# Patient Record
Sex: FEMALE | Race: WHITE | Employment: UNEMPLOYED | URBAN - METROPOLITAN AREA
[De-identification: names, ages, dates, MRNs, and addresses within clinical notes are randomized per-mention and may not be internally consistent; named-entity substitution may affect disease eponyms.]

---

## 2017-03-23 RX ORDER — ESCITALOPRAM OXALATE 20 MG/1
TABLET ORAL
Qty: 30 TABLET | Refills: 0 | Status: SHIPPED | OUTPATIENT
Start: 2017-03-23 | End: 2017-03-28 | Stop reason: SDUPTHER

## 2017-03-28 RX ORDER — ESCITALOPRAM OXALATE 20 MG/1
20 TABLET ORAL DAILY
Qty: 30 TABLET | Refills: 0 | Status: SHIPPED | OUTPATIENT
Start: 2017-03-28 | End: 2017-03-30 | Stop reason: SDUPTHER

## 2017-03-30 ENCOUNTER — OFFICE VISIT (OUTPATIENT)
Dept: FAMILY MEDICINE CLINIC | Age: 37
End: 2017-03-30

## 2017-03-30 VITALS
RESPIRATION RATE: 16 BRPM | HEART RATE: 86 BPM | WEIGHT: 166 LBS | DIASTOLIC BLOOD PRESSURE: 70 MMHG | HEIGHT: 59 IN | SYSTOLIC BLOOD PRESSURE: 124 MMHG | TEMPERATURE: 98 F | BODY MASS INDEX: 33.47 KG/M2

## 2017-03-30 DIAGNOSIS — F32.A ANXIETY AND DEPRESSION: ICD-10-CM

## 2017-03-30 DIAGNOSIS — G35 MS (MULTIPLE SCLEROSIS) (HCC): Primary | ICD-10-CM

## 2017-03-30 DIAGNOSIS — F41.9 ANXIETY AND DEPRESSION: ICD-10-CM

## 2017-03-30 DIAGNOSIS — H66.91 RIGHT OTITIS MEDIA, UNSPECIFIED CHRONICITY, UNSPECIFIED OTITIS MEDIA TYPE: ICD-10-CM

## 2017-03-30 PROCEDURE — 99213 OFFICE O/P EST LOW 20 MIN: CPT | Performed by: FAMILY MEDICINE

## 2017-03-30 RX ORDER — CEFDINIR 300 MG/1
300 CAPSULE ORAL 2 TIMES DAILY
Qty: 20 CAPSULE | Refills: 0 | Status: SHIPPED | OUTPATIENT
Start: 2017-03-30 | End: 2017-07-26 | Stop reason: SDUPTHER

## 2017-03-30 RX ORDER — LORAZEPAM 0.5 MG/1
TABLET ORAL
Qty: 60 TABLET | Refills: 1 | Status: SHIPPED | OUTPATIENT
Start: 2017-03-30 | End: 2017-05-31 | Stop reason: SDUPTHER

## 2017-03-30 RX ORDER — ESCITALOPRAM OXALATE 20 MG/1
20 TABLET ORAL DAILY
Qty: 30 TABLET | Refills: 5 | Status: SHIPPED | OUTPATIENT
Start: 2017-03-30 | End: 2017-10-29 | Stop reason: SDUPTHER

## 2017-03-30 ASSESSMENT — ENCOUNTER SYMPTOMS
GASTROINTESTINAL NEGATIVE: 1
RHINORRHEA: 0
CHEST TIGHTNESS: 0
COUGH: 0
EYES NEGATIVE: 1
RESPIRATORY NEGATIVE: 1

## 2017-05-31 DIAGNOSIS — F41.9 ANXIETY AND DEPRESSION: ICD-10-CM

## 2017-05-31 DIAGNOSIS — F32.A ANXIETY AND DEPRESSION: ICD-10-CM

## 2017-06-01 RX ORDER — LORAZEPAM 0.5 MG/1
TABLET ORAL
Qty: 60 TABLET | Refills: 0 | Status: SHIPPED | OUTPATIENT
Start: 2017-06-01 | End: 2017-06-29 | Stop reason: SDUPTHER

## 2017-06-29 ENCOUNTER — OFFICE VISIT (OUTPATIENT)
Dept: FAMILY MEDICINE CLINIC | Age: 37
End: 2017-06-29

## 2017-06-29 VITALS
DIASTOLIC BLOOD PRESSURE: 82 MMHG | HEART RATE: 74 BPM | RESPIRATION RATE: 18 BRPM | TEMPERATURE: 98.2 F | BODY MASS INDEX: 34.07 KG/M2 | SYSTOLIC BLOOD PRESSURE: 124 MMHG | WEIGHT: 169 LBS | OXYGEN SATURATION: 98 % | HEIGHT: 59 IN

## 2017-06-29 DIAGNOSIS — F32.A ANXIETY AND DEPRESSION: ICD-10-CM

## 2017-06-29 DIAGNOSIS — F41.9 ANXIETY AND DEPRESSION: ICD-10-CM

## 2017-06-29 DIAGNOSIS — G35 MS (MULTIPLE SCLEROSIS) (HCC): Primary | ICD-10-CM

## 2017-06-29 DIAGNOSIS — G62.9 NEUROPATHY: ICD-10-CM

## 2017-06-29 PROCEDURE — 99213 OFFICE O/P EST LOW 20 MIN: CPT | Performed by: FAMILY MEDICINE

## 2017-06-29 RX ORDER — LORAZEPAM 0.5 MG/1
TABLET ORAL
Qty: 60 TABLET | Refills: 1 | Status: SHIPPED | OUTPATIENT
Start: 2017-06-29 | End: 2017-12-04 | Stop reason: SDUPTHER

## 2017-06-29 RX ORDER — VARENICLINE TARTRATE 1 MG/1
1 TABLET, FILM COATED ORAL 2 TIMES DAILY
Qty: 60 TABLET | Refills: 3 | Status: SHIPPED | OUTPATIENT
Start: 2017-06-29 | End: 2018-04-10

## 2017-06-29 ASSESSMENT — ENCOUNTER SYMPTOMS
GASTROINTESTINAL NEGATIVE: 1
EYES NEGATIVE: 1
CHEST TIGHTNESS: 0
RHINORRHEA: 0
COUGH: 0
RESPIRATORY NEGATIVE: 1

## 2017-07-05 LAB
ALBUMIN SERPL-MCNC: 4 G/DL (ref 3.9–4.9)
ALP BLD-CCNC: 66 U/L (ref 40–130)
ALT SERPL-CCNC: 8 U/L (ref 0–33)
AST SERPL-CCNC: 13 U/L (ref 0–35)
BASOPHILS ABSOLUTE: 0.1 K/UL (ref 0–0.2)
BASOPHILS RELATIVE PERCENT: 1.4 %
BILIRUB SERPL-MCNC: 0.3 MG/DL (ref 0–1.2)
BILIRUBIN DIRECT: 0 MG/DL (ref 0–0.3)
BILIRUBIN, INDIRECT: 0.3 MG/DL (ref 0–0.6)
EOSINOPHILS ABSOLUTE: 0.6 K/UL (ref 0–0.7)
EOSINOPHILS RELATIVE PERCENT: 6 %
HCT VFR BLD CALC: 44.9 % (ref 37–47)
HEMOGLOBIN: 14.7 G/DL (ref 12–16)
LYMPHOCYTES ABSOLUTE: 2.6 K/UL (ref 1–4.8)
LYMPHOCYTES RELATIVE PERCENT: 27 %
MCH RBC QN AUTO: 31.9 PG (ref 27–31.3)
MCHC RBC AUTO-ENTMCNC: 32.8 % (ref 33–37)
MCV RBC AUTO: 97.2 FL (ref 82–100)
MONOCYTES ABSOLUTE: 0.7 K/UL (ref 0.2–0.8)
MONOCYTES RELATIVE PERCENT: 7.3 %
NEUTROPHILS ABSOLUTE: 5.7 K/UL (ref 1.4–6.5)
NEUTROPHILS RELATIVE PERCENT: 58.3 %
PDW BLD-RTO: 13.3 % (ref 11.5–14.5)
PLATELET # BLD: 292 K/UL (ref 130–400)
RBC # BLD: 4.62 M/UL (ref 4.2–5.4)
TOTAL PROTEIN: 6 G/DL (ref 6.4–8.1)
WBC # BLD: 9.8 K/UL (ref 4.8–10.8)

## 2017-07-26 ENCOUNTER — OFFICE VISIT (OUTPATIENT)
Dept: FAMILY MEDICINE CLINIC | Age: 37
End: 2017-07-26

## 2017-07-26 VITALS
TEMPERATURE: 98.5 F | BODY MASS INDEX: 32.2 KG/M2 | DIASTOLIC BLOOD PRESSURE: 82 MMHG | HEIGHT: 62 IN | RESPIRATION RATE: 18 BRPM | WEIGHT: 175 LBS | SYSTOLIC BLOOD PRESSURE: 134 MMHG | OXYGEN SATURATION: 97 % | HEART RATE: 75 BPM

## 2017-07-26 DIAGNOSIS — J01.90 ACUTE BACTERIAL SINUSITIS: ICD-10-CM

## 2017-07-26 DIAGNOSIS — H66.92 LEFT OTITIS MEDIA, UNSPECIFIED CHRONICITY, UNSPECIFIED OTITIS MEDIA TYPE: Primary | ICD-10-CM

## 2017-07-26 DIAGNOSIS — B96.89 ACUTE BACTERIAL SINUSITIS: ICD-10-CM

## 2017-07-26 PROCEDURE — 99213 OFFICE O/P EST LOW 20 MIN: CPT | Performed by: FAMILY MEDICINE

## 2017-07-26 RX ORDER — CEFDINIR 300 MG/1
300 CAPSULE ORAL 2 TIMES DAILY
Qty: 40 CAPSULE | Refills: 0 | Status: SHIPPED | OUTPATIENT
Start: 2017-07-26 | End: 2017-08-15

## 2017-07-26 ASSESSMENT — ENCOUNTER SYMPTOMS
COUGH: 0
RESPIRATORY NEGATIVE: 1
SORE THROAT: 1
CHEST TIGHTNESS: 0
GASTROINTESTINAL NEGATIVE: 1
SINUS PRESSURE: 1
RHINORRHEA: 0
EYES NEGATIVE: 1

## 2017-07-26 ASSESSMENT — PATIENT HEALTH QUESTIONNAIRE - PHQ9
SUM OF ALL RESPONSES TO PHQ9 QUESTIONS 1 & 2: 0
2. FEELING DOWN, DEPRESSED OR HOPELESS: 0
SUM OF ALL RESPONSES TO PHQ QUESTIONS 1-9: 0
1. LITTLE INTEREST OR PLEASURE IN DOING THINGS: 0

## 2017-08-21 ENCOUNTER — OFFICE VISIT (OUTPATIENT)
Dept: FAMILY MEDICINE CLINIC | Age: 37
End: 2017-08-21

## 2017-08-21 VITALS
WEIGHT: 176.7 LBS | HEIGHT: 62 IN | DIASTOLIC BLOOD PRESSURE: 58 MMHG | OXYGEN SATURATION: 98 % | BODY MASS INDEX: 32.52 KG/M2 | TEMPERATURE: 97.3 F | SYSTOLIC BLOOD PRESSURE: 94 MMHG | RESPIRATION RATE: 15 BRPM | HEART RATE: 73 BPM

## 2017-08-21 DIAGNOSIS — H69.93 EUSTACHIAN TUBE DISORDER, BILATERAL: Primary | ICD-10-CM

## 2017-08-21 PROCEDURE — 99212 OFFICE O/P EST SF 10 MIN: CPT | Performed by: FAMILY MEDICINE

## 2017-08-21 RX ORDER — AZITHROMYCIN 250 MG/1
TABLET, FILM COATED ORAL
Qty: 1 PACKET | Refills: 0 | Status: SHIPPED | OUTPATIENT
Start: 2017-08-21 | End: 2017-08-31

## 2017-08-21 RX ORDER — FLUTICASONE PROPIONATE 50 MCG
2 SPRAY, SUSPENSION (ML) NASAL DAILY
Qty: 1 BOTTLE | Refills: 0 | Status: SHIPPED | OUTPATIENT
Start: 2017-08-21 | End: 2017-09-18 | Stop reason: SDUPTHER

## 2017-08-21 ASSESSMENT — ENCOUNTER SYMPTOMS
VOICE CHANGE: 0
FACIAL SWELLING: 0

## 2017-09-16 RX ORDER — GABAPENTIN 300 MG/1
CAPSULE ORAL
Qty: 120 CAPSULE | Refills: 0 | Status: SHIPPED | OUTPATIENT
Start: 2017-09-16 | End: 2018-10-12 | Stop reason: DRUGHIGH

## 2017-09-18 RX ORDER — FLUTICASONE PROPIONATE 50 MCG
SPRAY, SUSPENSION (ML) NASAL
Qty: 3 BOTTLE | Refills: 3 | Status: SHIPPED | OUTPATIENT
Start: 2017-09-18 | End: 2018-04-10

## 2017-10-02 ENCOUNTER — OFFICE VISIT (OUTPATIENT)
Dept: FAMILY MEDICINE CLINIC | Age: 37
End: 2017-10-02

## 2017-10-02 VITALS
HEIGHT: 62 IN | TEMPERATURE: 98.1 F | BODY MASS INDEX: 32.94 KG/M2 | SYSTOLIC BLOOD PRESSURE: 124 MMHG | DIASTOLIC BLOOD PRESSURE: 72 MMHG | HEART RATE: 78 BPM | WEIGHT: 179 LBS | RESPIRATION RATE: 14 BRPM

## 2017-10-02 DIAGNOSIS — F41.9 ANXIETY AND DEPRESSION: ICD-10-CM

## 2017-10-02 DIAGNOSIS — F32.A ANXIETY AND DEPRESSION: ICD-10-CM

## 2017-10-02 DIAGNOSIS — G62.9 NEUROPATHY: ICD-10-CM

## 2017-10-02 DIAGNOSIS — G35 MS (MULTIPLE SCLEROSIS) (HCC): Primary | ICD-10-CM

## 2017-10-02 PROCEDURE — 99213 OFFICE O/P EST LOW 20 MIN: CPT | Performed by: FAMILY MEDICINE

## 2017-10-02 RX ORDER — CYCLOBENZAPRINE HCL 5 MG
TABLET ORAL
Refills: 0 | Status: ON HOLD | COMMUNITY
Start: 2017-09-08 | End: 2022-06-19

## 2017-10-02 RX ORDER — MELOXICAM 7.5 MG/1
TABLET ORAL
Refills: 0 | COMMUNITY
Start: 2017-09-08 | End: 2018-10-12

## 2017-10-02 ASSESSMENT — ENCOUNTER SYMPTOMS
GASTROINTESTINAL NEGATIVE: 1
CHEST TIGHTNESS: 0
COUGH: 0
EYES NEGATIVE: 1
RESPIRATORY NEGATIVE: 1
RHINORRHEA: 0

## 2017-10-02 NOTE — MR AVS SNAPSHOT
After Visit Summary             Sue Knee   10/2/2017 1:15 PM   Office Visit    Description:  Female : 1980   Provider:  Angella Reveles MD   Department:  [address] [place] [address] PCP              Your Follow-Up and Future Appointments         Below is a list of your follow-up and future appointments. This may not be a complete list as you may have made appointments directly with providers that we are not aware of or your providers may have made some for you. Please call your providers to confirm appointments. It is important to keep your appointments. Please bring your current insurance card, photo ID, co-pay, and all medication bottles to your appointment. If self-pay, payment is expected at the time of service. Your To-Do List     Future Appointments Provider Department Dept Phone    2018 1:15 PM Angella Reveles MD [address] [place] [address] [phone]    Please arrive 15 minutes prior to appointment, bring photo ID and insurance card. Information from Your Visit        Department     Name Address Phone Fax    [address] [place] [address] PCP [address] [facility] [id number] [phone]      You Were Seen for:         Comments    MS (multiple sclerosis) ([facility]   [[id number]]         Vital Signs     Blood Pressure Pulse Temperature Respirations Height Weight    124/72 78 98.1 °F (36.7 °C) (Tympanic) 14 5' 2\" (1.575 m) 179 lb (81.2 kg)    Body Mass Index Smoking Status                32.74 kg/m2 Former Smoker          Additional Information about your Body Mass Index (BMI)           Your BMI as listed above is considered obese (30 or more). BMI is an estimate of body fat, calculated from your height and weight. The higher your BMI, the greater your risk of heart disease, high blood pressure, type 2 diabetes, stroke, gallstones, arthritis, sleep apnea, and certain cancers. BMI is not perfect.   It may overestimate body fat in athletes and aged 15-65 years at least once (lifetime) who have never been HIV tested. 8/15/2026 (Originally 4/7/1995)    Pap Smear 2/10/2018    Tetanus Combination Vaccine (2 - Td) 2/1/2021            MyChart Signup           Related Content Database (RCDb) allows you to send messages to your doctor, view your test results, renew your prescriptions, schedule appointments, view visit notes, and more. How Do I Sign Up? 1. In your Internet browser, go to [email or web address]  2. Click on the Sign Up Now link in the Sign In box. You will see the New Member Sign Up page. 3. Enter your Related Content Database (RCDb) Access Code exactly as it appears below. You will not need to use this code after youve completed the sign-up process. If you do not sign up before the expiration date, you must request a new code. Related Content Database (RCDb) Access Code: [email or web address]  Expires: 12/1/2017  1:32 PM    4. Enter your Social Security Number ([id number]) and Date of Birth (mm/dd/yyyy) as indicated and click Submit. You will be taken to the next sign-up page. 5. Create a Related Content Database (RCDb) ID. This will be your Related Content Database (RCDb) login ID and cannot be changed, so think of one that is secure and easy to remember. 6. Create a Related Content Database (RCDb) password. You can change your password at any time. 7. Enter your Password Reset Question and Answer. This can be used at a later time if you forget your password. 8. Enter your e-mail address. You will receive e-mail notification when new information is available in [address]. 9. Click Sign Up. You can now view your medical record. Additional Information  If you have questions, please contact the physician practice where you receive care. Remember, Related Content Database (RCDb) is NOT to be used for urgent needs. For medical emergencies, dial 911. For questions regarding your Related Content Database (RCDb) account call [phone]. If you have a clinical question, please call your doctor's office.

## 2017-10-02 NOTE — PROGRESS NOTES
Patient is seen in follow up for   Chief Complaint   Patient presents with   Saige [address] for follow up on tmj from ent . Will discuss with her dentist.    No past medical history on file.   Patient Active Problem List    Diagnosis Date Noted    Neuropathy ([facility] 2016    Anxiety and depression 2016    MS (multiple sclerosis) ([facility] 2015     Past Surgical History:   Procedure Laterality Date    APPENDECTOMY      BREAST SURGERY      lump removal bengin     SECTION      x 3     Family History   Problem Relation Age of Onset    Diabetes Mother     Heart Disease Father      Social History     Social History    Marital status:      Spouse name: N/A    Number of children: N/A    Years of education: N/A     Social History Main Topics    Smoking status: Former Smoker     Quit date: 2017    Smokeless tobacco: Never Used    Alcohol use No    Drug use: No    Sexual activity: Not Asked     Other Topics Concern    None     Social History Narrative     Current Outpatient Prescriptions   Medication Sig Dispense Refill    meloxicam (MOBIC) 7.5 MG tablet TK 1 T PO QD  0    cyclobenzaprine (FLEXERIL) 5 MG tablet TK 1 T PO BID  0    fluticasone (FLONASE) 50 MCG/ACT nasal spray SHAKE LIQUID AND USE 2 SPRAYS IN EACH NOSTRIL DAILY 3 Bottle 3    gabapentin (NEURONTIN) 300 MG capsule TAKE 1 CAPSULE BY MOUTH FOUR TIMES DAILY 120 capsule 0    LORazepam (ATIVAN) 0.5 MG tablet TAKE 1 TABLET BY MOUTH EVERY 8 HOURS AS NEEDED FOR ANXIETY 60 tablet 1    varenicline (CHANTIX CONTINUING MONTH HEVER) 1 MG tablet Take 1 tablet by mouth 2 times daily 60 tablet 3    escitalopram (LEXAPRO) 20 MG tablet Take 1 tablet by mouth daily 30 tablet 5    AUBAGIO 14 MG TABS       oxyCODONE-acetaminophen (PERCOCET) 5-325 MG per tablet Take 1 tablet by mouth every 6 hours as needed for Pain 60 tablet 0    zolpidem (AMBIEN) 10 MG tablet Take 1 tablet by mouth nightly as needed for Sleep 30 Musculoskeletal: Negative. Skin: Negative. Neurological: Negative for dizziness, light-headedness and numbness. Hematological: Negative. Psychiatric/Behavioral: Negative. Physical Exam  Vitals:    10/02/17 1315   BP: 124/72   Pulse: 78   Resp: 14   Temp: 98.1 °F (36.7 °C)   TempSrc: Tympanic   Weight: 179 lb (81.2 kg)   Height: 5' 2\" (1.575 m)       Physical Exam   Constitutional: She appears well-developed and well-nourished. HENT:   Right Ear: External ear normal.   Left Ear: External ear normal.   Eyes: Conjunctivae are normal. Pupils are equal, round, and reactive to light. Neck: Normal range of motion. Neck supple. No thyromegaly present. Cardiovascular: Normal rate, regular rhythm and normal heart sounds. No murmur heard. Pulmonary/Chest: Effort normal and breath sounds normal.   Abdominal: Soft. Bowel sounds are normal. She exhibits no distension. There is no tenderness. Musculoskeletal: Normal range of motion. She exhibits no edema or tenderness. Lymphadenopathy:     She has no cervical adenopathy. Neurological: She is alert. No cranial nerve deficit. Coordination normal.       Assessment  1. MS (multiple sclerosis) ([address]     2. Anxiety and depression     3. Neuropathy ([address]       Problem List Items Addressed This Visit     MS (multiple sclerosis) ([address] - Primary    Relevant Medications    AUBAGIO 14 MG TABS    Neuropathy (HCC)    Anxiety and depression    Relevant Medications    LORazepam (ATIVAN) 0.5 MG tablet          Plan  Will follow uop with  for MS and Dentist for the tmj  No orders of the defined types were placed in this encounter. No Follow-up on file.   Xavi Willson MD

## 2017-10-18 RX ORDER — GABAPENTIN 300 MG/1
CAPSULE ORAL
Qty: 120 CAPSULE | Refills: 0 | Status: SHIPPED | OUTPATIENT
Start: 2017-10-18 | End: 2018-04-10 | Stop reason: SDUPTHER

## 2017-10-30 RX ORDER — ESCITALOPRAM OXALATE 20 MG/1
20 TABLET ORAL DAILY
Qty: 30 TABLET | Refills: 0 | Status: SHIPPED | OUTPATIENT
Start: 2017-10-30 | End: 2017-12-19 | Stop reason: SDUPTHER

## 2017-11-01 LAB
ALBUMIN SERPL-MCNC: 4.3 G/DL (ref 3.9–4.9)
ALP BLD-CCNC: 68 U/L (ref 40–130)
ALT SERPL-CCNC: 13 U/L (ref 0–33)
AST SERPL-CCNC: 18 U/L (ref 0–35)
BASOPHILS ABSOLUTE: 0.1 K/UL (ref 0–0.2)
BASOPHILS RELATIVE PERCENT: 1.4 %
BILIRUB SERPL-MCNC: 0.3 MG/DL (ref 0–1.2)
BILIRUBIN DIRECT: 0 MG/DL (ref 0–0.3)
BILIRUBIN, INDIRECT: 0.3 MG/DL (ref 0–0.6)
EOSINOPHILS ABSOLUTE: 0.6 K/UL (ref 0–0.7)
EOSINOPHILS RELATIVE PERCENT: 7.5 %
HCT VFR BLD CALC: 42.3 % (ref 37–47)
HEMOGLOBIN: 13.8 G/DL (ref 12–16)
LYMPHOCYTES ABSOLUTE: 2.7 K/UL (ref 1–4.8)
LYMPHOCYTES RELATIVE PERCENT: 31.7 %
MCH RBC QN AUTO: 31.4 PG (ref 27–31.3)
MCHC RBC AUTO-ENTMCNC: 32.6 % (ref 33–37)
MCV RBC AUTO: 96.2 FL (ref 82–100)
MONOCYTES ABSOLUTE: 0.7 K/UL (ref 0.2–0.8)
MONOCYTES RELATIVE PERCENT: 8.5 %
NEUTROPHILS ABSOLUTE: 4.3 K/UL (ref 1.4–6.5)
NEUTROPHILS RELATIVE PERCENT: 50.9 %
PDW BLD-RTO: 13.9 % (ref 11.5–14.5)
PLATELET # BLD: 316 K/UL (ref 130–400)
RBC # BLD: 4.39 M/UL (ref 4.2–5.4)
TOTAL PROTEIN: 6.6 G/DL (ref 6.4–8.1)
WBC # BLD: 8.5 K/UL (ref 4.8–10.8)

## 2017-11-14 ENCOUNTER — HOSPITAL ENCOUNTER (OUTPATIENT)
Dept: MRI IMAGING | Age: 37
Discharge: HOME | End: 2017-11-14
Payer: COMMERCIAL

## 2017-11-14 ENCOUNTER — HOSPITAL ENCOUNTER (OUTPATIENT)
Dept: PHYSICAL THERAPY | Age: 37
Setting detail: THERAPIES SERIES
Discharge: HOME | End: 2017-11-14
Payer: COMMERCIAL

## 2017-11-14 DIAGNOSIS — G35 MS (MULTIPLE SCLEROSIS) (HCC): ICD-10-CM

## 2017-11-14 PROCEDURE — 6360000004 HC RX CONTRAST MEDICATION: Performed by: PSYCHIATRY & NEUROLOGY

## 2017-11-14 PROCEDURE — 70553 MRI BRAIN STEM W/O & W/DYE: CPT

## 2017-11-14 PROCEDURE — A9579 GAD-BASE MR CONTRAST NOS,1ML: HCPCS | Performed by: PSYCHIATRY & NEUROLOGY

## 2017-11-14 PROCEDURE — 97162 PT EVAL MOD COMPLEX 30 MIN: CPT

## 2017-11-14 RX ORDER — SODIUM CHLORIDE 0.9 % (FLUSH) 0.9 %
10 SYRINGE (ML) INJECTION PRN
Status: DISCONTINUED | OUTPATIENT
Start: 2017-11-14 | End: 2017-11-17 | Stop reason: HOSPADM

## 2017-11-14 RX ADMIN — GADOTERIDOL 15 ML: 279.3 INJECTION, SOLUTION INTRAVENOUS at 18:34

## 2017-11-14 NOTE — PROGRESS NOTES
Curt willis, [address]     Ph: [phone]  Fax: [phone]    [] Certification  [] Recertification [x]  Plan of Care  [] Progress Note [] Discharge      To:  Referring Practitioner: Winda Galeazzi      From:  Randi Live PT  Patient: Kirit Saavedra     : 1980  Diagnosis: Ataxia, Multiple Sclerosis     Date: 2017  Treatment Diagnosis: MS, gait instability, balance impairment, frequent falls       OBJECTIVE:   Short Term Goals - Time Frame for Short term goals: 2 weeks     Goals Current/Discharge status  Met   Short term goal 1: Pt will be indep with HEP  Pt needs further instruction and education    [] yes  [x] no   Short term goal 2: Pt will report no falls throughout course of PT  Reports 6-10 falls per month [] yes  [x] no     Long Term Goals - Time Frame for Long term goals : 4 weeks   Goals Current/ Discharge status Met   Long term goal 1: DGI will increase to >/=20/24 to demonstrate decreased risk of falls   [] yes  [x] no   Long term goal 2: Pt will demonstrate 4+/5 strength in B hips for improved activity tolerance . Strength RLE  Comment: hip flex, ABD, ext, HS, ankle DF 4-/5  Strength LLE  Comment: hip flex, ABD, ext, HS, ankle DF 4-/5        Strength Other  Other: muscle fatigue quickly with MMT   [] yes  [x] no   Long term goal 3: Pt will complete 15' activity on land and pool without a rest break  ~5' activity tolerance  [] yes  [x] no       Body structures, Functions, Activity limitations: Decreased functional mobility , Decreased strength, Decreased balance, Decreased endurance, Decreased high-level IADLs  Assessment: Pt presents with history of MS and frequent with reports of functional decline and impaired balance and activity tolerance. Pt demonstrates impaired endurance, decreased strength, impaired dynamic stability,a nd increased risk for falls.  Pt would benefit from skilled PT to improve safety with mobility and indep function. Will trial aquatics per physician order and assess pt tolerance to warm water pool. New Education Provided: POC, aquatic guidelines    PLAN: [] Evaluate and Treat  Frequency/Duration:  Plan  Times per week: 2-3 pool and land combination. Based on tolerance may try cooler public pool. Plan weeks: 4  Current Treatment Recommendations: Strengthening, ROM, Balance Training, Transfer Training, Functional Mobility Training, Gait Training, Stair training, Neuromuscular Re-education, Manual Therapy - Soft Tissue Mobilization, Home Exercise Program, Safety Education & Training, Equipment Evaluation, Education, & procurement, Modalities, Patient/Caregiver Education & Training, Endurance Training, IADL Training  Plan Comment: Transfer POC to [employer], PT             ? Patient Status:[x] Continue/ Initiate plan of Care    [] Discharge PT. Recommend pt continue with HEP. [] Additional visits requested, Please re-certify for additional visits:          Signature: Electronically signed by Flaco Childers PT on 11/14/17 at 2:02 PM      If you have any questions or concerns, please don't hesitate to call. Thank you for your referral.    I have reviewed this plan of care and certify a need for medically necessary rehabilitation services.     Physician Signature:__________________________________________________________  Date:  Please sign and return

## 2017-11-14 NOTE — PROGRESS NOTES
instablity  Distance: DGI, short distances around clinic  Comments: DGI: 14/24, 1 rest break, c/o dizziness with horizontal head turns             Strength RLE  Comment: hip flex, ABD, ext, HS, ankle DF 4-/5  Strength LLE  Comment: hip flex, ABD, ext, HS, ankle DF 4-/5        Strength Other  Other: muscle fatigue quickly with MMT    PROM RLE (degrees)  RLE PROM: WFL     PROM LLE (degrees)  LLE PROM: WFL        Spine  Lumbar: WFL       Exercises:   Exercises  Exercise 1: see paper PRE for aquatic ex's  Exercise 3: ramirez tasks*  Exercise 4: dgi tasks*  Exercise 5: LE and core strengthening*  Exercise 6: gait training*  Exercise 7: activity and amb for endurance*       Assessment: Body structures, Functions, Activity limitations: Decreased functional mobility , Decreased strength, Decreased balance, Decreased endurance, Decreased high-level IADLs  Assessment: Pt presents with history of MS and frequent with reports of functional decline and impaired balance and activity tolerance. Pt demonstrates impaired endurance, decreased strength, impaired dynamic stability,a nd increased risk for falls. Pt would benefit from skilled PT to improve safety with mobility and indep function. Will trial aquatics per physician order and assess pt tolerance to warm water pool. Activity Tolerance: Patient limited by endurance  Activity Tolerance: limited tolerance for activity     Decision Making: Medium Complexity  History: MS, frequent falls, personal factors   Exam: Pain and sensory, musculoskeletal systems impacting ROM, strength, ADL/IADL completion, DGO 14/24  Clinical Presentation: evolving         Plan  Frequency/Duration:  Plan  Times per week: 2-3 pool and land combination. Based on tolerance may try cooler public pool.    Plan weeks: 4  Current Treatment Recommendations: Strengthening, ROM, Balance Training, Transfer Training, Functional Mobility Training, Gait Training, Stair training, Neuromuscular Re-education, Manual Therapy - Soft Tissue Mobilization, Home Exercise Program, Safety Education & Training, Equipment Evaluation, Education, & procurement, Modalities, Patient/Caregiver Education & Training, Endurance Training, IADL Training  Plan Comment: Transfer POC to Amy Moncada PT       Patient Education  New Education Provided: POC, aquatic guidelines    POST-PAIN     Pain Rating (0-10 pain scale):   0/10  Location and pain description same as pre-treatment unless indicated. Action: [x] NA  [] Call Physician  [] Perform HEP  [] Meds as prescribed    Evaluation and patient rights have been reviewed and patient agrees with plan of care. Yes  [x]  No  []   Explain:       Guzman Fall Risk Assessment  Risk Factor Scale  Score   History of Falls [x] Yes  [] No 25  0 25   Secondary Diagnosis [] Yes  [x] No 15  0 0   Ambulatory Aid [] Furniture  [x] Crutches/cane/walker  [] None/bedrest/wheelchair/nurse 30  15  0 15   IV/Heparin Lock [] Yes  [x] No 20  0 0   Gait/Transferring [x] Impaired  [] Weak  [] Normal/bedrest/immobile 20  10  0 20   Mental Status [] Forgets limitations  [x] Oriented to own ability 15  0 0      Total:50     Based on the Assessment score: check the appropriate box.   []  No intervention needed   Low =   Score of 0-24  []  Use standard prevention interventions Moderate =  Score of 24-44   [] Discuss fall prevention strategies   [] Indicate moderate falls risk on eval  [x]  Use high risk prevention interventions High = Score of 45 and higher   [x] Discuss fall prevention strategies   [x] Provide supervision during treatment time    Goals  Short term goals  Time Frame for Short term goals: 2 weeks   Short term goal 1: Pt will be indep with HEP  Short term goal 2: Pt will report no falls throughout course of PT  Long term goals  Time Frame for Long term goals : 4 weeks   Long term goal 1: DGI will increase to >/=20/24 to demonstrate decreased risk of falls   Long term goal 2: Pt will demonstrate 4+/5 strength in B hips for improved activity tolerance  Long term goal 3: Pt will complete 15' activity on land and pool without a rest break          PT Individual Minutes  Time In: 1304  Time Out: 1342  Minutes: 38  Timed Code Treatment Minutes: 0 Minutes  Procedure Minutes: 45  Electronically signed by Ethan Rivera PT on 11/14/17 at 1:13 PM

## 2017-11-21 ENCOUNTER — HOSPITAL ENCOUNTER (OUTPATIENT)
Dept: PHYSICAL THERAPY | Age: 37
Setting detail: THERAPIES SERIES
Discharge: HOME | End: 2017-11-21
Payer: COMMERCIAL

## 2017-11-21 PROCEDURE — 97110 THERAPEUTIC EXERCISES: CPT

## 2017-11-21 PROCEDURE — 97112 NEUROMUSCULAR REEDUCATION: CPT

## 2017-11-21 RX ORDER — GABAPENTIN 300 MG/1
CAPSULE ORAL
Qty: 120 CAPSULE | Refills: 0 | Status: SHIPPED | OUTPATIENT
Start: 2017-11-21 | End: 2018-04-10 | Stop reason: SDUPTHER

## 2017-11-21 ASSESSMENT — PAIN DESCRIPTION - DESCRIPTORS: DESCRIPTORS: ACHING

## 2017-11-21 ASSESSMENT — PAIN SCALES - GENERAL: PAINLEVEL_OUTOF10: 6

## 2017-11-21 NOTE — PROGRESS NOTES
[address] [address]  Outpatient Physical Therapy    Treatment Note        Date: 2017  Patient: Eugenia Hanson  : 1980  ACCT #: [de-identified]  Referring Practitioner: Tyler Leigh  Diagnosis: Ataxia, Multiple Sclerosis    Visit Information:  PT Visit Information  PT Insurance Information: [employer]  Total # of Visits Approved: 30  Total # of Visits to Date: 2  No Show: 0  Canceled Appointment: 0  Progress Note Counter:     Subjective: Pt reports feeling fatigued today after going to [facility] [employer] for hearing for disability. Vital Signs  Patient Currently in Pain: Yes   Pain Screening  Patient Currently in Pain: Yes  Pain Assessment  Pain Assessment: 0-10  Pain Level: 6  Pain Location:  (all over)  Pain Descriptors: Aching    OBJECTIVE:   Exercises  Exercise 2: Foam: FA, semitandem  Exercise 5: SLR (flex, abd and ext) x10 louis  Exercise 7: Sitting drawing alphabet with ball x1 louis  Exercise 8: NuStep L1 8'  Exercise 9: Sit to stand from chair without UEs x5  Exercise 20: HEP: 3 way SLR    Balance  Comments: Quan = 42/56    Assessment:   Activity Tolerance  Activity Tolerance: Patient limited by fatigue    Body structures, Functions, Activity limitations: Decreased functional mobility , Decreased strength, Decreased balance, Decreased endurance, Decreased high-level IADLs  Assessment: Pt at an increased risk for falls per Quan score. Pt with increased shaking with static standing activities that improves when distracted. Pt required multiple short RBs during session due to fatigue. Demos decreased LE control with 3 way SLR likely due to decreased strength.   Treatment Diagnosis: MS, gait instability, balance impairment, frequent falls   Prognosis: Good       Goals:  Short term goals  Time Frame for Short term goals: 2 weeks   Short term goal 1: Pt will be indep with HEP  Short term goal 2: Pt will report no falls throughout course of PT    Long term goals  Time Frame for Long term goals : 4

## 2017-11-24 ENCOUNTER — HOSPITAL ENCOUNTER (OUTPATIENT)
Dept: PHYSICAL THERAPY | Age: 37
Setting detail: THERAPIES SERIES
Discharge: HOME | End: 2017-11-24
Payer: COMMERCIAL

## 2017-11-24 PROCEDURE — 97113 AQUATIC THERAPY/EXERCISES: CPT

## 2017-11-24 ASSESSMENT — PAIN DESCRIPTION - PAIN TYPE: TYPE: CHRONIC PAIN

## 2017-11-24 ASSESSMENT — PAIN DESCRIPTION - DESCRIPTORS: DESCRIPTORS: ACHING;SORE

## 2017-11-24 ASSESSMENT — PAIN SCALES - GENERAL: PAINLEVEL_OUTOF10: 8

## 2017-11-28 ENCOUNTER — HOSPITAL ENCOUNTER (OUTPATIENT)
Dept: PHYSICAL THERAPY | Age: 37
Setting detail: THERAPIES SERIES
Discharge: HOME | End: 2017-11-28
Payer: COMMERCIAL

## 2017-11-28 PROCEDURE — 97110 THERAPEUTIC EXERCISES: CPT

## 2017-11-28 PROCEDURE — 97112 NEUROMUSCULAR REEDUCATION: CPT

## 2017-11-28 ASSESSMENT — PAIN SCALES - GENERAL: PAINLEVEL_OUTOF10: 7

## 2017-11-28 ASSESSMENT — PAIN DESCRIPTION - ORIENTATION: ORIENTATION: UPPER

## 2017-11-28 ASSESSMENT — PAIN DESCRIPTION - LOCATION: LOCATION: BACK;ARM

## 2017-11-28 NOTE — PROGRESS NOTES
term goal 1: Pt will be indep with HEP  Short term goal 2: Pt will report no falls throughout course of PT    Long term goals  Time Frame for Long term goals : 4 weeks   Long term goal 1: DGI will increase to >/=20/24 to demonstrate decreased risk of falls   Long term goal 2: Pt will demonstrate 4+/5 strength in B hips for improved activity tolerance  Long term goal 3: Pt will complete 15' activity on land and pool without a rest break   Long term goal 4: Quan >/= 48/56  Progress toward goals: strength, balance    POST-PAIN       Pain Rating (0-10 pain scale): 0 /10   Location and pain description same as pre-treatment unless indicated. Action: [x] NA   [] Perform HEP  [] Meds as prescribed  [] Modalities as prescribed   [] Call Physician     Frequency/Duration:  Plan  Times per week: 2-3 pool and land combination. Based on tolerance may try cooler public pool. Plan weeks: 4  Current Treatment Recommendations: Strengthening, ROM, Balance Training, Transfer Training, Functional Mobility Training, Gait Training, Stair training, Neuromuscular Re-education, Manual Therapy - Soft Tissue Mobilization, Home Exercise Program, Safety Education & Training, Equipment Evaluation, Education, & procurement, Modalities, Patient/Caregiver Education & Training, Endurance Training, IADL Training  Plan Comment: Transfer POC to [employer], PT     Pt to continue current HEP. See objective section for any therapeutic exercise changes, additions or modifications this date.     PT Individual Minutes  Time In: 4737  Time Out: 8441  Minutes: 56  Timed Code Treatment Minutes: 55 Minutes  Procedure Minutes:0  There ex: 28'  Neuro alan: 20'    Signature:  Electronically signed by Sudeep Saldaña PT on 11/28/17 at 1:08 PM

## 2017-11-30 NOTE — PROGRESS NOTES
[address]       Physical Therapy  Cancellation/No-show Note  Patient Name:  Bong Samuel  :  1980   Date:  2017  Referring Practitioner: Keegan Leon  Diagnosis: Ataxia, Multiple Sclerosis    Visit Information:  PT Visit Information  PT Insurance Information: [employer]  Total # of Visits Approved: 30  Total # of Visits to Date: 4  No Show: 0  Canceled Appointment: 1  Progress Note Counter: ( cx 12-)24 hr notice    For today's appointment patient:  [x]  Cancelled  []  Rescheduled appointment  []  No-show   []  Called pt to remind of next appointment     Reason given by patient:  []  Patient ill  [x]  Conflicting appointment  []  No transportation    []  Conflict with work  []  No reason given  []  Other:       Comments:       Signature: Electronically signed by Jerrica Landry PTA on 17 at 6:55 PM

## 2017-12-01 ENCOUNTER — HOSPITAL ENCOUNTER (OUTPATIENT)
Dept: PHYSICAL THERAPY | Age: 37
Setting detail: THERAPIES SERIES
Discharge: HOME | End: 2017-12-01
Payer: COMMERCIAL

## 2017-12-04 DIAGNOSIS — F32.A ANXIETY AND DEPRESSION: ICD-10-CM

## 2017-12-04 DIAGNOSIS — F41.9 ANXIETY AND DEPRESSION: ICD-10-CM

## 2017-12-04 RX ORDER — LORAZEPAM 0.5 MG/1
TABLET ORAL
Qty: 60 TABLET | Refills: 0 | Status: SHIPPED | OUTPATIENT
Start: 2017-12-04 | End: 2018-04-10

## 2017-12-05 ENCOUNTER — HOSPITAL ENCOUNTER (OUTPATIENT)
Dept: PHYSICAL THERAPY | Age: 37
Setting detail: THERAPIES SERIES
Discharge: HOME | End: 2017-12-05
Payer: COMMERCIAL

## 2017-12-05 PROCEDURE — 97110 THERAPEUTIC EXERCISES: CPT

## 2017-12-05 PROCEDURE — 97112 NEUROMUSCULAR REEDUCATION: CPT

## 2017-12-05 ASSESSMENT — PAIN SCALES - GENERAL: PAINLEVEL_OUTOF10: 8

## 2017-12-05 ASSESSMENT — PAIN DESCRIPTION - PAIN TYPE: TYPE: CHRONIC PAIN

## 2017-12-05 ASSESSMENT — PAIN DESCRIPTION - LOCATION: LOCATION: FOOT

## 2017-12-05 NOTE — PROGRESS NOTES
[address] [address]  Outpatient Physical Therapy    Treatment Note        Date: 2017  Patient: Sherryle Guess  : 1980  ACCT #: [de-identified]  Referring Practitioner: Adalgisa Haley  Diagnosis: Ataxia, Multiple Sclerosis    Visit Information:  PT Visit Information  PT Insurance Information: [employer]  Total # of Visits Approved: 30  Total # of Visits to Date: 5  No Show: 0  Canceled Appointment: 1  Progress Note Counter:     Subjective: Pt reports increased soreness and N&Ting in b/l feet today. Pt states this is not uncommon and occcurs occassionally. Pt reports having to elevate b/l LE's often at home when this happens. Pt reports no falls since beginning PT, however reports \"furniture surfing\" at home. HEP Compliance:  [x] Good [] Fair [] Poor [] Reports not doing due to:    Vital Signs  Patient Currently in Pain: Yes   Pain Screening  Patient Currently in Pain: Yes  Pain Assessment  Pain Assessment: 0-10  Pain Level: 8  Pain Type: Chronic pain  Pain Location: Foot  Pain Descriptors: Burning;Sore;Tingling;Numbness    OBJECTIVE:   Exercises  Exercise 5: bridges 5''x10  Exercise 8: NuStep L1 10'  Exercise 11: Single stepping over s/c x10 louis  Exercise 12: 4-way hip w/o resistance w/ 0-2 UE support x10 b/l  Exercise 13: TA isos 5''x10  Exercise 14: DLS 3-way Y71 b/l  Exercise 15: hip series 4-way x10 ea b/l  Exercise 16: hip circles x10 b/l (F/R)    Strength: [x] NT  [] MMT completed:    ROM: [x] NT  [] ROM measurements:    *Indicates exercise, modality, or manual techniques to be initiated when appropriate    Assessment: Body structures, Functions, Activity limitations: Decreased functional mobility , Decreased strength, Decreased balance, Decreased endurance, Decreased high-level IADLs  Assessment: Tx focus on open chain LE and core strengthening this date d/t decreased lida standing d/t soreness and N&Ting in b/l feet. Pt challenged by 4-way hip w/o UE support or fingertip support.   Pt

## 2017-12-08 ENCOUNTER — HOSPITAL ENCOUNTER (OUTPATIENT)
Dept: PHYSICAL THERAPY | Age: 37
Setting detail: THERAPIES SERIES
Discharge: HOME | End: 2017-12-08
Payer: COMMERCIAL

## 2017-12-08 PROCEDURE — 97113 AQUATIC THERAPY/EXERCISES: CPT

## 2017-12-08 NOTE — PROGRESS NOTES
[address] [address]  Outpatient Physical Therapy    Treatment Note        Date: 2017  Patient: Neema Tadeo  : 1980  ACCT #: [de-identified]  Referring Practitioner: Frank Richards  Diagnosis: Ataxia, Multiple Sclerosis    Visit Information:  PT Visit Information  PT Insurance Information: [employer]  Total # of Visits Approved: 30  Total # of Visits to Date: 6  No Show: 0  Canceled Appointment: 1  Progress Note Counter: -    Subjective: Pt states she has been sore but doing ok. Pt reports she is doing better with no falls just LOB and using furniture and walls. HEP Compliance:  [x] Good [] Fair [] Poor [] Reports not doing due to:    Vital Signs  Patient Currently in Pain: Yes   Pain Screening  Patient Currently in Pain: Yes    OBJECTIVE:   Exercises  Exercise 1: see paper PRE for aquatic ex's      *Indicates exercise, modality, or manual techniques to be initiated when appropriate    Assessment:   Activity Tolerance  Activity Tolerance: Patient Tolerated treatment well    Body structures, Functions, Activity limitations: Decreased functional mobility , Decreased strength, Decreased balance, Decreased endurance, Decreased high-level IADLs  Assessment: Pt tolerated pool exercises. Added balance activities- feet together EO/EC, tandem EO,EC Pt was able to tolerate the new activities. Pt reported no pain just soreness throughout session.    Treatment Diagnosis: MS, gait instability, balance impairment, frequent falls      Patient Education: POC, aquatic guidelines    Goals:  Short term goals  Time Frame for Short term goals: 2 weeks   Short term goal 1: Pt will be indep with HEP  Short term goal 2: Pt will report no falls throughout course of PT    Long term goals  Time Frame for Long term goals : 4 weeks   Long term goal 1: DGI will increase to >/=20/24 to demonstrate decreased risk of falls   Long term goal 2: Pt will demonstrate 4+/5 strength in B hips for improved activity tolerance  Long term

## 2017-12-12 ENCOUNTER — HOSPITAL ENCOUNTER (OUTPATIENT)
Dept: PHYSICAL THERAPY | Age: 37
Setting detail: THERAPIES SERIES
Discharge: HOME | End: 2017-12-12
Payer: COMMERCIAL

## 2017-12-12 NOTE — PROGRESS NOTES
[address]       Physical Therapy  Cancellation/No-show Note  Patient Name:  Marla Mendiola  :  1980   Date:  2017  Referring Practitioner: Kinza Escalera  Diagnosis: Ataxia, Multiple Sclerosis    Visit Information:  PT Visit Information  [facility]  Total # of Visits Approved: 30  Total # of Visits to Date: 6  No Show: 0  Canceled Appointment: 2  Progress Note Counter: ( cx )    For today's appointment patient:  [x]  Cancelled  []  Rescheduled appointment  []  No-show   []  Called pt to remind of next appointment     Reason given by patient:  []  Patient ill  []  Conflicting appointment  []  No transportation    []  Conflict with work  []  No reason given  [x]  Other:  Death of a friend     Comments:       Signature: Electronically signed by Dinh Anne PTA on 17 at 11:05 AM

## 2017-12-15 ENCOUNTER — HOSPITAL ENCOUNTER (OUTPATIENT)
Dept: PHYSICAL THERAPY | Age: 37
Setting detail: THERAPIES SERIES
Discharge: HOME | End: 2017-12-15
Payer: COMMERCIAL

## 2017-12-15 NOTE — PROGRESS NOTES
[address]       Physical Therapy  Cancellation/No-show Note  Patient Name:  Ned Street  :  1980   Date:  12/15/2017  Referring Practitioner: Jt Barros  Diagnosis: Ataxia, Multiple Sclerosis    Visit Information:  PT Visit Information  PT Insurance Information: [facility]  Total # of Visits Approved: 30  Total # of Visits to Date: 6  No Show: 0  Canceled Appointment: 3  Progress Note Counter: -( cx -15)    For today's appointment patient:  [x]  Cancelled  []  Rescheduled appointment  []  No-show   []  Called pt to remind of next appointment     Reason given by patient:  []  Patient ill  []  Conflicting appointment  []  No transportation    []  Conflict with work  [x]  No reason given  []  Other:       Comments:    Pt reports calling from in patient therapy.     Signature: Electronically signed by Carmine Schrader PTA on 12/15/17 at 8:26 AM

## 2017-12-19 ENCOUNTER — HOSPITAL ENCOUNTER (OUTPATIENT)
Dept: PHYSICAL THERAPY | Age: 37
Setting detail: THERAPIES SERIES
Discharge: HOME | End: 2017-12-19
Payer: COMMERCIAL

## 2017-12-19 PROCEDURE — 97116 GAIT TRAINING THERAPY: CPT

## 2017-12-19 PROCEDURE — 97110 THERAPEUTIC EXERCISES: CPT

## 2017-12-19 PROCEDURE — 97112 NEUROMUSCULAR REEDUCATION: CPT

## 2017-12-19 RX ORDER — GABAPENTIN 300 MG/1
CAPSULE ORAL
Qty: 120 CAPSULE | Refills: 0 | Status: SHIPPED | OUTPATIENT
Start: 2017-12-19 | End: 2018-02-28 | Stop reason: SDUPTHER

## 2017-12-19 RX ORDER — GABAPENTIN 300 MG/1
CAPSULE ORAL
Qty: 120 CAPSULE | Refills: 0 | Status: SHIPPED | OUTPATIENT
Start: 2017-12-19 | End: 2018-04-10 | Stop reason: SDUPTHER

## 2017-12-19 RX ORDER — ESCITALOPRAM OXALATE 20 MG/1
20 TABLET ORAL DAILY
Qty: 30 TABLET | Refills: 0 | Status: SHIPPED | OUTPATIENT
Start: 2017-12-19 | End: 2018-01-17 | Stop reason: SDUPTHER

## 2017-12-19 ASSESSMENT — PAIN SCALES - GENERAL: PAINLEVEL_OUTOF10: 6

## 2017-12-19 ASSESSMENT — PAIN DESCRIPTION - ORIENTATION: ORIENTATION: RIGHT;LEFT

## 2017-12-19 ASSESSMENT — PAIN DESCRIPTION - PAIN TYPE: TYPE: CHRONIC PAIN

## 2017-12-19 ASSESSMENT — PAIN DESCRIPTION - DESCRIPTORS: DESCRIPTORS: SORE

## 2017-12-19 ASSESSMENT — PAIN DESCRIPTION - LOCATION: LOCATION: LEG

## 2017-12-26 ENCOUNTER — HOSPITAL ENCOUNTER (OUTPATIENT)
Dept: PHYSICAL THERAPY | Age: 37
Setting detail: THERAPIES SERIES
Discharge: HOME | End: 2017-12-26
Payer: COMMERCIAL

## 2017-12-26 PROCEDURE — 97112 NEUROMUSCULAR REEDUCATION: CPT

## 2017-12-26 PROCEDURE — 97110 THERAPEUTIC EXERCISES: CPT

## 2017-12-26 ASSESSMENT — PAIN DESCRIPTION - DESCRIPTORS: DESCRIPTORS: SHARP

## 2017-12-26 ASSESSMENT — PAIN SCALES - GENERAL: PAINLEVEL_OUTOF10: 8

## 2017-12-26 ASSESSMENT — PAIN DESCRIPTION - ORIENTATION: ORIENTATION: LOWER;RIGHT

## 2017-12-26 ASSESSMENT — PAIN DESCRIPTION - PAIN TYPE: TYPE: CHRONIC PAIN

## 2017-12-26 ASSESSMENT — PAIN DESCRIPTION - LOCATION: LOCATION: BACK;HIP

## 2017-12-26 NOTE — PROGRESS NOTES
Goals:  Short term goals  Time Frame for Short term goals: 2 weeks   Short term goal 1: Pt will be indep with HEP  Short term goal 2: Pt will report no falls throughout course of PT    Long term goals  Time Frame for Long term goals : 4 weeks   Long term goal 1: DGI will increase to >/=20/24 to demonstrate decreased risk of falls   Long term goal 2: Pt will demonstrate 4+/5 strength in B hips for improved activity tolerance  Long term goal 3: Pt will complete 15' activity on land and pool without a rest break   Long term goal 4: Quan >/= 48/56  Progress toward goals: DGI, balance, standing tolerance    POST-PAIN       Pain Rating (0-10 pain scale):  8 /10   Location and pain description same as pre-treatment unless indicated. Action: [] NA   [] Perform HEP  [x] Meds as prescribed  [x] Modalities as prescribed   [] Call Physician     Frequency/Duration:  Plan  Times per week: 2-3 pool and land combination. Based on tolerance may try cooler public pool. Plan weeks: 4  Current Treatment Recommendations: Strengthening, ROM, Balance Training, Transfer Training, Functional Mobility Training, Gait Training, Stair training, Neuromuscular Re-education, Manual Therapy - Soft Tissue Mobilization, Home Exercise Program, Safety Education & Training, Equipment Evaluation, Education, & procurement, Modalities, Patient/Caregiver Education & Training, Endurance Training, IADL Training  Plan Comment: Transfer POC to [employer], PT     Pt to continue current HEP. See objective section for any therapeutic exercise changes, additions or modifications this date.     PT Individual Minutes  Time In: 2963  Time Out: 6953  Minutes: 54  Timed Code Treatment Minutes: 53 Minutes  Procedure Minutes:0  Neuro alan:38'  There ex: 15'    Signature:  Electronically signed by Petra Mcadams, PT on 12/26/17 at 11:12 AM

## 2018-01-02 NOTE — PROGRESS NOTES
[address]       Physical Therapy  Cancellation/No-show Note  Patient Name:  Cristina Orellana  :  1980   Date:  2018  Referring Practitioner: Jaron Noe  Diagnosis: Ataxia, Multiple Sclerosis     Visit Information:  PT Visit Information  [facility]  Total # of Visits Approved: 30  Total # of Visits to Date: 8  No Show: 2  Canceled Appointment: 4  Progress Note Counter: - (cx)    For today's appointment patient:  [x]  Cancelled  []  Rescheduled appointment  []  No-show   []  Called pt to remind of next appointment     Reason given by patient:  []  Patient ill  []  Conflicting appointment  []  No transportation    []  Conflict with work  []  No reason given  [x]  Other:   Wants to put therapy on hold and be discharged for now. Pt reports has a lot of personal issues going on. Instructed pt to get new order to resume PT.      Comments:       Signature: Electronically signed by Toi Garay PT on 18 at 2:03 PM

## 2018-01-02 NOTE — PROGRESS NOTES
[facility]    [x]  [address]  []  [address] Jodie Barrientos [address] [place] [address]  Ph: [phone]     Ph: [phone]  Fax: [phone]     Fax: [phone]    [] Certification  [] Recertification []  Plan of Care  [] Progress Note [x] Discharge    Date: 2018  Patient Name: Jerrell Weinberg  : 1980  MRN: [id number]    To:  Dr. Rhona Mireles    From: Inocencio Metcalf PT     [x]    FYI: Pt requests to be discharged at this time due to a lot of personal issues going on. Informed pt she would need a new Rx to resume PT. Please see last POC/ Progress Report for last measured functional status. D/C at this time      Thank you for your referral and the opportunity to treat this patient. Please contact us with any questions or concerns.     Electronically signed by Inocencio Metcalf PT on 2018 at 2:07 PM

## 2018-01-05 ENCOUNTER — HOSPITAL ENCOUNTER (OUTPATIENT)
Dept: PHYSICAL THERAPY | Age: 38
Setting detail: THERAPIES SERIES
Discharge: HOME | End: 2018-01-05
Payer: COMMERCIAL

## 2018-01-17 RX ORDER — ESCITALOPRAM OXALATE 20 MG/1
20 TABLET ORAL DAILY
Qty: 30 TABLET | Refills: 0 | Status: SHIPPED | OUTPATIENT
Start: 2018-01-17 | End: 2018-02-15 | Stop reason: SDUPTHER

## 2018-02-15 RX ORDER — ESCITALOPRAM OXALATE 20 MG/1
20 TABLET ORAL DAILY
Qty: 30 TABLET | Refills: 0 | Status: SHIPPED | OUTPATIENT
Start: 2018-02-15 | End: 2018-03-22 | Stop reason: SDUPTHER

## 2018-02-28 RX ORDER — GABAPENTIN 300 MG/1
CAPSULE ORAL
Qty: 120 CAPSULE | Refills: 2 | Status: SHIPPED | OUTPATIENT
Start: 2018-02-28 | End: 2018-04-10 | Stop reason: SDUPTHER

## 2018-03-22 RX ORDER — ESCITALOPRAM OXALATE 20 MG/1
20 TABLET ORAL DAILY
Qty: 30 TABLET | Refills: 0 | Status: SHIPPED | OUTPATIENT
Start: 2018-03-22 | End: 2018-04-29 | Stop reason: SDUPTHER

## 2018-04-10 ENCOUNTER — OFFICE VISIT (OUTPATIENT)
Dept: INTERNAL MEDICINE CLINIC | Age: 38
End: 2018-04-10
Payer: MEDICARE

## 2018-04-10 VITALS
RESPIRATION RATE: 16 BRPM | DIASTOLIC BLOOD PRESSURE: 70 MMHG | SYSTOLIC BLOOD PRESSURE: 128 MMHG | TEMPERATURE: 97.8 F | HEART RATE: 98 BPM | HEIGHT: 60 IN | OXYGEN SATURATION: 95 % | BODY MASS INDEX: 38.48 KG/M2 | WEIGHT: 196 LBS

## 2018-04-10 DIAGNOSIS — G35 MS (MULTIPLE SCLEROSIS) (HCC): Primary | ICD-10-CM

## 2018-04-10 DIAGNOSIS — G62.9 NEUROPATHY: ICD-10-CM

## 2018-04-10 PROCEDURE — 1036F TOBACCO NON-USER: CPT | Performed by: FAMILY MEDICINE

## 2018-04-10 PROCEDURE — G8427 DOCREV CUR MEDS BY ELIG CLIN: HCPCS | Performed by: FAMILY MEDICINE

## 2018-04-10 PROCEDURE — G8417 CALC BMI ABV UP PARAM F/U: HCPCS | Performed by: FAMILY MEDICINE

## 2018-04-10 PROCEDURE — 99213 OFFICE O/P EST LOW 20 MIN: CPT | Performed by: FAMILY MEDICINE

## 2018-04-10 RX ORDER — TRIAMTERENE AND HYDROCHLOROTHIAZIDE 37.5; 25 MG/1; MG/1
1 TABLET ORAL DAILY
Qty: 30 TABLET | Refills: 3 | Status: SHIPPED | OUTPATIENT
Start: 2018-04-10 | End: 2018-04-10 | Stop reason: SDUPTHER

## 2018-04-10 RX ORDER — ACETAMINOPHEN 160 MG
TABLET,DISINTEGRATING ORAL
Refills: 3 | COMMUNITY
Start: 2018-03-28 | End: 2020-06-03 | Stop reason: SDUPTHER

## 2018-04-10 RX ORDER — SOLIFENACIN SUCCINATE 10 MG/1
TABLET, FILM COATED ORAL
Refills: 3 | COMMUNITY
Start: 2018-03-28 | End: 2018-10-12 | Stop reason: ALTCHOICE

## 2018-04-10 RX ORDER — ZOLPIDEM TARTRATE 10 MG/1
10 TABLET ORAL NIGHTLY PRN
Qty: 30 TABLET | Refills: 5 | Status: SHIPPED | OUTPATIENT
Start: 2018-04-10 | End: 2018-05-10

## 2018-04-10 ASSESSMENT — ENCOUNTER SYMPTOMS
EYES NEGATIVE: 1
RESPIRATORY NEGATIVE: 1
CHEST TIGHTNESS: 0
RHINORRHEA: 0
GASTROINTESTINAL NEGATIVE: 1
COUGH: 0

## 2018-04-11 RX ORDER — TRIAMTERENE AND HYDROCHLOROTHIAZIDE 37.5; 25 MG/1; MG/1
1 TABLET ORAL DAILY
Qty: 90 TABLET | Refills: 3 | Status: SHIPPED | OUTPATIENT
Start: 2018-04-11 | End: 2019-05-21 | Stop reason: SDUPTHER

## 2018-04-30 RX ORDER — GABAPENTIN 300 MG/1
CAPSULE ORAL
Qty: 120 CAPSULE | Refills: 2 | Status: SHIPPED | OUTPATIENT
Start: 2018-04-30 | End: 2018-10-12 | Stop reason: DRUGHIGH

## 2018-04-30 RX ORDER — ESCITALOPRAM OXALATE 20 MG/1
20 TABLET ORAL DAILY
Qty: 30 TABLET | Refills: 0 | Status: SHIPPED | OUTPATIENT
Start: 2018-04-30 | End: 2018-07-15 | Stop reason: SDUPTHER

## 2018-04-30 RX ORDER — ESCITALOPRAM OXALATE 20 MG/1
20 TABLET ORAL DAILY
Qty: 30 TABLET | Refills: 0 | Status: SHIPPED | OUTPATIENT
Start: 2018-04-30 | End: 2018-07-19 | Stop reason: SDUPTHER

## 2018-07-09 RX ORDER — GABAPENTIN 300 MG/1
CAPSULE ORAL
Qty: 120 CAPSULE | Refills: 0 | Status: SHIPPED | OUTPATIENT
Start: 2018-07-09 | End: 2018-08-08 | Stop reason: SDUPTHER

## 2018-07-12 LAB
ALBUMIN SERPL-MCNC: 3.9 G/DL (ref 3.9–4.9)
ALP BLD-CCNC: 82 U/L (ref 40–130)
ALT SERPL-CCNC: 8 U/L (ref 0–33)
AST SERPL-CCNC: 11 U/L (ref 0–35)
BASOPHILS ABSOLUTE: 0 K/UL (ref 0–0.2)
BASOPHILS RELATIVE PERCENT: 0.2 %
BILIRUB SERPL-MCNC: 0.3 MG/DL (ref 0–1.2)
BILIRUBIN DIRECT: <0.2 MG/DL (ref 0–0.3)
BILIRUBIN, INDIRECT: NORMAL MG/DL (ref 0–0.6)
EOSINOPHILS ABSOLUTE: 0.4 K/UL (ref 0–0.7)
EOSINOPHILS RELATIVE PERCENT: 3.8 %
HCT VFR BLD CALC: 44.3 % (ref 37–47)
HEMOGLOBIN: 14.8 G/DL (ref 12–16)
LYMPHOCYTES ABSOLUTE: 2.3 K/UL (ref 1–4.8)
LYMPHOCYTES RELATIVE PERCENT: 21 %
MCH RBC QN AUTO: 32 PG (ref 27–31.3)
MCHC RBC AUTO-ENTMCNC: 33.5 % (ref 33–37)
MCV RBC AUTO: 95.5 FL (ref 82–100)
MONOCYTES ABSOLUTE: 0.5 K/UL (ref 0.2–0.8)
MONOCYTES RELATIVE PERCENT: 4.7 %
NEUTROPHILS ABSOLUTE: 7.8 K/UL (ref 1.4–6.5)
NEUTROPHILS RELATIVE PERCENT: 70.3 %
PDW BLD-RTO: 14.3 % (ref 11.5–14.5)
PLATELET # BLD: 320 K/UL (ref 130–400)
RBC # BLD: 4.64 M/UL (ref 4.2–5.4)
TOTAL PROTEIN: 6.7 G/DL (ref 6.4–8.1)
WBC # BLD: 11.1 K/UL (ref 4.8–10.8)

## 2018-07-16 RX ORDER — ESCITALOPRAM OXALATE 20 MG/1
20 TABLET ORAL DAILY
Qty: 30 TABLET | Refills: 0 | Status: SHIPPED | OUTPATIENT
Start: 2018-07-16 | End: 2019-06-12 | Stop reason: SDUPTHER

## 2018-07-19 RX ORDER — ESCITALOPRAM OXALATE 20 MG/1
20 TABLET ORAL DAILY
Qty: 30 TABLET | Refills: 0 | Status: SHIPPED | OUTPATIENT
Start: 2018-07-19 | End: 2018-08-07 | Stop reason: SDUPTHER

## 2018-08-07 ENCOUNTER — OFFICE VISIT (OUTPATIENT)
Dept: INTERNAL MEDICINE CLINIC | Age: 38
End: 2018-08-07
Payer: COMMERCIAL

## 2018-08-07 VITALS
HEIGHT: 60 IN | SYSTOLIC BLOOD PRESSURE: 98 MMHG | DIASTOLIC BLOOD PRESSURE: 60 MMHG | WEIGHT: 183 LBS | TEMPERATURE: 99 F | HEART RATE: 63 BPM | RESPIRATION RATE: 16 BRPM | OXYGEN SATURATION: 98 % | BODY MASS INDEX: 35.93 KG/M2

## 2018-08-07 DIAGNOSIS — H66.92 LEFT OTITIS MEDIA, UNSPECIFIED OTITIS MEDIA TYPE: ICD-10-CM

## 2018-08-07 DIAGNOSIS — G62.9 NEUROPATHY: Primary | ICD-10-CM

## 2018-08-07 PROCEDURE — G8417 CALC BMI ABV UP PARAM F/U: HCPCS | Performed by: FAMILY MEDICINE

## 2018-08-07 PROCEDURE — G8427 DOCREV CUR MEDS BY ELIG CLIN: HCPCS | Performed by: FAMILY MEDICINE

## 2018-08-07 PROCEDURE — 99213 OFFICE O/P EST LOW 20 MIN: CPT | Performed by: FAMILY MEDICINE

## 2018-08-07 PROCEDURE — 1036F TOBACCO NON-USER: CPT | Performed by: FAMILY MEDICINE

## 2018-08-07 RX ORDER — CEFDINIR 300 MG/1
300 CAPSULE ORAL 2 TIMES DAILY
Qty: 20 CAPSULE | Refills: 0 | Status: SHIPPED | OUTPATIENT
Start: 2018-08-07 | End: 2018-08-17

## 2018-08-07 RX ORDER — GABAPENTIN 600 MG/1
600 TABLET ORAL 3 TIMES DAILY
Qty: 90 TABLET | Refills: 5 | Status: SHIPPED | OUTPATIENT
Start: 2018-08-07 | End: 2019-07-01 | Stop reason: SDUPTHER

## 2018-08-07 RX ORDER — ESCITALOPRAM OXALATE 20 MG/1
20 TABLET ORAL DAILY
Qty: 30 TABLET | Refills: 5 | Status: SHIPPED | OUTPATIENT
Start: 2018-08-07 | End: 2018-10-12 | Stop reason: SDUPTHER

## 2018-08-07 RX ORDER — GABAPENTIN 300 MG/1
CAPSULE ORAL
Qty: 120 CAPSULE | Refills: 0 | Status: CANCELLED | OUTPATIENT
Start: 2018-08-07

## 2018-08-07 ASSESSMENT — ENCOUNTER SYMPTOMS
EYES NEGATIVE: 1
CHEST TIGHTNESS: 0
RESPIRATORY NEGATIVE: 1
RHINORRHEA: 0
COUGH: 0
GASTROINTESTINAL NEGATIVE: 1

## 2018-08-07 ASSESSMENT — PATIENT HEALTH QUESTIONNAIRE - PHQ9
SUM OF ALL RESPONSES TO PHQ QUESTIONS 1-9: 2
SUM OF ALL RESPONSES TO PHQ9 QUESTIONS 1 & 2: 2
1. LITTLE INTEREST OR PLEASURE IN DOING THINGS: 1
2. FEELING DOWN, DEPRESSED OR HOPELESS: 1
SUM OF ALL RESPONSES TO PHQ QUESTIONS 1-9: 2

## 2018-08-07 NOTE — PROGRESS NOTES
Medications    escitalopram (LEXAPRO) 20 MG tablet     Sig: Take 1 tablet by mouth daily     Dispense:  30 tablet     Refill:  5    gabapentin (NEURONTIN) 600 MG tablet     Sig: Take 1 tablet by mouth 3 times daily for 30 days. .     Dispense:  90 tablet     Refill:  5    cefdinir (OMNICEF) 300 MG capsule     Sig: Take 1 capsule by mouth 2 times daily for 10 days     Dispense:  20 capsule     Refill:  0     No Follow-up on file.   Junior Ayana MD

## 2018-08-08 RX ORDER — GABAPENTIN 300 MG/1
CAPSULE ORAL
Qty: 120 CAPSULE | Refills: 0 | Status: SHIPPED | OUTPATIENT
Start: 2018-08-08 | End: 2018-10-12 | Stop reason: DRUGHIGH

## 2018-09-21 LAB
ANION GAP SERPL CALCULATED.3IONS-SCNC: 13 MMOL/L (ref 10–20)
BICARBONATE: 27 MMOL/L (ref 21–32)
BUN / CREAT RATIO: 17 (ref 5–25)
CALCIUM SERPL-MCNC: 9.2 MG/DL (ref 8.6–10.3)
CHLORIDE BLD-SCNC: 104 MMOL/L (ref 98–107)
CREAT SERPL-MCNC: 0.84 MG/DL (ref 0.5–1.05)
ERYTHROCYTE [DISTWIDTH] IN BLOOD BY AUTOMATED COUNT: 13.9 % (ref 12–15.4)
ERYTHROCYTE [DISTWIDTH] IN BLOOD BY AUTOMATED COUNT: 46.8 FL (ref 39.3–48.6)
FOLATE: 5 NG/ML
GFR CALCULATED: >60
GLUCOSE: 172 MG/DL (ref 70–100)
HCT VFR BLD CALC: 43 % (ref 36.5–46.6)
HEMOGLOBIN: 14.3 G/DL (ref 11.8–15.3)
MCH RBC QN AUTO: 30.7 PG (ref 27.5–33)
MCHC RBC AUTO-ENTMCNC: 33.3 G/DL (ref 30.1–35)
MCV RBC AUTO: 92.3 FL (ref 85.4–100)
NUCLEATED RBCS: 0 /100{WBCS}
PLATELET # BLD: 349 10*3/UL (ref 155–404)
PMV BLD AUTO: 10.5 FL (ref 9.9–12.1)
POTASSIUM SERPL-SCNC: 3.7 MMOL/L (ref 3.5–5.1)
RBC: 4.66 10*6/UL (ref 3.85–5.1)
RBCS COUNTED: 0 10*3/UL
SODIUM BLD-SCNC: 140 MMOL/L (ref 136–145)
TSH SERPL DL<=0.05 MIU/L-ACNC: 0.48 MU/L (ref 0.44–3.98)
UREA NITROGEN: 14 MG/DL (ref 6–23)
VITAMIN B-12: 247 PG/ML (ref 211–911)
VITAMIN D 25-HYDROXY: 25 NG/ML
WBC: 12.8 10*3/UL (ref 4.4–9.9)

## 2018-10-01 ENCOUNTER — TELEPHONE (OUTPATIENT)
Dept: INTERNAL MEDICINE CLINIC | Age: 38
End: 2018-10-01

## 2018-10-01 NOTE — TELEPHONE ENCOUNTER
[facility] Transitions Initial Follow Up Call    Outreach made within 2 business days of discharge: Yes    Patient: Trisha Bustamante Patient : 1980   MRN: [id number]  Reason for Admission: There are no discharge diagnoses documented for the most recent discharge. Discharge Date: 2018       Spoke with: Joy Doe    Discharge department/facility: [facility] Interactive Patient Contact:  Was patient able to fill all prescriptions: Yes  Was patient instructed to bring all medications to the follow-up visit: Yes  Is patient taking all medications as directed in the discharge summary?  Yes  Does patient understand their discharge instructions: Yes  Does patient have questions or concerns that need addressed prior to 7-14 day follow up office visit: no    Scheduled appointment with PCP within 7-14 days    Follow Up  Future Appointments  Date Time Provider Rema Smart   10/3/2018 3:15 PM Remedios Cole MD [address] [place] [address]

## 2018-10-12 ENCOUNTER — OFFICE VISIT (OUTPATIENT)
Dept: INTERNAL MEDICINE CLINIC | Age: 38
End: 2018-10-12
Payer: COMMERCIAL

## 2018-10-12 VITALS
RESPIRATION RATE: 16 BRPM | HEIGHT: 59 IN | HEART RATE: 108 BPM | SYSTOLIC BLOOD PRESSURE: 120 MMHG | DIASTOLIC BLOOD PRESSURE: 74 MMHG | WEIGHT: 184.8 LBS | BODY MASS INDEX: 37.25 KG/M2 | TEMPERATURE: 98.9 F | OXYGEN SATURATION: 97 %

## 2018-10-12 DIAGNOSIS — G35 MS (MULTIPLE SCLEROSIS) (HCC): Primary | ICD-10-CM

## 2018-10-12 DIAGNOSIS — M79.601 RIGHT ARM PAIN: ICD-10-CM

## 2018-10-12 PROCEDURE — G8427 DOCREV CUR MEDS BY ELIG CLIN: HCPCS | Performed by: FAMILY MEDICINE

## 2018-10-12 PROCEDURE — 1036F TOBACCO NON-USER: CPT | Performed by: FAMILY MEDICINE

## 2018-10-12 PROCEDURE — G8484 FLU IMMUNIZE NO ADMIN: HCPCS | Performed by: FAMILY MEDICINE

## 2018-10-12 PROCEDURE — G8417 CALC BMI ABV UP PARAM F/U: HCPCS | Performed by: FAMILY MEDICINE

## 2018-10-12 PROCEDURE — 99213 OFFICE O/P EST LOW 20 MIN: CPT | Performed by: FAMILY MEDICINE

## 2018-10-12 RX ORDER — NAPROXEN 500 MG/1
TABLET ORAL
COMMUNITY
End: 2020-03-12

## 2018-10-12 RX ORDER — HYDROCODONE BITARTRATE AND ACETAMINOPHEN 5; 325 MG/1; MG/1
1 TABLET ORAL EVERY 6 HOURS PRN
Qty: 28 TABLET | Refills: 0 | Status: SHIPPED | OUTPATIENT
Start: 2018-10-12 | End: 2018-10-19

## 2018-10-12 RX ORDER — VARENICLINE TARTRATE 1 MG/1
1 TABLET, FILM COATED ORAL 2 TIMES DAILY
Qty: 60 TABLET | Refills: 5 | Status: SHIPPED | OUTPATIENT
Start: 2018-10-12 | End: 2019-06-12

## 2018-10-12 RX ORDER — FESOTERODINE FUMARATE 8 MG/1
1 TABLET, EXTENDED RELEASE ORAL DAILY
COMMUNITY
End: 2020-03-12

## 2018-10-12 ASSESSMENT — ENCOUNTER SYMPTOMS
COUGH: 0
GASTROINTESTINAL NEGATIVE: 1
RESPIRATORY NEGATIVE: 1
RHINORRHEA: 0
EYES NEGATIVE: 1
CHEST TIGHTNESS: 0

## 2018-10-15 ENCOUNTER — TELEPHONE (OUTPATIENT)
Dept: FAMILY MEDICINE CLINIC | Age: 38
End: 2018-10-15

## 2018-10-22 ENCOUNTER — TELEPHONE (OUTPATIENT)
Dept: FAMILY MEDICINE CLINIC | Age: 38
End: 2018-10-22

## 2018-10-23 ENCOUNTER — HOSPITAL ENCOUNTER (OUTPATIENT)
Dept: NEUROLOGY | Age: 38
Discharge: HOME | End: 2018-10-23
Payer: COMMERCIAL

## 2018-10-23 PROCEDURE — 95911 NRV CNDJ TEST 9-10 STUDIES: CPT

## 2018-10-23 PROCEDURE — 95886 MUSC TEST DONE W/N TEST COMP: CPT

## 2018-10-23 NOTE — PROCEDURES
[address]                     [address], [address] [facility]                            ELECTROMYOGRAM REPORT    PATIENT NAME: ALEXANDRIA PRICE                        :         1980  MED REC NO:   [id number]                            ROOM:  ACCOUNT NO:   [de-identified]                           ADMIT DATE:  10/23/2018  PROVIDER:     Suzy Horn MD    DATE OF EMG:  10/23/2018    Neurophysiologic studies are requested for the patient's significant  pain and numbness in the right arm. Motor nerve conduction studies of the right median nerve shows normal  amplitudes, latencies, and conduction velocity. The right ulnar nerve  shows normal amplitudes, latencies, and conduction velocity. The left  ulnar nerve shows normal amplitudes, latencies, and conduction  velocity. Sensory nerve conduction studies of the right median nerve recorded in  digit two shows normal amplitudes, latencies, and conduction velocity. Further mid palm stimulation was obtained to confirm findings shows  mildly prolonged latencies, low amplitudes, and decreased velocity. The left median digital examination shows normal amplitudes,  latencies, and conduction velocity. The left median mid palm  stimulation shows normal amplitudes, latencies, and conduction  velocity. The right ulnar digital examination shows normal peak  latencies, low amplitudes, and normal conduction velocity. The left  ulnar digital examination shows normal peak latencies, low amplitudes,  and normal conduction velocity. The right ulnar mixed orthodromic  study shows normal amplitudes, latencies, and conduction velocity. The left ulnar mixed orthodromic study shows normal amplitudes,  latencies, and conduction velocity. Radial and sensory nerve  conduction studies are not recordable bilaterally.   Needle electrode  examination of the above sampled muscles shows decreased units at the  right abductor pollicis brevis

## 2019-05-21 RX ORDER — TRIAMTERENE AND HYDROCHLOROTHIAZIDE 37.5; 25 MG/1; MG/1
1 TABLET ORAL DAILY
Qty: 90 TABLET | Refills: 1 | Status: SHIPPED | OUTPATIENT
Start: 2019-05-21 | End: 2020-03-12

## 2019-06-12 ENCOUNTER — OFFICE VISIT (OUTPATIENT)
Dept: FAMILY MEDICINE CLINIC | Age: 39
End: 2019-06-12
Payer: COMMERCIAL

## 2019-06-12 VITALS
RESPIRATION RATE: 18 BRPM | HEIGHT: 60 IN | DIASTOLIC BLOOD PRESSURE: 76 MMHG | SYSTOLIC BLOOD PRESSURE: 110 MMHG | TEMPERATURE: 98.4 F | BODY MASS INDEX: 33.96 KG/M2 | OXYGEN SATURATION: 98 % | WEIGHT: 173 LBS | HEART RATE: 72 BPM

## 2019-06-12 DIAGNOSIS — B96.89 ACUTE BACTERIAL SINUSITIS: Primary | ICD-10-CM

## 2019-06-12 DIAGNOSIS — J01.90 ACUTE BACTERIAL SINUSITIS: Primary | ICD-10-CM

## 2019-06-12 PROCEDURE — G8417 CALC BMI ABV UP PARAM F/U: HCPCS | Performed by: FAMILY MEDICINE

## 2019-06-12 PROCEDURE — 1036F TOBACCO NON-USER: CPT | Performed by: FAMILY MEDICINE

## 2019-06-12 PROCEDURE — G8427 DOCREV CUR MEDS BY ELIG CLIN: HCPCS | Performed by: FAMILY MEDICINE

## 2019-06-12 PROCEDURE — 99213 OFFICE O/P EST LOW 20 MIN: CPT | Performed by: FAMILY MEDICINE

## 2019-06-12 RX ORDER — VARENICLINE TARTRATE 1 MG/1
1 TABLET, FILM COATED ORAL 2 TIMES DAILY
Qty: 60 TABLET | Refills: 5 | Status: SHIPPED | OUTPATIENT
Start: 2019-06-12 | End: 2020-03-12 | Stop reason: ALTCHOICE

## 2019-06-12 RX ORDER — ESCITALOPRAM OXALATE 20 MG/1
20 TABLET ORAL DAILY
Qty: 90 TABLET | Refills: 3 | Status: SHIPPED | OUTPATIENT
Start: 2019-06-12 | End: 2020-06-05 | Stop reason: SDUPTHER

## 2019-06-12 RX ORDER — CEFDINIR 300 MG/1
300 CAPSULE ORAL 2 TIMES DAILY
Qty: 20 CAPSULE | Refills: 0 | Status: SHIPPED | OUTPATIENT
Start: 2019-06-12 | End: 2019-06-22

## 2019-06-12 ASSESSMENT — PATIENT HEALTH QUESTIONNAIRE - PHQ9
2. FEELING DOWN, DEPRESSED OR HOPELESS: 1
SUM OF ALL RESPONSES TO PHQ QUESTIONS 1-9: 2
SUM OF ALL RESPONSES TO PHQ QUESTIONS 1-9: 2
SUM OF ALL RESPONSES TO PHQ9 QUESTIONS 1 & 2: 2
1. LITTLE INTEREST OR PLEASURE IN DOING THINGS: 1

## 2019-06-12 ASSESSMENT — ENCOUNTER SYMPTOMS
RESPIRATORY NEGATIVE: 1
COUGH: 0
SINUS COMPLAINT: 1
GASTROINTESTINAL NEGATIVE: 1
RHINORRHEA: 0
CHEST TIGHTNESS: 0
EYES NEGATIVE: 1
SINUS PRESSURE: 1

## 2019-06-12 NOTE — PROGRESS NOTES
Patient is seen in follow up for   Chief Complaint   Patient presents with    Multiple Sclerosis     follow up on MS    Nicotine Dependence     Would like to try chantix again      Sinus Problem   This is a new problem. The current episode started in the past 7 days. The problem is unchanged. She is experiencing no pain. Associated symptoms include congestion, headaches and sinus pressure. Pertinent negatives include no coughing. Past treatments include nothing. The treatment provided no relief. No past medical history on file.   Patient Active Problem List    Diagnosis Date Noted    Neuropathy 2016    Anxiety and depression 2016    [facility]     Past Surgical History:   Procedure Laterality Date    APPENDECTOMY      BREAST SURGERY      lump removal bengin     SECTION      x 3     Family History   Problem Relation Age of Onset    Diabetes Mother     Heart Disease Father      Social History     Socioeconomic History    Marital status:      Spouse name: None    Number of children: None    Years of education: None    Highest education level: None   Occupational History    None   Social Needs    Financial resource strain: None    Food insecurity:     Worry: None     Inability: None    Transportation needs:     Medical: None     Non-medical: None   Tobacco Use    Smoking status: Former Smoker     Last attempt to quit: 2017     Years since quittin.9    Smokeless tobacco: Never Used   Substance and Sexual Activity    Alcohol use: No     Alcohol/week: 0.0 oz    Drug use: No    Sexual activity: None   Lifestyle    Physical activity:     Days per week: None     Minutes per session: None    Stress: None   Relationships    Social connections:     Talks on phone: None     Gets together: None     Attends Bahai service: None     Active member of club or organization: None     Attends meetings of clubs or organizations: None Relationship status: None    Intimate partner violence:     Fear of current or ex partner: None     Emotionally abused: None     Physically abused: None     Forced sexual activity: None   Other Topics Concern    None   Social History Narrative    None     Current Outpatient Medications   Medication Sig Dispense Refill    escitalopram (LEXAPRO) 20 MG tablet Take 1 tablet by mouth daily 90 tablet 3    cefdinir (OMNICEF) 300 MG capsule Take 1 capsule by mouth 2 times daily for 10 days 20 capsule 0    varenicline (CHANTIX) 1 MG tablet Take 1 tablet by mouth 2 times daily 60 tablet 5    triamterene-hydrochlorothiazide (MAXZIDE-25) 37.5-25 MG per tablet Take 1 tablet by mouth daily 90 tablet 1    naproxen (NAPROSYN) 500 MG tablet       Fesoterodine Fumarate ER (TOVIAZ) 8 MG TB24 Take 1 tablet by mouth daily      Handicap Placard MISC by Does not apply route 1 each 0    Cholecalciferol (VITAMIN D3) 2000 units CAPS TK 1 C PO D  3    cyclobenzaprine (FLEXERIL) 5 MG tablet TK 1 T PO BID  0    AUBAGIO 14 MG TABS Take 14 mg by mouth daily       baclofen (LIORESAL) 10 MG tablet Take 10 mg by mouth 4 times daily   0    levonorgestrel (MIRENA) 20 MCG/24HR IUD 1 each by Intrauterine route      gabapentin (NEURONTIN) 600 MG tablet Take 1 tablet by mouth 3 times daily for 30 days. . 90 tablet 5     No current facility-administered medications for this visit.       Current Outpatient Medications on File Prior to Visit   Medication Sig Dispense Refill    triamterene-hydrochlorothiazide (MAXZIDE-25) 37.5-25 MG per tablet Take 1 tablet by mouth daily 90 tablet 1    naproxen (NAPROSYN) 500 MG tablet       Fesoterodine Fumarate ER (TOVIAZ) 8 MG TB24 Take 1 tablet by mouth daily      Handicap Placard MISC by Does not apply route 1 each 0    Cholecalciferol (VITAMIN D3) 2000 units CAPS TK 1 C PO D  3    cyclobenzaprine (FLEXERIL) 5 MG tablet TK 1 T PO BID  0    AUBAGIO 14 MG TABS Take 14 mg by mouth daily       baclofen (LIORESAL) 10 MG tablet Take 10 mg by mouth 4 times daily   0    levonorgestrel (MIRENA) 20 MCG/24HR IUD 1 each by Intrauterine route      gabapentin (NEURONTIN) 600 MG tablet Take 1 tablet by mouth 3 times daily for 30 days. . 90 tablet 5     No current facility-administered medications on file prior to visit. No Known Allergies  Health Maintenance   Topic Date Due    Varicella Vaccine (1 of 2 - 13+ 2-dose series) 04/07/1993    HIV screen  08/15/2026 (Originally 4/7/1995)    Potassium monitoring  10/03/2019    Creatinine monitoring  10/03/2019    Cervical cancer screen  10/25/2019    DTaP/Tdap/Td vaccine (2 - Td) 02/01/2021    Flu vaccine  Completed    Pneumococcal 0-64 years Vaccine  Aged Out       Review of Systems     Review of Systems   Constitutional: Negative for activity change, appetite change, fatigue and fever. HENT: Positive for congestion and sinus pressure. Negative for rhinorrhea. Eyes: Negative. Respiratory: Negative. Negative for cough and chest tightness. Cardiovascular: Negative. Gastrointestinal: Negative. Endocrine: Negative. Genitourinary: Negative. Musculoskeletal: Negative. Skin: Negative. Neurological: Positive for headaches. Negative for dizziness, light-headedness and numbness. Hematological: Negative. Psychiatric/Behavioral: Negative. Physical Exam  Vitals:    06/12/19 1110   BP: 110/76   Site: Left Upper Arm   Position: Sitting   Cuff Size: Large Adult   Pulse: 72   Resp: 18   Temp: 98.4 °F (36.9 °C)   TempSrc: Oral   SpO2: 98%   Weight: 173 lb (78.5 kg)   Height: 4' 11.5\" (1.511 m)       Physical Exam   Constitutional: She is oriented to person, place, and time. She appears well-developed and well-nourished. HENT:   Right Ear: External ear normal.   Left Ear: External ear normal.   Mouth/Throat: Oropharynx is clear and moist.   Eyes: Pupils are equal, round, and reactive to light. EOM are normal.   Neck: Normal range of motion. Neck supple. No thyromegaly present. Cardiovascular: Normal rate, regular rhythm and normal heart sounds. Exam reveals no gallop and no friction rub. No murmur heard. Pulmonary/Chest: Effort normal. No respiratory distress. She has no wheezes. She has no rales. She exhibits no tenderness. Abdominal: Soft. Bowel sounds are normal. She exhibits no distension and no mass. There is no tenderness. There is no rebound and no guarding. Musculoskeletal: Normal range of motion. Lymphadenopathy:     She has no cervical adenopathy. Neurological: She is alert and oriented to person, place, and time. No cranial nerve deficit. Coordination normal.   Skin: Skin is warm and dry. Psychiatric: She has a normal mood and affect. Assessment   Diagnosis Orders   1. Acute bacterial sinusitis       Problem List     None          Plan  No orders of the defined types were placed in this encounter. Orders Placed This Encounter   Medications    escitalopram (LEXAPRO) 20 MG tablet     Sig: Take 1 tablet by mouth daily     Dispense:  90 tablet     Refill:  3    cefdinir (OMNICEF) 300 MG capsule     Sig: Take 1 capsule by mouth 2 times daily for 10 days     Dispense:  20 capsule     Refill:  0    varenicline (CHANTIX) 1 MG tablet     Sig: Take 1 tablet by mouth 2 times daily     Dispense:  60 tablet     Refill:  5   Call if getting worse.   Sonia Art MD

## 2019-06-20 ENCOUNTER — TELEPHONE (OUTPATIENT)
Dept: FAMILY MEDICINE CLINIC | Age: 39
End: 2019-06-20

## 2019-06-21 ENCOUNTER — TELEPHONE (OUTPATIENT)
Dept: FAMILY MEDICINE CLINIC | Age: 39
End: 2019-06-21

## 2019-07-01 RX ORDER — GABAPENTIN 600 MG/1
TABLET ORAL
Qty: 90 TABLET | Refills: 0 | Status: SHIPPED | OUTPATIENT
Start: 2019-07-01 | End: 2019-07-31 | Stop reason: SDUPTHER

## 2019-07-31 LAB
ALBUMIN SERPL-MCNC: 4.3 G/DL (ref 3.5–4.6)
ALP BLD-CCNC: 63 U/L (ref 40–130)
ALT SERPL-CCNC: 9 U/L (ref 0–33)
AST SERPL-CCNC: 14 U/L (ref 0–35)
BASOPHILS ABSOLUTE: 0.1 K/UL (ref 0–0.2)
BASOPHILS RELATIVE PERCENT: 1.2 %
BILIRUB SERPL-MCNC: 0.4 MG/DL (ref 0.2–0.7)
BILIRUBIN DIRECT: <0.2 MG/DL (ref 0–0.4)
BILIRUBIN, INDIRECT: NORMAL MG/DL (ref 0–0.6)
EOSINOPHILS ABSOLUTE: 0.2 K/UL (ref 0–0.7)
EOSINOPHILS RELATIVE PERCENT: 3.5 %
HCT VFR BLD CALC: 44.3 % (ref 37–47)
HEMOGLOBIN: 15 G/DL (ref 12–16)
LYMPHOCYTES ABSOLUTE: 2.5 K/UL (ref 1–4.8)
LYMPHOCYTES RELATIVE PERCENT: 35.2 %
MCH RBC QN AUTO: 32.5 PG (ref 27–31.3)
MCHC RBC AUTO-ENTMCNC: 33.9 % (ref 33–37)
MCV RBC AUTO: 96 FL (ref 82–100)
MONOCYTES ABSOLUTE: 0.6 K/UL (ref 0.2–0.8)
MONOCYTES RELATIVE PERCENT: 8.5 %
NEUTROPHILS ABSOLUTE: 3.7 K/UL (ref 1.4–6.5)
NEUTROPHILS RELATIVE PERCENT: 51.6 %
PDW BLD-RTO: 13.7 % (ref 11.5–14.5)
PLATELET # BLD: 311 K/UL (ref 130–400)
RBC # BLD: 4.61 M/UL (ref 4.2–5.4)
TOTAL PROTEIN: 7.2 G/DL (ref 6.3–8)
WBC # BLD: 7.2 K/UL (ref 4.8–10.8)

## 2019-07-31 RX ORDER — GABAPENTIN 600 MG/1
TABLET ORAL
Qty: 90 TABLET | Refills: 0 | Status: SHIPPED | OUTPATIENT
Start: 2019-07-31 | End: 2019-09-03 | Stop reason: SDUPTHER

## 2019-09-03 ENCOUNTER — OFFICE VISIT (OUTPATIENT)
Dept: OBGYN CLINIC | Age: 39
End: 2019-09-03
Payer: COMMERCIAL

## 2019-09-03 VITALS
WEIGHT: 176 LBS | BODY MASS INDEX: 35.48 KG/M2 | DIASTOLIC BLOOD PRESSURE: 80 MMHG | HEIGHT: 59 IN | SYSTOLIC BLOOD PRESSURE: 120 MMHG

## 2019-09-03 DIAGNOSIS — Z12.31 ENCOUNTER FOR SCREENING MAMMOGRAM FOR BREAST CANCER: ICD-10-CM

## 2019-09-03 DIAGNOSIS — N93.0 PCB (POST COITAL BLEEDING): ICD-10-CM

## 2019-09-03 DIAGNOSIS — Z01.419 WOMEN'S ANNUAL ROUTINE GYNECOLOGICAL EXAMINATION: Primary | ICD-10-CM

## 2019-09-03 DIAGNOSIS — Z97.5 PRESENCE OF INTRAUTERINE CONTRACEPTIVE DEVICE (IUD): ICD-10-CM

## 2019-09-03 DIAGNOSIS — Z01.419 WOMEN'S ANNUAL ROUTINE GYNECOLOGICAL EXAMINATION: ICD-10-CM

## 2019-09-03 DIAGNOSIS — Z11.51 SCREENING FOR HPV (HUMAN PAPILLOMAVIRUS): ICD-10-CM

## 2019-09-03 PROCEDURE — G8417 CALC BMI ABV UP PARAM F/U: HCPCS | Performed by: OBSTETRICS & GYNECOLOGY

## 2019-09-03 PROCEDURE — 1036F TOBACCO NON-USER: CPT | Performed by: OBSTETRICS & GYNECOLOGY

## 2019-09-03 PROCEDURE — 99385 PREV VISIT NEW AGE 18-39: CPT | Performed by: OBSTETRICS & GYNECOLOGY

## 2019-09-03 PROCEDURE — G8427 DOCREV CUR MEDS BY ELIG CLIN: HCPCS | Performed by: OBSTETRICS & GYNECOLOGY

## 2019-09-03 RX ORDER — GABAPENTIN 600 MG/1
TABLET ORAL
Qty: 90 TABLET | Refills: 0 | Status: SHIPPED | OUTPATIENT
Start: 2019-09-03 | End: 2019-10-20 | Stop reason: SDUPTHER

## 2019-09-03 ASSESSMENT — ENCOUNTER SYMPTOMS
CONSTIPATION: 0
WHEEZING: 0
NAUSEA: 0
VOMITING: 0
DIARRHEA: 0
ABDOMINAL DISTENTION: 0
ABDOMINAL PAIN: 0
SORE THROAT: 0
COUGH: 0
SHORTNESS OF BREATH: 0
BLOOD IN STOOL: 0

## 2019-09-03 ASSESSMENT — PATIENT HEALTH QUESTIONNAIRE - PHQ9
2. FEELING DOWN, DEPRESSED OR HOPELESS: 0
SUM OF ALL RESPONSES TO PHQ QUESTIONS 1-9: 0
1. LITTLE INTEREST OR PLEASURE IN DOING THINGS: 0
SUM OF ALL RESPONSES TO PHQ9 QUESTIONS 1 & 2: 0
SUM OF ALL RESPONSES TO PHQ QUESTIONS 1-9: 0

## 2019-09-03 NOTE — PROGRESS NOTES
Not on file     Active member of club or organization: Not on file     Attends meetings of clubs or organizations: Not on file     Relationship status: Not on file    Intimate partner violence:     Fear of current or ex partner: Not on file     Emotionally abused: Not on file     Physically abused: Not on file     Forced sexual activity: Not on file   Other Topics Concern    Not on file   Social History Narrative    Not on file       GynecologicHistory  No LMP recorded. Patient has had an implant. Last Pap:  Results: normal  Last Mammogram: Not Indicated Results: N/A  no fmhx cancer  OB History        7    Para   4    Term   4            AB   3    Living   4       SAB        TAB        Ectopic        Molar        Multiple        Live Births   4              Patient's medications, allergies, past medical, surgical, social and family histories were reviewed and updated as appropriate. Review of Systems  Review of Systems   Constitutional: Negative for activity change, appetite change, fatigue and unexpected weight change. HENT: Negative for nosebleeds and sore throat. Eyes: Negative for visual disturbance. Respiratory: Negative for cough, shortness of breath and wheezing. Cardiovascular: Negative for chest pain, palpitations and leg swelling. Gastrointestinal: Negative for abdominal distention, abdominal pain, blood in stool, constipation, diarrhea, nausea and vomiting. Endocrine: Negative for cold intolerance, heat intolerance, polydipsia and polyuria. Genitourinary: Positive for menstrual problem and vaginal discharge. Negative for difficulty urinating, dyspareunia, dysuria, frequency, genital sores, hematuria, pelvic pain, urgency, vaginal bleeding and vaginal pain. Musculoskeletal: Negative for arthralgias. Skin: Negative for rash. Neurological: Negative for dizziness, weakness, light-headedness and headaches. Hematological: Negative for adenopathy.  Does not prolapsed and there is not a cystocele or rectocele noted   Musculoskeletal: She exhibits no edema. Lymphadenopathy:        Right: No inguinal adenopathy present. Left: No inguinal adenopathy present. Neurological: She is alert and oriented to person, place, and time. She displays normal reflexes. No cranial nerve deficit. Skin: Skin is warm and dry. She is not diaphoretic. Psychiatric: She has a normal mood and affect. Her behavior is normal. Judgment normal.       Assessment:      Diagnosis Orders   1. Women's annual routine gynecological examination  PAP SMEAR   2. Screening for HPV (human papillomavirus)  PAP SMEAR   3. Encounter for screening mammogram for breast cancer  LORRAINE DIGITAL SCREEN W CAD BILATERAL   4. PCB (post coital bleeding)  C.trachomatis N.gonorrhoeae DNA    Wet Prep, Genital   5. Presence of intrauterine contraceptive device (IUD)         Body mass index is 35.55 kg/m². Obesity:  Overweight        Plan:   Pap smear : indicated:  performed. Breast exam :Normal  STD work up : As appropriate      Obesity Counseling:  given  Smoking Counseling:  Given  STD counseling: Will call pt with results    Orders Placed This Encounter   Procedures    C.trachomatis N.gonorrhoeae DNA     Standing Status:   Future     Number of Occurrences:   1     Standing Expiration Date:   9/3/2020    Wet Prep, Genital     Standing Status:   Future     Number of Occurrences:   1     Standing Expiration Date:   9/3/2020    LORRAINE DIGITAL SCREEN W CAD BILATERAL     Standing Status:   Future     Standing Expiration Date:   9/2/2020    PAP SMEAR     Standing Status:   Future     Number of Occurrences:   1     Standing Expiration Date:   8/27/2020     Order Specific Question:   Collection Type     Answer: Thin Prep     Order Specific Question:   Prior Abnormal Pap Test     Answer:   No     Order Specific Question:   Screening or Diagnostic     Answer:   Screening     Order Specific Question:   HPV Requested? Answer:   Yes     Comments:   16/18     Order Specific Question:   High Risk Patient     Answer:   N/A     No orders of the defined types were placed in this encounter. Clinic history reviewed and IUD was placed in November 2016 patient has another 2 years for her device. We will obtain cultures to rule out the presence of infection as a cause for postcoital bleeding    Follow up:  Return in about 1 year (around 9/3/2020).       Elli Johnston, DO

## 2019-09-04 LAB
CLUE CELLS: NORMAL
TRICHOMONAS PREP: NORMAL
TRICHOMONAS VAGINALIS SCREEN: NEGATIVE
YEAST WET PREP: NORMAL

## 2019-09-06 LAB
C TRACH DNA GENITAL QL NAA+PROBE: NEGATIVE
N. GONORRHOEAE DNA: NEGATIVE

## 2019-09-09 LAB
HPV COMMENT: NORMAL
HPV TYPE 16: NOT DETECTED
HPV TYPE 18: NOT DETECTED
HPVOH (OTHER TYPES): NOT DETECTED

## 2019-09-11 ENCOUNTER — HOSPITAL ENCOUNTER (OUTPATIENT)
Dept: WOMENS IMAGING | Age: 39
Discharge: HOME | End: 2019-09-13
Payer: COMMERCIAL

## 2019-09-11 DIAGNOSIS — Z12.31 ENCOUNTER FOR SCREENING MAMMOGRAM FOR BREAST CANCER: ICD-10-CM

## 2019-09-11 PROCEDURE — 77067 SCR MAMMO BI INCL CAD: CPT

## 2019-09-11 RX ORDER — METRONIDAZOLE 7.5 MG/G
GEL VAGINAL
Qty: 1 TUBE | Refills: 1 | Status: SHIPPED | OUTPATIENT
Start: 2019-09-11 | End: 2020-03-12

## 2019-09-12 ENCOUNTER — TELEPHONE (OUTPATIENT)
Dept: OBGYN CLINIC | Age: 39
End: 2019-09-12

## 2019-10-21 RX ORDER — GABAPENTIN 600 MG/1
TABLET ORAL
Qty: 90 TABLET | Refills: 3 | Status: SHIPPED | OUTPATIENT
Start: 2019-10-21 | End: 2020-06-03 | Stop reason: SDUPTHER

## 2019-12-04 ENCOUNTER — OFFICE VISIT (OUTPATIENT)
Dept: NEUROLOGY | Age: 39
End: 2019-12-04
Payer: COMMERCIAL

## 2019-12-04 VITALS — SYSTOLIC BLOOD PRESSURE: 109 MMHG | HEART RATE: 77 BPM | DIASTOLIC BLOOD PRESSURE: 68 MMHG

## 2019-12-04 DIAGNOSIS — G35 MS (MULTIPLE SCLEROSIS) (HCC): Primary | ICD-10-CM

## 2019-12-04 DIAGNOSIS — G35 MS (MULTIPLE SCLEROSIS) (HCC): ICD-10-CM

## 2019-12-04 LAB
ALBUMIN SERPL-MCNC: 4.3 G/DL (ref 3.5–4.6)
ALP BLD-CCNC: 67 U/L (ref 40–130)
ALT SERPL-CCNC: 8 U/L (ref 0–33)
ANION GAP SERPL CALCULATED.3IONS-SCNC: 14 MEQ/L (ref 9–15)
AST SERPL-CCNC: 12 U/L (ref 0–35)
BASOPHILS ABSOLUTE: 0.1 K/UL (ref 0–0.2)
BASOPHILS RELATIVE PERCENT: 0.9 %
BILIRUB SERPL-MCNC: 0.5 MG/DL (ref 0.2–0.7)
BILIRUBIN DIRECT: <0.2 MG/DL (ref 0–0.4)
BILIRUBIN, INDIRECT: NORMAL MG/DL (ref 0–0.6)
BUN BLDV-MCNC: 12 MG/DL (ref 6–20)
CALCIUM SERPL-MCNC: 9.5 MG/DL (ref 8.5–9.9)
CHLORIDE BLD-SCNC: 104 MEQ/L (ref 95–107)
CO2: 22 MEQ/L (ref 20–31)
CREAT SERPL-MCNC: 0.67 MG/DL (ref 0.5–0.9)
EOSINOPHILS ABSOLUTE: 0.3 K/UL (ref 0–0.7)
EOSINOPHILS RELATIVE PERCENT: 3.2 %
GFR AFRICAN AMERICAN: >60
GFR NON-AFRICAN AMERICAN: >60
GLUCOSE BLD-MCNC: 78 MG/DL (ref 70–99)
HCT VFR BLD CALC: 45.1 % (ref 37–47)
HEMOGLOBIN: 15.1 G/DL (ref 12–16)
LYMPHOCYTES ABSOLUTE: 3.2 K/UL (ref 1–4.8)
LYMPHOCYTES RELATIVE PERCENT: 32 %
MCH RBC QN AUTO: 32 PG (ref 27–31.3)
MCHC RBC AUTO-ENTMCNC: 33.5 % (ref 33–37)
MCV RBC AUTO: 95.3 FL (ref 82–100)
MONOCYTES ABSOLUTE: 0.6 K/UL (ref 0.2–0.8)
MONOCYTES RELATIVE PERCENT: 5.9 %
NEUTROPHILS ABSOLUTE: 5.8 K/UL (ref 1.4–6.5)
NEUTROPHILS RELATIVE PERCENT: 58 %
PDW BLD-RTO: 13.7 % (ref 11.5–14.5)
PLATELET # BLD: 352 K/UL (ref 130–400)
POTASSIUM SERPL-SCNC: 4.2 MEQ/L (ref 3.4–4.9)
RBC # BLD: 4.73 M/UL (ref 4.2–5.4)
SODIUM BLD-SCNC: 140 MEQ/L (ref 135–144)
TOTAL PROTEIN: 7.3 G/DL (ref 6.3–8)
WBC # BLD: 10 K/UL (ref 4.8–10.8)

## 2019-12-04 PROCEDURE — 99214 OFFICE O/P EST MOD 30 MIN: CPT | Performed by: PSYCHIATRY & NEUROLOGY

## 2019-12-04 ASSESSMENT — ENCOUNTER SYMPTOMS
PHOTOPHOBIA: 0
VOMITING: 0
TROUBLE SWALLOWING: 0
NAUSEA: 0
BACK PAIN: 0
SHORTNESS OF BREATH: 0
CHOKING: 0

## 2019-12-12 ENCOUNTER — OFFICE VISIT (OUTPATIENT)
Dept: FAMILY MEDICINE CLINIC | Age: 39
End: 2019-12-12
Payer: COMMERCIAL

## 2019-12-12 VITALS
DIASTOLIC BLOOD PRESSURE: 80 MMHG | TEMPERATURE: 97.7 F | BODY MASS INDEX: 37.9 KG/M2 | SYSTOLIC BLOOD PRESSURE: 110 MMHG | OXYGEN SATURATION: 96 % | RESPIRATION RATE: 16 BRPM | WEIGHT: 188 LBS | HEART RATE: 66 BPM | HEIGHT: 59 IN

## 2019-12-12 DIAGNOSIS — F32.A ANXIETY AND DEPRESSION: Primary | ICD-10-CM

## 2019-12-12 DIAGNOSIS — F41.9 ANXIETY AND DEPRESSION: Primary | ICD-10-CM

## 2019-12-12 PROCEDURE — 99213 OFFICE O/P EST LOW 20 MIN: CPT | Performed by: FAMILY MEDICINE

## 2019-12-12 PROCEDURE — G8427 DOCREV CUR MEDS BY ELIG CLIN: HCPCS | Performed by: FAMILY MEDICINE

## 2019-12-12 PROCEDURE — G8484 FLU IMMUNIZE NO ADMIN: HCPCS | Performed by: FAMILY MEDICINE

## 2019-12-12 PROCEDURE — 1036F TOBACCO NON-USER: CPT | Performed by: FAMILY MEDICINE

## 2019-12-12 PROCEDURE — G8417 CALC BMI ABV UP PARAM F/U: HCPCS | Performed by: FAMILY MEDICINE

## 2019-12-12 RX ORDER — ETODOLAC 400 MG/1
400 TABLET, FILM COATED ORAL 2 TIMES DAILY
Qty: 60 TABLET | Refills: 3 | Status: SHIPPED | OUTPATIENT
Start: 2019-12-12 | End: 2020-03-12

## 2019-12-12 RX ORDER — LORAZEPAM 0.5 MG/1
TABLET ORAL
Qty: 60 TABLET | Refills: 1 | Status: SHIPPED | OUTPATIENT
Start: 2019-12-12 | End: 2020-06-05 | Stop reason: SDUPTHER

## 2019-12-12 ASSESSMENT — ENCOUNTER SYMPTOMS
RHINORRHEA: 0
EYES NEGATIVE: 1
GASTROINTESTINAL NEGATIVE: 1
CHEST TIGHTNESS: 0
COUGH: 0
RESPIRATORY NEGATIVE: 1

## 2020-01-02 ENCOUNTER — TELEPHONE (OUTPATIENT)
Dept: FAMILY MEDICINE CLINIC | Age: 40
End: 2020-01-02

## 2020-01-02 ENCOUNTER — TELEPHONE (OUTPATIENT)
Dept: NEUROLOGY | Age: 40
End: 2020-01-02

## 2020-01-02 NOTE — TELEPHONE ENCOUNTER
Patient called states she went to the [facility] ER last Saturday for bilateral leg swelling and \"heavy\" feeling. They advised her to get compression stockings and also gave her a steroid injection. She states she is feeling better since then but worried it could have been a relapse. She just wanted to let you know they gave the injection.  Thanks

## 2020-03-12 ENCOUNTER — OFFICE VISIT (OUTPATIENT)
Dept: FAMILY MEDICINE CLINIC | Age: 40
End: 2020-03-12
Payer: COMMERCIAL

## 2020-03-12 VITALS
OXYGEN SATURATION: 96 % | HEIGHT: 60 IN | HEART RATE: 75 BPM | DIASTOLIC BLOOD PRESSURE: 68 MMHG | SYSTOLIC BLOOD PRESSURE: 92 MMHG | TEMPERATURE: 98.3 F | WEIGHT: 197.8 LBS | BODY MASS INDEX: 38.83 KG/M2

## 2020-03-12 PROCEDURE — 99213 OFFICE O/P EST LOW 20 MIN: CPT | Performed by: FAMILY MEDICINE

## 2020-03-12 PROCEDURE — G8427 DOCREV CUR MEDS BY ELIG CLIN: HCPCS | Performed by: FAMILY MEDICINE

## 2020-03-12 PROCEDURE — G8484 FLU IMMUNIZE NO ADMIN: HCPCS | Performed by: FAMILY MEDICINE

## 2020-03-12 PROCEDURE — 1036F TOBACCO NON-USER: CPT | Performed by: FAMILY MEDICINE

## 2020-03-12 PROCEDURE — G8417 CALC BMI ABV UP PARAM F/U: HCPCS | Performed by: FAMILY MEDICINE

## 2020-03-12 RX ORDER — LORAZEPAM 0.5 MG/1
TABLET ORAL
COMMUNITY
End: 2020-10-08

## 2020-03-12 ASSESSMENT — ENCOUNTER SYMPTOMS
RESPIRATORY NEGATIVE: 1
GASTROINTESTINAL NEGATIVE: 1
RHINORRHEA: 0
CHEST TIGHTNESS: 0
EYES NEGATIVE: 1
COUGH: 0

## 2020-03-12 NOTE — PROGRESS NOTES
Patient is seen in follow up for   Chief Complaint   Patient presents with    3 Month Follow-Up     pt states she has some cramping and pain on hips down to legs. started about a week ago. was in the emergency room 2019 for lymphedema and was refered compression socks. HPIhere for follow up having some cramps. Feeling better now. No past medical history on file.   Patient Active Problem List    Diagnosis Date Noted    Neuropathy 2016    Anxiety and depression 2016    MS (multiple sclerosis) ([facility] 2015     Past Surgical History:   Procedure Laterality Date    APPENDECTOMY      BREAST SURGERY      lump removal bengin     SECTION      x 3     Family History   Problem Relation Age of Onset    Diabetes Mother     Heart Disease Father      Social History     Socioeconomic History    Marital status:      Spouse name: None    Number of children: None    Years of education: None    Highest education level: None   Occupational History    None   Social Needs    Financial resource strain: None    Food insecurity     Worry: None     Inability: None    Transportation needs     Medical: None     Non-medical: None   Tobacco Use    Smoking status: Former Smoker     Last attempt to quit: 2017     Years since quittin.7    Smokeless tobacco: Never Used   Substance and Sexual Activity    Alcohol use: No     Alcohol/week: 0.0 standard drinks    Drug use: No    Sexual activity: None   Lifestyle    Physical activity     Days per week: None     Minutes per session: None    Stress: None   Relationships    Social connections     Talks on phone: None     Gets together: None     Attends Advent service: None     Active member of club or organization: None     Attends meetings of clubs or organizations: None     Relationship status: None    Intimate partner violence     Fear of current or ex partner: None     Emotionally abused: None     Physically abused: None Forced sexual activity: None   Other Topics Concern    None   Social History Narrative    None     Current Outpatient Medications   Medication Sig Dispense Refill    Compression Stockings MISC by Does not apply route Knee high medium compression 20-30 cc disp three pairs. 1 each 0    gabapentin (NEURONTIN) 600 MG tablet TAKE 1 TABLET BY MOUTH THREE TIMES DAILY 90 tablet 3    escitalopram (LEXAPRO) 20 MG tablet Take 1 tablet by mouth daily 90 tablet 3    Handicap Placard MISC by Does not apply route 1 each 0    Cholecalciferol (VITAMIN D3) 2000 units CAPS TK 1 C PO D  3    cyclobenzaprine (FLEXERIL) 5 MG tablet TK 1 T PO BID  0    AUBAGIO 14 MG TABS Take 14 mg by mouth daily       baclofen (LIORESAL) 10 MG tablet Take 10 mg by mouth 4 times daily   0    levonorgestrel (MIRENA) 20 MCG/24HR IUD 1 each by Intrauterine route      LORazepam (ATIVAN) 0.5 MG tablet Take by mouth. No current facility-administered medications for this visit. Current Outpatient Medications on File Prior to Visit   Medication Sig Dispense Refill    gabapentin (NEURONTIN) 600 MG tablet TAKE 1 TABLET BY MOUTH THREE TIMES DAILY 90 tablet 3    escitalopram (LEXAPRO) 20 MG tablet Take 1 tablet by mouth daily 90 tablet 3    Handicap Placard MISC by Does not apply route 1 each 0    Cholecalciferol (VITAMIN D3) 2000 units CAPS TK 1 C PO D  3    cyclobenzaprine (FLEXERIL) 5 MG tablet TK 1 T PO BID  0    AUBAGIO 14 MG TABS Take 14 mg by mouth daily       baclofen (LIORESAL) 10 MG tablet Take 10 mg by mouth 4 times daily   0    levonorgestrel (MIRENA) 20 MCG/24HR IUD 1 each by Intrauterine route      LORazepam (ATIVAN) 0.5 MG tablet Take by mouth. No current facility-administered medications on file prior to visit.       No Known Allergies  Health Maintenance   Topic Date Due    Varicella vaccine (1 of 2 - 2-dose childhood series) 04/07/1981    Annual Wellness Visit (AWV)  06/12/2019    Flu vaccine (1) no distension. Palpations: Abdomen is soft. There is no mass. Tenderness: There is no abdominal tenderness. There is no guarding or rebound. Musculoskeletal: Normal range of motion. Lymphadenopathy:      Cervical: No cervical adenopathy. Skin:     General: Skin is warm and dry. Neurological:      Mental Status: She is alert and oriented to person, place, and time. Cranial Nerves: No cranial nerve deficit. Coordination: Coordination normal.         Assessment   Diagnosis Orders   1. MS [facility] ([facility]     2. Neuropathy     3. Edema, unspecified type       Problem List     MS (multiple sclerosis) ([facility] - Primary    Relevant Medications    AUBAGIO 14 MG TABS    Neuropathy          Plan  Call if getting worse. Orders Placed This Encounter   Medications    Compression Stockings MISC     Sig: by Does not apply route Knee high medium compression 20-30 cc disp three pairs. Dispense:  1 each     Refill:  0     No follow-ups on file.   Obed Fernandez MD

## 2020-03-16 ENCOUNTER — TELEPHONE (OUTPATIENT)
Dept: FAMILY MEDICINE CLINIC | Age: 40
End: 2020-03-16

## 2020-03-18 NOTE — TELEPHONE ENCOUNTER
I called [facility] they do not have a Theresa Kidney that works there and the woman who [employer] is Cassandra Guzman I left a message on her machine Dx code is G62.9

## 2020-06-03 ENCOUNTER — TELEPHONE (OUTPATIENT)
Dept: FAMILY MEDICINE CLINIC | Age: 40
End: 2020-06-03

## 2020-06-03 ENCOUNTER — OFFICE VISIT (OUTPATIENT)
Dept: NEUROLOGY | Age: 40
End: 2020-06-03
Payer: COMMERCIAL

## 2020-06-03 VITALS
HEART RATE: 75 BPM | SYSTOLIC BLOOD PRESSURE: 123 MMHG | WEIGHT: 191.8 LBS | DIASTOLIC BLOOD PRESSURE: 86 MMHG | BODY MASS INDEX: 38.09 KG/M2

## 2020-06-03 DIAGNOSIS — G35 MULTIPLE SCLEROSIS (HCC): ICD-10-CM

## 2020-06-03 PROBLEM — E66.9 OBESITY, UNSPECIFIED: Status: ACTIVE | Noted: 2018-09-21

## 2020-06-03 PROBLEM — M79.672 LEFT FOOT PAIN: Status: ACTIVE | Noted: 2020-06-03

## 2020-06-03 PROBLEM — E66.3 OVERWEIGHT: Status: ACTIVE | Noted: 2018-10-02

## 2020-06-03 PROBLEM — N31.9 NEUROMUSCULAR DYSFUNCTION OF BLADDER, UNSPECIFIED: Status: ACTIVE | Noted: 2018-09-21

## 2020-06-03 PROBLEM — R26.0 ATAXIC GAIT: Status: ACTIVE | Noted: 2018-09-21

## 2020-06-03 PROBLEM — R20.2 PARESTHESIA OF SKIN: Status: ACTIVE | Noted: 2018-09-21

## 2020-06-03 PROBLEM — S61.559A: Status: ACTIVE | Noted: 2020-06-03

## 2020-06-03 PROBLEM — Z74.09 IMPAIRED MOBILITY: Status: ACTIVE | Noted: 2020-06-03

## 2020-06-03 PROBLEM — M62.81 MUSCLE WEAKNESS (GENERALIZED): Status: ACTIVE | Noted: 2018-09-21

## 2020-06-03 PROBLEM — R52 PAIN: Status: ACTIVE | Noted: 2020-06-03

## 2020-06-03 PROBLEM — E87.6 HYPOKALEMIA: Status: ACTIVE | Noted: 2018-10-02

## 2020-06-03 PROBLEM — M79.10 MYALGIA: Status: ACTIVE | Noted: 2020-06-03

## 2020-06-03 PROBLEM — T14.8XXA BITE, ANIMAL: Status: ACTIVE | Noted: 2020-06-03
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
## 2020-06-03 PROBLEM — E55.9 VITAMIN D DEFICIENCY: Status: ACTIVE | Noted: 2018-09-21

## 2020-06-03 PROBLEM — F17.200 NICOTINE DEPENDENCE, UNSPECIFIED, UNCOMPLICATED: Status: ACTIVE | Noted: 2018-10-02

## 2020-06-03 LAB
ALBUMIN SERPL-MCNC: 4.4 G/DL (ref 3.5–4.6)
ALP BLD-CCNC: 74 U/L (ref 40–130)
ALT SERPL-CCNC: 11 U/L (ref 0–33)
ANION GAP SERPL CALCULATED.3IONS-SCNC: 15 MEQ/L (ref 9–15)
AST SERPL-CCNC: 16 U/L (ref 0–35)
BASOPHILS ABSOLUTE: 0.1 K/UL (ref 0–0.2)
BASOPHILS RELATIVE PERCENT: 0.9 %
BILIRUB SERPL-MCNC: <0.2 MG/DL (ref 0.2–0.7)
BILIRUBIN DIRECT: <0.2 MG/DL (ref 0–0.4)
BILIRUBIN, INDIRECT: NORMAL MG/DL (ref 0–0.6)
BUN BLDV-MCNC: 9 MG/DL (ref 6–20)
CALCIUM SERPL-MCNC: 10.1 MG/DL (ref 8.5–9.9)
CHLORIDE BLD-SCNC: 101 MEQ/L (ref 95–107)
CO2: 21 MEQ/L (ref 20–31)
CREAT SERPL-MCNC: 0.71 MG/DL (ref 0.5–0.9)
EOSINOPHILS ABSOLUTE: 0.5 K/UL (ref 0–0.7)
EOSINOPHILS RELATIVE PERCENT: 4.6 %
GFR AFRICAN AMERICAN: >60
GFR NON-AFRICAN AMERICAN: >60
GLUCOSE BLD-MCNC: 70 MG/DL (ref 70–99)
HCT VFR BLD CALC: 46.8 % (ref 37–47)
HEMOGLOBIN: 15.4 G/DL (ref 12–16)
LYMPHOCYTES ABSOLUTE: 2.9 K/UL (ref 1–4.8)
LYMPHOCYTES RELATIVE PERCENT: 26.1 %
MCH RBC QN AUTO: 31.7 PG (ref 27–31.3)
MCHC RBC AUTO-ENTMCNC: 32.9 % (ref 33–37)
MCV RBC AUTO: 96.4 FL (ref 82–100)
MONOCYTES ABSOLUTE: 0.9 K/UL (ref 0.2–0.8)
MONOCYTES RELATIVE PERCENT: 7.9 %
NEUTROPHILS ABSOLUTE: 6.6 K/UL (ref 1.4–6.5)
NEUTROPHILS RELATIVE PERCENT: 60.5 %
PDW BLD-RTO: 14 % (ref 11.5–14.5)
PLATELET # BLD: 337 K/UL (ref 130–400)
POTASSIUM SERPL-SCNC: 4.1 MEQ/L (ref 3.4–4.9)
RBC # BLD: 4.86 M/UL (ref 4.2–5.4)
SODIUM BLD-SCNC: 137 MEQ/L (ref 135–144)
TOTAL PROTEIN: 7 G/DL (ref 6.3–8)
WBC # BLD: 10.9 K/UL (ref 4.8–10.8)

## 2020-06-03 PROCEDURE — G8427 DOCREV CUR MEDS BY ELIG CLIN: HCPCS | Performed by: PSYCHIATRY & NEUROLOGY

## 2020-06-03 PROCEDURE — G8417 CALC BMI ABV UP PARAM F/U: HCPCS | Performed by: PSYCHIATRY & NEUROLOGY

## 2020-06-03 PROCEDURE — 1036F TOBACCO NON-USER: CPT | Performed by: PSYCHIATRY & NEUROLOGY

## 2020-06-03 PROCEDURE — 99214 OFFICE O/P EST MOD 30 MIN: CPT | Performed by: PSYCHIATRY & NEUROLOGY

## 2020-06-03 RX ORDER — GABAPENTIN 600 MG/1
TABLET ORAL
Qty: 90 TABLET | Refills: 3 | Status: SHIPPED | OUTPATIENT
Start: 2020-06-03 | End: 2020-08-13

## 2020-06-03 RX ORDER — AMANTADINE HYDROCHLORIDE 100 MG/1
CAPSULE, GELATIN COATED ORAL
Qty: 60 CAPSULE | Refills: 0 | Status: SHIPPED | OUTPATIENT
Start: 2020-06-03 | End: 2021-07-09

## 2020-06-03 RX ORDER — ACETAMINOPHEN 160 MG
TABLET,DISINTEGRATING ORAL
Qty: 30 CAPSULE | Refills: 3 | Status: SHIPPED | OUTPATIENT
Start: 2020-06-03 | End: 2020-06-05 | Stop reason: SDUPTHER

## 2020-06-03 ASSESSMENT — ENCOUNTER SYMPTOMS
BACK PAIN: 0
CHOKING: 0
NAUSEA: 0
PHOTOPHOBIA: 0
TROUBLE SWALLOWING: 0
SHORTNESS OF BREATH: 0
VOMITING: 0
COLOR CHANGE: 0

## 2020-06-03 NOTE — TELEPHONE ENCOUNTER
Lea Beltran from [facility] called to advise that pt will now be using their pharmacy for her medications. She will fax you a request but asked that I also inform the office of this so her chart can be noted.     [facility]  [phone]

## 2020-06-03 NOTE — PROGRESS NOTES
Never Used   Substance and Sexual Activity    Alcohol use: No     Alcohol/week: 0.0 standard drinks    Drug use: No    Sexual activity: Not on file   Lifestyle    Physical activity     Days per week: Not on file     Minutes per session: Not on file    Stress: Not on file   Relationships    Social connections     Talks on phone: Not on file     Gets together: Not on file     Attends Hindu service: Not on file     Active member of club or organization: Not on file     Attends meetings of clubs or organizations: Not on file     Relationship status: Not on file    Intimate partner violence     Fear of current or ex partner: Not on file     Emotionally abused: Not on file     Physically abused: Not on file     Forced sexual activity: Not on file   Other Topics Concern    Not on file   Social History Narrative    Not on file     Family History   Problem Relation Age of Onset    Diabetes Mother     Heart Disease Father      No Known Allergies    Current Outpatient Medications   Medication Sig Dispense Refill    gabapentin (NEURONTIN) 600 MG tablet TAKE 1 TABLET BY MOUTH THREE TIMES DAILY 90 tablet 3    Cholecalciferol (VITAMIN D3) 50 MCG (2000 UT) CAPS TK 1 C PO D 30 capsule 3    amantadine (SYMMETREL) 100 MG capsule One daily for 1 week, then one bid 60 capsule 0    LORazepam (ATIVAN) 0.5 MG tablet Take by mouth.  Compression Stockings MISC by Does not apply route Knee high medium compression 20-30 cc disp three pairs. 1 each 0    escitalopram (LEXAPRO) 20 MG tablet Take 1 tablet by mouth daily 90 tablet 3    Handicap Placard MISC by Does not apply route 1 each 0    AUBAGIO 14 MG TABS Take 14 mg by mouth daily       baclofen (LIORESAL) 10 MG tablet Take 10 mg by mouth 4 times daily   0    levonorgestrel (MIRENA) 20 MCG/24HR IUD 1 each by Intrauterine route      cyclobenzaprine (FLEXERIL) 5 MG tablet TK 1 T PO BID  0     No current facility-administered medications for this visit.

## 2020-06-05 RX ORDER — ESCITALOPRAM OXALATE 20 MG/1
20 TABLET ORAL DAILY
Qty: 90 TABLET | Refills: 3 | Status: SHIPPED | OUTPATIENT
Start: 2020-06-05 | End: 2021-05-04

## 2020-06-05 RX ORDER — BACLOFEN 10 MG/1
10 TABLET ORAL 4 TIMES DAILY
Qty: 120 TABLET | Refills: 3 | Status: SHIPPED | OUTPATIENT
Start: 2020-06-05 | End: 2020-09-09

## 2020-06-05 RX ORDER — ETODOLAC 400 MG/1
400 TABLET, FILM COATED ORAL 2 TIMES DAILY
Qty: 180 TABLET | Refills: 3 | Status: SHIPPED | OUTPATIENT
Start: 2020-06-05 | End: 2021-05-04

## 2020-06-05 RX ORDER — LORAZEPAM 0.5 MG/1
TABLET ORAL
Qty: 60 TABLET | Refills: 1 | Status: SHIPPED | OUTPATIENT
Start: 2020-06-05 | End: 2020-06-05

## 2020-06-05 RX ORDER — LORAZEPAM 0.5 MG/1
0.5 TABLET ORAL
Status: CANCELLED | OUTPATIENT
Start: 2020-06-05

## 2020-06-05 RX ORDER — ACETAMINOPHEN 160 MG
TABLET,DISINTEGRATING ORAL
Qty: 30 CAPSULE | Refills: 3 | Status: SHIPPED | OUTPATIENT
Start: 2020-06-05 | End: 2020-12-15

## 2020-06-08 ENCOUNTER — VIRTUAL VISIT (OUTPATIENT)
Dept: FAMILY MEDICINE CLINIC | Age: 40
End: 2020-06-08
Payer: COMMERCIAL

## 2020-06-08 PROCEDURE — G0438 PPPS, INITIAL VISIT: HCPCS | Performed by: NURSE PRACTITIONER

## 2020-06-08 ASSESSMENT — LIFESTYLE VARIABLES
HAVE YOU OR SOMEONE ELSE BEEN INJURED AS A RESULT OF YOUR DRINKING: 0
HOW OFTEN DURING THE LAST YEAR HAVE YOU FOUND THAT YOU WERE NOT ABLE TO STOP DRINKING ONCE YOU HAD STARTED: 0
HOW OFTEN DURING THE LAST YEAR HAVE YOU BEEN UNABLE TO REMEMBER WHAT HAPPENED THE NIGHT BEFORE BECAUSE YOU HAD BEEN DRINKING: 0
HOW OFTEN DURING THE LAST YEAR HAVE YOU HAD A FEELING OF GUILT OR REMORSE AFTER DRINKING: 0
AUDIT TOTAL SCORE: 1
HOW OFTEN DO YOU HAVE SIX OR MORE DRINKS ON ONE OCCASION: 0
HOW OFTEN DURING THE LAST YEAR HAVE YOU FAILED TO DO WHAT WAS NORMALLY EXPECTED FROM YOU BECAUSE OF DRINKING: 0
HAS A RELATIVE, FRIEND, DOCTOR, OR ANOTHER HEALTH PROFESSIONAL EXPRESSED CONCERN ABOUT YOUR DRINKING OR SUGGESTED YOU CUT DOWN: 0
AUDIT-C TOTAL SCORE: 1
HOW OFTEN DO YOU HAVE A DRINK CONTAINING ALCOHOL: 1
HOW MANY STANDARD DRINKS CONTAINING ALCOHOL DO YOU HAVE ON A TYPICAL DAY: 0
HOW OFTEN DURING THE LAST YEAR HAVE YOU NEEDED AN ALCOHOLIC DRINK FIRST THING IN THE MORNING TO GET YOURSELF GOING AFTER A NIGHT OF HEAVY DRINKING: 0

## 2020-06-08 ASSESSMENT — PATIENT HEALTH QUESTIONNAIRE - PHQ9
SUM OF ALL RESPONSES TO PHQ QUESTIONS 1-9: 0
SUM OF ALL RESPONSES TO PHQ QUESTIONS 1-9: 0

## 2020-06-08 NOTE — PROGRESS NOTES
or your family notice any trouble with your hearing?: No  Do you have difficulty driving, watching TV, or doing any of your daily activities because of your eyesight?: No  Have you had an eye exam within the past year?: (!) No  Hearing/Vision Interventions:  · Vision concerns:  patient declines any further evaluation/treatment for this issue    Personalized Preventive Plan   Current Health Maintenance Status  Immunization History   Administered Date(s) Administered    Influenza Vaccine, unspecified formulation 09/24/2018    Influenza, Quadv, IM, PF (6 mo and older Fluzone, Flulaval, Fluarix, and 3 yrs and older Afluria) 09/24/2018    Tdap (Boostrix, Adacel) 02/01/2011, 04/03/2019, 12/12/2019        Health Maintenance   Topic Date Due    Varicella vaccine (1 of 2 - 2-dose childhood series) 04/07/1981    Annual Wellness Visit (AWV)  06/12/2019    Lipid screen  06/12/2020    HIV screen  08/15/2026 (Originally 4/7/1995)    Flu vaccine (Season Ended) 09/01/2020    Cervical cancer screen  09/05/2024    DTaP/Tdap/Td vaccine (4 - Td) 12/12/2029    Hepatitis A vaccine  Aged Out    Hepatitis B vaccine  Aged Out    Hib vaccine  Aged Out    Meningococcal (ACWY) vaccine  Aged Out    Pneumococcal 0-64 years Vaccine  Aged Out     Recommendations for Mobile [employer] Due: see orders and patient instructions/AVS.  . Recommended screening schedule for the next 5-10 years is provided to the patient in written form: see Patient Instructions/AVS.    [facility] MAR[facility]OY Kerens was seen today for medicare awv. Diagnoses and all orders for this visit:    Routine general medical examination at a health care facility              Kelsey Astudillo is a 36 y.o. female being evaluated by a Virtual Visit (phone) encounter to address concerns as mentioned above. A caregiver was present when appropriate.  Due to this being a TeleHealth encounter (During [facility]- public health emergency), evaluation of the following organ systems was limited: Vitals/Constitutional/EENT/Resp/CV/GI//MS/Neuro/Skin/Heme-Lymph-Imm. Pursuant to the emergency declaration under the [address] [place] [address], [address] [place] [address] and the [employer] Act, this Virtual Visit was conducted with patient's (and/or legal guardian's) consent, to reduce the patient's risk of exposure to COVID-19 and provide necessary medical care. The patient (and/or legal guardian) has also been advised to contact this office for worsening conditions or problems, and seek emergency medical treatment and/or call 911 if deemed necessary. Patient identification was verified at the start of the visit: Yes    Services were provided through phone to substitute for in-person clinic visit. Patient and provider were located at their individual homes. --Loan Joseph, CARLTON - CNP on 6/8/2020 at 1:29 PM    An electronic signature was used to authenticate this note. Advance Care Planning   Advanced Care Planning: Discussed the patients choices for care and treatment in case of a health event that adversely affects decision-making abilities. Also discussed the patients long-term treatment options. Reviewed with the patient the [address] [place] [address] & [facility] Declaration forms  Reviewed the process of designating a competent adult as an Agent (or -in-fact) that could take make health care decisions for the patient if incompetent. Patient was asked to complete the declaration forms, either acknowledge the forms by a public notary or an eligible witness and provide a signed copy to the practice office.   Time spent (minutes): 5

## 2020-06-08 NOTE — PATIENT INSTRUCTIONS
you learn more? Go to [email or web address] and sign in to your [employer] account. Enter [id number] in the [employer] [facility] box to learn more about \"Learning About [address]. \"     If you do not have an account, please click on the \"Sign Up Now\" link. Current as of: December 9, 2019               Content Version: 12.5  © 5305-7879 [employer]. Care instructions adapted under license by [facility] ([facility]). If you have questions about a medical condition or this instruction, always ask your healthcare professional. [address] any warranty or liability for your use of this information. Learning About Living [place]  What is a living will? A living will, also called a declaration, is a legal form. It tells your family and your doctor your wishes when you can't speak for yourself. It's used by the health professionals who will treat you as you near the end of your life or if you get seriously hurt or ill. If you put your wishes in writing, your loved ones and others will know what kind of care you want. They won't need to guess. This can ease your mind and be helpful to others. And you can change or cancel your living will at any time. A living will is not the same as an estate or property will. An estate will explains what you want to happen with your money and property after you die. How do you use it? A living will is used to describe the kinds of treatment or life support you want as you near the end of your life or if you get seriously hurt or ill. Keep these facts in mind about living lutz. · Your living will is used only if you can't speak or make decisions for yourself. Most often, one or more doctors must certify that you can't speak or decide for yourself before your living will takes effect. · If you get better and can speak for yourself again, you can accept or refuse any treatment.  It doesn't matter what you said in your

## 2020-06-10 ENCOUNTER — VIRTUAL VISIT (OUTPATIENT)
Dept: FAMILY MEDICINE CLINIC | Age: 40
End: 2020-06-10
Payer: COMMERCIAL

## 2020-06-10 PROCEDURE — 99213 OFFICE O/P EST LOW 20 MIN: CPT | Performed by: FAMILY MEDICINE

## 2020-06-10 ASSESSMENT — ENCOUNTER SYMPTOMS
EYES NEGATIVE: 1
CHEST TIGHTNESS: 0
RESPIRATORY NEGATIVE: 1
RHINORRHEA: 0
GASTROINTESTINAL NEGATIVE: 1
COUGH: 0

## 2020-06-10 NOTE — PROGRESS NOTES
Laterality Date    APPENDECTOMY      BREAST SURGERY      lump removal bengin     SECTION      x 3     Family History   Problem Relation Age of Onset    Diabetes Mother     Heart Disease Father      Social History     Socioeconomic History    Marital status:      Spouse name: Not on file    Number of children: Not on file    Years of education: Not on file    Highest education level: Not on file   Occupational History    Not on file   Social Needs    Financial resource strain: Not on file    Food insecurity     Worry: Not on file     Inability: Not on file    Transportation needs     Medical: Not on file     Non-medical: Not on file   Tobacco Use    Smoking status: Former Smoker     Last attempt to quit: 2017     Years since quittin.9    Smokeless tobacco: Never Used   Substance and Sexual Activity    Alcohol use: No     Alcohol/week: 0.0 standard drinks    Drug use: No    Sexual activity: Not on file   Lifestyle    Physical activity     Days per week: Not on file     Minutes per session: Not on file    Stress: Not on file   Relationships    Social connections     Talks on phone: Not on file     Gets together: Not on file     Attends Advent service: Not on file     Active member of club or organization: Not on file     Attends meetings of clubs or organizations: Not on file     Relationship status: Not on file    Intimate partner violence     Fear of current or ex partner: Not on file     Emotionally abused: Not on file     Physically abused: Not on file     Forced sexual activity: Not on file   Other Topics Concern    Not on file   Social History Narrative    Not on file     Current Outpatient Medications   Medication Sig Dispense Refill    escitalopram (LEXAPRO) 20 MG tablet Take 1 tablet by mouth daily 90 tablet 3    etodolac (LODINE) 400 MG tablet Take 1 tablet by mouth 2 times daily 180 tablet 3    Cholecalciferol (VITAMIN D3) 50 MCG ( UT) CAPS TK 1 C PO D 30 capsule 3    baclofen (LIORESAL) 10 MG tablet Take 1 tablet by mouth 4 times daily 120 tablet 3    gabapentin (NEURONTIN) 600 MG tablet TAKE 1 TABLET BY MOUTH THREE TIMES DAILY 90 tablet 3    amantadine (SYMMETREL) 100 MG capsule One daily for 1 week, then one bid 60 capsule 0    LORazepam (ATIVAN) 0.5 MG tablet Take by mouth.  Compression Stockings MISC by Does not apply route Knee high medium compression 20-30 cc disp three pairs. 1 each 0    Handicap Placard MISC by Does not apply route 1 each 0    cyclobenzaprine (FLEXERIL) 5 MG tablet TK 1 T PO BID  0    AUBAGIO 14 MG TABS Take 14 mg by mouth daily       levonorgestrel (MIRENA) 20 MCG/24HR IUD 1 each by Intrauterine route       No current facility-administered medications for this visit. Current Outpatient Medications on File Prior to Visit   Medication Sig Dispense Refill    escitalopram (LEXAPRO) 20 MG tablet Take 1 tablet by mouth daily 90 tablet 3    etodolac (LODINE) 400 MG tablet Take 1 tablet by mouth 2 times daily 180 tablet 3    Cholecalciferol (VITAMIN D3) 50 MCG (2000 UT) CAPS TK 1 C PO D 30 capsule 3    baclofen (LIORESAL) 10 MG tablet Take 1 tablet by mouth 4 times daily 120 tablet 3    gabapentin (NEURONTIN) 600 MG tablet TAKE 1 TABLET BY MOUTH THREE TIMES DAILY 90 tablet 3    amantadine (SYMMETREL) 100 MG capsule One daily for 1 week, then one bid 60 capsule 0    LORazepam (ATIVAN) 0.5 MG tablet Take by mouth.  Compression Stockings MISC by Does not apply route Knee high medium compression 20-30 cc disp three pairs. 1 each 0    Handicap Placard MISC by Does not apply route 1 each 0    cyclobenzaprine (FLEXERIL) 5 MG tablet TK 1 T PO BID  0    AUBAGIO 14 MG TABS Take 14 mg by mouth daily       levonorgestrel (MIRENA) 20 MCG/24HR IUD 1 each by Intrauterine route       No current facility-administered medications on file prior to visit.       No Known Allergies  Health Maintenance   Topic Date Due    Varicella vaccine (1 of 2 - 2-dose childhood series) 04/07/1981    Lipid screen  06/12/2020    HIV screen  08/15/2026 (Originally 4/7/1995)    Flu vaccine (Season Ended) 09/01/2020    Annual Wellness Visit (AWV)  06/09/2021    Cervical cancer screen  09/05/2024    DTaP/Tdap/Td vaccine (4 - Td) 12/12/2029    Hepatitis A vaccine  Aged Out    Hepatitis B vaccine  Aged Out    Hib vaccine  Aged Out    Meningococcal (ACWY) vaccine  Aged Out    Pneumococcal 0-64 years Vaccine  Aged Out       Review of Systems     Review of Systems   Constitutional: Negative for activity change, appetite change, fatigue and fever. HENT: Negative for congestion and rhinorrhea. Eyes: Negative. Respiratory: Negative. Negative for cough and chest tightness. Cardiovascular: Negative. Gastrointestinal: Negative. Endocrine: Negative. Genitourinary: Negative. Musculoskeletal: Negative. Skin: Negative. Neurological: Negative for dizziness, light-headedness and numbness. Hematological: Negative. Psychiatric/Behavioral: Negative. Physical Exam  There were no vitals filed for this visit. Physical Exam    Assessment   Diagnosis Orders   1. MS (multiple sclerosis) ([facility]     2. Anxiety and depression     3. Neuropathy       Problem List     Neuropathy    Anxiety and depression    Relevant Medications    LORazepam (ATIVAN) 0.5 MG tablet    escitalopram (LEXAPRO) 20 MG tablet          Plan  Doing well continue current treatments  No orders of the defined types were placed in this encounter. No follow-ups on file.   Susan Quinn MD

## 2020-06-15 ENCOUNTER — TELEPHONE (OUTPATIENT)
Dept: FAMILY MEDICINE CLINIC | Age: 40
End: 2020-06-15

## 2020-06-15 NOTE — TELEPHONE ENCOUNTER
No answer - v-mail full - unable to leave message to call office to schedule 3 month follow up from 6/10 appt w/Dr. Alexandra Pickard

## 2020-07-03 PROBLEM — R52 PAIN: Status: RESOLVED | Noted: 2020-06-03 | Resolved: 2020-07-03

## 2020-07-13 RX ORDER — LORAZEPAM 0.5 MG/1
TABLET ORAL
Qty: 60 TABLET | Refills: 10 | OUTPATIENT
Start: 2020-07-13

## 2020-07-13 RX ORDER — LORAZEPAM 0.5 MG/1
TABLET ORAL
Qty: 60 TABLET | Refills: 2 | Status: SHIPPED | OUTPATIENT
Start: 2020-07-13 | End: 2020-07-13

## 2020-07-13 NOTE — TELEPHONE ENCOUNTER
Requesting medication refill.  Please approve or deny this request.    Rx requested:  Requested Prescriptions     Pending Prescriptions Disp Refills    LORazepam (ATIVAN) 0.5 MG tablet [Pharmacy Med Name: LORAZEPAM 0.5 MG TABS 0.5 TAB] 60 tablet 10     Sig: TAKE 1 TABLET BY MOUTH EVERY 8 HOURS AS NEEDED FOR ANXIETY       Last Office Visit:   06/10/20    Last Filled:      Last Labs:      Next Visit Date:  Future Appointments   Date Time Provider Rema Smart   9/8/2020  1:00 PM Isabella Lira[address]   12/2/2020  2:30 PM Sameer Skinner MD [facility]

## 2020-08-13 RX ORDER — GABAPENTIN 600 MG/1
TABLET ORAL
Qty: 90 TABLET | Refills: 2 | Status: SHIPPED | OUTPATIENT
Start: 2020-08-13 | End: 2020-11-10

## 2020-09-08 ENCOUNTER — OFFICE VISIT (OUTPATIENT)
Dept: OBGYN CLINIC | Age: 40
End: 2020-09-08
Payer: COMMERCIAL

## 2020-09-08 VITALS
HEIGHT: 60 IN | WEIGHT: 193 LBS | BODY MASS INDEX: 37.89 KG/M2 | SYSTOLIC BLOOD PRESSURE: 112 MMHG | DIASTOLIC BLOOD PRESSURE: 70 MMHG

## 2020-09-08 DIAGNOSIS — Z11.51 SCREENING FOR HPV (HUMAN PAPILLOMAVIRUS): ICD-10-CM

## 2020-09-08 DIAGNOSIS — N93.8 DUB (DYSFUNCTIONAL UTERINE BLEEDING): ICD-10-CM

## 2020-09-08 DIAGNOSIS — Z01.419 WOMEN'S ANNUAL ROUTINE GYNECOLOGICAL EXAMINATION: ICD-10-CM

## 2020-09-08 PROCEDURE — 1036F TOBACCO NON-USER: CPT | Performed by: OBSTETRICS & GYNECOLOGY

## 2020-09-08 PROCEDURE — G8417 CALC BMI ABV UP PARAM F/U: HCPCS | Performed by: OBSTETRICS & GYNECOLOGY

## 2020-09-08 PROCEDURE — 99396 PREV VISIT EST AGE 40-64: CPT | Performed by: OBSTETRICS & GYNECOLOGY

## 2020-09-08 PROCEDURE — G8427 DOCREV CUR MEDS BY ELIG CLIN: HCPCS | Performed by: OBSTETRICS & GYNECOLOGY

## 2020-09-08 ASSESSMENT — ENCOUNTER SYMPTOMS
COUGH: 0
WHEEZING: 0
BLOOD IN STOOL: 0
DIARRHEA: 0
ABDOMINAL PAIN: 0
CONSTIPATION: 0
SHORTNESS OF BREATH: 0
VOMITING: 0
ABDOMINAL DISTENTION: 0
SORE THROAT: 0
NAUSEA: 0

## 2020-09-08 NOTE — PROGRESS NOTES
Subjective:      Ab Spicer is a 36 y.o. female [id number] here for routine exam.  Current Complaints: normal menses, no abnormal bleeding, pelvic pain or discharge, no breast pain or new or enlarging lumps on self exam.    Menstrual history:   Absent until recently and with now associated with significant pelvic pain  Sexual activity:  yes, denies knowledge of risky exposure  Abnormalvaginal discharge:  No  Contraceptive method:  mirena  Vitals:  /70   Ht 4' 11.5\" (1.511 m)   Wt 193 lb (87.5 kg)   BMI 38.33 kg/m²   Allergies:Patient has no known allergies. No past medical history on file.   Past Surgical History:   Procedure Laterality Date    APPENDECTOMY      BREAST SURGERY      lump removal bengin     SECTION      x 3     Family History   Problem Relation Age of Onset    Diabetes Mother     Heart Disease Father      Social History     Socioeconomic History    Marital status:      Spouse name: Not on file    Number of children: Not on file    Years of education: Not on file    Highest education level: Not on file   Occupational History    Not on file   Social Needs    Financial resource strain: Not on file    Food insecurity     Worry: Not on file     Inability: Not on file    Transportation needs     Medical: Not on file     Non-medical: Not on file   Tobacco Use    Smoking status: Former Smoker     Last attempt to quit: 2017     Years since quitting: 3.2    Smokeless tobacco: Never Used   Substance and Sexual Activity    Alcohol use: No     Alcohol/week: 0.0 standard drinks    Drug use: No    Sexual activity: Not on file   Lifestyle    Physical activity     Days per week: Not on file     Minutes per session: Not on file    Stress: Not on file   Relationships    Social connections     Talks on phone: Not on file     Gets together: Not on file     Attends Holiness service: Not on file     Active member of club or organization: Not on file     Attends meetings of clubs or organizations: Not on file     Relationship status: Not on file    Intimate partner violence     Fear of current or ex partner: Not on file     Emotionally abused: Not on file     Physically abused: Not on file     Forced sexual activity: Not on file   Other Topics Concern    Not on file   Social History Narrative    Not on file       GynecologicHistory  No LMP recorded. Patient has had an implant. Last Pap: 2018 Results: normal  Last Mammogram: Yes Results: normal  no fmhx cancer  OB History        7    Para   4    Term   4            AB   3    Living   4       SAB        TAB        Ectopic        Molar        Multiple        Live Births   4              Patient's medications, allergies, past medical, surgical, social and family histories were reviewed and updated as appropriate. Review of Systems  Review of Systems   Constitutional: Negative for activity change, appetite change, fatigue and unexpected weight change. HENT: Negative for nosebleeds and sore throat. Eyes: Negative for visual disturbance. Respiratory: Negative for cough, shortness of breath and wheezing. Cardiovascular: Negative for chest pain, palpitations and leg swelling. Gastrointestinal: Negative for abdominal distention, abdominal pain, blood in stool, constipation, diarrhea, nausea and vomiting. Endocrine: Negative for cold intolerance, heat intolerance, polydipsia and polyuria. Genitourinary: Positive for menstrual problem. Negative for difficulty urinating, dyspareunia, dysuria, frequency, genital sores, hematuria, pelvic pain, urgency, vaginal bleeding, vaginal discharge and vaginal pain. Musculoskeletal: Negative for arthralgias. Skin: Negative for rash. Neurological: Negative for dizziness, weakness, light-headedness and headaches. Hematological: Negative for adenopathy. Does not bruise/bleed easily.    Psychiatric/Behavioral: Negative for agitation, confusion, dysphoric mood and sleep disturbance. Objective:     Vitals:  /70   Ht 4' 11.5\" (1.511 m)   Wt 193 lb (87.5 kg)   BMI 38.33 kg/m²     Physical Exam  Constitutional:       General: She is not in acute distress. Appearance: She is well-developed. She is not diaphoretic. HENT:      Head: Normocephalic. Eyes:      Conjunctiva/sclera: Conjunctivae normal.      Pupils: Pupils are equal, round, and reactive to light. Neck:      Thyroid: No thyromegaly. Trachea: No tracheal deviation. Cardiovascular:      Rate and Rhythm: Normal rate and regular rhythm. Heart sounds: Normal heart sounds. No murmur. No friction rub. No gallop. Pulmonary:      Effort: Pulmonary effort is normal. No respiratory distress. Breath sounds: Normal breath sounds. No wheezing or rales. Chest:      Chest wall: No tenderness. Breasts:         Right: No inverted nipple, mass, nipple discharge, skin change or tenderness. Left: No inverted nipple, mass, nipple discharge, skin change or tenderness. Abdominal:      General: Bowel sounds are normal. There is no distension. Palpations: Abdomen is soft. There is no mass. Tenderness: There is no abdominal tenderness. There is no guarding or rebound. Genitourinary:     Labia:         Right: No rash, tenderness or lesion. Left: No rash, tenderness or lesion. Vagina: Normal. No vaginal discharge, erythema, tenderness or bleeding. Cervix: No cervical motion tenderness, discharge or friability. Uterus: Not deviated, not enlarged, not fixed and not tender. Adnexa:         Right: No mass, tenderness or fullness. Left: No mass, tenderness or fullness. Comments: Uterus is not prolapsed and there is not a cystocele or rectocele noted  Musculoskeletal:      Right lower leg: No edema. Left lower leg: No edema. Lymphadenopathy:      Upper Body:      Right upper body: No supraclavicular or axillary adenopathy.       Left upper body: No supraclavicular or axillary adenopathy. Skin:     General: Skin is warm and dry. Neurological:      Mental Status: She is alert and oriented to person, place, and time. Cranial Nerves: No cranial nerve deficit. Deep Tendon Reflexes: Reflexes normal.   Psychiatric:         Behavior: Behavior normal.         Judgment: Judgment normal.         Assessment:      Diagnosis Orders   1. Women's annual routine gynecological examination  PAP SMEAR   2. Screening for HPV (human papillomavirus)  PAP SMEAR   3. Encounter for mammogram to establish baseline mammogram  LORRAINE DIGITAL SCREEN W OR WO CAD BILATERAL   4. DUB (dysfunctional uterine bleeding)  C.trachomatis N.gonorrhoeae DNA    US PELVIS COMPLETE    US NON OB TRANSVAGINAL       Body mass index is 38.33 kg/m². Obesity:  Overweight        Plan:   Pap smear : indicated:  performed. Breast exam :Normal  STD work up : As appropriate      Obesity Counseling:  N/A  Smoking Counseling:  N/A  STD counseling: Will call pt with results    Orders Placed This Encounter   Procedures    C.trachomatis N.gonorrhoeae DNA     Standing Status:   Future     Number of Occurrences:   1     Standing Expiration Date:   9/8/2021    LORRAINE DIGITAL SCREEN W OR WO CAD BILATERAL     Standing Status:   Future     Standing Expiration Date:   9/8/2021    US PELVIS COMPLETE     Standing Status:   Future     Standing Expiration Date:   9/8/2021    US NON OB TRANSVAGINAL     Standing Status:   Future     Standing Expiration Date:   9/8/2021    PAP SMEAR     Standing Status:   Future     Number of Occurrences:   1     Standing Expiration Date:   9/8/2021     Order Specific Question:   Collection Type     Answer: Thin Prep     Order Specific Question:   Prior Abnormal Pap Test     Answer:   No     Order Specific Question:   Screening or Diagnostic     Answer:   Screening     Order Specific Question:   HPV Requested?      Answer:   Yes     Comments:   16/18     Order Specific Question:   High Risk Patient     Answer:   N/A     No orders of the defined types were placed in this encounter. Follow up:  Return in about 1 year (around 9/8/2021) for annual examination.       Xavier Jarrell DO

## 2020-09-08 NOTE — PROGRESS NOTES
The patient was asked if she would like a chaperone present for her intimate exam. She  Declined the chaperone.  Cindy Lopez

## 2020-09-09 RX ORDER — BACLOFEN 10 MG/1
TABLET ORAL
Qty: 120 TABLET | Refills: 3 | Status: SHIPPED | OUTPATIENT
Start: 2020-09-09 | End: 2021-01-12

## 2020-09-11 ENCOUNTER — TELEPHONE (OUTPATIENT)
Dept: NEUROLOGY | Age: 40
End: 2020-09-11

## 2020-09-11 LAB
C TRACH DNA GENITAL QL NAA+PROBE: NEGATIVE
N. GONORRHOEAE DNA: NEGATIVE

## 2020-09-11 NOTE — TELEPHONE ENCOUNTER
[employer] approved via cover my meds         CaseId:[id number];Status:Approved; Review Type:Prior Auth; Coverage Start Date:08/12/2020; Coverage End Date:09/11/2021;      Baron Pat

## 2020-09-21 ENCOUNTER — HOSPITAL ENCOUNTER (OUTPATIENT)
Dept: ULTRASOUND IMAGING | Age: 40
Discharge: HOME | End: 2020-09-23
Payer: COMMERCIAL

## 2020-09-21 PROCEDURE — 76830 TRANSVAGINAL US NON-OB: CPT

## 2020-09-21 PROCEDURE — 76856 US EXAM PELVIC COMPLETE: CPT

## 2020-10-08 RX ORDER — LORAZEPAM 0.5 MG/1
TABLET ORAL
Qty: 60 TABLET | Refills: 2 | Status: SHIPPED | OUTPATIENT
Start: 2020-10-08 | End: 2021-01-11

## 2020-10-11 ENCOUNTER — APPOINTMENT (OUTPATIENT)
Dept: CT IMAGING | Age: 40
End: 2020-10-11
Payer: COMMERCIAL

## 2020-10-11 ENCOUNTER — HOSPITAL ENCOUNTER (EMERGENCY)
Age: 40
Discharge: HOME | End: 2020-10-11
Payer: COMMERCIAL

## 2020-10-11 VITALS
WEIGHT: 185 LBS | HEART RATE: 80 BPM | SYSTOLIC BLOOD PRESSURE: 110 MMHG | DIASTOLIC BLOOD PRESSURE: 74 MMHG | HEIGHT: 59 IN | TEMPERATURE: 98.1 F | RESPIRATION RATE: 16 BRPM | OXYGEN SATURATION: 96 % | BODY MASS INDEX: 37.29 KG/M2

## 2020-10-11 LAB
ALBUMIN SERPL-MCNC: 4.1 G/DL (ref 3.5–4.6)
ALP BLD-CCNC: 75 U/L (ref 40–130)
ALT SERPL-CCNC: 9 U/L (ref 0–33)
ANION GAP SERPL CALCULATED.3IONS-SCNC: 10 MEQ/L (ref 9–15)
AST SERPL-CCNC: 16 U/L (ref 0–35)
BASOPHILS ABSOLUTE: 0.1 K/UL (ref 0–0.2)
BASOPHILS RELATIVE PERCENT: 0.8 %
BILIRUB SERPL-MCNC: 0.3 MG/DL (ref 0.2–0.7)
BILIRUBIN URINE: NEGATIVE
BLOOD, URINE: NEGATIVE
BUN BLDV-MCNC: 16 MG/DL (ref 6–20)
CALCIUM SERPL-MCNC: 9.7 MG/DL (ref 8.5–9.9)
CHLORIDE BLD-SCNC: 103 MEQ/L (ref 95–107)
CLARITY: CLEAR
CO2: 23 MEQ/L (ref 20–31)
COLOR: YELLOW
CREAT SERPL-MCNC: 0.65 MG/DL (ref 0.5–0.9)
EOSINOPHILS ABSOLUTE: 0.3 K/UL (ref 0–0.7)
EOSINOPHILS RELATIVE PERCENT: 3.1 %
GFR AFRICAN AMERICAN: >60
GFR NON-AFRICAN AMERICAN: >60
GLOBULIN: 2.6 G/DL (ref 2.3–3.5)
GLUCOSE BLD-MCNC: 79 MG/DL (ref 70–99)
GLUCOSE URINE: NEGATIVE MG/DL
HCT VFR BLD CALC: 44.6 % (ref 37–47)
HEMOGLOBIN: 15 G/DL (ref 12–16)
KETONES, URINE: NEGATIVE MG/DL
LEUKOCYTE ESTERASE, URINE: NEGATIVE
LYMPHOCYTES ABSOLUTE: 2.8 K/UL (ref 1–4.8)
LYMPHOCYTES RELATIVE PERCENT: 25.8 %
MAGNESIUM: 1.7 MG/DL (ref 1.7–2.4)
MCH RBC QN AUTO: 31.8 PG (ref 27–31.3)
MCHC RBC AUTO-ENTMCNC: 33.5 % (ref 33–37)
MCV RBC AUTO: 94.9 FL (ref 82–100)
MONOCYTES ABSOLUTE: 0.8 K/UL (ref 0.2–0.8)
MONOCYTES RELATIVE PERCENT: 7.3 %
NEUTROPHILS ABSOLUTE: 6.9 K/UL (ref 1.4–6.5)
NEUTROPHILS RELATIVE PERCENT: 63 %
NITRITE, URINE: NEGATIVE
PDW BLD-RTO: 14.4 % (ref 11.5–14.5)
PH UA: 5.5 (ref 5–9)
PLATELET # BLD: 289 K/UL (ref 130–400)
POTASSIUM SERPL-SCNC: 5 MEQ/L (ref 3.4–4.9)
PROTEIN UA: NEGATIVE MG/DL
RBC # BLD: 4.7 M/UL (ref 4.2–5.4)
SODIUM BLD-SCNC: 136 MEQ/L (ref 135–144)
SPECIFIC GRAVITY UA: 1.01 (ref 1–1.03)
TOTAL PROTEIN: 6.7 G/DL (ref 6.3–8)
URINE REFLEX TO CULTURE: NORMAL
UROBILINOGEN, URINE: 0.2 E.U./DL
WBC # BLD: 11 K/UL (ref 4.8–10.8)

## 2020-10-11 PROCEDURE — 99281 EMR DPT VST MAYX REQ PHY/QHP: CPT

## 2020-10-11 PROCEDURE — 85025 COMPLETE CBC W/AUTO DIFF WBC: CPT

## 2020-10-11 PROCEDURE — 96361 HYDRATE IV INFUSION ADD-ON: CPT

## 2020-10-11 PROCEDURE — 83735 ASSAY OF MAGNESIUM: CPT

## 2020-10-11 PROCEDURE — 80053 COMPREHEN METABOLIC PANEL: CPT

## 2020-10-11 PROCEDURE — 36415 COLL VENOUS BLD VENIPUNCTURE: CPT

## 2020-10-11 PROCEDURE — 72125 CT NECK SPINE W/O DYE: CPT

## 2020-10-11 PROCEDURE — 6360000002 HC RX W HCPCS: Performed by: PERSONAL EMERGENCY RESPONSE ATTENDANT

## 2020-10-11 PROCEDURE — 72131 CT LUMBAR SPINE W/O DYE: CPT

## 2020-10-11 PROCEDURE — 2580000003 HC RX 258: Performed by: PERSONAL EMERGENCY RESPONSE ATTENDANT

## 2020-10-11 PROCEDURE — 99282 EMERGENCY DEPT VISIT SF MDM: CPT

## 2020-10-11 PROCEDURE — 72128 CT CHEST SPINE W/O DYE: CPT

## 2020-10-11 PROCEDURE — 96375 TX/PRO/DX INJ NEW DRUG ADDON: CPT

## 2020-10-11 PROCEDURE — 70450 CT HEAD/BRAIN W/O DYE: CPT

## 2020-10-11 PROCEDURE — 96374 THER/PROPH/DIAG INJ IV PUSH: CPT

## 2020-10-11 PROCEDURE — 81003 URINALYSIS AUTO W/O SCOPE: CPT

## 2020-10-11 RX ORDER — ONDANSETRON 2 MG/ML
4 INJECTION INTRAMUSCULAR; INTRAVENOUS ONCE
Status: COMPLETED | OUTPATIENT
Start: 2020-10-11 | End: 2020-10-11

## 2020-10-11 RX ORDER — 0.9 % SODIUM CHLORIDE 0.9 %
500 INTRAVENOUS SOLUTION INTRAVENOUS ONCE
Status: COMPLETED | OUTPATIENT
Start: 2020-10-11 | End: 2020-10-11

## 2020-10-11 RX ORDER — MORPHINE SULFATE 2 MG/ML
4 INJECTION, SOLUTION INTRAMUSCULAR; INTRAVENOUS ONCE
Status: COMPLETED | OUTPATIENT
Start: 2020-10-11 | End: 2020-10-11

## 2020-10-11 RX ORDER — HYDROCODONE BITARTRATE AND ACETAMINOPHEN 5; 325 MG/1; MG/1
1 TABLET ORAL EVERY 6 HOURS PRN
Qty: 5 TABLET | Refills: 0 | Status: SHIPPED | OUTPATIENT
Start: 2020-10-11 | End: 2020-10-14

## 2020-10-11 RX ADMIN — MORPHINE SULFATE 4 MG: 2 INJECTION, SOLUTION INTRAMUSCULAR; INTRAVENOUS at 19:16

## 2020-10-11 RX ADMIN — SODIUM CHLORIDE 500 ML: 9 INJECTION, SOLUTION INTRAVENOUS at 19:16

## 2020-10-11 RX ADMIN — ONDANSETRON 4 MG: 2 INJECTION INTRAMUSCULAR; INTRAVENOUS at 19:15

## 2020-10-11 ASSESSMENT — ENCOUNTER SYMPTOMS
ABDOMINAL PAIN: 0
COLOR CHANGE: 0
NAUSEA: 0
SHORTNESS OF BREATH: 0
DIARRHEA: 0
VOMITING: 0
COUGH: 0
BLOOD IN STOOL: 0
SORE THROAT: 0
RHINORRHEA: 0
BACK PAIN: 1

## 2020-10-11 ASSESSMENT — PAIN DESCRIPTION - ONSET: ONSET: ON-GOING

## 2020-10-11 ASSESSMENT — PAIN SCALES - GENERAL
PAINLEVEL_OUTOF10: 9
PAINLEVEL_OUTOF10: 10

## 2020-10-11 ASSESSMENT — PAIN DESCRIPTION - LOCATION: LOCATION: BACK;HEAD;SHOULDER;NECK

## 2020-10-11 ASSESSMENT — PAIN DESCRIPTION - DESCRIPTORS: DESCRIPTORS: SHARP;SHOOTING

## 2020-10-11 ASSESSMENT — PAIN DESCRIPTION - FREQUENCY: FREQUENCY: CONTINUOUS

## 2020-10-11 ASSESSMENT — PAIN DESCRIPTION - PAIN TYPE: TYPE: ACUTE PAIN
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
## 2020-10-11 NOTE — ED NOTES
Pt up to restroom at this time. Pt has a stable gait as observed by this RN.      Katelyn Luque RN  10/11/20 1164

## 2020-10-11 NOTE — ED NOTES
Bed: 15  Expected date:   Expected time:   Means of arrival:   Comments:  36 yr Female s/p fall yesterday hitting head, No thinners, pain from lower back up to head     Dav Villar RN  10/11/20 3591

## 2020-10-11 NOTE — ED NOTES
Via Claudia LOPEZ  at the bedside at this time. Assessment completed.       Chastity Castaneda RN  10/11/20 2641

## 2020-10-12 NOTE — ED NOTES
Discharge  instructions given and reviewed. Patient verbalized understanding. Patient ambulated out of ED with a steady gait to POV.      Tonny Perkins RN  10/11/20 2405

## 2020-11-10 RX ORDER — GABAPENTIN 600 MG/1
TABLET ORAL
Qty: 90 TABLET | Refills: 10 | Status: SHIPPED | OUTPATIENT
Start: 2020-11-10 | End: 2020-12-02 | Stop reason: SDUPTHER

## 2020-12-02 ENCOUNTER — OFFICE VISIT (OUTPATIENT)
Dept: NEUROLOGY | Age: 40
End: 2020-12-02
Payer: COMMERCIAL

## 2020-12-02 ENCOUNTER — TELEPHONE (OUTPATIENT)
Dept: ADMINISTRATIVE | Age: 40
End: 2020-12-02

## 2020-12-02 VITALS
BODY MASS INDEX: 38.7 KG/M2 | SYSTOLIC BLOOD PRESSURE: 125 MMHG | DIASTOLIC BLOOD PRESSURE: 81 MMHG | HEART RATE: 74 BPM | WEIGHT: 191.6 LBS

## 2020-12-02 PROCEDURE — G8427 DOCREV CUR MEDS BY ELIG CLIN: HCPCS | Performed by: PSYCHIATRY & NEUROLOGY

## 2020-12-02 PROCEDURE — 1036F TOBACCO NON-USER: CPT | Performed by: PSYCHIATRY & NEUROLOGY

## 2020-12-02 PROCEDURE — G8484 FLU IMMUNIZE NO ADMIN: HCPCS | Performed by: PSYCHIATRY & NEUROLOGY

## 2020-12-02 PROCEDURE — G8417 CALC BMI ABV UP PARAM F/U: HCPCS | Performed by: PSYCHIATRY & NEUROLOGY

## 2020-12-02 PROCEDURE — 99214 OFFICE O/P EST MOD 30 MIN: CPT | Performed by: PSYCHIATRY & NEUROLOGY

## 2020-12-02 RX ORDER — NARATRIPTAN 2.5 MG/1
TABLET ORAL
Qty: 9 TABLET | Refills: 3 | Status: SHIPPED | OUTPATIENT
Start: 2020-12-02 | End: 2020-12-15 | Stop reason: ALTCHOICE

## 2020-12-02 RX ORDER — PREDNISONE 10 MG/1
TABLET ORAL
Qty: 27 TABLET | Refills: 0 | Status: SHIPPED | OUTPATIENT
Start: 2020-12-02 | End: 2020-12-17 | Stop reason: SDUPTHER

## 2020-12-02 RX ORDER — GABAPENTIN 600 MG/1
TABLET ORAL
Qty: 90 TABLET | Refills: 10 | Status: SHIPPED | OUTPATIENT
Start: 2020-12-02 | End: 2021-10-08

## 2020-12-02 ASSESSMENT — ENCOUNTER SYMPTOMS
SHORTNESS OF BREATH: 0
PHOTOPHOBIA: 0
NAUSEA: 0
CHOKING: 0
COLOR CHANGE: 0
BACK PAIN: 0
TROUBLE SWALLOWING: 0
VOMITING: 0

## 2020-12-02 NOTE — PROGRESS NOTES
Subjective:      Patient ID: Sheridan Floyd is a 36 y.o. female who presents today for:  Chief Complaint   Patient presents with    Follow-up     Patient states there are good days and bad days. Says she has been a little wobbly, tripped and has fallen a few times. She says she has been getting headaches again worse than normal, onset a few weeks ago and has been having them alomst everyday since. HPI-36year-old right-handed female with history of multiple sclerosis. Patient has multiple symptoms today and wonder appears to be her headaches. She has a chronic daily headaches. She is having some loss of balance as well. Patient continues on Aubagio. And had a fall with loss of balance and was seen in the emergency room. ER records are obtained and she had a series of x-rays which we have reviewed. Patient reports that her foot got in the way. She is having some loss of balance as well no urinary dysfunction as described she does not have any fevers. Her headaches appears to be somewhat bothersome she has had migraine headache she is not quite sure what she has been on in the past but I do not see that there has been any triptans. She denies any head injury trauma. She has not had any loss of consciousness. She has lower extremity weakness from mild spasticity of multiple sclerosis due to spinal MS  Patient is doing well otherwise on Aubagio. Very slow decline is noted she is not an MRI for a while. Patient likely also has neuropathy in her lower extremity    No past medical history on file.   Past Surgical History:   Procedure Laterality Date    APPENDECTOMY      BREAST SURGERY      lump removal bengin     SECTION      x 3     Social History     Socioeconomic History    Marital status:      Spouse name: Not on file    Number of children: Not on file    Years of education: Not on file    Highest education level: Not on file   Occupational History    Not on file Social Needs    Financial resource strain: Not on file    Food insecurity     Worry: Not on file     Inability: Not on file    Transportation needs     Medical: Not on file     Non-medical: Not on file   Tobacco Use    Smoking status: Former Smoker     Last attempt to quit: 6/21/2017     Years since quitting: 3.4    Smokeless tobacco: Never Used   Substance and Sexual Activity    Alcohol use: No     Alcohol/week: 0.0 standard drinks    Drug use: No    Sexual activity: Not on file   Lifestyle    Physical activity     Days per week: Not on file     Minutes per session: Not on file    Stress: Not on file   Relationships    Social connections     Talks on phone: Not on file     Gets together: Not on file     Attends Yazidism service: Not on file     Active member of club or organization: Not on file     Attends meetings of clubs or organizations: Not on file     Relationship status: Not on file    Intimate partner violence     Fear of current or ex partner: Not on file     Emotionally abused: Not on file     Physically abused: Not on file     Forced sexual activity: Not on file   Other Topics Concern    Not on file   Social History Narrative    Not on file     Family History   Problem Relation Age of Onset    Diabetes Mother     Heart Disease Father      No Known Allergies    Current Outpatient Medications   Medication Sig Dispense Refill    naratriptan (AMERGE) 2.5 MG tablet One daily for 5 days then prn 9 tablet 3    gabapentin (NEURONTIN) 600 MG tablet TAKE ONE CAPSULE BY MOUTH THREE TIMES DAILY 90 tablet 10    baclofen (LIORESAL) 10 MG tablet TAKE 1 TABLET BY MOUTH FOUR TIMES DAILY 120 tablet 3    escitalopram (LEXAPRO) 20 MG tablet Take 1 tablet by mouth daily 90 tablet 3    etodolac (LODINE) 400 MG tablet Take 1 tablet by mouth 2 times daily 180 tablet 3    Cholecalciferol (VITAMIN D3) 50 MCG (2000 UT) CAPS TK 1 C PO D 30 capsule 3 EXAM: CT LUMBAR SPINE WO CONTRAST HISTORY: Pain after TECHNIQUE: Multiple contiguous axial images were obtained of the lumbar spine without contrast. Multiplanar reformats were obtained. COMPARISON: None available FINDINGS: No acute fracture or malalignment. Lumbar vertebral body heights and intervertebral disc heights are maintained. Small multilevel Schmorl's nodes. Small disc bulges at L4-5 and L5-S1. No high-grade spinal canal or neuroforaminal stenosis identified by CT. Paravertebral soft tissues are within normal limits. An IUD is present within the uterus. 2 mm nonobstructing right renal calculus is identified. No acute fracture or malalignment. 2 mm nonobstructing right renal calculus. All CT scans at this facility use dose modulation, iterative reconstruction, and/or weight based dosing when appropriate to reduce radiation dose to as low as reasonably achievable.       Lab Results   Component Value Date    WBC 11.0 10/11/2020    RBC 4.70 10/11/2020    RBC 4.56 10/03/2018    HGB 15.0 10/11/2020    HCT 44.6 10/11/2020    MCV 94.9 10/11/2020    MCH 31.8 10/11/2020    MCHC 33.5 10/11/2020    RDW 14.4 10/11/2020     10/11/2020    MPV 9.8 10/03/2018     Lab Results   Component Value Date     10/11/2020    K 5.0 10/11/2020     10/11/2020    CO2 23 10/11/2020    BUN 16 10/11/2020    CREATININE 0.65 10/11/2020    GFRAA >60.0 10/11/2020    AGRATIO 1.4 10/03/2018    LABGLOM >60.0 10/11/2020    GLUCOSE 79 10/11/2020    GLUCOSE 181 10/03/2018    PROT 6.7 10/11/2020    LABALBU 4.1 10/11/2020    LABALBU 3.7 10/03/2018    CALCIUM 9.7 10/11/2020    BILITOT 0.3 10/11/2020    ALKPHOS 75 10/11/2020    AST 16 10/11/2020    ALT 9 10/11/2020     Lab Results   Component Value Date    PROTIME 9.8 08/24/2015    INR 0.9 08/24/2015     Lab Results   Component Value Date    TSH 0.48 09/21/2018    GVNARCHJ10 247 09/21/2018    FOLATE 5.00 09/21/2018     Lab Results   Component Value Date    TRIG 58 06/12/2015 HDL 50 06/12/2015    LDLCALC 100 06/12/2015     Lab Results   Component Value Date    LABAMPH Neg 03/14/2016    BARBSCNU Neg 03/14/2016    LABBENZ Neg 03/14/2016    OPIATESCREENURINE Neg 03/14/2016    PHENCYCLIDINESCREENURINE Neg 03/14/2016     No results found for: LITHIUM, DILFRTOT, VALPROATE    Assessment:       Diagnosis Orders   1. MS (multiple sclerosis) ([facility] [address]  MRI BRAIN W WO CONTRAST   2. Neuropathy     3. Multiple sclerosis (HCC)     4. Paresthesia of skin     5. Myalgia     6. Ataxic gait     Multiple Sclerosis. Patient is having some gait issues and falls. Patient likely require methylprednisone for 3 days IV with oral as she is showing some increased EDSS scores of 3-4. Patient was seen in the emergency room for the same ER records were obtained and reviewed normal findings of all the x-rays. She does not recall if she passed out. Her main issues appears to be headaches and she is having almost a status migrainous. She has been tried on medications the name of which she does not and therefore we will give her Amerge for 5 days to see if this helps if not we will have to consider CGRP blockers patient has not any fever or urinary dysfunction to consider urinary tract infection. Patient is on Aubagio doing quite well without any side effects. Her EDSS scores are though slowly worsening which is expected. Patient has numbness and tingling and paresthesias of her skin as well as myalgic symptoms and neuropathy findings on clinical evaluation.     We will see how she does with the IV steroids      Plan:      Orders Placed This Encounter   Procedures    MRI BRAIN W WO CONTRAST     Standing Status:   Future     Standing Expiration Date:   12/2/2021     Order Specific Question:   Reason for exam:     Answer:   multiple sclerosis     Orders Placed This Encounter   Medications    naratriptan (AMERGE) 2.5 MG tablet     Sig: One daily for 5 days then prn     Dispense:  9 tablet     Refill:  3 Return in about 4 months (around 4/2/2021).       Beatris Gifford MD  delivery delivered

## 2020-12-15 ENCOUNTER — HOSPITAL ENCOUNTER (OUTPATIENT)
Dept: INFUSION THERAPY | Age: 40
Setting detail: INFUSION SERIES
Discharge: HOME | End: 2020-12-15
Payer: COMMERCIAL

## 2020-12-15 VITALS
SYSTOLIC BLOOD PRESSURE: 122 MMHG | HEART RATE: 75 BPM | RESPIRATION RATE: 16 BRPM | DIASTOLIC BLOOD PRESSURE: 70 MMHG | TEMPERATURE: 98.3 F

## 2020-12-15 DIAGNOSIS — G35 MS (MULTIPLE SCLEROSIS) (HCC): Primary | ICD-10-CM

## 2020-12-15 PROCEDURE — 96365 THER/PROPH/DIAG IV INF INIT: CPT

## 2020-12-15 PROCEDURE — 2580000003 HC RX 258: Performed by: PSYCHIATRY & NEUROLOGY

## 2020-12-15 PROCEDURE — 6360000002 HC RX W HCPCS: Performed by: PSYCHIATRY & NEUROLOGY

## 2020-12-15 RX ORDER — CHOLECALCIFEROL (VITAMIN D3) 50 MCG
CAPSULE ORAL
Qty: 30 CAPSULE | Refills: 2 | Status: SHIPPED | OUTPATIENT
Start: 2020-12-15 | End: 2021-03-10

## 2020-12-15 RX ADMIN — SODIUM CHLORIDE 1000 MG: 9 INJECTION, SOLUTION INTRAVENOUS at 13:34

## 2020-12-15 NOTE — FLOWSHEET NOTE
Infusion complete, patient tolerated well. Left unit via ambulation. All equipment cleaned after discharge.

## 2020-12-16 ENCOUNTER — HOSPITAL ENCOUNTER (OUTPATIENT)
Dept: INFUSION THERAPY | Age: 40
Setting detail: INFUSION SERIES
Discharge: HOME | End: 2020-12-16
Payer: COMMERCIAL

## 2020-12-16 VITALS
SYSTOLIC BLOOD PRESSURE: 107 MMHG | RESPIRATION RATE: 16 BRPM | TEMPERATURE: 98.6 F | DIASTOLIC BLOOD PRESSURE: 75 MMHG | HEART RATE: 94 BPM

## 2020-12-16 DIAGNOSIS — G35 MS (MULTIPLE SCLEROSIS) (HCC): Primary | ICD-10-CM

## 2020-12-16 PROCEDURE — 96365 THER/PROPH/DIAG IV INF INIT: CPT

## 2020-12-16 PROCEDURE — 2580000003 HC RX 258: Performed by: PSYCHIATRY & NEUROLOGY

## 2020-12-16 PROCEDURE — 6360000002 HC RX W HCPCS: Performed by: PSYCHIATRY & NEUROLOGY

## 2020-12-16 RX ADMIN — SODIUM CHLORIDE 1000 MG: 9 INJECTION, SOLUTION INTRAVENOUS at 13:53

## 2020-12-16 NOTE — FLOWSHEET NOTE
Infusion complete, patient tolerated well. Left unit via ambulation, all equipment cleaned after discharge.

## 2020-12-17 ENCOUNTER — HOSPITAL ENCOUNTER (OUTPATIENT)
Dept: INFUSION THERAPY | Age: 40
Setting detail: INFUSION SERIES
Discharge: HOME | End: 2020-12-17
Payer: COMMERCIAL

## 2020-12-17 ENCOUNTER — HOSPITAL ENCOUNTER (OUTPATIENT)
Dept: MRI IMAGING | Age: 40
Discharge: HOME | End: 2020-12-19
Payer: COMMERCIAL

## 2020-12-17 VITALS
DIASTOLIC BLOOD PRESSURE: 74 MMHG | RESPIRATION RATE: 18 BRPM | SYSTOLIC BLOOD PRESSURE: 120 MMHG | HEART RATE: 68 BPM | TEMPERATURE: 98.5 F

## 2020-12-17 DIAGNOSIS — G35 MS (MULTIPLE SCLEROSIS) (HCC): Primary | ICD-10-CM

## 2020-12-17 PROCEDURE — 6360000002 HC RX W HCPCS: Performed by: PSYCHIATRY & NEUROLOGY

## 2020-12-17 PROCEDURE — 6360000004 HC RX CONTRAST MEDICATION: Performed by: PSYCHIATRY & NEUROLOGY

## 2020-12-17 PROCEDURE — 2580000003 HC RX 258: Performed by: PSYCHIATRY & NEUROLOGY

## 2020-12-17 PROCEDURE — A9579 GAD-BASE MR CONTRAST NOS,1ML: HCPCS | Performed by: PSYCHIATRY & NEUROLOGY

## 2020-12-17 PROCEDURE — 70553 MRI BRAIN STEM W/O & W/DYE: CPT

## 2020-12-17 PROCEDURE — 96365 THER/PROPH/DIAG IV INF INIT: CPT

## 2020-12-17 RX ORDER — PREDNISONE 10 MG/1
TABLET ORAL
Qty: 27 TABLET | Refills: 0 | Status: SHIPPED | OUTPATIENT
Start: 2020-12-17 | End: 2020-12-27

## 2020-12-17 RX ADMIN — SODIUM CHLORIDE 1000 MG: 9 INJECTION, SOLUTION INTRAVENOUS at 13:44

## 2020-12-17 RX ADMIN — GADOTERIDOL 20 ML: 279.3 INJECTION, SOLUTION INTRAVENOUS at 12:25

## 2020-12-17 NOTE — PROGRESS NOTES
Pt to [facility] via ambulatory for 3rd day of solumedrol. Infusion was initiated and completed. No c/o or requests were made. Called Dr. Cuellar Keep to make sure Radha Castillo gets sent to [facility] for tapering prednisone. Spoke with [facility] and doctor office.

## 2020-12-29 ENCOUNTER — TELEPHONE (OUTPATIENT)
Dept: NEUROLOGY | Age: 40
End: 2020-12-29

## 2021-01-11 DIAGNOSIS — F41.9 ANXIETY AND DEPRESSION: ICD-10-CM

## 2021-01-11 DIAGNOSIS — F32.A ANXIETY AND DEPRESSION: ICD-10-CM

## 2021-01-11 RX ORDER — LORAZEPAM 0.5 MG/1
TABLET ORAL
Qty: 60 TABLET | Refills: 2 | Status: SHIPPED | OUTPATIENT
Start: 2021-01-11 | End: 2021-04-06

## 2021-01-12 RX ORDER — BACLOFEN 10 MG/1
TABLET ORAL
Qty: 120 TABLET | Refills: 3 | Status: SHIPPED | OUTPATIENT
Start: 2021-01-12 | End: 2021-05-05

## 2021-01-26 DIAGNOSIS — G35 MULTIPLE SCLEROSIS (HCC): Primary | ICD-10-CM

## 2021-01-26 RX ORDER — RENAGEL 800 MG/1
14 TABLET ORAL DAILY
Qty: 90 TABLET | Refills: 3 | Status: SHIPPED | OUTPATIENT
Start: 2021-01-26 | End: 2022-01-19

## 2021-03-10 RX ORDER — CHOLECALCIFEROL (VITAMIN D3) 50 MCG
CAPSULE ORAL
Qty: 30 CAPSULE | Refills: 10 | Status: SHIPPED | OUTPATIENT
Start: 2021-03-10 | End: 2022-02-01

## 2021-04-06 DIAGNOSIS — F32.A ANXIETY AND DEPRESSION: ICD-10-CM

## 2021-04-06 DIAGNOSIS — F41.9 ANXIETY AND DEPRESSION: ICD-10-CM

## 2021-04-06 RX ORDER — LORAZEPAM 0.5 MG/1
TABLET ORAL
Qty: 60 TABLET | Refills: 2 | Status: SHIPPED | OUTPATIENT
Start: 2021-04-06 | End: 2021-05-06

## 2021-04-06 NOTE — TELEPHONE ENCOUNTER
Future Appointments     Provider Department   4/20/2021 MD YURY Storm AT [facility]   Appt Notes: Controlled medication checkup     Called to schedule 3 mo checkup because she's not been seen since 6/2020. Will provide one month supply.

## 2021-04-20 ENCOUNTER — OFFICE VISIT (OUTPATIENT)
Dept: FAMILY MEDICINE CLINIC | Age: 41
End: 2021-04-20
Payer: COMMERCIAL

## 2021-04-20 VITALS
DIASTOLIC BLOOD PRESSURE: 68 MMHG | TEMPERATURE: 98.4 F | OXYGEN SATURATION: 97 % | BODY MASS INDEX: 39.76 KG/M2 | HEART RATE: 86 BPM | SYSTOLIC BLOOD PRESSURE: 100 MMHG | HEIGHT: 59 IN | WEIGHT: 197.2 LBS

## 2021-04-20 DIAGNOSIS — F32.A ANXIETY AND DEPRESSION: Primary | ICD-10-CM

## 2021-04-20 DIAGNOSIS — F41.9 ANXIETY AND DEPRESSION: ICD-10-CM

## 2021-04-20 DIAGNOSIS — F41.9 ANXIETY AND DEPRESSION: Primary | ICD-10-CM

## 2021-04-20 DIAGNOSIS — F32.A ANXIETY AND DEPRESSION: ICD-10-CM

## 2021-04-20 DIAGNOSIS — G35 MS (MULTIPLE SCLEROSIS) (HCC): ICD-10-CM

## 2021-04-20 PROCEDURE — G8417 CALC BMI ABV UP PARAM F/U: HCPCS | Performed by: FAMILY MEDICINE

## 2021-04-20 PROCEDURE — 1036F TOBACCO NON-USER: CPT | Performed by: FAMILY MEDICINE

## 2021-04-20 PROCEDURE — G8427 DOCREV CUR MEDS BY ELIG CLIN: HCPCS | Performed by: FAMILY MEDICINE

## 2021-04-20 PROCEDURE — 99213 OFFICE O/P EST LOW 20 MIN: CPT | Performed by: FAMILY MEDICINE

## 2021-04-20 SDOH — ECONOMIC STABILITY: FOOD INSECURITY: WITHIN THE PAST 12 MONTHS, THE FOOD YOU BOUGHT JUST DIDN'T LAST AND YOU DIDN'T HAVE MONEY TO GET MORE.: NEVER TRUE

## 2021-04-20 SDOH — ECONOMIC STABILITY: TRANSPORTATION INSECURITY
IN THE PAST 12 MONTHS, HAS THE LACK OF TRANSPORTATION KEPT YOU FROM MEDICAL APPOINTMENTS OR FROM GETTING MEDICATIONS?: NO

## 2021-04-20 SDOH — ECONOMIC STABILITY: INCOME INSECURITY: HOW HARD IS IT FOR YOU TO PAY FOR THE VERY BASICS LIKE FOOD, HOUSING, MEDICAL CARE, AND HEATING?: NOT HARD AT ALL

## 2021-04-20 SDOH — ECONOMIC STABILITY: TRANSPORTATION INSECURITY
IN THE PAST 12 MONTHS, HAS LACK OF TRANSPORTATION KEPT YOU FROM MEETINGS, WORK, OR FROM GETTING THINGS NEEDED FOR DAILY LIVING?: NO

## 2021-04-20 ASSESSMENT — ENCOUNTER SYMPTOMS
RHINORRHEA: 0
GASTROINTESTINAL NEGATIVE: 1
EYES NEGATIVE: 1
RESPIRATORY NEGATIVE: 1
CHEST TIGHTNESS: 0
COUGH: 0

## 2021-04-20 NOTE — PROGRESS NOTES
0.0 standard drinks    Drug use: No    Sexual activity: None   Lifestyle    Physical activity     Days per week: None     Minutes per session: None    Stress: None   Relationships    Social connections     Talks on phone: None     Gets together: None     Attends Alevism service: None     Active member of club or organization: None     Attends meetings of clubs or organizations: None     Relationship status: None    Intimate partner violence     Fear of current or ex partner: None     Emotionally abused: None     Physically abused: None     Forced sexual activity: None   Other Topics Concern    None   Social History Narrative    None     Current Outpatient Medications   Medication Sig Dispense Refill    LORazepam (ATIVAN) 0.5 MG tablet TAKE (1) TABLET BY MOUTH EVERY 8 HOURS AS NEEDED FOR ANXIETY 60 tablet 2    Cholecalciferol (D3 SUPER STRENGTH) 50 MCG (2000 UT) CAPS TAKE (1) CAPSULE BY MOUTH ONCE DAILY 30 capsule 10    AUBAGIO 14 MG TABS Take 14 mg by mouth daily 90 tablet 3    baclofen (LIORESAL) 10 MG tablet TAKE ONE (1) TABLET BY MOUTH FOUR TIMES A  tablet 3    escitalopram (LEXAPRO) 20 MG tablet Take 1 tablet by mouth daily 90 tablet 3    etodolac (LODINE) 400 MG tablet Take 1 tablet by mouth 2 times daily 180 tablet 3    Compression Stockings MISC by Does not apply route Knee high medium compression 20-30 cc disp three pairs. 1 each 0    Handicap Placard MISC by Does not apply route 1 each 0    cyclobenzaprine (FLEXERIL) 5 MG tablet TK 1 T PO BID  0    AUBAGIO 14 MG TABS Take 14 mg by mouth daily       levonorgestrel (MIRENA) 20 MCG/24HR IUD 1 each by Intrauterine route      gabapentin (NEURONTIN) 600 MG tablet TAKE ONE CAPSULE BY MOUTH THREE TIMES DAILY 90 tablet 10    amantadine (SYMMETREL) 100 MG capsule One daily for 1 week, then one bid (Patient not taking: Reported on 12/2/2020) 60 capsule 0     No current facility-administered medications for this visit.       Current Outpatient Medications on File Prior to Visit   Medication Sig Dispense Refill    LORazepam (ATIVAN) 0.5 MG tablet TAKE (1) TABLET BY MOUTH EVERY 8 HOURS AS NEEDED FOR ANXIETY 60 tablet 2    Cholecalciferol (D3 SUPER STRENGTH) 50 MCG (2000 UT) CAPS TAKE (1) CAPSULE BY MOUTH ONCE DAILY 30 capsule 10    AUBAGIO 14 MG TABS Take 14 mg by mouth daily 90 tablet 3    baclofen (LIORESAL) 10 MG tablet TAKE ONE (1) TABLET BY MOUTH FOUR TIMES A  tablet 3    escitalopram (LEXAPRO) 20 MG tablet Take 1 tablet by mouth daily 90 tablet 3    etodolac (LODINE) 400 MG tablet Take 1 tablet by mouth 2 times daily 180 tablet 3    Compression Stockings MISC by Does not apply route Knee high medium compression 20-30 cc disp three pairs. 1 each 0    Handicap Placard MISC by Does not apply route 1 each 0    cyclobenzaprine (FLEXERIL) 5 MG tablet TK 1 T PO BID  0    AUBAGIO 14 MG TABS Take 14 mg by mouth daily       levonorgestrel (MIRENA) 20 MCG/24HR IUD 1 each by Intrauterine route      gabapentin (NEURONTIN) 600 MG tablet TAKE ONE CAPSULE BY MOUTH THREE TIMES DAILY 90 tablet 10    amantadine (SYMMETREL) 100 MG capsule One daily for 1 week, then one bid (Patient not taking: Reported on 12/2/2020) 60 capsule 0     No current facility-administered medications on file prior to visit.       No Known Allergies  Health Maintenance   Topic Date Due    Hepatitis C screen  Never done    Varicella vaccine (1 of 2 - 2-dose childhood series) Never done    COVID-19 Vaccine (1) Never done    Lipid screen  06/12/2020    HIV screen  08/15/2026 (Originally 4/7/1995)    Annual Wellness Visit (AWV)  06/09/2021    Flu vaccine (Season Ended) 09/01/2021    Cervical cancer screen  09/08/2025    DTaP/Tdap/Td vaccine (4 - Td) 12/12/2029    Hepatitis A vaccine  Aged Out    Hepatitis B vaccine  Aged Out    Hib vaccine  Aged Out    Meningococcal (ACWY) vaccine  Aged Out    Pneumococcal 0-64 years Vaccine  Aged Out       Review of Systems     Review of Systems   Constitutional: Negative for activity change, appetite change, fatigue and fever. HENT: Negative for congestion and rhinorrhea. Eyes: Negative. Respiratory: Negative. Negative for cough and chest tightness. Cardiovascular: Negative. Gastrointestinal: Negative. Endocrine: Negative. Genitourinary: Negative. Musculoskeletal: Negative. Skin: Negative. Neurological: Negative for dizziness, light-headedness and numbness. Hematological: Negative. Psychiatric/Behavioral: Negative. Physical Exam  Vitals:    04/20/21 1126   BP: 100/68   Site: Left Upper Arm   Position: Sitting   Cuff Size: Large Adult   Pulse: 86   Temp: 98.4 °F (36.9 °C)   TempSrc: Oral   SpO2: 97%   Weight: 197 lb 3.2 oz (89.4 kg)   Height: 4' 11\" (1.499 m)       Physical Exam  Constitutional:       Appearance: She is well-developed. HENT:      Right Ear: External ear normal.      Left Ear: External ear normal.   Eyes:      Pupils: Pupils are equal, round, and reactive to light. Neck:      Musculoskeletal: Normal range of motion and neck supple. Thyroid: No thyromegaly. Cardiovascular:      Rate and Rhythm: Normal rate and regular rhythm. Heart sounds: Normal heart sounds. No murmur. No friction rub. No gallop. Pulmonary:      Effort: Pulmonary effort is normal. No respiratory distress. Breath sounds: No wheezing or rales. Chest:      Chest wall: No tenderness. Abdominal:      General: Bowel sounds are normal. There is no distension. Palpations: Abdomen is soft. There is no mass. Tenderness: There is no abdominal tenderness. There is no guarding or rebound. Musculoskeletal: Normal range of motion. Lymphadenopathy:      Cervical: No cervical adenopathy. Skin:     General: Skin is warm and dry. Neurological:      Mental Status: She is alert and oriented to person, place, and time. Cranial Nerves: No cranial nerve deficit.

## 2021-04-20 NOTE — LETTER
[facility]      Patient: Fransisca Paz Date of Service: 2020   : 1980 MRN: [id number]     SUBJECTIVE:   HISTORY OF PRESENT ILLNESS:          Chief Complaint   Patient presents with   • Arm Pain     patient injured her left arm in begining of March during bowling game,pain levl is 5/10 ,  patient used IBU, Tylenol , and ice compress( patient states she recently felt tingling sensation and numbness after she puts ice )        Fransisca Paz is a 39 year old female who presents today for complaint of left arm/elbow pain x 2 months. Pt went bowling and was trying to grab a 14 lbs heavy ball that she dropped, with one hand . Crawford a pop at the time. Is unable to stretch or bend arm. Reports some numbness and tingling on her elbow and radiating to the neck and also down the arm to the last 2 fingers. Reports weakness to her left arm. Reports decrease strength, unable to grab and turn door knobs or do other activities. Rates pain at 5/10. Has been taking tylenol. Also states that when she applies ICE to the elbow who whole hand feels numb.           PAST MEDICAL HISTORY:  No past medical history on file.    MEDICATIONS:  No current outpatient medications on file.     No current facility-administered medications for this visit.        ALLERGIES:  ALLERGIES:  No Known Allergies    PAST SURGICAL HISTORY:  No past surgical history on file.    FAMILY HISTORY:  No family history on file.    SOCIAL HISTORY:  Social History     Tobacco Use   • Smoking status: Not on file   Substance Use Topics   • Alcohol use: Not on file   • Drug use: Not on file       Review of Systems   Musculoskeletal:        Left arm pain   All other systems reviewed and are negative.      OBJECTIVE:     Physical Exam   Constitutional: Vital signs are normal. She appears well-developed and well-nourished.   HENT:   Head: Normocephalic.   Cardiovascular: Normal rate.   Pulmonary/Chest: Effort normal.   Skin: Skin is warm.   Nursing  CONTROLLED SUBSTANCE MEDICATION AGREEMENT     Patient Name: Melissa Hazel  Patient YOB: 1980   I understand, that controlled substance medications may be used to help better manage my symptoms and to improve my ability to function at home, work and in social settings. However, I also understand that these medications do have risks, which have been discussed with me, including possible development of physical or psychological dependence. I understand that successful treatment requires mutual trust and honesty between me and my provider. I understand and agree that following this Medication Agreement is necessary in continuing my provider-patient relationship and the success of my treatment plan. Explanation from my Provider: Benefits and Goals of Controlled Substance Medications: There are two potential goals for your treatment: (1) decreased pain and suffering (2) improved daily life functions. There are many possible treatments for your chronic condition(s). Alternatives such as physical therapy, yoga, massage, home daily exercise, meditation, relaxation techniques, injections, chiropractic manipulations, surgery, cognitive therapy, hypnosis and many medications that are not habit-forming may be used. Use of controlled substance medications may be helpful, but they are unlikely to resolve all symptoms or restore all function. Explanation from my Provider: Risks of Controlled Substance Medications:  Opioid pain medications: These medications can lead to problems such as addiction/dependence, sedation, lightheadedness/dizziness, memory issues, falls, constipation, nausea, or vomiting. They may also impair the ability to drive or operate machinery. Additionally, these medications may lower testosterone levels, leading to loss of bone strength, stamina and sex drive.   They may cause problems with breathing, sleep apnea and reduced coughing, which is especially dangerous for patients with lung disease. Overdose or dangerous interactions with alcohol and other medications may occur, leading to death. Hyperalgesia may develop, which means patients receiving opioids for the treatment of pain may become more sensitive to certain painful stimuli, and in some cases, experience pain from ordinarily non-painful stimuli. Women between the ages of 14-53 who could become pregnant should carefully weigh the risks and benefits of opioids with their physicians, as these medications increase the risk of pregnancy complications, including miscarriage,  delivery and stillbirth. It is also possible for babies to be born addicted to opioids. Opioid dependence withdrawal symptoms may include; feelings of uneasiness, increased pain, irritability, belly pain, diarrhea, sweats and goose-flesh. Benzodiazepines and non-benzodiazepine sleep medications: These medications can lead to problems such as addiction/dependence, sedation, fatigue, lightheadedness, dizziness, incoordination, falls, depression, hallucinations, and impaired judgment, memory and concentration. The ability to drive and operate machinery may also be affected. Abnormal sleep-related behaviors have been reported, including sleepwalking, driving, making telephone calls, eating, or having sex while not fully awake. These medications can suppress breathing and worsen sleep apnea, particularly when combined with alcohol or other sedating medications, potentially leading to death. Dependence withdrawal symptoms may include tremors, anxiety, hallucinations and seizures. Stimulants:  Common adverse effects include addiction/dependence, increased blood  pressure and heart rate, decreased appetite, nausea, involuntary weight loss, insomnia,                                                                                                                     Initials:_______   irritability, and headaches.   These risks may increase when these medications note reviewed.      Visit Vitals  BP (!) 136/92 (BP Location: RUE - Right upper extremity, Patient Position: Sitting)   Pulse 68   Temp 98.1 °F (36.7 °C) (Temporal)   Ht 5' 3\" (1.6 m)   Wt 82.5 kg (181 lb 12.3 oz)   LMP 04/15/2020   SpO2 99%   BMI 32.20 kg/m²       DIAGNOSTIC STUDIES:     LAB RESULTS:    EXAM: XR ELBOW 3 VIEWS LEFT     CLINICAL INDICATION: Left elbow pain     COMPARISON: None     FINDINGS: The bones, joints, and soft tissues are normal.     IMPRESSION:  Negative     ASSESSMENT AND PLAN:     Left arm pain  (primary encounter diagnosis)  Plan:   XR ELBOW 3 VIEWS LEFT- No Fx or dislocation noted          Left elbow pain  Plan:   Take tylenol or Motrin for pain  Arm sling for support  ICE the area  F/u with PCP for further evaluation and treatment  Patient agrees with plan of care  Education provided  Risk of all medications discussed  Follow-up with primary care in 3 to 5 days  Supportive care discussed as provided in AVS  Medical compliance with plan discussed and risks of noncompliance reviewed    Patient verbalizes understanding of treatment plan    Additional Notes:   Written handout given.    Patient verbalized understanding of the plan.    Medical compliance with plan discussed and risks of non-compliance reviewed.    Proper usage and side effects of medications reviewed & discussed.        Take medication as directed.     Thank you for visiting [facility].      Rula Swan CNP  [facility]     [email or web address]    5. MY RESPONSIBILITY WITH ILLEGAL DRUGS    I will not use illegal or street drugs or another person's prescription medications not prescribed to me.  If there are identified addiction type symptoms, then referral to a program may be provided by my provider and I agree to follow through with this recommendation. 6. MY RESPONSIBILITY FOR COOPERATION WITH INVESTIGATIONS   I understand that my provider will comply with any applicable law and may discuss my use and/or possible misuse/abuse of controlled substances and alcohol, as appropriate, with any health care provider involved in my care, pharmacist, or legal authority.  I authorize my provider and pharmacy to cooperate fully with law enforcement agencies (as permitted by law) in the investigation of any possible misuse, sale, or other diversion of my controlled substances.  I agree to waive any applicable privilege or right of privacy or confidentiality with respect to these authorizations. 7. PROVIDERS RIGHT TO MONITOR FOR SAFETY: PRESCRIPTION MONITORING / DRUG TESTING   I consent to drug/toxicology screening and will submit to a drug screen upon my providers request to assure I am only taking the prescribed drugs for my safety monitoring. I understand that a drug screen is a laboratory test in which a sample of my urine, blood or saliva is checked to see what drugs I have been taking. This may entail an observed urine specimen, which means that a nurse or other health care provider may watch me provide urine, and I will cooperate if I am asked to provide an observed specimen.  I understand that my provider will check a copy of my State Prescription Monitoring Program () Report in order to safely prescribe medications.  Pill Counts: I consent to pill counts when requested.   I may be asked to bring all my prescribed contact my HIPAA contact if there are concerns about my safety and use of the controlled medications. I have agreed to use the prescribed controlled substance medications to me as instructed by my provider and as stated in this Medication Agreement. My initial on each page and my signature below shows that I have read each page and I have had the opportunity to ask questions with answers provided by my provider.     Patient Name (Printed): _____________________________________  Patient Signature:  ______________________   Date: _____________    Prescriber Name (Printed): ___________________________________  Prescriber Signature: _____________________  Date: _____________

## 2021-05-04 RX ORDER — ESCITALOPRAM OXALATE 20 MG/1
20 TABLET ORAL DAILY
Qty: 30 TABLET | Refills: 3 | Status: SHIPPED | OUTPATIENT
Start: 2021-05-04 | End: 2021-09-15

## 2021-05-04 RX ORDER — ETODOLAC 400 MG/1
TABLET, FILM COATED ORAL
Qty: 60 TABLET | Refills: 3 | Status: SHIPPED | OUTPATIENT
Start: 2021-05-04 | End: 2021-09-15

## 2021-05-05 RX ORDER — BACLOFEN 10 MG/1
TABLET ORAL
Qty: 120 TABLET | Refills: 3 | Status: SHIPPED | OUTPATIENT
Start: 2021-05-05 | End: 2021-10-08

## 2021-07-05 DIAGNOSIS — F32.A ANXIETY AND DEPRESSION: Primary | ICD-10-CM

## 2021-07-05 DIAGNOSIS — F41.9 ANXIETY AND DEPRESSION: Primary | ICD-10-CM

## 2021-07-05 NOTE — TELEPHONE ENCOUNTER
Rx requested:  Requested Prescriptions     Pending Prescriptions Disp Refills    LORazepam (ATIVAN) 0.5 MG tablet [Pharmacy Med Name: LORAZEPAM 0.5 MG TABS 0.5 Tablet] 60 tablet 2     Sig: TAKE 1 TABLET BY MOUTH EVERY 8 HOURS AS NEEDED FOR ANXIETY       Last Office Visit:   4/20/2021        Next Visit Date:  Future Appointments   Date Time Provider Rema Smart   7/9/2021 11:15 AM Ender Blackwood MD [facility] [place]   10/25/2021  2:15 PM Andrea Mcfadden MD [facility]

## 2021-07-06 RX ORDER — LORAZEPAM 0.5 MG/1
TABLET ORAL
Qty: 60 TABLET | Refills: 2 | Status: SHIPPED | OUTPATIENT
Start: 2021-07-06 | End: 2021-10-08

## 2021-07-09 ENCOUNTER — VIRTUAL VISIT (OUTPATIENT)
Dept: FAMILY MEDICINE CLINIC | Age: 41
End: 2021-07-09
Payer: COMMERCIAL

## 2021-07-09 DIAGNOSIS — G35 MS (MULTIPLE SCLEROSIS) (HCC): Primary | ICD-10-CM

## 2021-07-09 DIAGNOSIS — F41.9 ANXIETY AND DEPRESSION: ICD-10-CM

## 2021-07-09 DIAGNOSIS — F32.A ANXIETY AND DEPRESSION: ICD-10-CM

## 2021-07-09 PROCEDURE — 99442 PR PHYS/QHP TELEPHONE EVALUATION 11-20 MIN: CPT | Performed by: FAMILY MEDICINE

## 2021-07-09 ASSESSMENT — ENCOUNTER SYMPTOMS
CHEST TIGHTNESS: 0
RHINORRHEA: 0
GASTROINTESTINAL NEGATIVE: 1
RESPIRATORY NEGATIVE: 1
EYES NEGATIVE: 1
COUGH: 0

## 2021-07-09 NOTE — PROGRESS NOTES
Patient is seen in follow up for   Chief Complaint   Patient presents with    Anxiety     3 mo medication checkup    Depression   Nuzhat Reynolds is a 39 y.o. female evaluated via telephone on 7/9/2021. Consent:  She and/or health care decision maker is aware that that she may receive a bill for this telephone service, depending on her insurance coverage, and has provided verbal consent to proceed: Yes      Documentation:  I communicated with the patient and/or health care decision maker about see below. Details of this discussion including any medical advice provided: see below        I affirm this is a Patient Initiated Episode with a Patient who has not had a related appointment within my department in the past 7 days or scheduled within the next 24 hours. Patient identification was verified at the start of the visit: Yes    Total Time: minutes: 11-20 minutes    The visit was conducted pursuant to the emergency declaration under the [facility], [address] [place] [address] authority and the [employer] and [employer] General Act. Patient identification was verified, and a caregiver was present when appropriate. The patient was located in a state where the provider was credentialed to provide care. Note: not billable if this call serves to triage the patient into an appointment for the relevant concern      Josiane Perez MD       [facility] for follow upon anxiety not much better with medication. No past medical history on file.   Patient Active Problem List    Diagnosis Date Noted    Bite, animal 06/03/2020    Left foot pain 06/03/2020    Deficient knowledge 06/03/2020    Dog bite of wrist 06/03/2020    Impaired mobility 06/03/2020    Myalgia 06/03/2020    Hypokalemia 10/02/2018    Nicotine dependence, unspecified, uncomplicated 94/79/0810    Overweight 10/02/2018    Ataxic gait 09/21/2018    Body mass index (bmi) 39.0-39.9, adult 2018    Muscle weakness (generalized) 2018    Neuromuscular dysfunction of bladder, unspecified 2018    Obesity, unspecified 2018    Paresthesia of skin 2018    Vitamin D deficiency 2018    Neuropathy 2016    Anxiety and depression 2016    [facility]     Past Surgical History:   Procedure Laterality Date    APPENDECTOMY      BREAST SURGERY      lump removal bengin     SECTION      x 3     Family History   Problem Relation Age of Onset    Diabetes Mother     Heart Disease Father      Social History     Socioeconomic History    Marital status:      Spouse name: Not on file    Number of children: Not on file    Years of education: Not on file    Highest education level: Not on file   Occupational History    Not on file   Tobacco Use    Smoking status: Former Smoker     Quit date: 2017     Years since quittin.0    Smokeless tobacco: Never Used   Substance and Sexual Activity    Alcohol use: No     Alcohol/week: 0.0 standard drinks    Drug use: No    Sexual activity: Not on file   Other Topics Concern    Not on file   Social History Narrative    Not on file     Social Determinants of Health     Financial Resource Strain: Low Risk     Difficulty of Paying Living Expenses: Not hard at all   Food Insecurity: No Food Insecurity    Worried About Running Out of Food in the Last Year: Never true    Sommer of Food in the Last Year: Never true   Transportation Needs: No Transportation Needs    Lack of Transportation (Medical): No    Lack of Transportation (Non-Medical):  No   Physical Activity:     Days of Exercise per Week:     Minutes of Exercise per Session:    Stress:     Feeling of Stress :    Social Connections:     Frequency of Communication with Friends and Family:     Frequency of Social Gatherings with Friends and Family:     Attends Spiritism Services:     Active Member of Clubs or Organizations:     Attends Club or Organization Meetings:     Marital Status:    Intimate Partner Violence:     Fear of Current or Ex-Partner:     Emotionally Abused:     Physically Abused:     Sexually Abused:      Current Outpatient Medications   Medication Sig Dispense Refill    LORazepam (ATIVAN) 0.5 MG tablet TAKE 1 TABLET BY MOUTH EVERY 8 HOURS AS NEEDED FOR ANXIETY 60 tablet 2    baclofen (LIORESAL) 10 MG tablet TAKE 1 TABLET BY MOUTH FOUR TIMES DAILY 120 tablet 3    escitalopram (LEXAPRO) 20 MG tablet TAKE 1 TABLET BY MOUTH DAILY 30 tablet 3    Cholecalciferol (D3 SUPER STRENGTH) 50 MCG (2000 UT) CAPS TAKE (1) CAPSULE BY MOUTH ONCE DAILY 30 capsule 10    Compression Stockings MISC by Does not apply route Knee high medium compression 20-30 cc disp three pairs. 1 each 0    Handicap Placard MISC by Does not apply route 1 each 0    cyclobenzaprine (FLEXERIL) 5 MG tablet TK 1 T PO BID  0    levonorgestrel (MIRENA) 20 MCG/24HR IUD 1 each by Intrauterine route      etodolac (LODINE) 400 MG tablet TAKE ONE (1) TABLET BY MOUTH TWICE DAILY (Patient not taking: Reported on 7/9/2021) 60 tablet 3    AUBAGIO 14 MG TABS Take 14 mg by mouth daily 90 tablet 3    gabapentin (NEURONTIN) 600 MG tablet TAKE ONE CAPSULE BY MOUTH THREE TIMES DAILY 90 tablet 10     No current facility-administered medications for this visit. Current Outpatient Medications on File Prior to Visit   Medication Sig Dispense Refill    LORazepam (ATIVAN) 0.5 MG tablet TAKE 1 TABLET BY MOUTH EVERY 8 HOURS AS NEEDED FOR ANXIETY 60 tablet 2    baclofen (LIORESAL) 10 MG tablet TAKE 1 TABLET BY MOUTH FOUR TIMES DAILY 120 tablet 3    escitalopram (LEXAPRO) 20 MG tablet TAKE 1 TABLET BY MOUTH DAILY 30 tablet 3    Cholecalciferol (D3 SUPER STRENGTH) 50 MCG (2000 UT) CAPS TAKE (1) CAPSULE BY MOUTH ONCE DAILY 30 capsule 10    Compression Stockings MISC by Does not apply route Knee high medium compression 20-30 cc disp three pairs.  1 each 0    Handicap Placard MISC by Does not apply route 1 each 0    cyclobenzaprine (FLEXERIL) 5 MG tablet TK 1 T PO BID  0    levonorgestrel (MIRENA) 20 MCG/24HR IUD 1 each by Intrauterine route      etodolac (LODINE) 400 MG tablet TAKE ONE (1) TABLET BY MOUTH TWICE DAILY (Patient not taking: Reported on 7/9/2021) 60 tablet 3    AUBAGIO 14 MG TABS Take 14 mg by mouth daily 90 tablet 3    gabapentin (NEURONTIN) 600 MG tablet TAKE ONE CAPSULE BY MOUTH THREE TIMES DAILY 90 tablet 10     No current facility-administered medications on file prior to visit. No Known Allergies  Health Maintenance   Topic Date Due    Hepatitis C screen  Never done    Varicella vaccine (1 of 2 - 2-dose childhood series) Never done    COVID-19 Vaccine (1) Never done    Lipid screen  06/12/2020    Annual Wellness Visit (AWV)  06/09/2021    HIV screen  08/15/2026 (Originally 4/7/1995)    Flu vaccine (1) 09/01/2021    Cervical cancer screen  09/08/2025    DTaP/Tdap/Td vaccine (4 - Td or Tdap) 12/12/2029    Hepatitis A vaccine  Aged Out    Hepatitis B vaccine  Aged Out    Hib vaccine  Aged Out    Meningococcal (ACWY) vaccine  Aged Out    Pneumococcal 0-64 years Vaccine  Aged Out       Review of Systems     Review of Systems   Constitutional: Negative for activity change, appetite change, fatigue and fever. HENT: Negative for congestion and rhinorrhea. Eyes: Negative. Respiratory: Negative. Negative for cough and chest tightness. Cardiovascular: Negative. Gastrointestinal: Negative. Endocrine: Negative. Genitourinary: Negative. Musculoskeletal: Negative. Skin: Negative. Neurological: Negative for dizziness, light-headedness and numbness. Hematological: Negative. Psychiatric/Behavioral: The patient is nervous/anxious. Physical Exam  There were no vitals filed for this visit. Physical Exam    Assessment   Diagnosis Orders   1. MS (multiple sclerosis) ([facility]     2.  Anxiety and depression Problem List     MS (multiple sclerosis) ([facility] - Primary    Anxiety and depression    Relevant Medications    escitalopram (LEXAPRO) 20 MG tablet    LORazepam (ATIVAN) 0.5 MG tablet          Plan  Doing well continue current dose  No orders of the defined types were placed in this encounter. No follow-ups on file.   Melany Carreno MD

## 2021-09-07 ENCOUNTER — OFFICE VISIT (OUTPATIENT)
Dept: OBGYN CLINIC | Age: 41
End: 2021-09-07
Payer: COMMERCIAL

## 2021-09-07 VITALS
HEIGHT: 59 IN | BODY MASS INDEX: 40.08 KG/M2 | SYSTOLIC BLOOD PRESSURE: 110 MMHG | WEIGHT: 198.8 LBS | DIASTOLIC BLOOD PRESSURE: 68 MMHG

## 2021-09-07 DIAGNOSIS — E66.9 OBESITY (BMI 30-39.9): ICD-10-CM

## 2021-09-07 DIAGNOSIS — Z12.31 ENCOUNTER FOR SCREENING MAMMOGRAM FOR BREAST CANCER: ICD-10-CM

## 2021-09-07 DIAGNOSIS — R73.03 PREDIABETES: ICD-10-CM

## 2021-09-07 DIAGNOSIS — Z86.32 PERSONAL HISTORY OF GESTATIONAL DIABETES: ICD-10-CM

## 2021-09-07 DIAGNOSIS — Z01.419 VISIT FOR GYNECOLOGIC EXAMINATION: Primary | ICD-10-CM

## 2021-09-07 PROCEDURE — G8427 DOCREV CUR MEDS BY ELIG CLIN: HCPCS | Performed by: OBSTETRICS & GYNECOLOGY

## 2021-09-07 PROCEDURE — G8417 CALC BMI ABV UP PARAM F/U: HCPCS | Performed by: OBSTETRICS & GYNECOLOGY

## 2021-09-07 PROCEDURE — 1036F TOBACCO NON-USER: CPT | Performed by: OBSTETRICS & GYNECOLOGY

## 2021-09-07 PROCEDURE — 99396 PREV VISIT EST AGE 40-64: CPT | Performed by: OBSTETRICS & GYNECOLOGY

## 2021-09-07 ASSESSMENT — PATIENT HEALTH QUESTIONNAIRE - PHQ9
SUM OF ALL RESPONSES TO PHQ QUESTIONS 1-9: 2
1. LITTLE INTEREST OR PLEASURE IN DOING THINGS: 1
SUM OF ALL RESPONSES TO PHQ9 QUESTIONS 1 & 2: 2
SUM OF ALL RESPONSES TO PHQ QUESTIONS 1-9: 2
SUM OF ALL RESPONSES TO PHQ QUESTIONS 1-9: 2
2. FEELING DOWN, DEPRESSED OR HOPELESS: 1

## 2021-09-09 RX ORDER — METRONIDAZOLE 500 MG/1
500 TABLET ORAL 2 TIMES DAILY
Qty: 14 TABLET | Refills: 0 | Status: SHIPPED | OUTPATIENT
Start: 2021-09-09 | End: 2021-09-16

## 2021-09-09 ASSESSMENT — ENCOUNTER SYMPTOMS
VOMITING: 0
SORE THROAT: 0
WHEEZING: 0
SHORTNESS OF BREATH: 0
ABDOMINAL DISTENTION: 0
ABDOMINAL PAIN: 0
CONSTIPATION: 0
COUGH: 0
DIARRHEA: 0
NAUSEA: 0
BLOOD IN STOOL: 0

## 2021-09-09 NOTE — PROGRESS NOTES
Subjective:      Jaydon Pitts is a 39 y.o. female [id number] here for routine exam.  Current Complaints: no breast pain or new or enlarging lumps on self exam, no vaginal bleeding, no discharge or pelvic pain, she complains of weight gain ans skin changes. Menstrual history:   Absent with IUD  Sexual activity:  yes, denies knowledge of risky exposure  Abnormalvaginal discharge:  No  Contraceptive method:  IUD    Vitals:  /68   Ht 4' 11\" (1.499 m)   Wt 198 lb 12.8 oz (90.2 kg)   BMI 40.15 kg/m²   Allergies:Patient has no known allergies. No past medical history on file. Past Surgical History:   Procedure Laterality Date    APPENDECTOMY      BREAST SURGERY      lump removal bengin     SECTION      x 3     Family History   Problem Relation Age of Onset    Diabetes Mother     Heart Disease Father      Social History     Socioeconomic History    Marital status:      Spouse name: Not on file    Number of children: Not on file    Years of education: Not on file    Highest education level: Not on file   Occupational History    Not on file   Tobacco Use    Smoking status: Former Smoker     Quit date: 2017     Years since quittin.2    Smokeless tobacco: Never Used   Substance and Sexual Activity    Alcohol use: No     Alcohol/week: 0.0 standard drinks    Drug use: No    Sexual activity: Not on file   Other Topics Concern    Not on file   Social History Narrative    Not on file     Social Determinants of Health     Financial Resource Strain: Low Risk     Difficulty of Paying Living Expenses: Not hard at all   Food Insecurity: No Food Insecurity    Worried About [address] [employer] in the Last Year: Never true    [address] in the Last Year: Never true   Transportation Needs: No Transportation Needs    Lack of Transportation (Medical): No    Lack of Transportation (Non-Medical):  No   Physical Activity:     Days of Exercise per Week:     Minutes of Exercise per Session:    Stress:     Feeling of Stress :    Social Connections:     Frequency of Communication with Friends and Family:     Frequency of Social Gatherings with Friends and Family:     Attends [employer] Services:     Active Member of Clubs or Organizations:     Attends Club or Organization Meetings:     Marital Status:    Intimate Partner Violence:     Fear of Current or Ex-Partner:     Emotionally Abused:     Physically Abused:     Sexually Abused:        GynecologicHistory  No LMP recorded. Patient has had an implant. Last Pap: 2019 Results: normal  Last Mammogram: Yes Results: normal  no fmhx cancer  OB History        7    Para   4    Term   4            AB   3    Living   4       SAB        TAB        Ectopic        Molar        Multiple        Live Births   4              Patient's medications, allergies, past medical, surgical, social and family histories were reviewed and updated as appropriate. Review of Systems  Review of Systems   Constitutional: Positive for unexpected weight change. Negative for activity change, appetite change and fatigue. HENT: Negative for nosebleeds and sore throat. Eyes: Negative for visual disturbance. Respiratory: Negative for cough, shortness of breath and wheezing. Cardiovascular: Negative for chest pain, palpitations and leg swelling. Gastrointestinal: Negative for abdominal distention, abdominal pain, blood in stool, constipation, diarrhea, nausea and vomiting. Endocrine: Negative for cold intolerance, heat intolerance, polydipsia and polyuria. Genitourinary: Negative for difficulty urinating, dyspareunia, dysuria, frequency, genital sores, hematuria, pelvic pain, urgency, vaginal bleeding, vaginal discharge and vaginal pain. Musculoskeletal: Negative for arthralgias. Skin: Negative for rash. Neurological: Negative for dizziness, weakness, light-headedness and headaches. Hematological: Negative for adenopathy.  Does not bruise/bleed easily. Psychiatric/Behavioral: Negative for agitation, confusion and sleep disturbance. Objective:     Vitals:  /68   Ht 4' 11\" (1.499 m)   Wt 198 lb 12.8 oz (90.2 kg)   BMI 40.15 kg/m²     Physical Exam  Constitutional:       General: She is not in acute distress. Appearance: Normal appearance. She is well-developed. She is obese. She is not diaphoretic. HENT:      Head: Normocephalic. Eyes:      Conjunctiva/sclera: Conjunctivae normal.      Pupils: Pupils are equal, round, and reactive to light. Neck:      Thyroid: No thyromegaly. Trachea: No tracheal deviation. Cardiovascular:      Rate and Rhythm: Normal rate and regular rhythm. Heart sounds: Normal heart sounds. No murmur heard. No friction rub. No gallop. Pulmonary:      Effort: Pulmonary effort is normal. No respiratory distress. Breath sounds: Normal breath sounds. No wheezing or rales. Chest:      Chest wall: No tenderness. Breasts:         Right: No inverted nipple, mass, nipple discharge, skin change or tenderness. Left: No inverted nipple, mass, nipple discharge, skin change or tenderness. Abdominal:      General: Bowel sounds are normal. There is no distension. Palpations: Abdomen is soft. There is no mass. Tenderness: There is no abdominal tenderness. There is no guarding or rebound. Genitourinary:     Labia:         Right: No rash, tenderness or lesion. Left: No rash, tenderness or lesion. Vagina: Vaginal discharge present. No erythema, tenderness or bleeding. Cervix: No cervical motion tenderness, discharge or friability. Uterus: Not deviated, not enlarged, not fixed and not tender. Adnexa:         Right: No mass, tenderness or fullness. Left: No mass, tenderness or fullness.         Comments: Uterus is not prolapsed and there is not a cystocele or rectocele noted  Lymphadenopathy:      Upper Body:      Right upper body: No supraclavicular or axillary adenopathy. Left upper body: No supraclavicular or axillary adenopathy. Skin:     General: Skin is warm and dry. Neurological:      Mental Status: She is alert and oriented to person, place, and time. Cranial Nerves: No cranial nerve deficit. Deep Tendon Reflexes: Reflexes normal.   Psychiatric:         Behavior: Behavior normal.         Judgment: Judgment normal.         Assessment:      Diagnosis Orders   1. Visit for gynecologic examination  CBC Auto Differential    Comprehensive Metabolic Panel    Hemoglobin A1C    TSH with Reflex    Insulin, Total    Wet Prep, Genital    C.trachomatis N.gonorrhoeae DNA   2. Obesity (BMI 30-39. 9)  CBC Auto Differential    Comprehensive Metabolic Panel    Hemoglobin A1C    TSH with Reflex    Insulin, Total   3. Encounter for screening mammogram for breast cancer  LORRAINE DIGITAL SCREEN W OR WO CAD BILATERAL   4. Personal history of gestational diabetes   Hemoglobin A1C   5. Prediabetes   TSH with Reflex       Body mass index is 40.15 kg/m². Obesity:  Overweight        Plan:   Pap smear : indicated:  performed. Breast exam :Normal  STD work up : As appropriate    Obesity Counseling:  Given  Smoking Counseling:  N/A  STD counseling:  Will call pt with results    Orders Placed This Encounter   Procedures    Wet Prep, Genital     Standing Status:   Future     Number of Occurrences:   1     Standing Expiration Date:   9/7/2022    C.trachomatis N.gonorrhoeae DNA     Standing Status:   Future     Number of Occurrences:   1     Standing Expiration Date:   9/7/2022    LORRAINE DIGITAL SCREEN W OR WO CAD BILATERAL     Standing Status:   Future     Standing Expiration Date:   11/7/2022    CBC Auto Differential     Standing Status:   Future     Number of Occurrences:   1     Standing Expiration Date:   9/7/2022    Comprehensive Metabolic Panel     Standing Status:   Future     Number of Occurrences:   1     Standing Expiration Date:   9/7/2022  Hemoglobin A1C     Standing Status:   Future     Number of Occurrences:   1     Standing Expiration Date:   9/7/2022    TSH with Reflex     Standing Status:   Future     Number of Occurrences:   1     Standing Expiration Date:   9/7/2022    Insulin, Total     Standing Status:   Future     Number of Occurrences:   1     Standing Expiration Date:   9/7/2022     No orders of the defined types were placed in this encounter. Follow up:  Return for follow up results.       Adelia Conway DO

## 2021-09-14 NOTE — TELEPHONE ENCOUNTER
Recent Visits    09/07/2021 Visit for gynecologic examination   [facility] [place]   07/09/2021 MS (multiple sclerosis) [facility])   [facility] and Mikey Solis MD

## 2021-09-15 RX ORDER — ESCITALOPRAM OXALATE 20 MG/1
TABLET ORAL
Qty: 30 TABLET | Refills: 10 | Status: SHIPPED | OUTPATIENT
Start: 2021-09-15 | End: 2022-07-28

## 2021-09-15 RX ORDER — ETODOLAC 400 MG/1
TABLET, FILM COATED ORAL
Qty: 60 TABLET | Refills: 10 | Status: SHIPPED | OUTPATIENT
Start: 2021-09-15 | End: 2022-05-24 | Stop reason: SDUPTHER

## 2021-10-08 DIAGNOSIS — F41.9 ANXIETY AND DEPRESSION: ICD-10-CM

## 2021-10-08 DIAGNOSIS — F32.A ANXIETY AND DEPRESSION: ICD-10-CM

## 2021-10-08 RX ORDER — LORAZEPAM 0.5 MG/1
TABLET ORAL
Qty: 60 TABLET | Refills: 2 | Status: SHIPPED | OUTPATIENT
Start: 2021-10-08 | End: 2021-11-07

## 2021-10-08 RX ORDER — BACLOFEN 10 MG/1
TABLET ORAL
Qty: 120 TABLET | Refills: 3 | Status: SHIPPED | OUTPATIENT
Start: 2021-10-08 | End: 2022-01-27 | Stop reason: SDUPTHER

## 2021-10-08 RX ORDER — GABAPENTIN 600 MG/1
600 TABLET ORAL 3 TIMES DAILY
Qty: 90 TABLET | Refills: 1 | Status: SHIPPED | OUTPATIENT
Start: 2021-10-08 | End: 2021-12-06

## 2021-10-08 NOTE — TELEPHONE ENCOUNTER
Recent Visits    09/07/2021 Visit for gynecologic examination   [facility] Obstetrics and Gynecology [place]   07/09/2021 MS (multiple sclerosis) [facility])   [facility] AT [facility] and Mikey Solis MD

## 2021-10-08 NOTE — TELEPHONE ENCOUNTER
Pharmacy is requesting medication refill.  Please approve or deny this request.    Rx requested:  Requested Prescriptions     Pending Prescriptions Disp Refills    baclofen (LIORESAL) 10 MG tablet [Pharmacy Med Name: BACLOFEN 10MG TAB 10 Tablet] 120 tablet 3     Sig: TAKE 1 TABLET BY MOUTH FOUR TIMES DAILY         Last Office Visit:   12/2/2020      Next Visit Date:  Future Appointments   Date Time Provider Rema Smart   10/15/2021  1:15 PM Denisha Ott DO [address]   10/25/2021  2:15 PM Paige Kendrick MD [facility]

## 2021-10-08 NOTE — TELEPHONE ENCOUNTER
is calling in requesting a refill on medication(s). Requested Prescriptions     Pending Prescriptions Disp Refills    LORazepam (ATIVAN) 0.5 MG tablet [Pharmacy Med Name: LORAZEPAM 0.5 MG TABS 0.5 Tablet] 60 tablet 2     Sig: TAKE 1 TABLET BY MOUTH EVERY 8 HOURS AS NEEDED FOR ANXIETY        Patient's Last Office Visit:  7/9/2021     Patient's Next Visit:  Future Appointments   Date Time Provider Rema Smart   10/15/2021  1:15 PM Marlen Paez DO [address]   10/25/2021  2:15 PM MD Carol Kwan 24:  Please send the medication to the pharmacy listed.     Other Comments:

## 2021-10-12 ENCOUNTER — TELEPHONE (OUTPATIENT)
Dept: NEUROLOGY | Age: 41
End: 2021-10-12

## 2021-10-12 NOTE — TELEPHONE ENCOUNTER
Submitted pa via cover my meds for [employer] is processing your inquiry and will respond shortly with next steps. You are currently using the fastest method to process this request. Please do not fax or call [employer] to resubmit this request. To check for an update later, open this request again from your dashboard. If you need assistance, please chat with CoverMyMeds or call us at [phone].

## 2021-10-13 NOTE — TELEPHONE ENCOUNTER
aubagio approved via cover my meds    This request has been approved. Please note any additional information provided by [employer] at the bottom of your screen. [id number]; Review Type:Prior Auth; Coverage Start Date:09/13/2021; Coverage End Date:10/13/2022;

## 2021-10-25 ENCOUNTER — OFFICE VISIT (OUTPATIENT)
Dept: NEUROLOGY | Age: 41
End: 2021-10-25
Payer: COMMERCIAL

## 2021-10-25 VITALS
DIASTOLIC BLOOD PRESSURE: 86 MMHG | SYSTOLIC BLOOD PRESSURE: 138 MMHG | OXYGEN SATURATION: 95 % | BODY MASS INDEX: 39.43 KG/M2 | WEIGHT: 195.2 LBS | HEART RATE: 82 BPM

## 2021-10-25 DIAGNOSIS — R26.0 ATAXIC GAIT: ICD-10-CM

## 2021-10-25 DIAGNOSIS — G62.9 NEUROPATHY: ICD-10-CM

## 2021-10-25 DIAGNOSIS — E55.9 VITAMIN D DEFICIENCY, UNSPECIFIED: ICD-10-CM

## 2021-10-25 DIAGNOSIS — M62.9 DISORDER OF MUSCLE, UNSPECIFIED: ICD-10-CM

## 2021-10-25 DIAGNOSIS — G35 MS (MULTIPLE SCLEROSIS) (HCC): Primary | ICD-10-CM

## 2021-10-25 DIAGNOSIS — G35 MS (MULTIPLE SCLEROSIS) (HCC): ICD-10-CM

## 2021-10-25 LAB
ALBUMIN SERPL-MCNC: 4.2 G/DL (ref 3.5–4.6)
ALP BLD-CCNC: 89 U/L (ref 40–130)
ALT SERPL-CCNC: 15 U/L (ref 0–33)
AST SERPL-CCNC: 16 U/L (ref 0–35)
BASOPHILS ABSOLUTE: 0.2 K/UL (ref 0–0.2)
BASOPHILS RELATIVE PERCENT: 1.5 %
BILIRUB SERPL-MCNC: <0.2 MG/DL (ref 0.2–0.7)
BILIRUBIN DIRECT: <0.2 MG/DL (ref 0–0.4)
BILIRUBIN, INDIRECT: NORMAL MG/DL (ref 0–0.6)
EOSINOPHILS ABSOLUTE: 0.3 K/UL (ref 0–0.7)
EOSINOPHILS RELATIVE PERCENT: 2.6 %
FOLATE: 5.3 NG/ML (ref 7.3–26.1)
HCT VFR BLD CALC: 46.2 % (ref 37–47)
HEMOGLOBIN: 15.6 G/DL (ref 12–16)
LYMPHOCYTES ABSOLUTE: 3.1 K/UL (ref 1–4.8)
LYMPHOCYTES RELATIVE PERCENT: 25.2 %
MCH RBC QN AUTO: 32 PG (ref 27–31.3)
MCHC RBC AUTO-ENTMCNC: 33.6 % (ref 33–37)
MCV RBC AUTO: 95.2 FL (ref 82–100)
MONOCYTES ABSOLUTE: 0.8 K/UL (ref 0.2–0.8)
MONOCYTES RELATIVE PERCENT: 6.8 %
NEUTROPHILS ABSOLUTE: 7.7 K/UL (ref 1.4–6.5)
NEUTROPHILS RELATIVE PERCENT: 63.9 %
PDW BLD-RTO: 14.1 % (ref 11.5–14.5)
PLATELET # BLD: 343 K/UL (ref 130–400)
RBC # BLD: 4.86 M/UL (ref 4.2–5.4)
TOTAL PROTEIN: 6.4 G/DL (ref 6.3–8)
TSH REFLEX: 0.78 UIU/ML (ref 0.44–3.86)
VITAMIN B-12: 406 PG/ML (ref 232–1245)
VITAMIN D 25-HYDROXY: 58.8 NG/ML (ref 30–100)
WBC # BLD: 12.1 K/UL (ref 4.8–10.8)

## 2021-10-25 PROCEDURE — 99214 OFFICE O/P EST MOD 30 MIN: CPT | Performed by: PSYCHIATRY & NEUROLOGY

## 2021-10-25 PROCEDURE — 1036F TOBACCO NON-USER: CPT | Performed by: PSYCHIATRY & NEUROLOGY

## 2021-10-25 PROCEDURE — G8417 CALC BMI ABV UP PARAM F/U: HCPCS | Performed by: PSYCHIATRY & NEUROLOGY

## 2021-10-25 PROCEDURE — G8427 DOCREV CUR MEDS BY ELIG CLIN: HCPCS | Performed by: PSYCHIATRY & NEUROLOGY

## 2021-10-25 PROCEDURE — G8484 FLU IMMUNIZE NO ADMIN: HCPCS | Performed by: PSYCHIATRY & NEUROLOGY

## 2021-10-25 ASSESSMENT — ENCOUNTER SYMPTOMS
CHOKING: 0
COLOR CHANGE: 0
NAUSEA: 0
PHOTOPHOBIA: 0
TROUBLE SWALLOWING: 0
VOMITING: 0
BACK PAIN: 0
SHORTNESS OF BREATH: 0

## 2021-10-25 NOTE — PROGRESS NOTES
route      AUBAGIO 14 MG TABS Take 14 mg by mouth daily 90 tablet 3     No current facility-administered medications for this visit. Review of Systems   Constitutional: Negative for fever. HENT: Negative for ear pain, tinnitus and trouble swallowing. Eyes: Negative for photophobia and visual disturbance. Respiratory: Negative for choking and shortness of breath. Cardiovascular: Negative for chest pain and palpitations. Gastrointestinal: Negative for nausea and vomiting. Musculoskeletal: Negative for back pain, gait problem, joint swelling, myalgias, neck pain and neck stiffness. Skin: Negative for color change. Allergic/Immunologic: Negative for food allergies. Neurological: Negative for dizziness, tremors, seizures, syncope, facial asymmetry, speech difficulty, weakness, light-headedness, numbness and headaches. Psychiatric/Behavioral: Negative for behavioral problems, confusion, hallucinations and sleep disturbance. Objective:   /86 (Site: Left Upper Arm, Position: Sitting, Cuff Size: Medium Adult)   Pulse 82   Wt 195 lb 3.2 oz (88.5 kg)   SpO2 95%   BMI 39.43 kg/m²     Physical Exam  Vitals reviewed. Eyes:      Pupils: Pupils are equal, round, and reactive to light. Cardiovascular:      Rate and Rhythm: Normal rate and regular rhythm. Heart sounds: No murmur heard. Pulmonary:      Effort: Pulmonary effort is normal.      Breath sounds: Normal breath sounds. Abdominal:      General: Bowel sounds are normal.   Musculoskeletal:         General: Normal range of motion. Cervical back: Normal range of motion. Skin:     General: Skin is warm. Neurological:      Mental Status: She is alert and oriented to person, place, and time. Cranial Nerves: No cranial nerve deficit. Sensory: No sensory deficit. Motor: No abnormal muscle tone. Coordination: Coordination normal.      Deep Tendon Reflexes: Reflexes are normal and symmetric. Babinski sign absent on the right side. Babinski sign absent on the left side. Psychiatric:         Mood and Affect: Mood normal.     Patient has weakness in the lower extremity the gait ataxia she is now using a walker to walk. EDSS score is now reached about 5. We have not seen her over a year    MRI BRAIN W WO CONTRAST    Result Date: 12/17/2020  EXAM:MRI BRAIN W WO CONTRAST DATE:12/17/2020 12:13 PM CLINICAL HISTORY:Demyelinating disease. Followup. COMPARISON: November 14, 2017 TECHNIQUE:  Multi-sequence multiplanar imaging of the brain was performed. Sequences included T1-weighted, T2-weighted, FLAIR, diffusion-weighted, ADC maps, and  susceptibility weighted imaging. VOLUME: 20 mL   MR CONTRAST: ProHance FINDINGS: New lesions No change in multiple periventricular, juxtacortical and infratentorial white matter lesions compatible with known demyelinating disease. Enhancing lesions : There are no enhancing lesions. Diffusion restriction: No diffusion restriction to suggest any acute infarct. Overall disease burden: No significant change                     Parenchymal atrophy: No significant change in parenchymal volume. Other findings: Stable 9 mm pineal cyst. Brain is otherwise unremarkable. STABLE INTRACRANIAL WHITE MATTER LESIONS CONSISTENT WITH MULTIPLE SCLEROSIS. NO NEW LESIONS.        Lab Results   Component Value Date    WBC 12.3 09/07/2021    RBC 4.87 09/07/2021    RBC 4.56 10/03/2018    HGB 15.5 09/07/2021    HCT 46.7 09/07/2021    MCV 95.9 09/07/2021    MCH 31.8 09/07/2021    MCHC 33.2 09/07/2021    RDW 14.1 09/07/2021     09/07/2021    MPV 9.8 10/03/2018     Lab Results   Component Value Date     09/07/2021    K 4.5 09/07/2021     09/07/2021    CO2 23 09/07/2021    BUN 9 09/07/2021    CREATININE 0.72 09/07/2021    GFRAA >60.0 09/07/2021    AGRATIO 1.4 10/03/2018    LABGLOM >60.0 09/07/2021 GLUCOSE 190 09/07/2021    GLUCOSE 181 10/03/2018    PROT 6.1 09/07/2021    LABALBU 4.0 09/07/2021    LABALBU 3.7 10/03/2018    CALCIUM 9.1 09/07/2021    BILITOT <0.2 09/07/2021    ALKPHOS 87 09/07/2021    AST 12 09/07/2021    ALT 10 09/07/2021     Lab Results   Component Value Date    PROTIME 9.8 08/24/2015    INR 0.9 08/24/2015     Lab Results   Component Value Date    TSH 0.48 09/21/2018    JENXGCDB19 247 09/21/2018    FOLATE 5.00 09/21/2018     Lab Results   Component Value Date    TRIG 58 06/12/2015    HDL 50 06/12/2015    LDLCALC 100 06/12/2015     Lab Results   Component Value Date    LABAMPH Neg 03/14/2016    BARBSCNU Neg 03/14/2016    LABBENZ Neg 03/14/2016    OPIATESCREENURINE Neg 03/14/2016    PHENCYCLIDINESCREENURINE Neg 03/14/2016     No results found for: LITHIUM, DILFRTOT, VALPROATE    Assessment:       Diagnosis Orders   1. MS (multiple sclerosis) ([facility] [address]  Hepatic Function Panel    TSH with Reflex    Vitamin B12 & Folate    Vitamin D 25 Hydroxy    CBC Auto Differential   2. Disorder of muscle, unspecified   TSH with Reflex   3. Vitamin D deficiency, unspecified   Vitamin D 25 Hydroxy   4. Neuropathy     5. 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 We have not seen her since December 2020. She reportedly has not any loss of balance but has gained considerable weight. She had a lipid test done. It is apparent that we have to check her for thyroid disease and vitamin D deficiency. This for EDSS score is increased to some degree. She continues on Aubagio without any side effects     She has not developed any urinary issues. In the first instance we recommended that we evaluate all her blood tests given that she has not had any for the past couple months. She is on low dose baclofen and high-dose gabapentin for pain. Recommend that she worsens we may need to consider steroid treatments.   We will have her labs done urgently today   Plan:      Orders Placed This Encounter   Procedures    Hepatic Function Panel     Standing Status:   Future     Standing Expiration Date:   10/25/2022    TSH with Reflex     Standing Status:   Future     Standing Expiration Date:   10/25/2022    Vitamin B12 & Folate     Standing Status:   Future     Standing Expiration Date:   10/25/2022    Vitamin D 25 Hydroxy     Standing Status:   Future     Standing Expiration Date:   10/25/2022    CBC Auto Differential     Standing Status:   Future     Standing Expiration Date:   10/25/2022     No orders of the defined types were placed in this encounter. No follow-ups on file.       Quan Romo MD

## 2021-10-27 ENCOUNTER — PROCEDURE VISIT (OUTPATIENT)
Dept: OBGYN CLINIC | Age: 41
End: 2021-10-27
Payer: COMMERCIAL

## 2021-10-27 VITALS — HEART RATE: 86 BPM | BODY MASS INDEX: 38.28 KG/M2 | HEIGHT: 60 IN | WEIGHT: 195 LBS

## 2021-10-27 DIAGNOSIS — Z30.433 ENCOUNTER FOR IUD REMOVAL AND REINSERTION: ICD-10-CM

## 2021-10-27 DIAGNOSIS — Z30.433 ENCOUNTER FOR IUD REMOVAL AND REINSERTION: Primary | ICD-10-CM

## 2021-10-27 LAB
CONTROL: YES
PREGNANCY TEST URINE, POC: NORMAL

## 2021-10-27 PROCEDURE — 58300 INSERT INTRAUTERINE DEVICE: CPT | Performed by: OBSTETRICS & GYNECOLOGY

## 2021-10-27 PROCEDURE — 58301 REMOVE INTRAUTERINE DEVICE: CPT | Performed by: OBSTETRICS & GYNECOLOGY

## 2021-10-27 PROCEDURE — 81025 URINE PREGNANCY TEST: CPT | Performed by: OBSTETRICS & GYNECOLOGY

## 2021-10-27 RX ORDER — METRONIDAZOLE 500 MG/1
500 TABLET ORAL 2 TIMES DAILY
Qty: 28 TABLET | Refills: 0 | Status: SHIPPED | OUTPATIENT
Start: 2021-10-27 | End: 2021-11-10

## 2021-10-28 LAB
CLUE CELLS: ABNORMAL
TRICHOMONAS PREP: ABNORMAL
YEAST WET PREP: ABNORMAL

## 2021-10-28 NOTE — PROGRESS NOTES
IUD Removal Procedure Note    Pre-operative Diagnosis: iud expiration   Post-operative Diagnosis: same    Indications: undesired fertility    Procedure Details   Urine pregnancy test was done 11/27 and result was neg. The risks (including infection, bleeding, pain, and uterine perforation) and benefits of the procedure were explained to the patient and Written informed consent was obtained. Upon sterile speculum exam, cervix exposed, IUD string seen at cervical OS, grasped with ring forceps and pulled with no complications. IUD Information:  Mirena. Condition:  Stable    Complications:  None    Plan:    IUD Insertion Procedure Note    Pre-operative Diagnosis: menorrhagia    Post-operative Diagnosis: same    Indications: undesired fertility    Procedure Details   Urine pregnancy test was not done. The risks (including infection, bleeding, pain, and uterine perforation) and benefits of the procedure were explained to the patient and Written informed consent was obtained. Cervix cleansed with Betadine. Uterus sounded to 8 cm. IUD inserted without difficulty. String visible and trimmed. Patient tolerated procedure well. IUD Information:  Mirena. Condition:  Stable    Complications:  None    Plan:    The patient was advised to call for any fever or for prolonged or severe pain or bleeding. She was advised to use NSAID as needed for mild to moderate pain. Follow up:  Return if symptoms worsen or fail to improve.       Toshia Frye, DO

## 2021-12-01 ENCOUNTER — HOSPITAL ENCOUNTER (OUTPATIENT)
Dept: ULTRASOUND IMAGING | Age: 41
Discharge: HOME | End: 2021-12-03
Payer: COMMERCIAL

## 2021-12-01 DIAGNOSIS — N93.8 DUB (DYSFUNCTIONAL UTERINE BLEEDING): ICD-10-CM

## 2021-12-01 PROCEDURE — 76856 US EXAM PELVIC COMPLETE: CPT

## 2021-12-01 PROCEDURE — 76830 TRANSVAGINAL US NON-OB: CPT

## 2021-12-06 RX ORDER — GABAPENTIN 600 MG/1
300 TABLET ORAL 3 TIMES DAILY
Qty: 90 TABLET | Refills: 3 | Status: SHIPPED | OUTPATIENT
Start: 2021-12-06 | End: 2022-01-27 | Stop reason: SDUPTHER

## 2021-12-31 DIAGNOSIS — F41.9 ANXIETY AND DEPRESSION: Primary | ICD-10-CM

## 2021-12-31 DIAGNOSIS — F32.A ANXIETY AND DEPRESSION: Primary | ICD-10-CM

## 2022-01-01 NOTE — TELEPHONE ENCOUNTER
Recent Visits    10/27/2021 Encounter for IUD removal and reinsertion   [facility] Obstetrics and Gynecology [place], [place]   10/25/2021 MS (multiple sclerosis) [facility])   [facility] Kaylee Alejandro MD   09/07/2021 Visit for gynecologic examination   [facility] Obstetrics [facility] Gynecology [place], [place]   07/09/2021 MS (multiple sclerosis) [facility])   [facility] AT [facility] and Mikey Solis MD

## 2022-01-03 RX ORDER — LORAZEPAM 0.5 MG/1
0.5 TABLET ORAL EVERY 8 HOURS PRN
Qty: 60 TABLET | Refills: 0 | Status: SHIPPED | OUTPATIENT
Start: 2022-01-03 | End: 2022-01-31

## 2022-01-19 DIAGNOSIS — G35 MULTIPLE SCLEROSIS (HCC): ICD-10-CM

## 2022-01-19 RX ORDER — RENAGEL 800 MG/1
TABLET ORAL
Qty: 30 TABLET | Refills: 2 | Status: SHIPPED | OUTPATIENT
Start: 2022-01-19 | End: 2022-06-15

## 2022-01-20 ENCOUNTER — TELEPHONE (OUTPATIENT)
Dept: FAMILY MEDICINE CLINIC | Age: 42
End: 2022-01-20

## 2022-01-20 NOTE — TELEPHONE ENCOUNTER
Pt called and scheduled an appt on 1/27/22 but she says she is in a lot of pain with just the half dosage of the gabepentin and wonder if DR could up it or what else she she can do. Her LOV 7/19/21.  Please advise

## 2022-01-21 NOTE — TELEPHONE ENCOUNTER
Have her take to 600 mg of neurotin in the am and one in the afternoon and one at night total of four per day

## 2022-01-27 ENCOUNTER — OFFICE VISIT (OUTPATIENT)
Dept: FAMILY MEDICINE CLINIC | Age: 42
End: 2022-01-27
Payer: COMMERCIAL

## 2022-01-27 VITALS
HEART RATE: 73 BPM | BODY MASS INDEX: 40.76 KG/M2 | DIASTOLIC BLOOD PRESSURE: 75 MMHG | OXYGEN SATURATION: 94 % | TEMPERATURE: 98.6 F | HEIGHT: 58 IN | SYSTOLIC BLOOD PRESSURE: 122 MMHG | RESPIRATION RATE: 13 BRPM

## 2022-01-27 DIAGNOSIS — F32.A ANXIETY AND DEPRESSION: ICD-10-CM

## 2022-01-27 DIAGNOSIS — G35 MS (MULTIPLE SCLEROSIS) (HCC): ICD-10-CM

## 2022-01-27 DIAGNOSIS — G62.9 NEUROPATHY: Primary | ICD-10-CM

## 2022-01-27 DIAGNOSIS — M79.10 MYALGIA: ICD-10-CM

## 2022-01-27 DIAGNOSIS — F41.9 ANXIETY AND DEPRESSION: ICD-10-CM

## 2022-01-27 PROCEDURE — 1036F TOBACCO NON-USER: CPT | Performed by: FAMILY MEDICINE

## 2022-01-27 PROCEDURE — G8417 CALC BMI ABV UP PARAM F/U: HCPCS | Performed by: FAMILY MEDICINE

## 2022-01-27 PROCEDURE — 99213 OFFICE O/P EST LOW 20 MIN: CPT | Performed by: FAMILY MEDICINE

## 2022-01-27 PROCEDURE — G8427 DOCREV CUR MEDS BY ELIG CLIN: HCPCS | Performed by: FAMILY MEDICINE

## 2022-01-27 PROCEDURE — G8484 FLU IMMUNIZE NO ADMIN: HCPCS | Performed by: FAMILY MEDICINE

## 2022-01-27 RX ORDER — BACLOFEN 10 MG/1
TABLET ORAL
Qty: 120 TABLET | Refills: 3 | Status: SHIPPED | OUTPATIENT
Start: 2022-01-27 | End: 2022-03-07

## 2022-01-27 RX ORDER — GABAPENTIN 600 MG/1
600 TABLET ORAL 3 TIMES DAILY
Qty: 90 TABLET | Refills: 5 | Status: SHIPPED | OUTPATIENT
Start: 2022-01-27 | End: 2022-06-29 | Stop reason: DRUGHIGH

## 2022-01-27 ASSESSMENT — ENCOUNTER SYMPTOMS
COUGH: 0
GASTROINTESTINAL NEGATIVE: 1
RESPIRATORY NEGATIVE: 1
CHEST TIGHTNESS: 0
EYES NEGATIVE: 1
RHINORRHEA: 0

## 2022-01-27 NOTE — PROGRESS NOTES
Patient is seen in follow up for   Chief Complaint   Patient presents with    Discuss Medications     has noticed her doses of neurontin are geting less and less and having alot of pain    3 Month Follow-Up     contract and uds done 21    Anxiety     ativan    Chronic Pain     neurontin     HPIhere with generalized pain. No past medical history on file.   Patient Active Problem List    Diagnosis Date Noted    Bite, animal 2020    Left foot pain 2020    Deficient knowledge 2020    Dog bite of wrist 2020    Impaired mobility 2020    Myalgia 2020    Hypokalemia 10/02/2018    Nicotine dependence, unspecified, uncomplicated     Overweight 10/02/2018    Ataxic gait 2018    Body mass index (bmi) 39.0-39.9, adult 2018    Muscle weakness (generalized) 2018    Neuromuscular dysfunction of bladder, unspecified 2018    Obesity, unspecified 2018    Paresthesia of skin 2018    Vitamin D deficiency 2018    Neuropathy 2016    Anxiety and depression 2016    [facility]     Past Surgical History:   Procedure Laterality Date    APPENDECTOMY      BREAST SURGERY      lump removal bengin     SECTION      x 3     Family History   Problem Relation Age of Onset    Diabetes Mother     Heart Disease Father      Social History     Socioeconomic History    Marital status:      Spouse name: None    Number of children: None    Years of education: None    Highest education level: None   Occupational History    None   Tobacco Use    Smoking status: Former Smoker     Quit date: 2017     Years since quittin.6    Smokeless tobacco: Never Used   Substance and Sexual Activity    Alcohol use: No     Alcohol/week: 0.0 standard drinks    Drug use: No    Sexual activity: None   Other Topics Concern    None   Social History Narrative    None     Social Determinants of Health     Financial Resource Strain: Low Risk     Difficulty of Paying Living Expenses: Not hard at all   Food Insecurity: No Food Insecurity    Worried About Running Out of Food in the Last Year: Never true    Sommer of Food in the Last Year: Never true   Transportation Needs: No Transportation Needs    Lack of Transportation (Medical): No    Lack of Transportation (Non-Medical): No   Physical Activity:     Days of Exercise per Week: Not on file    Minutes of Exercise per Session: Not on file   Stress:     Feeling of Stress : Not on file   Social Connections:     Frequency of Communication with Friends and Family: Not on file    Frequency of Social Gatherings with Friends and Family: Not on file    Attends Scientology Services: Not on file    Active Member of [address] [place] [address] or Organizations: Not on file    Attends Club or Organization Meetings: Not on file    Marital Status: Not on file   Intimate Partner Violence:     Fear of Current or Ex-Partner: Not on file    Emotionally Abused: Not on file    Physically Abused: Not on file    Sexually Abused: Not on file   Housing Stability:     Unable to Pay for Housing in the Last Year: Not on file    Number of [place] in the Last Year: Not on file    Unstable Housing in the Last Year: Not on file     Current Outpatient Medications   Medication Sig Dispense Refill    gabapentin (NEURONTIN) 600 MG tablet Take 1 tablet by mouth 3 times daily for 180 days. 90 tablet 5    baclofen (LIORESAL) 10 MG tablet TAKE 1 TABLET BY MOUTH FOUR TIMES DAILY 120 tablet 3    AUBAGIO 14 MG TABS TAKE 1 TABLET BY MOUTH 1 TIME A DAY.  30 tablet 2    LORazepam (ATIVAN) 0.5 MG tablet Take 1 tablet by mouth every 8 hours as needed for Anxiety for up to 30 days. - NEEDS APPT FOR FURTHER REFILLS 60 tablet 0    escitalopram (LEXAPRO) 20 MG tablet TAKE (1) TABLET BY MOUTH DAILY 30 tablet 10    etodolac (LODINE) 400 MG tablet TAKE 1 TABLET BY MOUTH TWICE DAILY 60 tablet 10    Cholecalciferol (D3 SUPER STRENGTH) 50 MCG (2000 UT) CAPS TAKE (1) CAPSULE BY MOUTH ONCE DAILY 30 capsule 10    Compression Stockings MISC by Does not apply route Knee high medium compression 20-30 cc disp three pairs. 1 each 0    Handicap Placard MISC by Does not apply route 1 each 0    cyclobenzaprine (FLEXERIL) 5 MG tablet TK 1 T PO BID  0    levonorgestrel (MIRENA) 20 MCG/24HR IUD 1 each by Intrauterine route       Current Facility-Administered Medications   Medication Dose Route Frequency Provider Last Rate Last Admin    levonorgestrel (MIRENA) IUD 52 mg 1 each  1 each IntraUTERine Once Adam Hatch DO   1 each at 10/27/21 1510     Current Outpatient Medications on File Prior to Visit   Medication Sig Dispense Refill    AUBAGIO 14 MG TABS TAKE 1 TABLET BY MOUTH 1 TIME A DAY. 30 tablet 2    LORazepam (ATIVAN) 0.5 MG tablet Take 1 tablet by mouth every 8 hours as needed for Anxiety for up to 30 days. - NEEDS APPT FOR FURTHER REFILLS 60 tablet 0    escitalopram (LEXAPRO) 20 MG tablet TAKE (1) TABLET BY MOUTH DAILY 30 tablet 10    etodolac (LODINE) 400 MG tablet TAKE 1 TABLET BY MOUTH TWICE DAILY 60 tablet 10    Cholecalciferol (D3 SUPER STRENGTH) 50 MCG (2000 UT) CAPS TAKE (1) CAPSULE BY MOUTH ONCE DAILY 30 capsule 10    Compression Stockings MISC by Does not apply route Knee high medium compression 20-30 cc disp three pairs.  1 each 0    Handicap Placard MISC by Does not apply route 1 each 0    cyclobenzaprine (FLEXERIL) 5 MG tablet TK 1 T PO BID  0    levonorgestrel (MIRENA) 20 MCG/24HR IUD 1 each by Intrauterine route       Current Facility-Administered Medications on File Prior to Visit   Medication Dose Route Frequency Provider Last Rate Last Admin    levonorgestrel (MIRENA) IUD 52 mg 1 each  1 each IntraUTERine Once Adam Hatch DO   1 each at 10/27/21 1510     No Known Allergies  Health Maintenance   Topic Date Due    Hepatitis C screen  Never done    Varicella vaccine (1 of 2 - 2-dose childhood series) Never done    COVID-19 Vaccine (1) Never done    Lipid screen  06/12/2020    Flu vaccine (1) 09/01/2021    HIV screen  08/15/2026 (Originally 4/7/1995)    A1C test (Diabetic or Prediabetic)  09/07/2022    Depression Monitoring  09/07/2022    Cervical cancer screen  09/08/2025    DTaP/Tdap/Td vaccine (4 - Td or Tdap) 12/12/2029    Hepatitis A vaccine  Aged Out    Hepatitis B vaccine  Aged Out    Hib vaccine  Aged Out    Meningococcal (ACWY) vaccine  Aged Out    Pneumococcal 0-64 years Vaccine  Aged Out       Review of Systems     Review of Systems   Constitutional: Negative for activity change, appetite change, fatigue and fever. HENT: Negative for congestion and rhinorrhea. Eyes: Negative. Respiratory: Negative. Negative for cough and chest tightness. Cardiovascular: Negative. Gastrointestinal: Negative. Endocrine: Negative. Genitourinary: Negative. Musculoskeletal: Positive for arthralgias and myalgias. Skin: Negative. Neurological: Negative for dizziness, light-headedness and numbness. Hematological: Negative. Psychiatric/Behavioral: Negative. Physical Exam  Vitals:    01/27/22 1102   BP: 122/75   Pulse: 73   Resp: 13   Temp: 98.6 °F (37 °C)   SpO2: 94%   Height: 4' 10\" (1.473 m)       Physical Exam  Constitutional:       Appearance: She is well-developed. HENT:      Right Ear: External ear normal.      Left Ear: External ear normal.   Eyes:      Pupils: Pupils are equal, round, and reactive to light. Neck:      Thyroid: No thyromegaly. Cardiovascular:      Rate and Rhythm: Normal rate and regular rhythm. Heart sounds: Normal heart sounds. No murmur heard. No friction rub. No gallop. Pulmonary:      Effort: Pulmonary effort is normal. No respiratory distress. Breath sounds: No wheezing or rales. Chest:      Chest wall: No tenderness.    Abdominal:      General: Bowel sounds are normal. There is no distension. Palpations: Abdomen is soft. There is no mass. Tenderness: There is no abdominal tenderness. There is no guarding or rebound. Musculoskeletal:         General: Normal range of motion. Cervical back: Normal range of motion and neck supple. Lymphadenopathy:      Cervical: No cervical adenopathy. Skin:     General: Skin is warm and dry. Neurological:      Mental Status: She is alert and oriented to person, place, and time. Cranial Nerves: No cranial nerve deficit. Coordination: Coordination normal.         Assessment   Diagnosis Orders   1. Neuropathy     2. Anxiety and depression     3. MS [facility]multiple sclerosis) ([facility]     4. Myalgia       Problem List     MS (multiple sclerosis) ([facility])    Relevant Medications    AUBAGIO 14 MG TABS    Neuropathy - Primary    Anxiety and depression    Relevant Medications    escitalopram (LEXAPRO) 20 MG tablet    LORazepam (ATIVAN) 0.5 MG tablet    Myalgia          Plan  No orders of the defined types were placed in this encounter. Orders Placed This Encounter   Medications    gabapentin (NEURONTIN) 600 MG tablet     Sig: Take 1 tablet by mouth 3 times daily for 180 days. Dispense:  90 tablet     Refill:  5    baclofen (LIORESAL) 10 MG tablet     Sig: TAKE 1 TABLET BY MOUTH FOUR TIMES DAILY     Dispense:  120 tablet     Refill:  3     No follow-ups on file.   Gildardo Hernandez MD

## 2022-01-31 DIAGNOSIS — F32.A ANXIETY AND DEPRESSION: ICD-10-CM

## 2022-01-31 DIAGNOSIS — F41.9 ANXIETY AND DEPRESSION: ICD-10-CM

## 2022-01-31 RX ORDER — LORAZEPAM 0.5 MG/1
0.5 TABLET ORAL EVERY 8 HOURS PRN
Qty: 60 TABLET | Refills: 2 | Status: SHIPPED | OUTPATIENT
Start: 2022-01-31 | End: 2022-04-26 | Stop reason: SDUPTHER

## 2022-01-31 NOTE — TELEPHONE ENCOUNTER
Future Appointments    Encounter Information    Provider Department Appt Notes   2/28/2022 Harvey Angelo MD [facility] Neurology 4 MOS FUP   5/2/2022 MD YURY Ludwig AT [facility] 3 mo neuro/MS       Recent Visits    01/27/2022 Neuropathy   SOJOURN AT [facility] [place] MD Will

## 2022-02-01 RX ORDER — CHOLECALCIFEROL (VITAMIN D3) 50 MCG
CAPSULE ORAL
Qty: 30 CAPSULE | Refills: 10 | Status: SHIPPED | OUTPATIENT
Start: 2022-02-01

## 2022-02-01 NOTE — TELEPHONE ENCOUNTER
pharmacy requesting medication refill.  Please approve or deny this request.    Rx requested:  Requested Prescriptions     Pending Prescriptions Disp Refills    D3 SUPER STRENGTH 50 MCG (2000 UT) CAPS [Pharmacy Med Name: VIT D-3 50MCG SGC (2000U) 50 MCG Capsule] 30 capsule 10     Sig: TAKE (1) CAPSULE BY MOUTH ONCE DAILY         Last Office Visit:   Visit date not found      Next Visit Date:  Future Appointments   Date Time Provider [facility]   2/28/2022  2:30 PM Fredi Flores MD [facility]   5/2/2022 11:45 AM Rich Hatch MD [address] [place] [address]

## 2022-02-25 ENCOUNTER — TELEPHONE (OUTPATIENT)
Dept: NEUROLOGY | Age: 42
End: 2022-02-25

## 2022-02-25 NOTE — TELEPHONE ENCOUNTER
Pt called in stating that she got a shut off notice for her gas she is asking for us to write a letter for her so that they do not shut it off. Also said that they will be faxing paperwork here as well regarding this. Just wanted to know if it is okay to do this or not. Please advise.

## 2022-02-28 ENCOUNTER — OFFICE VISIT (OUTPATIENT)
Dept: NEUROLOGY | Age: 42
End: 2022-02-28
Payer: COMMERCIAL

## 2022-02-28 VITALS
BODY MASS INDEX: 40.96 KG/M2 | WEIGHT: 196 LBS | HEART RATE: 108 BPM | SYSTOLIC BLOOD PRESSURE: 125 MMHG | DIASTOLIC BLOOD PRESSURE: 80 MMHG

## 2022-02-28 DIAGNOSIS — M62.81 MUSCLE WEAKNESS (GENERALIZED): ICD-10-CM

## 2022-02-28 DIAGNOSIS — M79.10 MYALGIA: ICD-10-CM

## 2022-02-28 DIAGNOSIS — R20.2 PARESTHESIA OF SKIN: ICD-10-CM

## 2022-02-28 DIAGNOSIS — R26.0 ATAXIC GAIT: ICD-10-CM

## 2022-02-28 DIAGNOSIS — N31.9 NEUROMUSCULAR DYSFUNCTION OF BLADDER, UNSPECIFIED: ICD-10-CM

## 2022-02-28 DIAGNOSIS — G62.9 NEUROPATHY: ICD-10-CM

## 2022-02-28 DIAGNOSIS — G35 MS (MULTIPLE SCLEROSIS) (HCC): Primary | ICD-10-CM

## 2022-02-28 PROCEDURE — 4004F PT TOBACCO SCREEN RCVD TLK: CPT | Performed by: PSYCHIATRY & NEUROLOGY

## 2022-02-28 PROCEDURE — G8417 CALC BMI ABV UP PARAM F/U: HCPCS | Performed by: PSYCHIATRY & NEUROLOGY

## 2022-02-28 PROCEDURE — G8484 FLU IMMUNIZE NO ADMIN: HCPCS | Performed by: PSYCHIATRY & NEUROLOGY

## 2022-02-28 PROCEDURE — G8427 DOCREV CUR MEDS BY ELIG CLIN: HCPCS | Performed by: PSYCHIATRY & NEUROLOGY

## 2022-02-28 PROCEDURE — 99214 OFFICE O/P EST MOD 30 MIN: CPT | Performed by: PSYCHIATRY & NEUROLOGY

## 2022-02-28 ASSESSMENT — ENCOUNTER SYMPTOMS
BACK PAIN: 1
CHOKING: 0
PHOTOPHOBIA: 0
VOMITING: 0
NAUSEA: 0
TROUBLE SWALLOWING: 0
SHORTNESS OF BREATH: 0
COLOR CHANGE: 0

## 2022-03-01 RX ORDER — BACLOFEN 10 MG/1
TABLET ORAL
Qty: 120 TABLET | Refills: 3 | OUTPATIENT
Start: 2022-03-01

## 2022-03-07 RX ORDER — BACLOFEN 10 MG/1
TABLET ORAL
Qty: 120 TABLET | Refills: 3 | Status: SHIPPED | OUTPATIENT
Start: 2022-03-07 | End: 2022-04-06

## 2022-04-06 RX ORDER — BACLOFEN 10 MG/1
TABLET ORAL
Qty: 120 TABLET | Refills: 10 | Status: SHIPPED | OUTPATIENT
Start: 2022-04-06 | End: 2022-06-13 | Stop reason: SDUPTHER

## 2022-04-26 ENCOUNTER — OFFICE VISIT (OUTPATIENT)
Dept: FAMILY MEDICINE CLINIC | Age: 42
End: 2022-04-26
Payer: COMMERCIAL

## 2022-04-26 VITALS
SYSTOLIC BLOOD PRESSURE: 114 MMHG | DIASTOLIC BLOOD PRESSURE: 78 MMHG | TEMPERATURE: 97.8 F | HEART RATE: 77 BPM | RESPIRATION RATE: 12 BRPM | OXYGEN SATURATION: 96 %

## 2022-04-26 DIAGNOSIS — G35 MS (MULTIPLE SCLEROSIS) (HCC): ICD-10-CM

## 2022-04-26 DIAGNOSIS — G44.89 OTHER HEADACHE SYNDROME: ICD-10-CM

## 2022-04-26 DIAGNOSIS — G62.9 NEUROPATHY: ICD-10-CM

## 2022-04-26 DIAGNOSIS — M79.10 MYALGIA: ICD-10-CM

## 2022-04-26 DIAGNOSIS — M25.551 HIP PAIN, BILATERAL: ICD-10-CM

## 2022-04-26 DIAGNOSIS — F32.A ANXIETY AND DEPRESSION: Primary | ICD-10-CM

## 2022-04-26 DIAGNOSIS — F41.9 ANXIETY AND DEPRESSION: ICD-10-CM

## 2022-04-26 DIAGNOSIS — M25.552 HIP PAIN, BILATERAL: ICD-10-CM

## 2022-04-26 DIAGNOSIS — F41.9 ANXIETY AND DEPRESSION: Primary | ICD-10-CM

## 2022-04-26 DIAGNOSIS — F32.A ANXIETY AND DEPRESSION: ICD-10-CM

## 2022-04-26 PROCEDURE — G8427 DOCREV CUR MEDS BY ELIG CLIN: HCPCS | Performed by: FAMILY MEDICINE

## 2022-04-26 PROCEDURE — 4004F PT TOBACCO SCREEN RCVD TLK: CPT | Performed by: FAMILY MEDICINE

## 2022-04-26 PROCEDURE — G8417 CALC BMI ABV UP PARAM F/U: HCPCS | Performed by: FAMILY MEDICINE

## 2022-04-26 PROCEDURE — 99213 OFFICE O/P EST LOW 20 MIN: CPT | Performed by: FAMILY MEDICINE

## 2022-04-26 RX ORDER — LORAZEPAM 0.5 MG/1
0.5 TABLET ORAL EVERY 8 HOURS PRN
Qty: 60 TABLET | Refills: 2 | Status: SHIPPED | OUTPATIENT
Start: 2022-04-26 | End: 2022-05-06

## 2022-04-26 SDOH — ECONOMIC STABILITY: FOOD INSECURITY: WITHIN THE PAST 12 MONTHS, THE FOOD YOU BOUGHT JUST DIDN'T LAST AND YOU DIDN'T HAVE MONEY TO GET MORE.: NEVER TRUE

## 2022-04-26 SDOH — ECONOMIC STABILITY: FOOD INSECURITY: WITHIN THE PAST 12 MONTHS, YOU WORRIED THAT YOUR FOOD WOULD RUN OUT BEFORE YOU GOT MONEY TO BUY MORE.: NEVER TRUE

## 2022-04-26 ASSESSMENT — ENCOUNTER SYMPTOMS
BACK PAIN: 1
COUGH: 0
RESPIRATORY NEGATIVE: 1
CHEST TIGHTNESS: 0
RHINORRHEA: 0
GASTROINTESTINAL NEGATIVE: 1
EYES NEGATIVE: 1

## 2022-04-26 ASSESSMENT — SOCIAL DETERMINANTS OF HEALTH (SDOH): HOW HARD IS IT FOR YOU TO PAY FOR THE VERY BASICS LIKE FOOD, HOUSING, MEDICAL CARE, AND HEATING?: NOT HARD AT ALL

## 2022-04-26 NOTE — PROGRESS NOTES
Patient is seen in follow up for   Chief Complaint   Patient presents with    3 Month Follow-Up    Chronic Pain     neurontin    Anxiety     arivan    Headache     top of head hurts even to touch changed hair ties and saw eye doctor    Jaw Pain     HPItop of head she has headache and scalp sensativity    No past medical history on file.   Patient Active Problem List    Diagnosis Date Noted    Bite, animal 2020    Left foot pain 2020    Deficient knowledge 2020    Dog bite of wrist 2020    Impaired mobility 2020    Myalgia 2020    Hypokalemia 10/02/2018    Nicotine dependence, unspecified, uncomplicated     Overweight 10/02/2018    Ataxic gait 2018    Body mass index (bmi) 39.0-39.9, adult 2018    Muscle weakness (generalized) 2018    Neuromuscular dysfunction of bladder, unspecified 2018    Obesity, unspecified 2018    Paresthesia of skin 2018    Vitamin D deficiency 2018    Neuropathy 2016    Anxiety and depression 2016    [facility]     Past Surgical History:   Procedure Laterality Date    APPENDECTOMY      BREAST SURGERY      lump removal bengin     SECTION      x 3     Family History   Problem Relation Age of Onset    Diabetes Mother     Heart Disease Father      Social History     Socioeconomic History    Marital status:      Spouse name: None    Number of children: None    Years of education: None    Highest education level: None   Occupational History    None   Tobacco Use    Smoking status: Current Every Day Smoker    Smokeless tobacco: Never Used   Substance and Sexual Activity    Alcohol use: No     Alcohol/week: 0.0 standard drinks    Drug use: No    Sexual activity: None   Other Topics Concern    None   Social History Narrative    None     Social Determinants of Health     Financial Resource Strain: Low Risk     Difficulty of Paying Living Expenses: Not hard at all   Food Insecurity: No Food Insecurity    Worried About Running Out of Food in the Last Year: Never true    Ran Out of Food in the Last Year: Never true   Transportation Needs:     Lack of Transportation (Medical): Not on file    Lack of Transportation (Non-Medical): Not on file   Physical Activity:     Days of Exercise per Week: Not on file    Minutes of Exercise per Session: Not on file   Stress:     Feeling of Stress : Not on file   Social Connections:     Frequency of Communication with Friends and Family: Not on file    Frequency of Social Gatherings with Friends and Family: Not on file    Attends Shinto Services: Not on file    Active Member of [address] [place] [address] or Organizations: Not on file    Attends Club or Organization Meetings: Not on file    Marital Status: Not on file   Intimate Partner Violence:     Fear of Current or Ex-Partner: Not on file    Emotionally Abused: Not on file    Physically Abused: Not on file    Sexually Abused: Not on file   Housing Stability:     Unable to Pay for Housing in the Last Year: Not on file    Number of [place] in the Last Year: Not on file    Unstable Housing in the Last Year: Not on file     Current Outpatient Medications   Medication Sig Dispense Refill    LORazepam (ATIVAN) 0.5 MG tablet Take 1 tablet by mouth every 8 hours as needed for Anxiety for up to 90 days. 60 tablet 2    baclofen (LIORESAL) 10 MG tablet TAKE 1 TABLET BY MOUTH FOUR TIMES DAILY 120 tablet 10    D3 SUPER STRENGTH 50 MCG (2000 UT) CAPS TAKE (1) CAPSULE BY MOUTH ONCE DAILY 30 capsule 10    gabapentin (NEURONTIN) 600 MG tablet Take 1 tablet by mouth 3 times daily for 180 days. 90 tablet 5    AUBAGIO 14 MG TABS TAKE 1 TABLET BY MOUTH 1 TIME A DAY.  30 tablet 2    escitalopram (LEXAPRO) 20 MG tablet TAKE (1) TABLET BY MOUTH DAILY 30 tablet 10    etodolac (LODINE) 400 MG tablet TAKE 1 TABLET BY MOUTH TWICE DAILY 60 tablet 10    Compression Stockings MISC by Does not apply route Knee high medium compression 20-30 cc disp three pairs. 1 each 0    Handicap Placard MISC by Does not apply route 1 each 0    cyclobenzaprine (FLEXERIL) 5 MG tablet TK 1 T PO BID  0    levonorgestrel (MIRENA) 20 MCG/24HR IUD 1 each by Intrauterine route       Current Facility-Administered Medications   Medication Dose Route Frequency Provider Last Rate Last Admin    levonorgestrel (MIRENA) IUD 52 mg 1 each  1 each IntraUTERine Once Odalis Keller, DO   1 each at 10/27/21 1510     Current Outpatient Medications on File Prior to Visit   Medication Sig Dispense Refill    baclofen (LIORESAL) 10 MG tablet TAKE 1 TABLET BY MOUTH FOUR TIMES DAILY 120 tablet 10    D3 SUPER STRENGTH 50 MCG (2000 UT) CAPS TAKE (1) CAPSULE BY MOUTH ONCE DAILY 30 capsule 10    gabapentin (NEURONTIN) 600 MG tablet Take 1 tablet by mouth 3 times daily for 180 days. 90 tablet 5    AUBAGIO 14 MG TABS TAKE 1 TABLET BY MOUTH 1 TIME A DAY. 30 tablet 2    escitalopram (LEXAPRO) 20 MG tablet TAKE (1) TABLET BY MOUTH DAILY 30 tablet 10    etodolac (LODINE) 400 MG tablet TAKE 1 TABLET BY MOUTH TWICE DAILY 60 tablet 10    Compression Stockings MISC by Does not apply route Knee high medium compression 20-30 cc disp three pairs.  1 each 0    Handicap Placard MISC by Does not apply route 1 each 0    cyclobenzaprine (FLEXERIL) 5 MG tablet TK 1 T PO BID  0    levonorgestrel (MIRENA) 20 MCG/24HR IUD 1 each by Intrauterine route       Current Facility-Administered Medications on File Prior to Visit   Medication Dose Route Frequency Provider Last Rate Last Admin    levonorgestrel (MIRENA) IUD 52 mg 1 each  1 each IntraUTERine Once Odalis Keller, DO   1 each at 10/27/21 1510     No Known Allergies  Health Maintenance   Topic Date Due    Varicella vaccine (1 of 2 - 2-dose childhood series) Never done    COVID-19 Vaccine (1) Never done    Pneumococcal 0-64 years Vaccine (1 - PCV) Never done   Chelle Delgadillo Hepatitis C screen  Never done    Lipids  06/12/2020    HIV screen  08/15/2026 (Originally 4/7/1995)    Flu vaccine (Season Ended) 09/01/2022    A1C test (Diabetic or Prediabetic)  09/07/2022    Depression Monitoring  09/07/2022    Cervical cancer screen  09/08/2025    DTaP/Tdap/Td vaccine (4 - Td or Tdap) 12/12/2029    Hepatitis A vaccine  Aged Out    Hepatitis B vaccine  Aged Out    Hib vaccine  Aged Out    Meningococcal (ACWY) vaccine  Aged Out       Review of Systems     Review of Systems   Constitutional: Negative for activity change, appetite change, fatigue and fever. HENT: Negative for congestion and rhinorrhea. Eyes: Negative. Respiratory: Negative. Negative for cough and chest tightness. Cardiovascular: Negative. Gastrointestinal: Negative. Endocrine: Negative. Genitourinary: Negative. Musculoskeletal: Positive for arthralgias and back pain. Skin: Negative. Neurological: Positive for headaches. Negative for dizziness, light-headedness and numbness. Hematological: Negative. Psychiatric/Behavioral: Negative. Physical Exam  Vitals:    04/26/22 1115   BP: 114/78   Pulse: 77   Resp: 12   Temp: 97.8 °F (36.6 °C)   SpO2: 96%       Physical Exam  Constitutional:       Appearance: She is well-developed. HENT:      Right Ear: External ear normal.      Left Ear: External ear normal.   Eyes:      Pupils: Pupils are equal, round, and reactive to light. Neck:      Thyroid: No thyromegaly. Cardiovascular:      Rate and Rhythm: Normal rate and regular rhythm. Heart sounds: Normal heart sounds. No murmur heard. No friction rub. No gallop. Pulmonary:      Effort: Pulmonary effort is normal. No respiratory distress. Breath sounds: No wheezing or rales. Chest:      Chest wall: No tenderness. Abdominal:      General: Bowel sounds are normal. There is no distension. Palpations: Abdomen is soft. There is no mass. Tenderness:  There is no abdominal tenderness. There is no guarding or rebound. Musculoskeletal:         General: Normal range of motion. Cervical back: Normal range of motion and neck supple. Lymphadenopathy:      Cervical: No cervical adenopathy. Skin:     General: Skin is warm and dry. Neurological:      Mental Status: She is alert and oriented to person, place, and time. Cranial Nerves: No cranial nerve deficit. Coordination: Coordination normal.         Assessment   Diagnosis Orders   1. Anxiety and depression  Pain Management Drug Screen    LORazepam (ATIVAN) 0.5 MG tablet   2. MS (multiple sclerosis) ([facility])  Pain Management Drug Screen   3. Myalgia     4. Neuropathy     5. Other headache syndrome     6. Hip pain, bilateral  [facility] [place]     Problem List     MS (multiple sclerosis) ([facility])    Relevant Medications    AUBAGIO 14 MG TABS    Other Relevant Orders    Pain Management Drug Screen    Neuropathy    Anxiety and depression - Primary    Relevant Medications    escitalopram (LEXAPRO) 20 MG tablet    AUBAGIO 14 MG TABS    gabapentin (NEURONTIN) 600 MG tablet    LORazepam (ATIVAN) 0.5 MG tablet    Other Relevant Orders    Pain Management Drug Screen    Myalgia          Plan  Orders Placed This Encounter   Procedures    Pain Management Drug Screen     Standing Status:   Future     Standing Expiration Date:   4/25/2023   [facility] [place]     Referral Priority:   Routine     Referral Type:   Eval and Treat     Referral Reason:   Specialty Services Required     Requested Specialty:   Physical Therapy     Number of Visits Requested:   1     Orders Placed This Encounter   Medications    LORazepam (ATIVAN) 0.5 MG tablet     Sig: Take 1 tablet by mouth every 8 hours as needed for Anxiety for up to 90 days. Dispense:  60 tablet     Refill:  2     No follow-ups on file.   Ashlie Kurtz MD

## 2022-04-28 LAB
6-ACETYLMORPHINE: NOT DETECTED
7-AMINOCLONAZEPAM: NOT DETECTED
ALPHA-OH-ALPRAZOLAM: NOT DETECTED
ALPHA-OH-MIDAZOLAM, URINE: NOT DETECTED
ALPRAZOLAM: NOT DETECTED
AMPHETAMINE: NOT DETECTED
BARBITURATES: NOT DETECTED
BENZOYLECGONINE: NOT DETECTED
BUPRENORPHINE: NOT DETECTED
CARISOPRODOL: NOT DETECTED
CLONAZEPAM: NOT DETECTED
CODEINE: NOT DETECTED
CREATININE URINE: 185.3 MG/DL (ref 20–400)
DIAZEPAM: NOT DETECTED
EER PAIN MGT DRUG PANEL, HIGH RES/EMIT U: NORMAL
ETHYL GLUCURONIDE: NOT DETECTED
FENTANYL: NOT DETECTED
GABAPENTIN: PRESENT
HYDROCODONE: NOT DETECTED
HYDROMORPHONE: NOT DETECTED
LORAZEPAM: NOT DETECTED
MARIJUANA METABOLITE: PRESENT
MDA: NOT DETECTED
MDEA: NOT DETECTED
MDMA URINE: NOT DETECTED
MEPERIDINE: NOT DETECTED
METHADONE: NOT DETECTED
METHAMPHETAMINE: NOT DETECTED
METHYLPHENIDATE: NOT DETECTED
MIDAZOLAM: NOT DETECTED
MORPHINE: NOT DETECTED
NALOXONE: NOT DETECTED
NORBUPRENORPHINE, FREE: NOT DETECTED
NORDIAZEPAM: NOT DETECTED
NORFENTANYL: NOT DETECTED
NORHYDROCODONE, URINE: NOT DETECTED
NOROXYCODONE: NOT DETECTED
NOROXYMORPHONE, URINE: NOT DETECTED
OXAZEPAM: NOT DETECTED
OXYCODONE: NOT DETECTED
OXYMORPHONE: NOT DETECTED
PAIN MANAGEMENT DRUG PANEL: NORMAL
PCP: NOT DETECTED
PHENTERMINE: NOT DETECTED
PREGABALIN: NOT DETECTED
TAPENTADOL, URINE: NOT DETECTED
TAPENTADOL-O-SULFATE, URINE: NOT DETECTED
TEMAZEPAM: NOT DETECTED
TRAMADOL: NOT DETECTED
ZOLPIDEM: NOT DETECTED

## 2022-05-06 DIAGNOSIS — F41.9 ANXIETY AND DEPRESSION: ICD-10-CM

## 2022-05-06 DIAGNOSIS — F32.A ANXIETY AND DEPRESSION: ICD-10-CM

## 2022-05-09 RX ORDER — LORAZEPAM 0.5 MG/1
TABLET ORAL
Qty: 60 TABLET | Refills: 2 | Status: SHIPPED | OUTPATIENT
Start: 2022-05-09 | End: 2022-06-08

## 2022-05-10 ENCOUNTER — HOSPITAL ENCOUNTER (OUTPATIENT)
Dept: PHYSICAL THERAPY | Age: 42
Setting detail: THERAPIES SERIES
Discharge: HOME | End: 2022-05-10
Payer: COMMERCIAL

## 2022-05-10 PROCEDURE — 97530 THERAPEUTIC ACTIVITIES: CPT

## 2022-05-10 PROCEDURE — 97162 PT EVAL MOD COMPLEX 30 MIN: CPT

## 2022-05-10 PROCEDURE — 97110 THERAPEUTIC EXERCISES: CPT

## 2022-05-10 ASSESSMENT — PAIN DESCRIPTION - PAIN TYPE: TYPE: CHRONIC PAIN

## 2022-05-10 ASSESSMENT — PAIN SCALES - GENERAL: PAINLEVEL_OUTOF10: 6

## 2022-05-10 ASSESSMENT — PAIN DESCRIPTION - DESCRIPTORS: DESCRIPTORS: ACHING;SHOOTING;SORE

## 2022-05-10 ASSESSMENT — PAIN DESCRIPTION - LOCATION: LOCATION: HIP

## 2022-05-10 ASSESSMENT — PAIN DESCRIPTION - FREQUENCY: FREQUENCY: CONTINUOUS

## 2022-05-10 ASSESSMENT — PAIN DESCRIPTION - ORIENTATION: ORIENTATION: RIGHT;LEFT;OUTER;POSTERIOR

## 2022-05-10 ASSESSMENT — PAIN DESCRIPTION - ONSET: ONSET: AWAKENED FROM SLEEP

## 2022-05-10 NOTE — PROGRESS NOTES
Physical Therapy: Initial Evaluation    Patient: London Boone (35 y.o. female)   Examination Date:   Plan of Care Certification Period: 5/10/2022 to 06/10/22      :  1980 ;    Confirmed: Yes MRN: [id number]  CSN: [id number]   Insurance: Payor: Cecil Castro / Plan: Jerome Aspen / Product Type: *No Product type* /   Insurance ID: [id number] - (Medicare Managed) Secondary Insurance (if applicable):    Referring Physician: MD Dr Bambi Corley   PCP: Bambi Macias MD Visits to Date/Visits Approved: 1 / 10    No Show/Cancelled Appts: 0  0     Medical Diagnosis: Pain in right hip [M25.551]  Pain in left hip [M25.552] B hip pain with weakness and PMH of MS  Treatment Diagnosis: B hip pain with difficulty walking, complicated by MS     PERTINENT MEDICAL HISTORY      Self reported health status[de-identified] Poor (\"with a chance of good\")    Medical History: Chart Reviewed: Yes History reviewed. No pertinent past medical history. Surgical History:   Past Surgical History:   Procedure Laterality Date    APPENDECTOMY      BREAST SURGERY      lump removal bengin     SECTION      x 3       Medications:   Current Outpatient Medications:     LORazepam (ATIVAN) 0.5 MG tablet, TAKE 1 TABLET BY MOUTH EVERY 8 HOURS AS NEEDED FOR ANIXETY FOR UP TO 90 DAYS, Disp: 60 tablet, Rfl: 2    baclofen (LIORESAL) 10 MG tablet, TAKE 1 TABLET BY MOUTH FOUR TIMES DAILY, Disp: 120 tablet, Rfl: 10    D3 SUPER STRENGTH 50 MCG (2000 UT) CAPS, TAKE (1) CAPSULE BY MOUTH ONCE DAILY, Disp: 30 capsule, Rfl: 10    gabapentin (NEURONTIN) 600 MG tablet, Take 1 tablet by mouth 3 times daily for 180 days. , Disp: 90 tablet, Rfl: 5    AUBAGIO 14 MG TABS, TAKE 1 TABLET BY MOUTH 1 TIME A DAY., Disp: 30 tablet, Rfl: 2    escitalopram (LEXAPRO) 20 MG tablet, TAKE (1) TABLET BY MOUTH DAILY, Disp: 30 tablet, Rfl: 10    etodolac (LODINE) 400 MG tablet, TAKE 1 TABLET BY MOUTH TWICE DAILY, Disp: 60 tablet, Rfl: 10    Compression Stockings MISC, by Does not apply route Knee high medium compression 20-30 cc disp three pairs. , Disp: 1 each, Rfl: 0    Handicap Placard MISC, by Does not apply route, Disp: 1 each, Rfl: 0    cyclobenzaprine (FLEXERIL) 5 MG tablet, TK 1 T PO BID, Disp: , Rfl: 0    levonorgestrel (MIRENA) 20 MCG/24HR IUD, 1 each by Intrauterine route, Disp: , Rfl:     Current Facility-Administered Medications:     levonorgestrel (MIRENA) IUD 52 mg 1 each, 1 each, IntraUTERine, Once, Vernon Kid, DO, 1 each at 10/27/21 1510  Allergies: Patient has no known allergies. SUBJECTIVE EXAMINATION      ,      Family/Caregiver Present: Yes (Son)    Subjective History: Onset Date: 22  Subjective: My hips have been painful for awhile. They are getting worse. \" most recent fall 22 outof bathroom, falls about once a month, falls approx one time a month, knees buckle at times. Additional Pertinent Hx (if applicable): MS since 5341; has manual w/c from  father; Insurance bought Rollator for pt 2-3 yrs ago.  Has canes; bathroom is getting modified with grab bars   Previous treatments prior to current episode?:  (stretching on own)      Learning/Language:       Pain Screening   Pain Screening  Patient Currently in Pain: Yes  Pain Assessment: 0-10  Pain Level: 6 (with standing, takes a minute to be able to walk)  Best Pain Level: 4  Worst Pain Level: 10  Date pain first started: 22 (or longer)  Patient's Stated Pain Goal: 3  Pain Type: Chronic pain  Pain Location: Hip  Pain Orientation: Right,Left,Outer,Posterior  Pain Radiating Towards: Hips feel like they want to dislocate outwards/ laterally, shooting pains to toes intermittently, also has neuropathy  Pain Descriptors: Aching,Shooting,Sore  Pain Frequency: Continuous  Pain Onset: Awakened from sleep    Functional Status    Dominant Hand: : Right    Social History:  Social History  Lives With: Son (boyfriend)  Home Layout: One level  Home Access: Level entry  Bathroom Shower/Tub: Tub/Shower unit  Bathroom Toilet: Standard  Home Equipment: Leonora Vale (w/c from  father)    Occupation/Interests:  Occupation: On disability    Prior Level of Function:             Current Level of Function:    LE  Sitting Tolerance: \"fidgetty\" moves every 5-10 minutes  Standing Tolerance: kitchen counter/ dishes 10 minutes  Walking Tolerance: 25ft no AD, Rollator longer  Transfers: independent but slow  Ascending Stairs: harder to go up than down  Descending Stairs: down backwards and needs railings    ADL Assistance: Independent (independent takes a long time and needs rest after)  Ambulation Assistance: Independent (home furniture, chair or ROllaotr in community dependent upon distance; power chair from friend)    OBJECTIVE EXAMINATION   Restrictions:        Position Activity Restriction  Other position/activity restrictions: fall 22 getting out of bath tub- hitting L hip and tailbone,    Observations:   fatigues quickly, arrives in manual w/c no legrests , pushed by son     Palpation: tender over B GH heads and hip bursa       Ambulation/Gait (if applicable):  Ambulation  Surface: level tile,carpet  Device: Rollator  Quality of Gait: hip hike for clearance of RLE, RLE appears longer /taller causing increased presure to B hips with gait, decreased control  Distance: 100ft  Comments: second amb with handheld LUE by PT fair pace, no LOB, faitgues quickly, 50ft; reprots uses cane in RUE if using cane and a good day/ short distance; leg length in supine 80cm ASIS to med malleolus left and right- likely feeling R longer fro pain compensation on left side- recent fall past  with increased pain L>R  Stairs/Curb  Stairs? :  (test when time and stronger)    Balance Screen:   supported stand static G-, dynamic F+ with Rollator    Neuro Screen:  Sensation      Sensation  Overall Sensation Status: Impaired  Light Touch: Severe deficits in the RUE,Severe deficits in the LUE (limited sensation B above knee to toes with numbness and tinngling, somtimes shooting pain, hiostyr of neuropathy)       Left Reflexes  Right Reflexes             Left Quadriceps (L3-4):  Absent  Left Gastrocnemius (S1):  Absent Right Quadriceps (L3-4):  Absent  Right Gastrocnemius (S1):  Absent       Left AROM  Right AROM          WFL   WFL        Left PROM  Right PROM                    Left Strength  Right Strength      General Strength Testing LE:  (sitting)  Strength LLE  L Hip Flexion: 4-/5  L Hip Extension: 3+/5  L Hip ABduction: 3+/5  L Hip ADduction: 4-/5,3+/5  L Hip Internal Rotation: 3+/5,4-/5  L Hip External Rotation: 4-/5  L Knee Flexion: 4-/5,4/5  L Knee Extension: 4/5,4+/5 General Strength Testing LE:  (sitting)  Strength RLE  R Hip Flexion: 4/5  R Hip Extension: 3+/5,4-/5  R Hip ABduction: 3+/5,4-/5  R Hip ADduction: 4-/5  R Hip Internal Rotation: 4-/5  R Hip External Rotation: 4-/5,4/5  R Knee Flexion: 4-/5  R Knee Extension: 4+/5                               Trunk Strength      weakened core noted with functional gait and in sitting, needs support       Joint Mobility (if applicable):    hip joints with generalized dec tone B     Special Tests:    Hip scour test , negative bilaterally, but both hips \"loose \" during scour test    Balance/Gait Assessment(s) Performed:  Modified LEFS 26/64= 60% disability       Additional Finding(s) (if applicable):    Ambulation/Gait (if applicable):   Ambulation  Surface: level tile,carpet  Device: Rollator  Quality of Gait: hip hike for clearance of RLE, RLE appears longer /taller causing increased presure to B hips with gait, decreased control  Distance: 100ft  Comments: second amb with handheld LUE by PT fair pace, no LOB, faitgues quickly, 50ft; reprots uses cane in RUE if using cane and a good day/ short distance; leg length in supine 80cm ASIS to med malleolus left and right- likely feeling R longer fro pain compensation on left side- recent fall past Sunday with increased pain L>R  Stairs/Curb  Stairs? :  (test when time and stronger)        Left Strength  Right Strength      General Strength Testing LE:  (sitting)  Strength LLE  L Hip Flexion: 4-/5  L Hip Extension: 3+/5  L Hip ABduction: 3+/5  L Hip ADduction: 4-/5,3+/5  L Hip Internal Rotation: 3+/5,4-/5  L Hip External Rotation: 4-/5  L Knee Flexion: 4-/5,4/5  L Knee Extension: 4/5,4+/5 General Strength Testing LE:  (sitting)  Strength RLE  R Hip Flexion: 4/5  R Hip Extension: 3+/5,4-/5  R Hip ABduction: 3+/5,4-/5  R Hip ADduction: 4-/5  R Hip Internal Rotation: 4-/5  R Hip External Rotation: 4-/5,4/5  R Knee Flexion: 4-/5  R Knee Extension: 4+/5        ASSESSMENT     Impression: Assessment: 42yof with B hip pain for at least one year, progressively getting worse per pt report. Pt complicated by MS dx and weakness since 2015. With flareups needs Rollator, sometimes needs cane or manual w/c pushed by family for distance. Pt will benefit from OP skilled PT to decrease B hip pain, increase function and increase LE strength for ovearll better quality of life . Body Structures, Functions, Activity Limitations Requiring Skilled Therapeutic Intervention: Decreased functional mobility ,Decreased strength,Decreased endurance,Decreased balance,Decreased ADL status,Decreased body mechanics    Statement of Medical Necessity: Physical Therapy is both indicated and medically necessary as outlined in the POC to increase the likelihood of meeting the functionally related goals stated below. Patient's Activity Tolerance: Patient limited by fatigue,Patient limited by pain,Patient tolerated treatment well      Patient's rehabilitation potential/prognosis is considered to be: Factors which may impact rehabilitation potential include:          GOALS   Patient Goal(s): \"I want to be able to have my hips be less painful and stronger so I can wealk better and safer. \"  Short Term Goals Completed by 1-2 weeks Goal Status   Pt reporting HEP at least 4/7 days to increase hip strenght and in turn imrpove3 funciotnal transfers any amount. Pt reporting B hip pain at 4/10 or less at least 25% of day so able to ambulate better     Ambulate >= 150ft with Rollator and some increase knee flex for swing phase on right so that RLE doesn't \"feel as tall\" and for less overuse of B hips with exagerrated gait with over trunk accesorry movment o right in R shtance when advancing LLE     Assess one flight of stairs and record status if pain down enough for trial.     sit to stand to sit x 10 reps in <= 30 seconds to show inc strength, function, and endurance B hips         Long Term Goals Completed by 4-5 weeks Goal Status   Increase seated B hip strength to >= 4+/5 to have better strength wtih walking and less falls per pt report     B Hip pain <=3/10 for at least 50% of day so pt can do more daily tasks at home and be less fatigued and painful with gait           modified LEFS <= 40% disability form 60% disabiltiy at eval 5/10;/22, to demonstrate overall dec pain and inc funciotnal mobility     Pt competent advanced HEP to gain hip strenght and core control for better gait and overall function.           TREATMENT PLAN            Pt. actively involved in establishing Plan of Care and Goals: Yes  Patient/ Caregiver education and instruction: Geoffery Banegas of Care,Home Exercise Program,Transfer Training,Gait Training    KT tape trial on forearm 5/10/22 for possible use on hips for pain management and support- TBD        Treatment may include any combination of the following: Strengthening,Balance training,Functional mobility training,Transfer training,Endurance training,Gait training,Stair training,Manual Therapy - Soft Tissue Mobilization,Home exercise program,Safety education & training,Patient/Caregiver education & training,Equipment evaluation, education, & procurement,Modalities     Frequency / Duration:  Patient to be seen 2x week for 4-5 weeks weeks      Eval Complexity:         PT Treatment Completed:  Exercises:  Therapeutic exercise (CPT 56040)   Treatment Reasoning    Exercise 1: hooklying pillow ADD squeeze 3-5 hold x 10 reps  Exercise 2: bridge with pillow gentle adduciton 3 hold and 3 3eccentric lower, x 5 reps  Exercise 3: hooklying butterflys in supine 3 hold x 5 reps x 2 sets  Exercise 4: sit to stand to sit x5 reps - for HEP 5 reps, 3 times a day                           Therapeutic Activities: (CPT 70369) Treatment Reasoning     Education of HEP, goals, and gait provided. All pt questions answered and plan discussed. Pt agreeable to plan    initiate POC                      Therapy Time  Individual Time In:  1200       Individual Time Out:  105  Minutes:     65     Therapist Signature: Greg De Anda, PT    Date: 3/49/2662     I certify that the above Therapy Services are being furnished while the patient is under my care. I agree with the treatment plan and certify that this therapy is necessary. [de-identified] Signature:  ___________________________   Date:_______                                                                   Mildred Lanier MD        Physician Comments: _______________________________________________    Please sign and return to [facility]. Please fax to the location listed below.  [place] [address] for this referral!    [facility]  [address] [place] [address] [address] [place] [address]  Dept: [phone]  Dept Fax: [phone] : [phone]       POC NOTE

## 2022-05-12 ENCOUNTER — HOSPITAL ENCOUNTER (OUTPATIENT)
Dept: PHYSICAL THERAPY | Age: 42
Setting detail: THERAPIES SERIES
Discharge: HOME | End: 2022-05-12
Payer: COMMERCIAL

## 2022-05-12 PROCEDURE — 97140 MANUAL THERAPY 1/> REGIONS: CPT

## 2022-05-12 PROCEDURE — G0283 ELEC STIM OTHER THAN WOUND: HCPCS

## 2022-05-12 PROCEDURE — 97110 THERAPEUTIC EXERCISES: CPT

## 2022-05-12 ASSESSMENT — PAIN DESCRIPTION - ORIENTATION: ORIENTATION: LEFT

## 2022-05-12 ASSESSMENT — PAIN SCALES - GENERAL: PAINLEVEL_OUTOF10: 8

## 2022-05-12 ASSESSMENT — PAIN DESCRIPTION - LOCATION: LOCATION: LEG;HIP;BACK

## 2022-05-12 NOTE — PROGRESS NOTES
Physical Therapy  Physical Therapy: Daily Note   Patient: Demi Tabares (84 y.o. female)   Examination Date:   Plan of Care/Certification Expiration Date: 06/10/22    No data recorded   :  1980 # of Visits since [facility]:   2   MRN: [id number]  CSN: [id number] Start of Care Date:   5/10/2022   Insurance: Payor: Ashlyn Reid / Plan: Sulma Dunbar / Product Type: *No Product type* /   Insurance ID: [id number] - (Medicare Managed) Secondary Insurance (if applicable):    Referring Physician: MD Dr Michael Dickinson   PCP: Michael Farias MD Visits to Date/Visits Approved: 2 / 10    No Show/Cancelled Appts: 0 / 0     Medical Diagnosis: Pain in right hip [M25.551]  Pain in left hip [M25.552] B hip pain with weakness and PMH of MS  Treatment Diagnosis: B hip pain with difficulty walking, complicated by MS        SUBJECTIVE EXAMINATION   Pain Level: Pain Screening  Patient Currently in Pain: Yes  Pain Assessment: 0-10  Pain Level: 8  Pain Location: Leg,Hip,Back  Pain Orientation: Left    Patient Comments: Subjective: Pt reports back and hip pain this date which was intensified after her recent fall in the bathroom. OBJECTIVE EXAMINATION   Restrictions:  No data recorded No data recorded No data recorded      TREATMENT     Exercises:      Treatment Reasoning    Exercise 1: seated trunk flexion stretch; H10'' x 5  Exercise 2: seated LAQ; H3'' 2 x 10 2#  Exercise 4: sit to stand to sit x5 reps - for HEP 5 reps, 3 times a day  Exercise 5: seated hip ABD; H3'' x 20 RTB  Exercise 6: seated HS curls; H3'' 2 x 10 RTB  Exercise 7: seated marches; RTB H3'' x 10                           Manual Therapy: (CPT 71282) Treatment Reasoning     Soft Tissue Mobilizaton: MFR to lumbar/thoracic area to inc tolerance to ther ex.    KT tape I strip at B hips with distal pull down IT band 25% tension for nerve pain relief                       Modalities  Electric Stimulation, unattended:  (CPT 02471) /   Treatment Reasoning    · Patient Position: Seated  · E-stim location: Hip,Low back  · E-stim type: Interferential (IFC)  · E-stim via: 4 Electrode Pads  · E-stim Parameters: 50 intensity x 15 min for pain relief                           Pt Education: Plan Comment: KT tape trial on forearm 5/10/22 for possible use on hips for pain management and support- TBD       ASSESSMENT     Assessment: Assessment: Pt progressing with strengthening tolerating seated ther ex focusing on unsupported tall posture. Pt often having bouncing of LE's during ther ex but able to perform with inc ROM. Performed MFR to lumbar/thoracic area to dec pain and inc tolerance to ther ex. Donned estim with B hips and low back post for further pain relief. Pain level 6/10 post. Continue with POC. Post-Treatment Pain Level: Activity Tolerance: Patient limited by fatigue; Patient limited by pain; Patient tolerated treatment well    No data recorded     GOALS   Patient goals : \"I want to be able to have my hips be less painful and stronger so I can wealk better and safer. \"  Short Term Goals Completed by 1-2 weeks Current Status Goal Status   Pt reporting HEP at least 4/7 days to increase hip strenght and in turn imrpove3 funciotnal transfers any amount.        Pt reporting B hip pain at 4/10 or less at least 25% of day so able to ambulate better       Ambulate >= 150ft with Rollator and some increase knee flex for swing phase on right so that RLE doesn't \"feel as tall\" and for less overuse of B hips with exagerrated gait with over trunk accesorry movment o right in R shtance when advancing LLE       Assess one flight of stairs and record status if pain down enough for trial.       sit to stand to sit x 10 reps in <= 30 seconds to show inc strength, function, and endurance B hips         Long Term Goals Completed by 4-5 weeks Current Status Goal Status   Increase seated B hip strength to >= 4+/5 to have better strength wtih walking and less falls per pt report       B Hip pain <=3/10 for at least 50% of day so pt can do more daily tasks at home and be less fatigued and painful with gait         Amb>=440ft with Rollator with better gait, better knee flex R swing phase, increased hip and core control and pain <= 3-4/10 post     modified LEFS <= 40% disability form 60% disabiltiy at eval 5/10;/22, to demonstrate overall dec pain and inc funciotnal mobility       Pt competent advanced HEP to gain hip strenght and core control for better gait and overall function.             TREATMENT PLAN   Plan Frequency: 2x week  Plan weeks: 4-5 weeks  Current Treatment Recommendations: Strengthening,Balance training,Functional mobility training,Transfer training,Endurance training,Gait training,Stair training,Manual Therapy - Soft Tissue Mobilization,Home exercise program,Safety education & training,Patient/Caregiver education & training,Equipment evaluation, education, & procurement,Modalities   Plan Comment: KT tape trial on forearm 5/10/22 for possible use on hips for pain management and support- TBD       Therapy Time  Individual Time In:       0908  Individual Time Out:  1330  Minutes:  60        Electronically signed by Ariane Sanders PTA  on 5/12/2022 at 1:27 PM   POC NOTE

## 2022-05-17 ENCOUNTER — HOSPITAL ENCOUNTER (OUTPATIENT)
Dept: PHYSICAL THERAPY | Age: 42
Setting detail: THERAPIES SERIES
Discharge: HOME | End: 2022-05-17
Payer: COMMERCIAL

## 2022-05-17 PROCEDURE — 97140 MANUAL THERAPY 1/> REGIONS: CPT

## 2022-05-17 PROCEDURE — 97110 THERAPEUTIC EXERCISES: CPT

## 2022-05-17 PROCEDURE — G0283 ELEC STIM OTHER THAN WOUND: HCPCS

## 2022-05-17 ASSESSMENT — PAIN SCALES - GENERAL: PAINLEVEL_OUTOF10: 6

## 2022-05-17 ASSESSMENT — PAIN DESCRIPTION - PAIN TYPE: TYPE: CHRONIC PAIN

## 2022-05-17 ASSESSMENT — PAIN DESCRIPTION - FREQUENCY: FREQUENCY: CONTINUOUS

## 2022-05-17 ASSESSMENT — PAIN DESCRIPTION - DESCRIPTORS: DESCRIPTORS: ACHING;SORE;SHOOTING

## 2022-05-17 ASSESSMENT — PAIN DESCRIPTION - LOCATION: LOCATION: HIP;BACK;LEG

## 2022-05-17 NOTE — PROGRESS NOTES
Joint Mobilization: R and L long distraction with oscilations for pain releif.                       Modalities  Electric Stimulation, unattended:  (CPT 22 852871) / [id number]  Treatment Reasoning    · Patient Position: Seated  · E-stim location: Hip,Low back  · E-stim type: Interferential (IFC)  · E-stim via: 4 Electrode Pads  · E-stim Parameters: 38 intensity x 15 min for pain relief                           Pt Education:         ASSESSMENT     Assessment: Assessment: Continued to focus on strengthening from a tall seated position unsupported to engage core and posture. Less bounsing with therex from seated position. Issued seated therex to HEP. R and L long leg distraction with good releif. Cont with estim to hips and back for pain reduction. Deffered KT tape will see if gave support. Will rapply Friday if needed. Body Structures, Functions, Activity Limitations Requiring Skilled Therapeutic Intervention: Decreased functional mobility ,Decreased strength,Decreased endurance,Decreased balance,Decreased ADL status,Decreased body mechanics    Post-Treatment Pain Level: Activity Tolerance: Patient limited by fatigue; Patient limited by pain; Patient tolerated treatment well    No data recorded     GOALS   Patient goals : \"I want to be able to have my hips be less painful and stronger so I can wealk better and safer. \"  Short Term Goals Completed by 1-2 weeks Current Status Goal Status   Pt reporting HEP at least 4/7 days to increase hip strenght and in turn imrpove3 funciotnal transfers any amount.        Pt reporting B hip pain at 4/10 or less at least 25% of day so able to ambulate better       Ambulate >= 150ft with Rollator and some increase knee flex for swing phase on right so that RLE doesn't \"feel as tall\" and for less overuse of B hips with exagerrated gait with over trunk accesorry movment o right in R shtance when advancing LLE       Assess one flight of stairs and record status if pain down enough for trial.       sit to stand to sit x 10 reps in <= 30 seconds to show inc strength, function, and endurance B hips         Long Term Goals Completed by 4-5 weeks Current Status Goal Status   Increase seated B hip strength to >= 4+/5 to have better strength wtih walking and less falls per pt report       B Hip pain <=3/10 for at least 50% of day so pt can do more daily tasks at home and be less fatigued and painful with gait         Amb>=440ft with Rollator with better gait, better knee flex R swing phase, increased hip and core control and pain <= 3-4/10 post     modified LEFS <= 40% disability form 60% disabiltiy at eval 5/10;/22, to demonstrate overall dec pain and inc funciotnal mobility       Pt competent advanced HEP to gain hip strenght and core control for better gait and overall function.             TREATMENT PLAN   Plan Frequency: 2x week  Plan weeks: 4-5 weeks  Current Treatment Recommendations: Strengthening,Balance training,Functional mobility training,Transfer training,Endurance training,Gait training,Stair training,Manual Therapy - Soft Tissue Mobilization,Home exercise program,Safety education & training,Patient/Caregiver education & training,Equipment evaluation, education, & procurement,Modalities           Therapy Time  Individual Time In:    4441     Individual Time Out:  9233  Minutes:  60        Electronically signed by Isatu Cortes PTA  on 5/88/0975 at 12:32 PM   [address] [facility]

## 2022-05-20 ENCOUNTER — HOSPITAL ENCOUNTER (OUTPATIENT)
Dept: PHYSICAL THERAPY | Age: 42
Setting detail: THERAPIES SERIES
Discharge: HOME | End: 2022-05-20
Payer: COMMERCIAL

## 2022-05-20 PROCEDURE — 97110 THERAPEUTIC EXERCISES: CPT

## 2022-05-20 PROCEDURE — G0283 ELEC STIM OTHER THAN WOUND: HCPCS

## 2022-05-20 PROCEDURE — 97530 THERAPEUTIC ACTIVITIES: CPT

## 2022-05-20 ASSESSMENT — PAIN SCALES - GENERAL: PAINLEVEL_OUTOF10: 6

## 2022-05-20 NOTE — PROGRESS NOTES
Physical Therapy: Daily Note   Patient: Danica Livingston (89 y.o. female)   Examination Date:   Plan of Care/Certification Expiration Date: 06/10/22    No data recorded   :  1980 # of Visits since [facility]:   4   MRN: [id number]  CSN: [id number] Start of Care Date:   5/10/2022   Insurance: Payor: Arron Thomas / Plan: Elvie Dancer / Product Type: *No Product type* /   Insurance ID: [id number] - (Medicare Managed) Secondary Insurance (if applicable):    Referring Physician: MD Dr Carlos Ludwig   PCP: Carlos Haley MD Visits to Date/Visits Approved: 4 / 10    No Show/Cancelled Appts: 0 / 0     Medical Diagnosis: Pain in right hip [M25.551]  Pain in left hip [M25.552] B hip pain with weakness and PMH of MS  Treatment Diagnosis: B hip pain with difficulty walking, complicated by MS        SUBJECTIVE EXAMINATION   Pain Level: Pain Screening  Pain Assessment: 0-10  Pain Level: 6  Best Pain Level: 4 (past week 22)  Worst Pain Level: 9 (past week 22)    Patient Comments: Subjective: Pt states pain a little more stable, but needs to get stronger. Today L shoulder a little sore- so defers trying physioball for core.     HEP Compliance: Good        OBJECTIVE EXAMINATION   Restrictions:  No data recorded No data recorded No data recorded          TREATMENT     Exercises:  Therapeutic exercise (CPT 59331)   Treatment Reasoning    Exercise 1: seated trunk flexion stretch; H10'' x 5  Exercise 2: seated LAQ; H5'' 2 x 10 2.5#  Exercise 3: hooklying butterflys in supine 5 hold x 5 reps x 2 sets  Exercise 4: sit to stand no UE support x 5 reps completed in 22.5\"; x 5 more reps in 20.4 sec  Exercise 5: seated hip ABD/ ; H3'' x 20 RTB  Exercise 6: seated HS curls; H3'' 1 x 10 RTB  Exercise 7: seated marches 2.5 # H3\" x 1, alternating  Exercise 8: tall sit scapular retractions 5 hold x 10 reps for core and shoulder stability/strength  Exercise 9: bridging 5 hold,  2x 5 reps  without rest    Limitations addressed: Mobility,Strength,Posture,Pain modulation  Therapist provided: Assistance,Verbal cuing,Manual cuing  Progressed: Resistance                     Therapeutic Activities: (CPT 67481) Treatment Reasoning     Education on listening to symptoms and pain, smaller sets, shorter holds if needed. Discussed energy conservation at home, significant other and teenage son assist pt PRN                      Manual Therapy: (CPT 88187) Treatment Reasoning     Other: KT tape 25% tension I strip IT band B proximal to distal                      Modalities  Electric Stimulation, unattended:  (CPT 67514) /   Treatment Reasoning                             Pt Education: PT Education: Alej[facility],Home Exercise Program,Transfer Training,Gait Training  Plan Comment: KT tape max 5 days       ASSESSMENT     Assessment: Assessment: P kit more difficulty with L shoulder pain and shakiness this date, yet was able to advance to 2.5# wts, holds, etc Pt requests KT tape as helped with hip discomfort. Was removed lst Tuesday. Pt making progress with easier in and out of chair for transfers per observation and pt report. Recommend continue 2x week per plan. Need to try stairs at least partial next visit. Body Structures, Functions, Activity Limitations Requiring Skilled Therapeutic Intervention: Decreased functional mobility ,Decreased strength,Decreased endurance,Decreased balance,Decreased ADL status,Decreased body mechanics    Post-Treatment Pain Level: Activity Tolerance: Patient limited by fatigue; Patient limited by pain; Patient tolerated treatment well    No data recorded     GOALS   Patient goals : \"I want to be able to have my hips be less painful and stronger so I can wealk better and safer. \"  Short Term Goals Completed by 1-2 weeks Current Status Goal Status   Pt reporting HEP at least 4/7 days to increase hip strenght and in turn imrpove3 funciotnal transfers any amount. Pt reporting B hip pain at 4/10 or less at least 25% of day so able to ambulate better       Ambulate >= 150ft with Rollator and some increase knee flex for swing phase on right so that RLE doesn't \"feel as tall\" and for less overuse of B hips with exagerrated gait with over trunk accesorry movment o right in R shtance when advancing LLE       Assess one flight of stairs and record status if pain down enough for trial.       sit to stand to sit x 10 reps in <= 30 seconds to show inc strength, function, and endurance B hips         Long Term Goals Completed by 4-5 weeks Current Status Goal Status   Increase seated B hip strength to >= 4+/5 to have better strength wtih walking and less falls per pt report       B Hip pain <=3/10 for at least 50% of day so pt can do more daily tasks at home and be less fatigued and painful with gait         Amb>=440ft with Rollator with better gait, better knee flex R swing phase, increased hip and core control and pain <= 3-4/10 post     modified LEFS <= 40% disability form 60% disabiltiy at eval 5/10;/22, to demonstrate overall dec pain and inc funciotnal mobility       Pt competent advanced HEP to gain hip strenght and core control for better gait and overall function.             TREATMENT PLAN   Plan Frequency: 2x week  Plan weeks: 4-5 weeks  Current Treatment Recommendations: Strengthening,Balance training,Functional mobility training,Transfer training,Endurance training,Gait training,Stair training,Manual Therapy - Soft Tissue Mobilization,Home exercise program,Safety education & training,Patient/Caregiver education & training,Equipment evaluation, education, & procurement,Modalities   Plan Comment: KT tape max 5 days       Therapy Time  Individual Time In:     5887    Individual Time Out:  5051  Minutes:  60        Electronically signed by Yony Gee PT  on 5/20/2022 at 12:45 PM   POC NOTE

## 2022-05-23 ENCOUNTER — HOSPITAL ENCOUNTER (OUTPATIENT)
Dept: PHYSICAL THERAPY | Age: 42
Setting detail: THERAPIES SERIES
Discharge: HOME | End: 2022-05-23
Payer: COMMERCIAL

## 2022-05-23 PROCEDURE — 97116 GAIT TRAINING THERAPY: CPT

## 2022-05-23 PROCEDURE — 97110 THERAPEUTIC EXERCISES: CPT

## 2022-05-23 NOTE — PROGRESS NOTES
Physical Therapy  Physical Therapy: Daily Note   Patient: Melvi Clifford (53 y.o. female)   Examination Date:   Plan of Care/Certification Expiration Date: 06/10/22    No data recorded   :  1980 # of Visits since [facility]:   5   MRN: [id number]  CSN: [id number] Start of Care Date:   5/10/2022   Insurance: Payor: Carina Moran / Plan: [employer] / Product Type: *No Product type* /   Insurance ID: [id number] - (Medicare Managed) Secondary Insurance (if applicable):    Referring Physician: MD Dr Mary Mckinney   PCP: Mary Nicholas MD Visits to Date/Visits Approved:   / 10    No Show/Cancelled Appts: 0 / 0     Medical Diagnosis: Pain in right hip [M25.551]  Pain in left hip [M25.552] B hip pain with weakness and PMH of MS  Treatment Diagnosis: B hip pain with difficulty walking, complicated by MS        SUBJECTIVE EXAMINATION   Pain Level:      Patient Comments: Subjective: Pt. reports pain and strength varies day by day. Pt. reports pain in hips are about 5/10 more in L than R. I have trunk weakness and stiffness everyday. HEP Compliance: Good        OBJECTIVE EXAMINATION   Restrictions:  No data recorded No data recorded No data recorded              TREATMENT     Exercises:      Treatment Reasoning    Exercise 1: seated trunk flexion stretch; H10'' x 5, and lateral rotation trunk flexion stretch x 5 10 sec hold. Exercise 2: seated LAQ; H5'' 2 x 10 2.5#  Exercise 3: hooklying butterflys in supine 5 hold x 5 reps x 2 sets  Exercise 4: sit to stand no UE support x 5 reps. 7 reps timed with completion of 7 reps in 30 seconds. Exercise 5: Supine hip abduction in hooklying YTB x 20', Focus on increasing ROM strengthening due to decrease ROM with RTB. Exercise 6: supine heelslides x10' alternating. Exercise 7: supine SLR B LE x 10 each slow and controlled.   Exercise 8: tall sit scapular retractions 5 hold x 10 reps for core and shoulder stability/strength  Exercise 9: bridging 5 hold,  2x 5 reps  without rest     Limitations addressed: Mobility,Strength,Posture,Pain modulation   Therapist provided: Assistance,Verbal cuing,Manual cuing   Progressed: Resistance                     Gait: (CPT [phone])  Treatment Reasoning     Ambulated pt. With pushing w/c 440' modified Independent. VC's with slight improve controlled with rigid LE during deceleration -> acceleration. Slight decrease L midstance > R LE. Decrease controlled hip and knee ROM. Pt. Steady and tolerated well.                      Manual Therapy: (CPT 24894) Treatment Reasoning     Joint Mobilization: R and L long distraction with oscilations for pain releif. Other: Manual LE scouring at hips, PROM hamstring and IT band stretches. Pt. demo's more tightness with R hip > L hip ROM.                         Pt Education: HEP       ASSESSMENT     Assessment: Assessment: Pt. tolerated intervention well with completing each ther ex to assist with strengthening in seated and supine against gravity. Pt. reports medium difficulty; however, able to complete each tsk with appropriate reps. Pt. demo's rigid ROM more with hip ther ex. Pt. able to ambulate distance in hospital demonstrating degcrease control at hips and knees and able to improve controlled with cues to concentrate. Body Structures, Functions, Activity Limitations Requiring Skilled Therapeutic Intervention: Decreased functional mobility ,Decreased strength,Decreased endurance,Decreased balance,Decreased ADL status,Decreased body mechanics    Post-Treatment Pain Level: Activity Tolerance: Patient limited by fatigue; Patient limited by pain; Patient tolerated treatment well    No data recorded     GOALS   Patient goals : \"I want to be able to have my hips be less painful and stronger so I can wealk better and safer. \"  Short Term Goals Completed by 1-2 weeks Current Status Goal Status   Pt reporting HEP at least 4/7 days to increase hip strenght and in turn imrpove3 funciotnal transfers any amount. Pt reporting B hip pain at 4/10 or less at least 25% of day so able to ambulate better       Ambulate >= 150ft with Rollator and some increase knee flex for swing phase on right so that RLE doesn't \"feel as tall\" and for less overuse of B hips with exagerrated gait with over trunk accesorry movment o right in R shtance when advancing LLE       Assess one flight of stairs and record status if pain down enough for trial.       sit to stand to sit x 10 reps in <= 30 seconds to show inc strength, function, and endurance B hips         Long Term Goals Completed by 4-5 weeks Current Status Goal Status   Increase seated B hip strength to >= 4+/5 to have better strength wtih walking and less falls per pt report       B Hip pain <=3/10 for at least 50% of day so pt can do more daily tasks at home and be less fatigued and painful with gait         Amb>=440ft with Rollator with better gait, better knee flex R swing phase, increased hip and core control and pain <= 3-4/10 post     modified LEFS <= 40% disability form 60% disabiltiy at eval 5/10;/22, to demonstrate overall dec pain and inc funciotnal mobility       Pt competent advanced HEP to gain hip strenght and core control for better gait and overall function.             TREATMENT PLAN   Plan Frequency: 2x week  Plan weeks: 4-5 weeks  Current Treatment Recommendations: Strengthening,Balance training,Functional mobility training,Transfer training,Endurance training,Gait training,Stair training,Manual Therapy - Soft Tissue Mobilization,Home exercise program,Safety education & training,Patient/Caregiver education & training,Equipment evaluation, education, & procurement,Modalities   Plan Comment: KT tape max 5 days       Therapy Time  Individual Time In:   215      Individual Time Out:  619  Minutes:  50        Electronically signed by Brian Jon PTA  on 5/23/2022 at 4:09 PM   POC NOTE

## 2022-05-24 ENCOUNTER — OFFICE VISIT (OUTPATIENT)
Dept: FAMILY MEDICINE CLINIC | Age: 42
End: 2022-05-24
Payer: COMMERCIAL

## 2022-05-24 VITALS
HEIGHT: 58 IN | SYSTOLIC BLOOD PRESSURE: 114 MMHG | OXYGEN SATURATION: 96 % | RESPIRATION RATE: 13 BRPM | DIASTOLIC BLOOD PRESSURE: 72 MMHG | BODY MASS INDEX: 40.96 KG/M2 | TEMPERATURE: 97.7 F | HEART RATE: 85 BPM

## 2022-05-24 DIAGNOSIS — G56.03 CARPAL TUNNEL SYNDROME ON BOTH SIDES: Primary | ICD-10-CM

## 2022-05-24 PROCEDURE — 4004F PT TOBACCO SCREEN RCVD TLK: CPT | Performed by: FAMILY MEDICINE

## 2022-05-24 PROCEDURE — G8427 DOCREV CUR MEDS BY ELIG CLIN: HCPCS | Performed by: FAMILY MEDICINE

## 2022-05-24 PROCEDURE — 99213 OFFICE O/P EST LOW 20 MIN: CPT | Performed by: FAMILY MEDICINE

## 2022-05-24 PROCEDURE — G8417 CALC BMI ABV UP PARAM F/U: HCPCS | Performed by: FAMILY MEDICINE

## 2022-05-24 RX ORDER — ETODOLAC 400 MG/1
TABLET, FILM COATED ORAL
Qty: 60 TABLET | Refills: 10 | Status: SHIPPED | OUTPATIENT
Start: 2022-05-24 | End: 2022-09-16

## 2022-05-24 ASSESSMENT — ENCOUNTER SYMPTOMS
EYES NEGATIVE: 1
GASTROINTESTINAL NEGATIVE: 1
COUGH: 0
CHEST TIGHTNESS: 0
RESPIRATORY NEGATIVE: 1
RHINORRHEA: 0

## 2022-05-24 NOTE — PROGRESS NOTES
Patient is seen in follow up for   Chief Complaint   Patient presents with    1 Month Follow-Up    Hand Pain     bilat     HPIhere for follow up on hip and hand pain  . Pt is helping. She was in occupational therapy. No past medical history on file.   Patient Active Problem List    Diagnosis Date Noted    Bite, animal 2020    Left foot pain 2020    Deficient knowledge 2020    Dog bite of wrist 2020    Impaired mobility 2020    Myalgia 2020    Hypokalemia 10/02/2018    Nicotine dependence, unspecified, uncomplicated     Overweight 10/02/2018    Ataxic gait 2018    Body mass index (bmi) 39.0-39.9, adult 2018    Muscle weakness (generalized) 2018    Neuromuscular dysfunction of bladder, unspecified 2018    Obesity, unspecified 2018    Paresthesia of skin 2018    Vitamin D deficiency 2018    Neuropathy 2016    Anxiety and depression 2016    MS (multiple sclerosis) ([facility] 2015     Past Surgical History:   Procedure Laterality Date    APPENDECTOMY      BREAST SURGERY      lump removal bengin     SECTION      x 3     Family History   Problem Relation Age of Onset    Diabetes Mother     Heart Disease Father      Social History     Socioeconomic History    Marital status:      Spouse name: None    Number of children: None    Years of education: None    Highest education level: None   Occupational History    None   Tobacco Use    Smoking status: Current Every Day Smoker    Smokeless tobacco: Never Used   Substance and Sexual Activity    Alcohol use: No     Alcohol/week: 0.0 standard drinks    Drug use: No    Sexual activity: None   Other Topics Concern    None   Social History Narrative    None     Social Determinants of Health     Financial Resource Strain: Low Risk     Difficulty of Paying Living Expenses: Not hard at all   Food Insecurity: No Food Insecurity    Worried About [facility]] in the Last Year: Never true    Sommer of Food in the Last Year: Never true   Transportation Needs:     Lack of Transportation (Medical): Not on file    Lack of Transportation (Non-Medical): Not on file   Physical Activity:     Days of Exercise per Week: Not on file    Minutes of Exercise per Session: Not on file   Stress:     Feeling of Stress : Not on file   Social Connections:     Frequency of Communication with Friends and Family: Not on file    Frequency of Social Gatherings with Friends and Family: Not on file    Attends Yarsani Services: Not on file    Active Member of Clubs or Organizations: Not on file    Attends Club or Organization Meetings: Not on file    Marital Status: Not on file   Intimate Partner Violence:     Fear of Current or Ex-Partner: Not on file    Emotionally Abused: Not on file    Physically Abused: Not on file    Sexually Abused: Not on file   Housing Stability:     Unable to Pay for Housing in the Last Year: Not on file    Number of Places Lived in the Last Year: Not on file    Unstable Housing in the Last Year: Not on file     Current Outpatient Medications   Medication Sig Dispense Refill    etodolac (LODINE) 400 MG tablet TAKE 1 TABLET BY MOUTH TWICE DAILY 60 tablet 10    LORazepam (ATIVAN) 0.5 MG tablet TAKE 1 TABLET BY MOUTH EVERY 8 HOURS AS NEEDED FOR ANIXETY FOR UP TO 90 DAYS 60 tablet 2    baclofen (LIORESAL) 10 MG tablet TAKE 1 TABLET BY MOUTH FOUR TIMES DAILY 120 tablet 10    D3 SUPER STRENGTH 50 MCG (2000 UT) CAPS TAKE (1) CAPSULE BY MOUTH ONCE DAILY 30 capsule 10    gabapentin (NEURONTIN) 600 MG tablet Take 1 tablet by mouth 3 times daily for 180 days. 90 tablet 5    AUBAGIO 14 MG TABS TAKE 1 TABLET BY MOUTH 1 TIME A DAY.  30 tablet 2    escitalopram (LEXAPRO) 20 MG tablet TAKE (1) TABLET BY MOUTH DAILY 30 tablet 10    Compression Stockings MISC by Does not apply route Knee high medium compression 20-30 cc disp three pairs. 1 each 0    Handicap Placard MISC by Does not apply route 1 each 0    cyclobenzaprine (FLEXERIL) 5 MG tablet TK 1 T PO BID  0    levonorgestrel (MIRENA) 20 MCG/24HR IUD 1 each by Intrauterine route       Current Facility-Administered Medications   Medication Dose Route Frequency Provider Last Rate Last Admin    levonorgestrel (MIRENA) IUD 52 mg 1 each  1 each IntraUTERine Once Boyd Night, DO   1 each at 10/27/21 1510     Current Outpatient Medications on File Prior to Visit   Medication Sig Dispense Refill    LORazepam (ATIVAN) 0.5 MG tablet TAKE 1 TABLET BY MOUTH EVERY 8 HOURS AS NEEDED FOR ANIXETY FOR UP TO 90 DAYS 60 tablet 2    baclofen (LIORESAL) 10 MG tablet TAKE 1 TABLET BY MOUTH FOUR TIMES DAILY 120 tablet 10    D3 SUPER STRENGTH 50 MCG (2000 UT) CAPS TAKE (1) CAPSULE BY MOUTH ONCE DAILY 30 capsule 10    gabapentin (NEURONTIN) 600 MG tablet Take 1 tablet by mouth 3 times daily for 180 days. 90 tablet 5    AUBAGIO 14 MG TABS TAKE 1 TABLET BY MOUTH 1 TIME A DAY. 30 tablet 2    escitalopram (LEXAPRO) 20 MG tablet TAKE (1) TABLET BY MOUTH DAILY 30 tablet 10    Compression Stockings MISC by Does not apply route Knee high medium compression 20-30 cc disp three pairs.  1 each 0    Handicap Placard MISC by Does not apply route 1 each 0    cyclobenzaprine (FLEXERIL) 5 MG tablet TK 1 T PO BID  0    levonorgestrel (MIRENA) 20 MCG/24HR IUD 1 each by Intrauterine route       Current Facility-Administered Medications on File Prior to Visit   Medication Dose Route Frequency Provider Last Rate Last Admin    levonorgestrel (MIRENA) IUD 52 mg 1 each  1 each IntraUTERine Once Boyd Night, DO   1 each at 10/27/21 1510     No Known Allergies  Health Maintenance   Topic Date Due    Varicella vaccine (1 of 2 - 2-dose childhood series) Never done    COVID-19 Vaccine (1) Never done    Pneumococcal 0-64 years Vaccine (1 - PCV) Never done    Hepatitis C screen  Never done    Lipids 06/12/2020    HIV screen  08/15/2026 (Originally 4/7/1995)    Flu vaccine (Season Ended) 09/01/2022    A1C test (Diabetic or Prediabetic)  09/07/2022    Depression Monitoring  09/07/2022    Cervical cancer screen  09/08/2025    DTaP/Tdap/Td vaccine (4 - Td or Tdap) 12/12/2029    Hepatitis A vaccine  Aged Out    Hepatitis B vaccine  Aged Out    Hib vaccine  Aged Out    Meningococcal (ACWY) vaccine  Aged Out       Review of Systems     Review of Systems   Constitutional: Negative for activity change, appetite change, fatigue and fever. HENT: Negative for congestion and rhinorrhea. Eyes: Negative. Respiratory: Negative. Negative for cough and chest tightness. Cardiovascular: Negative. Gastrointestinal: Negative. Endocrine: Negative. Genitourinary: Negative. Musculoskeletal: Negative. Skin: Negative. Neurological: Negative for dizziness, light-headedness and numbness. Hematological: Negative. Psychiatric/Behavioral: Negative. Physical Exam  Vitals:    05/24/22 1021   BP: 114/72   Pulse: 85   Resp: 13   Temp: 97.7 °F (36.5 °C)   SpO2: 96%   Height: 4' 10\" (1.473 m)       Physical Exam  Constitutional:       Appearance: She is well-developed. HENT:      Right Ear: External ear normal.      Left Ear: External ear normal.   Eyes:      Pupils: Pupils are equal, round, and reactive to light. Neck:      Thyroid: No thyromegaly. Cardiovascular:      Rate and Rhythm: Normal rate and regular rhythm. Heart sounds: Normal heart sounds. No murmur heard. No friction rub. No gallop. Pulmonary:      Effort: Pulmonary effort is normal. No respiratory distress. Breath sounds: No wheezing or rales. Chest:      Chest wall: No tenderness. Abdominal:      General: Bowel sounds are normal. There is no distension. Palpations: Abdomen is soft. There is no mass. Tenderness: There is no abdominal tenderness. There is no guarding or rebound.    Musculoskeletal: General: Normal range of motion. Arms:       Cervical back: Normal range of motion and neck supple. Lymphadenopathy:      Cervical: No cervical adenopathy. Skin:     General: Skin is warm and dry. Neurological:      Mental Status: She is alert and oriented to person, place, and time. Cranial Nerves: No cranial nerve deficit. Coordination: Coordination normal.         Assessment   Diagnosis Orders   1. Carpal tunnel syndrome on both sides  EMG     Problem List     None          Plan  Orders Placed This Encounter   Procedures    EMG     Standing Status:   Future     Standing Expiration Date:   7/23/2022     Order Specific Question:   Which body part? Answer:   both upper extremities     Orders Placed This Encounter   Medications    etodolac (LODINE) 400 MG tablet     Sig: TAKE 1 TABLET BY MOUTH TWICE DAILY     Dispense:  60 tablet     Refill:  10     No follow-ups on file.   Ashlie Kurtz MD

## 2022-05-26 ENCOUNTER — HOSPITAL ENCOUNTER (OUTPATIENT)
Dept: PHYSICAL THERAPY | Age: 42
Setting detail: THERAPIES SERIES
Discharge: HOME | End: 2022-05-26
Payer: COMMERCIAL

## 2022-05-26 NOTE — PROGRESS NOTES
Physical Therapy  Physical Therapy: Daily Note   Patient: Su Turner (68 y.o. female)   Examination Date:   Plan of Care/Certification Expiration Date: 06/10/22    No data recorded   :  1980 # of Visits since [facility]:   5   MRN: [id number]  CSN: [id number] Start of Care Date:   5/10/2022   Insurance: Payor: Gerson Foss / Plan: Ever [employer] / Product Type: *No Product type* /   Insurance ID: [id number] - (Medicare Managed) Secondary Insurance (if applicable):    Referring Physician: MD Dr Ernesto Márquez   PCP: Ernesto Garcia MD Visits to Date/Visits Approved: 5 / 10    No Show/Cancelled Appts: 0      Medical Diagnosis: Pain in right hip [M25.551]  Pain in left hip [M25.552]    Treatment Diagnosis: B hip pain with difficulty walking, complicated by MS        SUBJECTIVE EXAMINATION   Pain Level:    Pt cx'd-eye issues  Patient Comments:            ASSESSMENT     Assessment:         Post-Treatment Pain Level: Activity Tolerance: Patient limited by fatigue; Patient limited by pain; Patient tolerated treatment well    No data recorded     GOALS   Patient goals : \"I want to be able to have my hips be less painful and stronger so I can wealk better and safer. \"  Short Term Goals Completed by 1-2 weeks Current Status Goal Status   Pt reporting HEP at least 4/7 days to increase hip strenght and in turn imrpove3 funciotnal transfers any amount.        Pt reporting B hip pain at 4/10 or less at least 25% of day so able to ambulate better       Ambulate >= 150ft with Rollator and some increase knee flex for swing phase on right so that RLE doesn't \"feel as tall\" and for less overuse of B hips with exagerrated gait with over trunk accesorry movment o right in R shtance when advancing LLE       Assess one flight of stairs and record status if pain down enough for trial.       sit to stand to sit x 10 reps in <= 30 seconds to show inc strength, function, and endurance B hips Long Term Goals Completed by 4-5 weeks Current Status Goal Status   Increase seated B hip strength to >= 4+/5 to have better strength wtih walking and less falls per pt report       B Hip pain <=3/10 for at least 50% of day so pt can do more daily tasks at home and be less fatigued and painful with gait         Amb>=440ft with Rollator with better gait, better knee flex R swing phase, increased hip and core control and pain <= 3-4/10 post     modified LEFS <= 40% disability form 60% disabiltiy at eval 5/10;/22, to demonstrate overall dec pain and inc funciotnal mobility       Pt competent advanced HEP to gain hip strenght and core control for better gait and overall function.             TREATMENT PLAN   Plan Frequency: 2x week  Plan weeks: 4-5 weeks  Current Treatment Recommendations: Strengthening,Balance training,Functional mobility training,Transfer training,Endurance training,Gait training,Stair training,Manual Therapy - Soft Tissue Mobilization,Home exercise program,Safety education & training,Patient/Caregiver education & training,Equipment evaluation, education, & procurement,Modalities   Plan Comment: KT tape max 5 days       Therapy Time  Individual Time In:         Individual Time Out:    Minutes:  0-cx'd        Electronically signed by Mechelle Izquierdo PTA  on 5/26/2022 at 12:07 PM   POC NOTE

## 2022-06-01 ENCOUNTER — HOSPITAL ENCOUNTER (OUTPATIENT)
Dept: NEUROLOGY | Age: 42
Discharge: HOME | End: 2022-06-01
Payer: COMMERCIAL

## 2022-06-01 DIAGNOSIS — G56.03 CARPAL TUNNEL SYNDROME ON BOTH SIDES: ICD-10-CM

## 2022-06-01 PROCEDURE — 95910 NRV CNDJ TEST 7-8 STUDIES: CPT

## 2022-06-01 PROCEDURE — 95886 MUSC TEST DONE W/N TEST COMP: CPT

## 2022-06-01 NOTE — PROCEDURES
[address]                      [address] [facility], [address] [facility]                             ELECTROMYOGRAM REPORT    PATIENT NAME: Rodri SOSA                      :        1980  MED REC NO:   [id number]                            ROOM:  ACCOUNT NO:   [de-identified]                           ADMIT DATE: 2022  PROVIDER:     Jasmine Salazar MD    DATE OF EM2022    REFERRING PROVIDER:  Josse Sood MD.    REASON FOR STUDY:  The patient was having numbness and aching in the  hands. FINDINGS:  Motor nerve conduction velocities and F-wave latencies are  normal in all the nerves tested. Distal motor latencies are normal in the ulnar nerves, borderline in the  left median nerve, and delayed in the right median nerve. Distal sensory latencies are normal in the ulnar nerves, but delayed in  the median nerves, being worse on the left side. On concentric needle electrode examination, mild denervation changes are  present in the abductor pollicis brevis muscles bilaterally. CLINICAL INTERPRETATION:  EMG studies are showing changes of  mild-to-moderate bilateral median nerve compression neuropathy at the  wrists consistent with diagnosis of mild-to-moderate bilateral carpal  tunnel syndrome. The patient is being tried on conservative management. Hand therapy, wrist splints, and analgesics may help the patient. If  her symptoms continue or worsen, she will need decompression of the  median nerves. If clinically indicated, we could repeat the study in a year. Thank you Dr. Eric Ernandez for allowing me to see this patient. Please feel  free to call me if I can be of any further assistance regarding this  patient's evaluation.         Pat Dunn MD    D: 2022 15:53:42       T: 2022 15:57:19     [email or web address]_  Job#: [id number]     Doc#: [id number]    CC:

## 2022-06-13 ENCOUNTER — OFFICE VISIT (OUTPATIENT)
Dept: FAMILY MEDICINE CLINIC | Age: 42
End: 2022-06-13
Payer: COMMERCIAL

## 2022-06-13 VITALS
HEIGHT: 58 IN | BODY MASS INDEX: 40.96 KG/M2 | RESPIRATION RATE: 14 BRPM | DIASTOLIC BLOOD PRESSURE: 78 MMHG | HEART RATE: 85 BPM | SYSTOLIC BLOOD PRESSURE: 130 MMHG | TEMPERATURE: 97.7 F | OXYGEN SATURATION: 96 %

## 2022-06-13 DIAGNOSIS — G56.03 CARPAL TUNNEL SYNDROME ON BOTH SIDES: Primary | ICD-10-CM

## 2022-06-13 PROCEDURE — 99213 OFFICE O/P EST LOW 20 MIN: CPT | Performed by: FAMILY MEDICINE

## 2022-06-13 PROCEDURE — 4004F PT TOBACCO SCREEN RCVD TLK: CPT | Performed by: FAMILY MEDICINE

## 2022-06-13 PROCEDURE — G8417 CALC BMI ABV UP PARAM F/U: HCPCS | Performed by: FAMILY MEDICINE

## 2022-06-13 PROCEDURE — G8427 DOCREV CUR MEDS BY ELIG CLIN: HCPCS | Performed by: FAMILY MEDICINE

## 2022-06-13 RX ORDER — BACLOFEN 10 MG/1
TABLET ORAL
Qty: 120 TABLET | Refills: 10 | Status: SHIPPED | OUTPATIENT
Start: 2022-06-13

## 2022-06-13 ASSESSMENT — PATIENT HEALTH QUESTIONNAIRE - PHQ9
1. LITTLE INTEREST OR PLEASURE IN DOING THINGS: 0
2. FEELING DOWN, DEPRESSED OR HOPELESS: 0
SUM OF ALL RESPONSES TO PHQ QUESTIONS 1-9: 0
SUM OF ALL RESPONSES TO PHQ QUESTIONS 1-9: 0
4. FEELING TIRED OR HAVING LITTLE ENERGY: 0
SUM OF ALL RESPONSES TO PHQ QUESTIONS 1-9: 0
SUM OF ALL RESPONSES TO PHQ QUESTIONS 1-9: 0
SUM OF ALL RESPONSES TO PHQ9 QUESTIONS 1 & 2: 0
SUM OF ALL RESPONSES TO PHQ QUESTIONS 1-9: 0
SUM OF ALL RESPONSES TO PHQ9 QUESTIONS 1 & 2: 0
3. TROUBLE FALLING OR STAYING ASLEEP: 0
7. TROUBLE CONCENTRATING ON THINGS, SUCH AS READING THE NEWSPAPER OR WATCHING TELEVISION: 0
SUM OF ALL RESPONSES TO PHQ QUESTIONS 1-9: 0
SUM OF ALL RESPONSES TO PHQ QUESTIONS 1-9: 0
1. LITTLE INTEREST OR PLEASURE IN DOING THINGS: 0
6. FEELING BAD ABOUT YOURSELF - OR THAT YOU ARE A FAILURE OR HAVE LET YOURSELF OR YOUR FAMILY DOWN: 0
8. MOVING OR SPEAKING SO SLOWLY THAT OTHER PEOPLE COULD HAVE NOTICED. OR THE OPPOSITE, BEING SO FIGETY OR RESTLESS THAT YOU HAVE BEEN MOVING AROUND A LOT MORE THAN USUAL: 0
SUM OF ALL RESPONSES TO PHQ QUESTIONS 1-9: 0
2. FEELING DOWN, DEPRESSED OR HOPELESS: 0
5. POOR APPETITE OR OVEREATING: 0
9. THOUGHTS THAT YOU WOULD BE BETTER OFF DEAD, OR OF HURTING YOURSELF: 0

## 2022-06-13 ASSESSMENT — ENCOUNTER SYMPTOMS
GASTROINTESTINAL NEGATIVE: 1
RHINORRHEA: 0
CHEST TIGHTNESS: 0
EYES NEGATIVE: 1
RESPIRATORY NEGATIVE: 1
COUGH: 0

## 2022-06-13 NOTE — PROGRESS NOTES
Patient is seen in follow up for   Chief Complaint   Patient presents with    Results     discuss emg     HPIemg shows mild carpal tunnel    No past medical history on file.   Patient Active Problem List    Diagnosis Date Noted    Bite, animal 2020    Left foot pain 2020    Deficient knowledge 2020    Dog bite of wrist 2020    Impaired mobility 2020    Myalgia 2020    Hypokalemia 10/02/2018    Nicotine dependence, unspecified, uncomplicated     Overweight 10/02/2018    Ataxic gait 2018    Body mass index (bmi) 39.0-39.9, adult 2018    Muscle weakness (generalized) 2018    Neuromuscular dysfunction of bladder, unspecified 2018    Obesity, unspecified 2018    Paresthesia of skin 2018    Vitamin D deficiency 2018    Neuropathy 2016    Anxiety and depression 2016    [facility]     Past Surgical History:   Procedure Laterality Date    APPENDECTOMY      BREAST SURGERY      lump removal bengin     SECTION      x 3     Family History   Problem Relation Age of Onset    Diabetes Mother     Heart Disease Father      Social History     Socioeconomic History    Marital status:      Spouse name: None    Number of children: None    Years of education: None    Highest education level: None   Occupational History    None   Tobacco Use    Smoking status: Current Every Day Smoker    Smokeless tobacco: Never Used   Substance and Sexual Activity    Alcohol use: No     Alcohol/week: 0.0 standard drinks    Drug use: No    Sexual activity: None   Other Topics Concern    None   Social History Narrative    None     Social Determinants of Health     Financial Resource Strain: Low Risk     Difficulty of Paying Living Expenses: Not hard at all   Food Insecurity: No Food Insecurity    Worried About Running Out of Food in the Last Year: Never true   [employer] of Food in the Last Year: Never true   Transportation Needs:     Lack of Transportation (Medical): Not on file    Lack of Transportation (Non-Medical): Not on file   Physical Activity:     Days of Exercise per Week: Not on file    Minutes of Exercise per Session: Not on file   Stress:     Feeling of Stress : Not on file   Social Connections:     Frequency of Communication with Friends and Family: Not on file    Frequency of Social Gatherings with Friends and Family: Not on file    Attends Rastafarian Services: Not on file    Active Member of [address] [place] [address] [employer] or Organizations: Not on file    Attends Club or Organization Meetings: Not on file    Marital Status: Not on file   Intimate Partner Violence:     Fear of Current or Ex-Partner: Not on file    Emotionally Abused: Not on file    Physically Abused: Not on file    Sexually Abused: Not on file   Housing Stability:     Unable to Pay for Housing in the Last Year: Not on file    Number of Places Lived in the Last Year: Not on file    Unstable Housing in the Last Year: Not on file     Current Outpatient Medications   Medication Sig Dispense Refill    baclofen (LIORESAL) 10 MG tablet TAKE 1 TABLET BY MOUTH FOUR TIMES DAILY 120 tablet 10    etodolac (LODINE) 400 MG tablet TAKE 1 TABLET BY MOUTH TWICE DAILY 60 tablet 10    D3 SUPER STRENGTH 50 MCG (2000 UT) CAPS TAKE (1) CAPSULE BY MOUTH ONCE DAILY 30 capsule 10    gabapentin (NEURONTIN) 600 MG tablet Take 1 tablet by mouth 3 times daily for 180 days. 90 tablet 5    AUBAGIO 14 MG TABS TAKE 1 TABLET BY MOUTH 1 TIME A DAY. 30 tablet 2    escitalopram (LEXAPRO) 20 MG tablet TAKE (1) TABLET BY MOUTH DAILY 30 tablet 10    Compression Stockings MISC by Does not apply route Knee high medium compression 20-30 cc disp three pairs.  1 each 0    Handicap Placard MISC by Does not apply route 1 each 0    cyclobenzaprine (FLEXERIL) 5 MG tablet TK 1 T PO BID  0    levonorgestrel (MIRENA) 20 MCG/24HR IUD 1 each by Intrauterine route       Current Facility-Administered Medications   Medication Dose Route Frequency Provider Last Rate Last Admin    levonorgestrel (MIRENA) IUD 52 mg 1 each  1 each IntraUTERine Once Nils Springer DO   1 each at 10/27/21 1510     Current Outpatient Medications on File Prior to Visit   Medication Sig Dispense Refill    etodolac (LODINE) 400 MG tablet TAKE 1 TABLET BY MOUTH TWICE DAILY 60 tablet 10    D3 SUPER STRENGTH 50 MCG (2000 UT) CAPS TAKE (1) CAPSULE BY MOUTH ONCE DAILY 30 capsule 10    gabapentin (NEURONTIN) 600 MG tablet Take 1 tablet by mouth 3 times daily for 180 days. 90 tablet 5    AUBAGIO 14 MG TABS TAKE 1 TABLET BY MOUTH 1 TIME A DAY. 30 tablet 2    escitalopram (LEXAPRO) 20 MG tablet TAKE (1) TABLET BY MOUTH DAILY 30 tablet 10    Compression Stockings MISC by Does not apply route Knee high medium compression 20-30 cc disp three pairs.  1 each 0    Handicap Placard MISC by Does not apply route 1 each 0    cyclobenzaprine (FLEXERIL) 5 MG tablet TK 1 T PO BID  0    levonorgestrel (MIRENA) 20 MCG/24HR IUD 1 each by Intrauterine route       Current Facility-Administered Medications on File Prior to Visit   Medication Dose Route Frequency Provider Last Rate Last Admin    levonorgestrel (MIRENA) IUD 52 mg 1 each  1 each IntraUTERine Once Nils Springer DO   1 each at 10/27/21 1510     No Known Allergies  Health Maintenance   Topic Date Due    Varicella vaccine (1 of 2 - 2-dose childhood series) Never done    COVID-19 Vaccine (1) Never done    Pneumococcal 0-64 years Vaccine (1 - PCV) Never done    Hepatitis C screen  Never done    Lipids  06/12/2020    HIV screen  08/15/2026 (Originally 4/7/1995)    Flu vaccine (Season Ended) 09/01/2022    A1C test (Diabetic or Prediabetic)  09/07/2022    Depression Monitoring  09/07/2022    Cervical cancer screen  09/08/2025    DTaP/Tdap/Td vaccine (4 - Td or Tdap) 12/12/2029    Hepatitis A vaccine  Aged Out    Hepatitis B vaccine  Aged Out  Hib vaccine  Aged Out    Meningococcal (ACWY) vaccine  Aged Out       Review of Systems     Review of Systems   Constitutional: Negative for activity change, appetite change, fatigue and fever. HENT: Negative for congestion and rhinorrhea. Eyes: Negative. Respiratory: Negative. Negative for cough and chest tightness. Cardiovascular: Negative. Gastrointestinal: Negative. Endocrine: Negative. Genitourinary: Negative. Musculoskeletal: Negative. Skin: Negative. Neurological: Negative for dizziness, light-headedness and numbness. Hematological: Negative. Psychiatric/Behavioral: Negative. Physical Exam  Vitals:    06/13/22 1023   BP: 130/78   Pulse: 85   Resp: 14   Temp: 97.7 °F (36.5 °C)   SpO2: 96%   Height: 4' 10\" (1.473 m)       Physical Exam  Constitutional:       Appearance: She is well-developed. HENT:      Right Ear: External ear normal.      Left Ear: External ear normal.   Eyes:      Pupils: Pupils are equal, round, and reactive to light. Neck:      Thyroid: No thyromegaly. Cardiovascular:      Rate and Rhythm: Normal rate and regular rhythm. Heart sounds: Normal heart sounds. No murmur heard. No friction rub. No gallop. Pulmonary:      Effort: Pulmonary effort is normal. No respiratory distress. Breath sounds: No wheezing or rales. Chest:      Chest wall: No tenderness. Abdominal:      General: Bowel sounds are normal. There is no distension. Palpations: Abdomen is soft. There is no mass. Tenderness: There is no abdominal tenderness. There is no guarding or rebound. Musculoskeletal:         General: Normal range of motion. Cervical back: Normal range of motion and neck supple. Lymphadenopathy:      Cervical: No cervical adenopathy. Skin:     General: Skin is warm and dry. Neurological:      Mental Status: She is alert and oriented to person, place, and time. Cranial Nerves: No cranial nerve deficit. Coordination: Coordination normal.         Assessment   Diagnosis Orders   1. Carpal tunnel syndrome on both sides       Problem List     None          Plan  Will wear bilateral splints. Orders Placed This Encounter   Medications    baclofen (LIORESAL) 10 MG tablet     Sig: TAKE 1 TABLET BY MOUTH FOUR TIMES DAILY     Dispense:  120 tablet     Refill:  10     No follow-ups on file.   Camden Moe MD

## 2022-06-15 DIAGNOSIS — G35 MULTIPLE SCLEROSIS (HCC): ICD-10-CM

## 2022-06-15 RX ORDER — RENAGEL 800 MG/1
TABLET ORAL
Qty: 30 TABLET | Refills: 2 | Status: SHIPPED | OUTPATIENT
Start: 2022-06-15 | End: 2022-10-17

## 2022-06-18 ENCOUNTER — APPOINTMENT (OUTPATIENT)
Dept: CT IMAGING | Age: 42
End: 2022-06-18
Payer: COMMERCIAL

## 2022-06-18 ENCOUNTER — HOSPITAL ENCOUNTER (EMERGENCY)
Age: 42
Discharge: ANOTHER HOSPITAL | End: 2022-06-19
Attending: EMERGENCY MEDICINE
Payer: COMMERCIAL

## 2022-06-18 DIAGNOSIS — G35 MULTIPLE SCLEROSIS EXACERBATION (HCC): Primary | ICD-10-CM

## 2022-06-18 LAB
ALBUMIN SERPL-MCNC: 4 G/DL (ref 3.5–4.6)
ALP BLD-CCNC: 81 U/L (ref 40–130)
ALT SERPL-CCNC: 11 U/L (ref 0–33)
ANION GAP SERPL CALCULATED.3IONS-SCNC: 11 MEQ/L (ref 9–15)
AST SERPL-CCNC: 13 U/L (ref 0–35)
BACTERIA: ABNORMAL /HPF
BASOPHILS ABSOLUTE: 0.1 K/UL (ref 0–0.1)
BASOPHILS RELATIVE PERCENT: 0.4 % (ref 0.1–1.2)
BILIRUB SERPL-MCNC: 0.3 MG/DL (ref 0.2–0.7)
BILIRUBIN URINE: NORMAL
BLOOD, URINE: NEGATIVE
BUN BLDV-MCNC: 9 MG/DL (ref 6–20)
C-REACTIVE PROTEIN: 32.1 MG/L (ref 0–5)
CALCIUM SERPL-MCNC: 9.3 MG/DL (ref 8.5–9.9)
CHLORIDE BLD-SCNC: 105 MEQ/L (ref 95–107)
CLARITY: CLEAR
CO2: 25 MEQ/L (ref 20–31)
COLOR: YELLOW
CREAT SERPL-MCNC: 0.72 MG/DL (ref 0.5–0.9)
EOSINOPHILS ABSOLUTE: 0.3 K/UL (ref 0–0.4)
EOSINOPHILS RELATIVE PERCENT: 1.4 % (ref 0.7–5.8)
EPITHELIAL CELLS, UA: ABNORMAL /HPF
GFR AFRICAN AMERICAN: >60
GFR NON-AFRICAN AMERICAN: >60
GLOBULIN: 2.8 G/DL (ref 2.3–3.5)
GLUCOSE BLD-MCNC: 103 MG/DL (ref 70–99)
GLUCOSE URINE: NEGATIVE MG/DL
HCT VFR BLD CALC: 43.9 % (ref 37–47)
HEMOGLOBIN: 14.6 G/DL (ref 11.2–15.7)
IMMATURE GRANULOCYTES #: 0.1 K/UL
IMMATURE GRANULOCYTES %: 0.5 %
KETONES, URINE: NEGATIVE MG/DL
LEUKOCYTE ESTERASE, URINE: NORMAL
LYMPHOCYTES ABSOLUTE: 2.3 K/UL (ref 1.2–3.7)
LYMPHOCYTES RELATIVE PERCENT: 12.2 %
MCH RBC QN AUTO: 32.3 PG (ref 25.6–32.2)
MCHC RBC AUTO-ENTMCNC: 33.3 % (ref 32.2–35.5)
MCV RBC AUTO: 97.1 FL (ref 79.4–94.8)
MONOCYTES ABSOLUTE: 1.1 K/UL (ref 0.2–0.9)
MONOCYTES RELATIVE PERCENT: 5.7 % (ref 4.7–12.5)
NEUTROPHILS ABSOLUTE: 14.9 K/UL (ref 1.6–6.1)
NEUTROPHILS RELATIVE PERCENT: 79.8 % (ref 34–71.1)
NITRITE, URINE: NEGATIVE
PDW BLD-RTO: 14.3 % (ref 11.7–14.4)
PH UA: 6 (ref 5–9)
PLATELET # BLD: 312 K/UL (ref 182–369)
POTASSIUM REFLEX MAGNESIUM: 4.2 MEQ/L (ref 3.4–4.9)
PROTEIN UA: NEGATIVE MG/DL
RBC # BLD: 4.52 M/UL (ref 3.93–5.22)
RBC UA: ABNORMAL /HPF (ref 0–2)
SEDIMENTATION RATE, ERYTHROCYTE: 24 MM (ref 0–20)
SODIUM BLD-SCNC: 141 MEQ/L (ref 135–144)
SPECIFIC GRAVITY UA: 1.01 (ref 1–1.03)
TOTAL CK: 52 U/L (ref 0–170)
TOTAL PROTEIN: 6.8 G/DL (ref 6.3–8)
URINE REFLEX TO CULTURE: NORMAL
UROBILINOGEN, URINE: 0.2 E.U./DL
WBC # BLD: 18.7 K/UL (ref 4–10)
WBC UA: ABNORMAL /HPF (ref 0–5)

## 2022-06-18 PROCEDURE — 99285 EMERGENCY DEPT VISIT HI MDM: CPT

## 2022-06-18 PROCEDURE — 72125 CT NECK SPINE W/O DYE: CPT

## 2022-06-18 PROCEDURE — 81001 URINALYSIS AUTO W/SCOPE: CPT

## 2022-06-18 PROCEDURE — 70450 CT HEAD/BRAIN W/O DYE: CPT

## 2022-06-18 PROCEDURE — 86140 C-REACTIVE PROTEIN: CPT

## 2022-06-18 PROCEDURE — 2580000003 HC RX 258: Performed by: EMERGENCY MEDICINE

## 2022-06-18 PROCEDURE — 6360000002 HC RX W HCPCS: Performed by: EMERGENCY MEDICINE

## 2022-06-18 PROCEDURE — 85652 RBC SED RATE AUTOMATED: CPT

## 2022-06-18 PROCEDURE — 96375 TX/PRO/DX INJ NEW DRUG ADDON: CPT

## 2022-06-18 PROCEDURE — 96365 THER/PROPH/DIAG IV INF INIT: CPT

## 2022-06-18 PROCEDURE — 80053 COMPREHEN METABOLIC PANEL: CPT

## 2022-06-18 PROCEDURE — 85025 COMPLETE CBC W/AUTO DIFF WBC: CPT

## 2022-06-18 PROCEDURE — 82550 ASSAY OF CK (CPK): CPT

## 2022-06-18 RX ORDER — ENOXAPARIN SODIUM 100 MG/ML
40 INJECTION SUBCUTANEOUS DAILY
Status: CANCELLED | OUTPATIENT
Start: 2022-06-19

## 2022-06-18 RX ORDER — MORPHINE SULFATE 4 MG/ML
4 INJECTION, SOLUTION INTRAMUSCULAR; INTRAVENOUS ONCE
Status: COMPLETED | OUTPATIENT
Start: 2022-06-18 | End: 2022-06-18

## 2022-06-18 RX ORDER — ONDANSETRON 4 MG/1
4 TABLET, ORALLY DISINTEGRATING ORAL EVERY 8 HOURS PRN
Status: CANCELLED | OUTPATIENT
Start: 2022-06-18

## 2022-06-18 RX ORDER — ONDANSETRON 2 MG/ML
4 INJECTION INTRAMUSCULAR; INTRAVENOUS ONCE
Status: COMPLETED | OUTPATIENT
Start: 2022-06-18 | End: 2022-06-18

## 2022-06-18 RX ORDER — 0.9 % SODIUM CHLORIDE 0.9 %
1000 INTRAVENOUS SOLUTION INTRAVENOUS ONCE
Status: COMPLETED | OUTPATIENT
Start: 2022-06-18 | End: 2022-06-18

## 2022-06-18 RX ORDER — ONDANSETRON 2 MG/ML
4 INJECTION INTRAMUSCULAR; INTRAVENOUS EVERY 6 HOURS PRN
Status: CANCELLED | OUTPATIENT
Start: 2022-06-18

## 2022-06-18 RX ADMIN — MORPHINE SULFATE 4 MG: 4 INJECTION, SOLUTION INTRAMUSCULAR; INTRAVENOUS at 18:51

## 2022-06-18 RX ADMIN — ONDANSETRON 4 MG: 2 INJECTION INTRAMUSCULAR; INTRAVENOUS at 18:51

## 2022-06-18 RX ADMIN — SODIUM CHLORIDE 1000 ML: 9 INJECTION, SOLUTION INTRAVENOUS at 18:50

## 2022-06-18 RX ADMIN — SODIUM CHLORIDE 1000 MG: 9 INJECTION, SOLUTION INTRAVENOUS at 21:56

## 2022-06-18 ASSESSMENT — PAIN DESCRIPTION - DESCRIPTORS: DESCRIPTORS: SHOOTING

## 2022-06-18 ASSESSMENT — PAIN DESCRIPTION - FREQUENCY: FREQUENCY: INTERMITTENT

## 2022-06-18 ASSESSMENT — PAIN SCALES - GENERAL
PAINLEVEL_OUTOF10: 10
PAINLEVEL_OUTOF10: 10

## 2022-06-18 ASSESSMENT — PAIN DESCRIPTION - PAIN TYPE: TYPE: ACUTE PAIN

## 2022-06-18 ASSESSMENT — PAIN DESCRIPTION - LOCATION
LOCATION: GENERALIZED
LOCATION: ARM

## 2022-06-18 ASSESSMENT — PAIN DESCRIPTION - ORIENTATION: ORIENTATION: LEFT

## 2022-06-18 NOTE — ED PROVIDER NOTES
eMERGENCY dEPARTMENT eNCOUnter      [facility]    Chief Complaint   Patient presents with    Other     General body aches. Pain started over a week ago and has gotten worse. Patient does have MS       HPI    Feli Mccurdy is a 43 y.o. female with hx of MS on Aubagio follow up with Dr Anne Marie Doyle , carpal tunnel symptoms  who presentsto ED from home   By private car   With complaint of Left upper extremity weakness and pain   Onset 1 week  Intensity of symptoms moderate   She was diagnosed with carpal tunnel syndrome and has been using splints   She is R Handed and has been compliant with her medications . Patient is a smoker     PAST MEDICAL HISTORY    Past Medical History:   Diagnosis Date    Carpal tunnel syndrome        SURGICAL HISTORY    Past Surgical History:   Procedure Laterality Date    APPENDECTOMY      BREAST SURGERY      lump removal bengin     SECTION      x 3    TUBAL LIGATION         CURRENT MEDICATIONS    Current Outpatient Rx   Medication Sig Dispense Refill    AUBAGIO 14 MG TABS TAKE 1 TABLET BY MOUTH 1 TIME A DAY. 30 tablet 2    baclofen (LIORESAL) 10 MG tablet TAKE 1 TABLET BY MOUTH FOUR TIMES DAILY 120 tablet 10    etodolac (LODINE) 400 MG tablet TAKE 1 TABLET BY MOUTH TWICE DAILY 60 tablet 10    D3 SUPER STRENGTH 50 MCG (2000 UT) CAPS TAKE (1) CAPSULE BY MOUTH ONCE DAILY 30 capsule 10    gabapentin (NEURONTIN) 600 MG tablet Take 1 tablet by mouth 3 times daily for 180 days. 90 tablet 5    escitalopram (LEXAPRO) 20 MG tablet TAKE (1) TABLET BY MOUTH DAILY 30 tablet 10    Compression Stockings MISC by Does not apply route Knee high medium compression 20-30 cc disp three pairs.  1 each 0    Handicap Placard MISC by Does not apply route 1 each 0    cyclobenzaprine (FLEXERIL) 5 MG tablet TK 1 T PO BID (Patient not taking: Reported on 2022)  0    levonorgestrel (MIRENA) 20 MCG/24HR IUD 1 each by Intrauterine route         ALLERGIES    No Known Allergies    FAMILY HISTORY Family History   Problem Relation Age of Onset    Diabetes Mother     Heart Disease Father        SOCIAL HISTORY    Social History     Socioeconomic History    Marital status:      Spouse name: None    Number of children: None    Years of education: None    Highest education level: None   Occupational History    None   Tobacco Use    Smoking status: Current Every Day Smoker    Smokeless tobacco: Never Used   Vaping Use    Vaping Use: Never used   Substance and Sexual Activity    Alcohol use: Yes     Alcohol/week: 0.0 standard drinks    Drug use: Yes     Types: Marijuana Garon Kettle)    Sexual activity: None   Other Topics Concern    None   Social History Narrative    None     Social Determinants of Health     Financial Resource Strain: Low Risk     Difficulty of Paying Living Expenses: Not hard at all   Food Insecurity: No Food Insecurity    Worried About Running Out of Food in the Last Year: Never true    [employer] of Food in the Last Year: Never true   Transportation Needs:     Lack of Transportation (Medical): Not on file    Lack of Transportation (Non-Medical):  Not on file   Physical Activity:     Days of Exercise per Week: Not on file    Minutes of Exercise per Session: Not on file   Stress:     Feeling of Stress : Not on file   Social Connections:     Frequency of Communication with Friends and Family: Not on file    Frequency of Social Gatherings with Friends and Family: Not on file    Attends Restorationism Services: Not on file    Active Member of Clubs or Organizations: Not on file    Attends Club or Organization Meetings: Not on file    Marital Status: Not on file   Intimate Partner Violence:     Fear of Current or Ex-Partner: Not on file    Emotionally Abused: Not on file    Physically Abused: Not on file    Sexually Abused: Not on file   Housing Stability:     Unable to Pay for Housing in the Last Year: Not on file    Number of [place] in the Last Year: Not on file  Unstable Housing in the Last Year: Not on file       REVIEW OF SYSTEMS    Constitutional:  Denies fever, chills, weight loss or weakness   Eyes:  Denies photophobia or discharge   HENT:  Denies sore throat or ear pain   Respiratory:  Denies cough or shortness of breath   Cardiovascular:  Denies chest pain, palpitations or swelling   GI:  Denies abdominal pain, nausea, vomiting, or diarrhea   Musculoskeletal: c/o Left upper extremity pain and weakness,  Denies back pain   Skin:  Denies rash   Neurologic:  Denies headache, focal weakness or sensory changes   Endocrine:  Denies polyuria or polydypsia   Lymphatic:  Denies swollen glands   Psychiatric:  Denies depression, suicidal ideation or homicidal ideation   All systems negative except as marked. PHYSICAL EXAM    VITAL SIGNS: BP (!) 135/90   Pulse 97   Temp 98.3 °F (36.8 °C) (Temporal)   Resp 17   Ht 4' 11\" (1.499 m)   Wt 190 lb (86.2 kg)   SpO2 92%   BMI 38.38 kg/m²    Constitutional:  Well developed, Well nourished, No acute distress, Non-toxic appearance. HENT:  Normocephalic, Atraumatic, Bilateral external ears normal, Oropharynx moist, No oral exudates, Nose normal. Neck- Normal range of motion, No tenderness, Supple, No stridor. Eyes:  PERRL, EOMI, Conjunctiva normal, No discharge. Respiratory:  Normal breath sounds, No respiratory distress, No wheezing, No chest tenderness. Cardiovascular:  Normal heart rate, Normal rhythm, No murmurs, No rubs, No gallops. GI:  Bowel sounds normal, Soft, No tenderness, No masses, No pulsatile masses. :No CVA tenderness. Musculoskeletal:  Intact distal pulses, No edema, No tenderness, No cyanosis, No clubbing. Good range of motion in all major joints. No tenderness to palpation or major deformities noted. Back- No tenderness. Integument:  Warm, Dry, No erythema, No rash. Lymphatic:  No lymphadenopathy noted.    Neurologic:  Alert & oriented x 3, Left upper extremity power 4/5 in elbow and wrist, RUE normal , Normal sensory function. Psychiatric:  Affect normal, Judgment normal, Mood normal.         RADIOLOGY    CT HEAD WO CONTRAST   Final Result     No acute findings in the head/brain. CT CERVICAL SPINE WO CONTRAST   Final Result   No acute fracture or traumatic malalignment. No significant spinal or    neuroforaminal stenosis. REEVALUATION   Pain control adequate   Patient was updated the results of labs and Radiology.   Spoke with Dr Jhon Boyce wants the patient to be transferred to [facility] and 1 gram of Solumedrol IV  Spoke with hospitalist DR WIGGINS accepted transfer     Labs  Labs Reviewed   CBC WITH AUTO DIFFERENTIAL - Abnormal; Notable for the following components:       Result Value    WBC 18.7 (*)     MCV 97.1 (*)     MCH 32.3 (*)     Neutrophils % 79.8 (*)     Neutrophils Absolute 14.9 (*)     Monocytes Absolute 1.1 (*)     All other components within normal limits   COMPREHENSIVE METABOLIC PANEL W/ REFLEX TO MG FOR LOW K - Abnormal; Notable for the following components:    Glucose 103 (*)     All other components within normal limits   SEDIMENTATION RATE - Abnormal; Notable for the following components:    Sed Rate 24 (*)     All other components within normal limits   MICROSCOPIC URINALYSIS - Abnormal; Notable for the following components:    Bacteria, UA RARE (*)     All other components within normal limits   URINALYSIS WITH REFLEX TO CULTURE   CK   C-REACTIVE PROTEIN             Summation      Patient Course:     ED Medications administered this visit:    Medications   methylPREDNISolone sodium (SOLU-MEDROL) 1,000 mg in sodium chloride 0.9 % 250 mL IVPB (has no administration in time range)   0.9 % sodium chloride bolus (1,000 mLs IntraVENous New Bag 6/18/22 1850)   morphine sulfate (PF) injection 4 mg (4 mg IntraVENous Given 6/18/22 1851)   ondansetron (ZOFRAN) injection 4 mg (4 mg IntraVENous Given 6/18/22 1851)       New Prescriptions from this visit:    New Prescriptions    No medications on file       Follow-up:  No follow-up provider specified. Final Impression:   1.  [facility]               (Please note that portions of this note were completed with a voice recognition program.  Efforts were made to edit the dictations but occasionally words are mis-transcribed.)          Rodrigue Collins MD  06/18/22 0887

## 2022-06-18 NOTE — ED TRIAGE NOTES
Patient states she is having left elbow pain, denies injury but states she has been diagnosed with MS. States she is having weakness in left hand. A/0 x3, ambulatory, resps even and unlabored on room air.

## 2022-06-19 ENCOUNTER — HOSPITAL ENCOUNTER (INPATIENT)
Age: 42
LOS: 3 days | Discharge: HOME | DRG: 060 | End: 2022-06-22
Attending: INTERNAL MEDICINE | Admitting: INTERNAL MEDICINE
Payer: COMMERCIAL

## 2022-06-19 VITALS
HEART RATE: 88 BPM | DIASTOLIC BLOOD PRESSURE: 72 MMHG | OXYGEN SATURATION: 94 % | WEIGHT: 190 LBS | TEMPERATURE: 98.3 F | SYSTOLIC BLOOD PRESSURE: 119 MMHG | HEIGHT: 59 IN | RESPIRATION RATE: 17 BRPM | BODY MASS INDEX: 38.3 KG/M2

## 2022-06-19 PROCEDURE — 6360000002 HC RX W HCPCS: Performed by: INTERNAL MEDICINE

## 2022-06-19 PROCEDURE — 6370000000 HC RX 637 (ALT 250 FOR IP): Performed by: NURSE PRACTITIONER

## 2022-06-19 PROCEDURE — 6360000002 HC RX W HCPCS: Performed by: NURSE PRACTITIONER

## 2022-06-19 PROCEDURE — 1210000000 HC MED SURG R&B

## 2022-06-19 PROCEDURE — 2580000003 HC RX 258: Performed by: INTERNAL MEDICINE

## 2022-06-19 PROCEDURE — 6370000000 HC RX 637 (ALT 250 FOR IP): Performed by: INTERNAL MEDICINE

## 2022-06-19 RX ORDER — ESCITALOPRAM OXALATE 20 MG/1
20 TABLET ORAL DAILY
Status: DISCONTINUED | OUTPATIENT
Start: 2022-06-19 | End: 2022-06-22 | Stop reason: HOSPADM

## 2022-06-19 RX ORDER — IBUPROFEN 400 MG/1
400 TABLET ORAL EVERY 8 HOURS PRN
Status: DISCONTINUED | OUTPATIENT
Start: 2022-06-19 | End: 2022-06-19

## 2022-06-19 RX ORDER — LIDOCAINE 4 G/G
1 PATCH TOPICAL DAILY
Status: DISCONTINUED | OUTPATIENT
Start: 2022-06-19 | End: 2022-06-22 | Stop reason: HOSPADM

## 2022-06-19 RX ORDER — ETODOLAC 400 MG/1
400 TABLET, FILM COATED ORAL 2 TIMES DAILY
Status: DISCONTINUED | OUTPATIENT
Start: 2022-06-19 | End: 2022-06-22 | Stop reason: HOSPADM

## 2022-06-19 RX ORDER — ONDANSETRON 2 MG/ML
4 INJECTION INTRAMUSCULAR; INTRAVENOUS EVERY 6 HOURS PRN
Status: DISCONTINUED | OUTPATIENT
Start: 2022-06-19 | End: 2022-06-22 | Stop reason: HOSPADM

## 2022-06-19 RX ORDER — ONDANSETRON 4 MG/1
4 TABLET, ORALLY DISINTEGRATING ORAL EVERY 8 HOURS PRN
Status: DISCONTINUED | OUTPATIENT
Start: 2022-06-19 | End: 2022-06-22 | Stop reason: HOSPADM

## 2022-06-19 RX ORDER — KETOROLAC TROMETHAMINE 15 MG/ML
15 INJECTION, SOLUTION INTRAMUSCULAR; INTRAVENOUS ONCE
Status: COMPLETED | OUTPATIENT
Start: 2022-06-19 | End: 2022-06-19

## 2022-06-19 RX ORDER — GABAPENTIN 300 MG/1
600 CAPSULE ORAL DAILY
Status: DISCONTINUED | OUTPATIENT
Start: 2022-06-19 | End: 2022-06-19

## 2022-06-19 RX ORDER — NICOTINE 21 MG/24HR
1 PATCH, TRANSDERMAL 24 HOURS TRANSDERMAL DAILY
Status: DISCONTINUED | OUTPATIENT
Start: 2022-06-19 | End: 2022-06-22 | Stop reason: HOSPADM

## 2022-06-19 RX ORDER — ENOXAPARIN SODIUM 100 MG/ML
40 INJECTION SUBCUTANEOUS DAILY
Status: DISCONTINUED | OUTPATIENT
Start: 2022-06-19 | End: 2022-06-22 | Stop reason: HOSPADM

## 2022-06-19 RX ORDER — BACLOFEN 10 MG/1
10 TABLET ORAL 4 TIMES DAILY
Status: DISCONTINUED | OUTPATIENT
Start: 2022-06-19 | End: 2022-06-22 | Stop reason: HOSPADM

## 2022-06-19 RX ORDER — GABAPENTIN 300 MG/1
300 CAPSULE ORAL 3 TIMES DAILY
Status: DISCONTINUED | OUTPATIENT
Start: 2022-06-19 | End: 2022-06-22 | Stop reason: HOSPADM

## 2022-06-19 RX ORDER — ACETAMINOPHEN 325 MG/1
650 TABLET ORAL EVERY 4 HOURS PRN
Status: DISCONTINUED | OUTPATIENT
Start: 2022-06-19 | End: 2022-06-22 | Stop reason: HOSPADM

## 2022-06-19 RX ADMIN — ACETAMINOPHEN 650 MG: 325 TABLET ORAL at 03:17

## 2022-06-19 RX ADMIN — BACLOFEN 10 MG: 10 TABLET ORAL at 22:44

## 2022-06-19 RX ADMIN — ENOXAPARIN SODIUM 40 MG: 100 INJECTION SUBCUTANEOUS at 10:03

## 2022-06-19 RX ADMIN — SODIUM CHLORIDE 1000 MG: 9 INJECTION, SOLUTION INTRAVENOUS at 22:54

## 2022-06-19 RX ADMIN — BACLOFEN 10 MG: 10 TABLET ORAL at 14:43

## 2022-06-19 RX ADMIN — BACLOFEN 10 MG: 10 TABLET ORAL at 17:39

## 2022-06-19 RX ADMIN — GABAPENTIN 600 MG: 300 CAPSULE ORAL at 10:03

## 2022-06-19 RX ADMIN — KETOROLAC TROMETHAMINE 15 MG: 15 INJECTION, SOLUTION INTRAMUSCULAR; INTRAVENOUS at 04:48

## 2022-06-19 RX ADMIN — BACLOFEN 10 MG: 10 TABLET ORAL at 10:03

## 2022-06-19 RX ADMIN — ESCITALOPRAM 20 MG: 20 TABLET, FILM COATED ORAL at 10:03

## 2022-06-19 RX ADMIN — ETODOLAC 400 MG: 400 TABLET, FILM COATED ORAL at 23:50

## 2022-06-19 RX ADMIN — GABAPENTIN 300 MG: 300 CAPSULE ORAL at 22:44

## 2022-06-19 ASSESSMENT — ENCOUNTER SYMPTOMS
NAUSEA: 0
ABDOMINAL PAIN: 0
BACK PAIN: 0
SORE THROAT: 0
VOMITING: 0
PHOTOPHOBIA: 0
SHORTNESS OF BREATH: 0
WHEEZING: 0
ALLERGIC/IMMUNOLOGIC NEGATIVE: 1
EYES NEGATIVE: 1
RHINORRHEA: 0

## 2022-06-19 ASSESSMENT — PAIN SCALES - GENERAL
PAINLEVEL_OUTOF10: 5
PAINLEVEL_OUTOF10: 9
PAINLEVEL_OUTOF10: 8

## 2022-06-19 ASSESSMENT — LIFESTYLE VARIABLES: HOW OFTEN DO YOU HAVE A DRINK CONTAINING ALCOHOL: 2-4 TIMES A MONTH

## 2022-06-19 NOTE — ED NOTES
Called transfer center to lucia hospitalist at [facility] for Dr to  consult.       Steve Armstrong  06/18/22 9098

## 2022-06-19 NOTE — PROGRESS NOTES
06/19/22    From: HOME W/SPOUSE     Admit: L ARM PAIN AND WEAKNESS     PMH: MS, TOBACCO ABUSE, MYALGIA, OBESITY, CARPAL TUNNEL, NEUROPATHY, DEPRESSION AND ANXIETY,     Anticipated Discharge Disposition: HOME W/ OP PT PREFERRED IF INDICATED     Patient Mobility or PT/OT ordered: 6/19  Consults: NEURO     Clinical: LUE WEAKNESS AND PAIN, MS EXACERBATION     Barriers to Discharge: SOLUMEDROL, CONSULTS, WBC 18.7--20.      Assessments: CMI COMPLETED

## 2022-06-19 NOTE — CARE COORDINATION
[facility] Case Management Initial Discharge Assessment    Met with Patient to discuss discharge plan. PCP: Juliet Mishra MD                                Date of Last Visit:  LAST Monday     [facility] Patient: No        [facility] Notified: no    If no PCP, list provided? N/A    Discharge Planning    Living Arrangements: independently at home    Who do you live with? SPOUSE AND SON     Who helps you with your care:  self    If lives at home:     Do you have any barriers navigating in your home? no    Patient can perform ADL? Yes    Current Services (outpatient and in home) : Outpatient PT/OT (Giv.to [facility] OP PT )    Dialysis: No    Is transportation available to get to your appointments? Yes. PROVIDE A RIDE     DME Equipment:  yes - CANE, ROLATOR, WHEELCHAIR, MOTORIZED SCOOTER L HANDED, JUST PURCHASED NEW BATTERY PER PT. Respiratory equipment: None    Respiratory provider:  no     Pharmacy:  yes - [facility] [place] [address] with Medication Assistance Program?  No      Patient agreeable to [place] [id number]? Yes, Company [facility] IF INDICATED. PREFERS OP PT     Patient agreeable to SNF/Rehab? Declined    Other discharge needs identified? N/A    Does Patient Have a High-Risk for Readmission Diagnosis (CHF, PN, MI, COPD)? No    Initial Discharge Plan? MET W/ PT AND S.O., TO DISCUSS DISCHARGE PLAN AND ASSESS NEEDS. PT WOULD LIKE TO RESUME OP PT AT [place] [address]. PT IS OPEN TO C IF INDICATED HOWEVER, SHE PREFERS TO RESUME  OP THERAPY IF INDICATED. CM/LSW TO FOLLOW COURSE. (Note: please see concurrent daily documentation for any updates after initial note).       Readmission Risk              Risk of Unplanned Readmission:  6         Electronically signed by Lucia Calvert RN on 6/19/2022 at 11:40 AM32

## 2022-06-19 NOTE — PROGRESS NOTES
Pt arrived to floor via life care and oriented to room. Pt with complaint of pain to left elbow. Pt also complains of weakness to bilateral arms and that she has been dropping things. Pt is alert and oriented. Skin assessment completed with Dorthula Gaucher RN. Pt's skin is intact. Ambulated to the bathroom. Call light in reach. Bed alarm on.        0320:  Tylenol given and lidoderm patch placed to elbow for pain. 4386: Toradol given for continued discomfort to left elbow. 0530:  Pt resting with eyes closed. Respirations even and unlabored. Call light in reach. Bed alarm on.

## 2022-06-19 NOTE — H&P
[facility] MEDICINE    HISTORY AND PHYSICAL EXAM    PATIENT NAME:  Cristiane Herron    MRN:  [id number]  SERVICE DATE:  2022   SERVICE TIME:  1:44 AM    Primary Care Physician: Ashlie Kurtz MD         SUBJECTIVE  CHIEF COMPLAINT: Left arm pain bilateral arm weakness. HPI: Patient being admitted for possible MS flare. Patient is a pleasant, alert and oriented x3,  female, 55-year-old. Patient reports that for about a week she has been experiencing left arm pain from her elbow to her hand. Patient also states she has had weakness in her hands bilaterally. Patient states that this has continued to increase over the past couple days. Patient was concerned that her MS was flared up. Patient denies any fever, cough, shortness of breath, chest pain, abdominal pain, nausea, or vomiting. Patient's other past medical history includes depression. Patient smokes half pack a day of tobacco.  Denies alcohol use. Patient uses marijuana but no other illicit drugs. PAST MEDICAL HISTORY:    Past Medical History:   Diagnosis Date    Carpal tunnel syndrome      PAST SURGICAL HISTORY:    Past Surgical History:   Procedure Laterality Date    APPENDECTOMY      BREAST SURGERY      lump removal bengin     SECTION      x 3    TUBAL LIGATION       FAMILY HISTORY:    Family History   Problem Relation Age of Onset    Diabetes Mother     Heart Disease Father      SOCIAL HISTORY:    Social History     Socioeconomic History    Marital status:      Spouse name: Not on file    Number of children: Not on file    Years of education: Not on file    Highest education level: Not on file   Occupational History    Not on file   Tobacco Use    Smoking status: Current Every Day Smoker    Smokeless tobacco: Never Used   Vaping Use    Vaping Use: Never used   Substance and Sexual Activity    Alcohol use:  Yes     Alcohol/week: 0.0 standard drinks    Drug use: Yes     Types: Marijuana [place]    Sexual activity: Not on file   Other Topics Concern    Not on file   Social History Narrative    Not on file     Social Determinants of Health     Financial Resource Strain: Low Risk     Difficulty of Paying Living Expenses: Not hard at all   Food Insecurity: No Food Insecurity    Worried About Running Out of Food in the Last Year: Never true    [address] in the Last Year: Never true   Transportation Needs:     Lack of Transportation (Medical): Not on file    Lack of Transportation (Non-Medical): Not on file   Physical Activity:     Days of Exercise per Week: Not on file    Minutes of Exercise per Session: Not on file   Stress:     Feeling of Stress : Not on file   Social Connections:     Frequency of Communication with Friends and Family: Not on file    Frequency of Social Gatherings with Friends and Family: Not on file    Attends Baptism Services: Not on file    Active Member of [address] [place] [address] or Organizations: Not on file    Attends Club or Organization Meetings: Not on file    Marital Status: Not on file   Intimate Partner Violence:     Fear of Current or Ex-Partner: Not on file    Emotionally Abused: Not on file    Physically Abused: Not on file    Sexually Abused: Not on file   Housing Stability:     Unable to Pay for Housing in the Last Year: Not on file    Number of [place] in the Last Year: Not on file    Unstable Housing in the Last Year: Not on file     MEDICATIONS:   Prior to Admission medications    Medication Sig Start Date End Date Taking?  Authorizing Provider   AUBAGIO 14 MG TABS TAKE 1 TABLET BY MOUTH 1 TIME A DAY. 6/15/22   Sharen Kawasaki, MD   baclofen (LIORESAL) 10 MG tablet TAKE 1 TABLET BY MOUTH FOUR TIMES DAILY 6/13/22   Cristal Carson MD   etodolac (LODINE) 400 MG tablet TAKE 1 TABLET BY MOUTH TWICE DAILY 5/24/22   Cristal Carson MD   D3 SUPER STRENGTH 50 MCG (2000 UT) CAPS TAKE (1) CAPSULE BY MOUTH ONCE DAILY 2/1/22   Cristal Carson MD   gabapentin (NEURONTIN) 600 MG tablet Take 1 tablet by mouth 3 times daily for 180 days. 1/27/22 7/26/22  Michael Farias MD   escitalopram (LEXAPRO) 20 MG tablet TAKE (1) TABLET BY MOUTH DAILY 9/15/21   Michael Farias MD   Compression Stockings MISC by Does not apply route Knee high medium compression 20-30 cc disp three pairs. 3/12/20   Michael Farias MD   Handicap Placard MISC by Does not apply route 10/12/18   Michael Farias MD   cyclobenzaprine (FLEXERIL) 5 MG tablet TK 1 T PO BID  Patient not taking: Reported on 6/18/2022 9/8/17   Historical Provider, MD   levonorgestrel (Dorenda Sour) 20 MCG/24HR IUD 1 each by Intrauterine route    Historical Provider, MD       ALLERGIES: Patient has no known allergies. REVIEW OF SYSTEM:   Review of Systems   Constitutional: Negative for appetite change, fatigue, fever and unexpected weight change. HENT: Negative for congestion, rhinorrhea and sore throat. Eyes: Negative. Negative for photophobia and visual disturbance. Respiratory: Negative for shortness of breath and wheezing. Cardiovascular: Negative for chest pain. Gastrointestinal: Negative for abdominal pain, nausea and vomiting. Endocrine: Negative. Negative for polydipsia, polyphagia and polyuria. Genitourinary: Negative for difficulty urinating, dysuria and pelvic pain. Musculoskeletal: Positive for myalgias. Negative for back pain. Skin: Negative. Negative for rash. Allergic/Immunologic: Negative. Neurological: Positive for weakness. Negative for dizziness and speech difficulty. Hematological: Negative. Psychiatric/Behavioral: Negative. Negative for behavioral problems and confusion. OBJECTIVE  PHYSICAL EXAM: There were no vitals taken for this visit. Physical Exam  Vitals and nursing note reviewed. Constitutional:       General: She is not in acute distress. Appearance: She is well-developed. She is not ill-appearing or toxic-appearing.    HENT:      Head: Normocephalic and atraumatic. Nose: Nose normal.      Mouth/Throat:      Mouth: Mucous membranes are moist.   Eyes:      Pupils: Pupils are equal, round, and reactive to light. Cardiovascular:      Rate and Rhythm: Normal rate and regular rhythm. Pulses: Normal pulses. Heart sounds: Normal heart sounds. Pulmonary:      Effort: Pulmonary effort is normal. No respiratory distress. Breath sounds: Normal breath sounds. No wheezing or rales. Abdominal:      General: Bowel sounds are normal. There is no distension. Palpations: Abdomen is soft. There is no mass. Tenderness: There is no abdominal tenderness. There is no guarding or rebound. Hernia: No hernia is present. Musculoskeletal:         General: Normal range of motion. Left forearm: No swelling, deformity or tenderness. Cervical back: Normal range of motion. Skin:     General: Skin is warm and dry. Capillary Refill: Capillary refill takes less than 2 seconds. Neurological:      Mental Status: She is alert and oriented to person, place, and time. Cranial Nerves: No dysarthria or facial asymmetry. Sensory: No sensory deficit. Motor: No weakness, tremor or pronator drift. Coordination: Finger-Nose-Finger Test and Heel to [employer] normal.   Psychiatric:         Mood and Affect: Mood normal.           DATA:     Diagnostic tests reviewed for today's visit:    Most recent labs and imaging results reviewed.      LABS:    Recent Results (from the past 24 hour(s))   CBC with Auto Differential    Collection Time: 06/18/22  6:52 PM   Result Value Ref Range    WBC 18.7 (H) 4.0 - 10.0 K/uL    RBC 4.52 3.93 - 5.22 M/uL    Hemoglobin 14.6 11.2 - 15.7 g/dL    Hematocrit 43.9 37.0 - 47.0 %    MCV 97.1 (H) 79.4 - 94.8 fL    MCH 32.3 (H) 25.6 - 32.2 pg    MCHC 33.3 32.2 - 35.5 %    RDW 14.3 11.7 - 14.4 %    Platelets 253 082 - 725 K/uL    Neutrophils % 79.8 (H) 34.0 - 71.1 %    Immature Granulocytes % 0.5 %    Lymphocytes % 12.2 %    Monocytes % 5.7 4.7 - 12.5 %    Eosinophils % 1.4 0.7 - 5.8 %    Basophils % 0.4 0.1 - 1.2 %    Neutrophils Absolute 14.9 (H) 1.6 - 6.1 K/uL    Immature Granulocytes # 0.1 K/uL    Lymphocytes Absolute 2.3 1.2 - 3.7 K/uL    Monocytes Absolute 1.1 (H) 0.2 - 0.9 K/uL    Eosinophils Absolute 0.3 0.0 - 0.4 K/uL    Basophils Absolute 0.1 0.0 - 0.1 K/uL   Comprehensive Metabolic Panel w/ Reflex to MG    Collection Time: 06/18/22  6:52 PM   Result Value Ref Range    Sodium 141 135 - 144 mEq/L    Potassium reflex Magnesium 4.2 3.4 - 4.9 mEq/L    Chloride 105 95 - 107 mEq/L    CO2 25 20 - 31 mEq/L    Anion Gap 11 9 - 15 mEq/L    Glucose 103 (H) 70 - 99 mg/dL    BUN 9 6 - 20 mg/dL    CREATININE 0.72 0.50 - 0.90 mg/dL    GFR Non-African American >60.0 >60    GFR  >60.0 >60    Calcium 9.3 8.5 - 9.9 mg/dL    Total Protein 6.8 6.3 - 8.0 g/dL    Albumin 4.0 3.5 - 4.6 g/dL    Total Bilirubin 0.3 0.2 - 0.7 mg/dL    Alkaline Phosphatase 81 40 - 130 U/L    ALT 11 0 - 33 U/L    AST 13 0 - 35 U/L    Globulin 2.8 2.3 - 3.5 g/dL   Sedimentation Rate    Collection Time: 06/18/22  6:52 PM   Result Value Ref Range    Sed Rate 24 (H) 0 - 20 mm   C-Reactive Protein    Collection Time: 06/18/22  6:52 PM   Result Value Ref Range    CRP 32.1 (H) 0.0 - 5.0 mg/L   Urinalysis with Reflex to Culture    Collection Time: 06/18/22  6:52 PM    Specimen: Urine   Result Value Ref Range    Color, UA Yellow Straw/Yellow    Clarity, UA Clear Clear    Glucose, Ur Negative Negative mg/dL    Bilirubin Urine Large Negative    Ketones, Urine Negative Negative mg/dL    Specific Gravity, UA 1.015 1.005 - 1.030    Blood, Urine Negative Negative    pH, UA 6.0 5.0 - 9.0    Protein, UA Negative Negative mg/dL    Urobilinogen, Urine 0.2 <2.0 E.U./dL    Nitrite, Urine Negative Negative    Leukocyte Esterase, Urine Trace Negative    Urine Reflex to Culture Not Indicated    Microscopic Urinalysis    Collection Time: 06/18/22  6:52 PM   Result Value Ref Range    WBC, UA 3-5 0 - 5 /HPF    RBC, UA 0-2 0 - 2 /HPF    Epithelial Cells, UA 0-2 /HPF    Bacteria, UA RARE (A) Negative /HPF   CK    Collection Time: 06/18/22  6:53 PM   Result Value Ref Range    Total CK 52 0 - 170 U/L       IMAGING:   CT HEAD WO CONTRAST    Result Date: 6/18/2022    No acute findings in the head/brain. CT CERVICAL SPINE WO CONTRAST    Result Date: 6/18/2022  No acute fracture or traumatic malalignment. No significant spinal or neuroforaminal stenosis. VTE Prophylaxis: low molecular weight heparin -  start     ASSESSMENT AND PLAN    Principal Problem:  MS: BLE weakness with left arm pain. History of MS and on home meds to control. Elevated WBC at 18.7 likely reactive to inflammation. CRP 32.1, sed rate 24. Patient given 1 g Rocephin IV in ED. We will continue Solu-Medrol. We will consult neuro. Depression: Patient on home meds to control. Will resume home meds.     Plan of care discussed with: patient    SIGNATURE: CARLTON Pimentel CNP  DATE: June 19, 2022  TIME: 1:44 AM     Cynthia Layton DO - supervising physician

## 2022-06-19 NOTE — ED NOTES
Spoke with [address], Darlin Meigs for ETA of bed assignment. Per [facility]lin [facility], they have no ETA as they \"can't take anything out of house at this time\".       Warden Leo RN  06/18/22 [id number]

## 2022-06-20 LAB
ANION GAP SERPL CALCULATED.3IONS-SCNC: 12 MEQ/L (ref 9–15)
BASOPHILS ABSOLUTE: 0 K/UL (ref 0–0.2)
BASOPHILS RELATIVE PERCENT: 0.1 %
BUN BLDV-MCNC: 11 MG/DL (ref 6–20)
CALCIUM SERPL-MCNC: 8.4 MG/DL (ref 8.5–9.9)
CHLORIDE BLD-SCNC: 109 MEQ/L (ref 95–107)
CO2: 22 MEQ/L (ref 20–31)
CREAT SERPL-MCNC: 0.69 MG/DL (ref 0.5–0.9)
EOSINOPHILS ABSOLUTE: 0 K/UL (ref 0–0.7)
EOSINOPHILS RELATIVE PERCENT: 0 %
GFR AFRICAN AMERICAN: >60
GFR NON-AFRICAN AMERICAN: >60
GLUCOSE BLD-MCNC: 183 MG/DL (ref 70–99)
HCT VFR BLD CALC: 43 % (ref 37–47)
HEMOGLOBIN: 14.3 G/DL (ref 12–16)
LYMPHOCYTES ABSOLUTE: 1.8 K/UL (ref 1–4.8)
LYMPHOCYTES RELATIVE PERCENT: 9.7 %
MCH RBC QN AUTO: 31.6 PG (ref 27–31.3)
MCHC RBC AUTO-ENTMCNC: 33.1 % (ref 33–37)
MCV RBC AUTO: 95.5 FL (ref 82–100)
MONOCYTES ABSOLUTE: 0.4 K/UL (ref 0.2–0.8)
MONOCYTES RELATIVE PERCENT: 2.2 %
NEUTROPHILS ABSOLUTE: 16.2 K/UL (ref 1.4–6.5)
NEUTROPHILS RELATIVE PERCENT: 88 %
PDW BLD-RTO: 14.3 % (ref 11.5–14.5)
PLATELET # BLD: 305 K/UL (ref 130–400)
POTASSIUM REFLEX MAGNESIUM: 4.6 MEQ/L (ref 3.4–4.9)
RBC # BLD: 4.51 M/UL (ref 4.2–5.4)
SODIUM BLD-SCNC: 143 MEQ/L (ref 135–144)
WBC # BLD: 18.5 K/UL (ref 4.8–10.8)

## 2022-06-20 PROCEDURE — 36415 COLL VENOUS BLD VENIPUNCTURE: CPT

## 2022-06-20 PROCEDURE — 97161 PT EVAL LOW COMPLEX 20 MIN: CPT

## 2022-06-20 PROCEDURE — 1210000000 HC MED SURG R&B

## 2022-06-20 PROCEDURE — 6360000002 HC RX W HCPCS: Performed by: INTERNAL MEDICINE

## 2022-06-20 PROCEDURE — 97165 OT EVAL LOW COMPLEX 30 MIN: CPT

## 2022-06-20 PROCEDURE — 6370000000 HC RX 637 (ALT 250 FOR IP): Performed by: INTERNAL MEDICINE

## 2022-06-20 PROCEDURE — 2580000003 HC RX 258: Performed by: INTERNAL MEDICINE

## 2022-06-20 PROCEDURE — 85025 COMPLETE CBC W/AUTO DIFF WBC: CPT

## 2022-06-20 PROCEDURE — 6370000000 HC RX 637 (ALT 250 FOR IP): Performed by: NURSE PRACTITIONER

## 2022-06-20 PROCEDURE — 80048 BASIC METABOLIC PNL TOTAL CA: CPT

## 2022-06-20 RX ADMIN — BACLOFEN 10 MG: 10 TABLET ORAL at 13:56

## 2022-06-20 RX ADMIN — ESCITALOPRAM 20 MG: 20 TABLET, FILM COATED ORAL at 08:55

## 2022-06-20 RX ADMIN — BACLOFEN 10 MG: 10 TABLET ORAL at 22:11

## 2022-06-20 RX ADMIN — ETODOLAC 400 MG: 400 TABLET, FILM COATED ORAL at 22:13

## 2022-06-20 RX ADMIN — ETODOLAC 400 MG: 400 TABLET, FILM COATED ORAL at 08:57

## 2022-06-20 RX ADMIN — GABAPENTIN 300 MG: 300 CAPSULE ORAL at 08:55

## 2022-06-20 RX ADMIN — GABAPENTIN 300 MG: 300 CAPSULE ORAL at 22:11

## 2022-06-20 RX ADMIN — ENOXAPARIN SODIUM 40 MG: 100 INJECTION SUBCUTANEOUS at 08:56

## 2022-06-20 RX ADMIN — BACLOFEN 10 MG: 10 TABLET ORAL at 17:21

## 2022-06-20 RX ADMIN — GABAPENTIN 300 MG: 300 CAPSULE ORAL at 13:56

## 2022-06-20 RX ADMIN — SODIUM CHLORIDE 1000 MG: 9 INJECTION, SOLUTION INTRAVENOUS at 22:11

## 2022-06-20 RX ADMIN — BACLOFEN 10 MG: 10 TABLET ORAL at 08:55

## 2022-06-20 ASSESSMENT — PAIN SCALES - GENERAL
PAINLEVEL_OUTOF10: 6
PAINLEVEL_OUTOF10: 6
PAINLEVEL_OUTOF10: 5
PAINLEVEL_OUTOF10: 5

## 2022-06-20 NOTE — FLOWSHEET NOTE
Patient assessment complete. A&Ox4. Equal strength, painful sensation to left elbow, lidocaine patch applied. Respirations equal and unlabored, denies chest pain, abdomen soft and non-tender. Pt up in room independently with steady gait, all needs addressed at this time, Son present in the room with patient, will continue to monitor.

## 2022-06-20 NOTE — PROGRESS NOTES
Physical Therapy Med Surg Initial Assessment  Facility/Department: [address] [place] [address] [address]  Room: [place] [address]       NAME: Bora Emmanuel  : 1980 (43 y.o.)  MRN: [id number]  CODE STATUS: Full Code    Date of Service: 2022    Patient Diagnosis(es): MS (multiple sclerosis) ([facility] [place] [facility] Yanique [place]   No chief complaint on file.     Patient Active Problem List    Diagnosis Date Noted    Bite, animal 2020    Left foot pain 2020    Deficient knowledge 2020    Dog bite of wrist 2020    Impaired mobility 2020    Myalgia 2020    Hypokalemia 10/02/2018    Nicotine dependence, unspecified, uncomplicated     Overweight 10/02/2018    Ataxic gait 2018    Body mass index (bmi) 39.0-39.9, adult 2018    Muscle weakness (generalized) 2018    Neuromuscular dysfunction of bladder, unspecified 2018    Obesity, unspecified 2018    Paresthesia of skin 2018    Vitamin D deficiency 2018    Neuropathy 2016    Anxiety and depression 2016    MS (multiple sclerosis) ([facility] [place] 2015        Past Medical History:   Diagnosis Date    Carpal tunnel syndrome      Past Surgical History:   Procedure Laterality Date    APPENDECTOMY      BREAST SURGERY      lump removal bengin     SECTION      x 3    TUBAL LIGATION         Patient assessed for rehabilitation services?: Yes  Family / Caregiver Present: Yes ( and son)    Restrictions:  Restrictions/Precautions: Fall Risk (high mccauley score)     SUBJECTIVE:   Subjective: \"I want to go back to OP at [facility]\"    Pain  7/10 L elbow    Prior Level of Function:  Social/Functional History  Lives With: Spouse,Son  Type of Home: House  Home Layout: One level  Home Access: Level entry  Bathroom Shower/Tub: Tub/Shower unit  [address]: Shower chair (getting bars installed; poor fit of shower chair per family report)  Home Equipment: Cane,Rollator,Walker, rolling (scooter)  Has Verbal  Education Outcome: Verbalized understanding       ASSESSMENT:   Decision Making: Low Complexity  History: high  Exam: low  Clinical Presentation: low    Therapy Prognosis: Good         DISCHARGE RECOMMENDATIONS:  No Skilled PT: At baseline function    Assessment: Pt demonstrating functional mobility at supervision level. Pt with long standing gait issues and has assistance from family at [facility]. Pt declined continued PT need while in the hospital.  Requires PT Follow-Up: No      PLAN OF CARE:  Plan  Plan Comment: home with family, no continued PT needs    Safety Devices  Type of Devices: Call light within reach,Left in bed    [facility] (6 CLICK) [address] Mobility Raw Score : 21    Therapy Time:   Individual   Time In 1136   Time Out 1149   Minutes 13       eval X 13 min    Shakila Joseph, PT, 06/20/22 at 12:12 PM         Definitions for assistance levels  Independent = pt does not require any physical supervision or assistance from another person for activity completion. Device may be needed.   Stand by assistance = pt requires verbal cues or instructions from another person, close to but not touching, to perform the activity  Minimal assistance= pt performs 75% or more of the activity; assistance is required to complete the activity  Moderate assistance= pt performs 50% of the activity; assistance is required to complete the activity  Maximal assistance = pt performs 25% of the activity; assistance is required to complete the activity  Dependent = pt requires total physical assistance to accomplish the task

## 2022-06-20 NOTE — PLAN OF CARE
Problem: Safety - Adult  Goal: Free from fall injury  6/20/2022 1040 by SHANE Rand RN  Outcome: Progressing  6/20/2022 0236 by Damon Piña RN  Outcome: Progressing     Problem: Pain  Goal: Verbalizes/displays adequate comfort level or baseline comfort level  6/20/2022 1040 by SHANE Rand RN  Outcome: Progressing  6/20/2022 0236 by Damon Piña RN  Outcome: Progressing     Problem: ABCDS Injury Assessment  Goal: Absence of physical injury  6/20/2022 1040 by SHANE Rand RN  Outcome: Progressing  6/20/2022 0236 by Damon Piña RN  Outcome: Progressing

## 2022-06-20 NOTE — PROGRESS NOTES
[facility] OCCUPATIONAL THERAPY EVALUATION - ACUTE     NAME: Feli Mccurdy  : 1980 (43 y.o.)  MRN: [id number]  CODE STATUS: Full Code  [facility]    Date of Service: 2022    Patient Diagnosis(es): MS (multiple sclerosis) ([facility] Fatou Ross   Patient Active Problem List    Diagnosis Date Noted    Bite, animal 2020    Left foot pain 2020    Deficient knowledge 2020    Dog bite of wrist 2020    Impaired mobility 2020    Myalgia 2020    Hypokalemia 10/02/2018    Nicotine dependence, unspecified, uncomplicated     Overweight 10/02/2018    Ataxic gait 2018    Body mass index (bmi) 39.0-39.9, adult 2018    Muscle weakness (generalized) 2018    Neuromuscular dysfunction of bladder, unspecified 2018    Obesity, unspecified 2018    Paresthesia of skin 2018    Vitamin D deficiency 2018    Neuropathy 2016    Anxiety and depression 2016    MS (multiple sclerosis) ([facility] 2015        Past Medical History:   Diagnosis Date    Carpal tunnel syndrome      Past Surgical History:   Procedure Laterality Date    APPENDECTOMY      BREAST SURGERY      lump removal bengin     SECTION      x 3    TUBAL LIGATION          Restrictions  Restrictions/Precautions: Fall Risk (high mccauley score however demonstrates safe independent mobility)     Safety Devices: Safety Devices  Type of Devices: Call light within reach; Left in bed     Patient's date of birth confirmed: Yes    General:  Chart Reviewed: Yes  Patient assessed for rehabilitation services?: Yes    Subjective  Subjective: \"I feel okay, it's just my elbow now\"       Pain at start of treatment: Yes: 7/10    Pain at end of treatment: Yes: 7/10    Location: L elbow  Nursing notified: Declined  RN: Jennifer Has- notified of pt's level of independence  Intervention: None    Prior Level of Function:  Social/Functional History  Lives With: Spouse,Son  Type of Home: House  Home Layout: One level  Home Access: Level entry  Bathroom Shower/Tub: Tub/Shower unit  Bathroom Equipment: Shower chair (getting bars installed; poor fit of shower chair per family report)  Home Equipment: Cane,Rollator,Walker, rolling (scooter)  Has the patient had two or more falls in the past year or any fall with injury in the past year?: Yes (fell in tub this month)  ADL Assistance: Independent (increased time)  Homemaking Assistance: Needs assistance (family assists)  Homemaking Responsibilities: Yes  Ambulation Assistance: Independent (intermittently using furniture; able to amb 15 min prior to requiring a rest- pt with difficulty with community distances)  Transfer Assistance: Independent  Active : No  Patient's  Info: ; provide a ride  Additional Comments: pt stating previously going to OP PT for B hips; also getting carpal tunnel assessed by OP therapy    OBJECTIVE:     Orientation Status:  Orientation  Overall Orientation Status: Within Functional Limits    Observation:  Observation/Palpation  Posture: Good  Observation: pt amb in room with family supervision upon arrival to pt room    Cognition Status:  Cognition  Overall Cognitive Status: [facility]  Cognition Comment: Mild impulsivity and pressured speech    Perception Status:  Perception  Overall Perceptual Status: [facility]    Vision and Hearing Status:  Vision  Vision Exceptions: Wears glasses at all times  Hearing  Hearing: Within functional limits   Vision - Basic Assessment  Prior Vision: Wears glasses only for reading  Visual History: No significant visual history  Patient Visual Report: No visual complaint reported. Visual Field Cut: No  Oculo Motor Control:  WNL    GROSS ASSESSMENT AROM/PROM:  AROM: Within functional limits  PROM: Within functional limits    ROM:   LUE AROM (degrees)  LUE AROM : WFL  Left Hand AROM (degrees)  Left Hand AROM: WFL  RUE AROM (degrees)  RUE AROM : WFL  Right Hand AROM (degrees)  Right Hand AROM: WFL    UE STRENGTH:  Strength: Generally decreased, functional    UE COORDINATION:  Coordination: Generally decreased, functional    UE TONE:  Tone: Normal    UE SENSATION:  Sensation: Impaired (\"Everywhere, all the time, but especially my left elbow right now)    Hand Dominance:  Hand Dominance  Hand Dominance: Right    ADL Status:  ADL  Feeding: Independent  Grooming: Modified independent   UE Bathing: Modified independent   LE Bathing: Modified independent   UE Dressing: Modified independent   LE Dressing: Modified independent   Toileting: Modified independent   Additional Comments: Pt simulated ADLs as above. Able to weight bear, reach feet, weight shift and adjust shoes/clothing. Pt states at baseline her dressing 'just takes a while'  Toilet Transfers  Toilet - Technique: Ambulating  Equipment Used: Standard toilet  Toilet Transfer: Independent    Functional Mobility:  Patient ambulated household distance in hallway with No device at Supervision level. Pt reports she ambulates at this level at baseline; agreeable to use of cane or walker to improve safety.     Bed Mobility  Bed mobility  Supine to Sit: Independent  Sit to Supine: Independent    Seated and Standing Balance:  Balance  Sitting: Intact  Standing: Impaired  Standing - Static: Good  Standing - Dynamic: Fair    Functional Endurance:  Activity Tolerance  Activity Tolerance: Patient Tolerated treatment well    D/C Recommendations:  OT D/C RECOMMENDATIONS  REQUIRES OT FOLLOW-UP: No    Equipment Recommendations:  OT Equipment Recommendations  Equipment Needed: No    OT Education:   Patient Education  Education Given To: Patient  Education Provided: Role of Therapy;Plan of Care  Education Method: Verbal  Barriers to Learning: None  Education Outcome: Verbalized understanding    OT Follow Up:   OT D/C RECOMMENDATIONS  REQUIRES OT FOLLOW-UP: No       Assessment/Discharge Disposition:  Assessment: Pt is a 43year old woman from home with family support who presents to [facility] with exacerbation of MS. Pt demonstrates mild decreased balance. Pt limited d/t fatigue and weakness however reports this feels at her baseline. No acute OT needs identified. Prognosis: Good  No Skilled OT: Independent with ADL's,Safe to return home  Decision Making: Low Complexity  History: Pt's medical history is moderately complex  Exam: Pt. has no performance deficits  Assistance / Modification: Pt. requires no physical assist    AMPAC (Six Click) Self care Score   How much help for putting on and taking off regular lower body clothing?: None  How much help for Bathing?: None  How much help for Toileting?: None  How much help for putting on and taking off regular upper body clothing?: None  How much help for taking care of personal grooming?: None  How much help for eating meals?: None  AM-PAC Inpatient Daily Activity Raw Score: 24  AM-PAC Inpatient ADL T-Scale Score : 57.54  ADL Inpatient CMS 0-100% Score: 0    Therapy key for assistance levels -   Independent/Mod I = Pt. is able to perform task with no assistance but may require a device   Stand by assistance = Pt. does not perform task at an independent level but does not need physical assistance, requires verbal cues  Minimal, Moderate, Maximal Assistance = Pt. requires physical assistance (25%, 50%, 75% assist from helper) for task but is able to actively participate in task   Dependent = Pt. requires total assistance with task and is not able to actively participate with task completion     Plan:  Plan  Times per Week: No acute OT    Goals:     Patient Goal: Patient goals : \"I want to get home\"     Discussed and agreed upon: Yes Comments:       Therapy Time:   Individual   Time In 1136   Time Out 1149   Minutes 13     Eval: 13 minutes     Electronically signed by:     BENITA Lange,   6/20/2022, 12:42 PM

## 2022-06-20 NOTE — PLAN OF CARE
See OT evaluation for all goals and OT POC.  Electronically signed by BENITA Angela on 6/20/2022 at 12:47 PM

## 2022-06-21 ENCOUNTER — TELEPHONE (OUTPATIENT)
Dept: FAMILY MEDICINE CLINIC | Age: 42
End: 2022-06-21

## 2022-06-21 LAB
ANION GAP SERPL CALCULATED.3IONS-SCNC: 9 MEQ/L (ref 9–15)
BASOPHILS ABSOLUTE: 0 K/UL (ref 0–0.2)
BASOPHILS RELATIVE PERCENT: 0.1 %
BUN BLDV-MCNC: 14 MG/DL (ref 6–20)
CALCIUM SERPL-MCNC: 8.6 MG/DL (ref 8.5–9.9)
CHLORIDE BLD-SCNC: 107 MEQ/L (ref 95–107)
CO2: 26 MEQ/L (ref 20–31)
CREAT SERPL-MCNC: 0.82 MG/DL (ref 0.5–0.9)
EOSINOPHILS ABSOLUTE: 0 K/UL (ref 0–0.7)
EOSINOPHILS RELATIVE PERCENT: 0 %
GFR AFRICAN AMERICAN: >60
GFR NON-AFRICAN AMERICAN: >60
GLUCOSE BLD-MCNC: 197 MG/DL (ref 70–99)
HCT VFR BLD CALC: 42.5 % (ref 37–47)
HEMOGLOBIN: 14 G/DL (ref 12–16)
LYMPHOCYTES ABSOLUTE: 1.5 K/UL (ref 1–4.8)
LYMPHOCYTES RELATIVE PERCENT: 8.4 %
MCH RBC QN AUTO: 31.7 PG (ref 27–31.3)
MCHC RBC AUTO-ENTMCNC: 32.8 % (ref 33–37)
MCV RBC AUTO: 96.6 FL (ref 82–100)
MONOCYTES ABSOLUTE: 0.3 K/UL (ref 0.2–0.8)
MONOCYTES RELATIVE PERCENT: 1.5 %
NEUTROPHILS ABSOLUTE: 15.8 K/UL (ref 1.4–6.5)
NEUTROPHILS RELATIVE PERCENT: 90 %
PDW BLD-RTO: 14.4 % (ref 11.5–14.5)
PLATELET # BLD: 296 K/UL (ref 130–400)
POTASSIUM REFLEX MAGNESIUM: 4.6 MEQ/L (ref 3.4–4.9)
RBC # BLD: 4.4 M/UL (ref 4.2–5.4)
SODIUM BLD-SCNC: 142 MEQ/L (ref 135–144)
WBC # BLD: 17.6 K/UL (ref 4.8–10.8)

## 2022-06-21 PROCEDURE — 80048 BASIC METABOLIC PNL TOTAL CA: CPT

## 2022-06-21 PROCEDURE — 85025 COMPLETE CBC W/AUTO DIFF WBC: CPT

## 2022-06-21 PROCEDURE — 6370000000 HC RX 637 (ALT 250 FOR IP): Performed by: NURSE PRACTITIONER

## 2022-06-21 PROCEDURE — 6370000000 HC RX 637 (ALT 250 FOR IP): Performed by: INTERNAL MEDICINE

## 2022-06-21 PROCEDURE — 36415 COLL VENOUS BLD VENIPUNCTURE: CPT

## 2022-06-21 PROCEDURE — 6360000002 HC RX W HCPCS: Performed by: INTERNAL MEDICINE

## 2022-06-21 PROCEDURE — 2580000003 HC RX 258: Performed by: INTERNAL MEDICINE

## 2022-06-21 PROCEDURE — 1210000000 HC MED SURG R&B

## 2022-06-21 PROCEDURE — 99222 1ST HOSP IP/OBS MODERATE 55: CPT | Performed by: PSYCHIATRY & NEUROLOGY

## 2022-06-21 RX ORDER — LORAZEPAM 2 MG/ML
1 INJECTION INTRAMUSCULAR
Status: ACTIVE | OUTPATIENT
Start: 2022-06-21 | End: 2022-06-21

## 2022-06-21 RX ADMIN — GABAPENTIN 300 MG: 300 CAPSULE ORAL at 13:20

## 2022-06-21 RX ADMIN — ESCITALOPRAM 20 MG: 20 TABLET, FILM COATED ORAL at 08:54

## 2022-06-21 RX ADMIN — BACLOFEN 10 MG: 10 TABLET ORAL at 13:20

## 2022-06-21 RX ADMIN — BACLOFEN 10 MG: 10 TABLET ORAL at 17:05

## 2022-06-21 RX ADMIN — BACLOFEN 10 MG: 10 TABLET ORAL at 08:55

## 2022-06-21 RX ADMIN — ETODOLAC 400 MG: 400 TABLET, FILM COATED ORAL at 08:57

## 2022-06-21 RX ADMIN — GABAPENTIN 300 MG: 300 CAPSULE ORAL at 21:28

## 2022-06-21 RX ADMIN — ETODOLAC 400 MG: 400 TABLET, FILM COATED ORAL at 21:28

## 2022-06-21 RX ADMIN — BACLOFEN 10 MG: 10 TABLET ORAL at 21:28

## 2022-06-21 RX ADMIN — GABAPENTIN 300 MG: 300 CAPSULE ORAL at 08:54

## 2022-06-21 RX ADMIN — SODIUM CHLORIDE 1000 MG: 9 INJECTION, SOLUTION INTRAVENOUS at 22:23

## 2022-06-21 ASSESSMENT — ENCOUNTER SYMPTOMS
CHOKING: 0
COLOR CHANGE: 0
NAUSEA: 0
TROUBLE SWALLOWING: 0
PHOTOPHOBIA: 0
SHORTNESS OF BREATH: 0
BACK PAIN: 0
VOMITING: 0

## 2022-06-21 ASSESSMENT — PAIN SCALES - GENERAL
PAINLEVEL_OUTOF10: 9
PAINLEVEL_OUTOF10: 7

## 2022-06-21 NOTE — CARE COORDINATION
Not medically ready for d/c today. Patient still needs a few more days of Solumedrol. Once treatment has been completed the patient plans to d/c home and resume outpatient PT therapy. CM to continue to follow for any new d/c needs and any changes to the d/c plan.

## 2022-06-21 NOTE — TELEPHONE ENCOUNTER
----- Message from Tr Fernandez sent at 6/21/2022  9:13 AM EDT -----  Subject: Message to Provider    QUESTIONS  Information for Provider? patient went into the hospital for M.S. wants a   call back to see if the doctor wants to see her before July 21   ---------------------------------------------------------------------------  --------------  CALL BACK INFO  What is the best way for the office to contact you? OK to leave message on   voicemail  Preferred Call Back Phone Number? [phone]  ---------------------------------------------------------------------------  --------------  SCRIPT ANSWERS  Relationship to Patient?  Self

## 2022-06-21 NOTE — CONSULTS
Subjective:      Patient ID: Jailene Carmona is a 43 y.o. female who presents today for left pain and weakness and numbness. HPI 43 right-handed female well-known to me from outpatient evaluation with multiple sclerosis. Patient came to the hospital as she is experiencing left arm pain around the elbow and hand and weakness in both her hands. This is going on for a few days and she was concerned about flareup. She did not have any major issues with her walking. She was given methylprednisone in the emergency room and admitted. We did not get consulted till this morning. She has been Aubagio doing well with a EDSS scores remaining stable with few exacerbations. Pain she describes is deep in the elbow. She had a recent EMG nerve conduction study showing carpal tunnel syndrome. She still has neck pain mostly on the left compared to the right. Review of Systems   Constitutional: Negative for fever. HENT: Negative for ear pain, tinnitus and trouble swallowing. Eyes: Negative for photophobia and visual disturbance. Respiratory: Negative for choking and shortness of breath. Cardiovascular: Negative for chest pain and palpitations. Gastrointestinal: Negative for nausea and vomiting. Musculoskeletal: Positive for gait problem and neck pain. Negative for back pain, joint swelling, myalgias and neck stiffness. Skin: Negative for color change. Allergic/Immunologic: Negative for food allergies. Neurological: Positive for weakness. Negative for dizziness, tremors, seizures, syncope, facial asymmetry, speech difficulty, light-headedness, numbness and headaches. Psychiatric/Behavioral: Negative for behavioral problems, confusion, hallucinations and sleep disturbance.    Left arm pain and elbow pain and gait issues and ataxia  Patient has pain in the left side of her neck as well  Past Medical History:   Diagnosis Date    Carpal tunnel syndrome      Past Surgical History:   Procedure Laterality Date    APPENDECTOMY      BREAST SURGERY      lump removal bengin     SECTION      x 3    TUBAL LIGATION       Social History     Socioeconomic History    Marital status:      Spouse name: Not on file    Number of children: Not on file    Years of education: Not on file    Highest education level: Not on file   Occupational History    Not on file   Tobacco Use    Smoking status: Current Every Day Smoker    Smokeless tobacco: Never Used   Vaping Use    Vaping Use: Never used   Substance and Sexual Activity    Alcohol use: Yes     Alcohol/week: 0.0 standard drinks    Drug use: Yes     Types: Marijuana Kayla Dinning)    Sexual activity: Not on file   Other Topics Concern    Not on file   Social History Narrative    Not on file     Social Determinants of Health     Financial Resource Strain: Low Risk     Difficulty of Paying Living Expenses: Not hard at all   Food Insecurity: No Food Insecurity    Worried About [address] in the Last Year: Never true    [facility] in the Last Year: Never true   Transportation Needs:     Lack of Transportation (Medical): Not on file    Lack of Transportation (Non-Medical):  Not on file   Physical Activity:     Days of Exercise per Week: Not on file    Minutes of Exercise per Session: Not on file   Stress:     Feeling of Stress : Not on file   Social Connections:     Frequency of Communication with Friends and Family: Not on file    Frequency of Social Gatherings with Friends and Family: Not on file    Attends Cheondoism Services: Not on file    Active Member of Clubs or Organizations: Not on file    Attends Club or Organization Meetings: Not on file    Marital Status: Not on file   Intimate Partner Violence:     Fear of Current or Ex-Partner: Not on file    Emotionally Abused: Not on file    Physically Abused: Not on file    Sexually Abused: Not on file   Housing Stability:     Unable to Pay for Housing in the Last Year: Not on file  Number of Places Lived in the Last Year: Not on file    Unstable Housing in the Last Year: Not on file     Family History   Problem Relation Age of Onset    Diabetes Mother     Heart Disease Father      No Known Allergies  Current Facility-Administered Medications   Medication Dose Route Frequency Provider Last Rate Last Admin    LORazepam (ATIVAN) injection 1 mg  1 mg IntraVENous Once PRN Mali Guzman MD        enoxaparin (LOVENOX) injection 40 mg  40 mg SubCUTAneous Daily Dominguez MARC Sedar, DO   40 mg at 06/20/22 0856    ondansetron (ZOFRAN-ODT) disintegrating tablet 4 mg  4 mg Oral Q8H PRN Sydelle Forget Sedar, DO        Or    ondansetron (ZOFRAN) injection 4 mg  4 mg IntraVENous Q6H PRN Can Forget Sedar, DO        methylPREDNISolone sodium (SOLU-MEDROL) 1,000 mg in sodium chloride 0.9 % 250 mL IVPB  1,000 mg IntraVENous Daily Sydelle Forget Sedar, DO   Stopped at 06/20/22 2303    nicotine (NICODERM CQ) 14 MG/24HR 1 patch  1 patch TransDERmal Daily Nicoletto Bolzan-Roche, APRN - CNP   1 patch at 06/21/22 0856    acetaminophen (TYLENOL) tablet 650 mg  650 mg Oral Q4H PRN Nicoletto Bolzan-Roche, APRN - CNP   650 mg at 06/19/22 0317    lidocaine 4 % external patch 1 patch  1 patch TransDERmal Daily Nicoletto Bolzan-Roche, APRN - CNP   1 patch at 06/21/22 0855    Teriflunomide TABS 14 mg  14 mg Oral Daily Nicoletto Bolzan-Roche, APRN - CNP   14 mg at 06/21/22 0857    baclofen (LIORESAL) tablet 10 mg  10 mg Oral 4x Daily Nicoletto Bolzan-Roche, APRN - CNP   10 mg at 06/21/22 0855    escitalopram (LEXAPRO) tablet 20 mg  20 mg Oral Daily Nicoletto Bolzan-Roche, APRN - CNP   20 mg at 06/21/22 0854    gabapentin (NEURONTIN) capsule 300 mg  300 mg Oral TID Sydelle Forget Sedar, DO   300 mg at 06/21/22 0854    etodolac (LODINE) tablet 400 mg  400 mg Oral BID Can Forget Sedar, DO   400 mg at 06/21/22 0857        Objective:   /73   Pulse 70   Temp 97.3 °F (36.3 °C) (Oral)   Resp 16   Ht 4' 11\" (1.499 m)   Wt 184 lb 9.6 oz (83.7 kg)   SpO2 99%   BMI 37.28 kg/m²     Physical Exam  Vitals reviewed. Eyes:      Pupils: Pupils are equal, round, and reactive to light. Cardiovascular:      Rate and Rhythm: Normal rate and regular rhythm. Heart sounds: No murmur heard. Pulmonary:      Effort: Pulmonary effort is normal.      Breath sounds: Normal breath sounds. Abdominal:      General: Bowel sounds are normal.   Musculoskeletal:         General: Normal range of motion. Cervical back: Normal range of motion. Skin:     General: Skin is warm. Neurological:      Mental Status: She is alert and oriented to person, place, and time. Cranial Nerves: No cranial nerve deficit. Sensory: No sensory deficit. Motor: No abnormal muscle tone. Coordination: Coordination normal.      Deep Tendon Reflexes: Reflexes are normal and symmetric. Babinski sign absent on the right side. Babinski sign absent on the left side. Psychiatric:         Mood and Affect: Mood normal.     Patient is mildly hyperreflexic throughout though she has decreased biceps and brachioradialis reflex on the left    No results found.     Lab Results   Component Value Date    WBC 17.6 06/21/2022    RBC 4.40 06/21/2022    RBC 4.56 10/03/2018    HGB 14.0 06/21/2022    HCT 42.5 06/21/2022    MCV 96.6 06/21/2022    MCH 31.7 06/21/2022    MCHC 32.8 06/21/2022    RDW 14.4 06/21/2022     06/21/2022    MPV 9.8 10/03/2018     Lab Results   Component Value Date     06/21/2022    K 4.6 06/21/2022     06/21/2022    CO2 26 06/21/2022    BUN 14 06/21/2022    CREATININE 0.82 06/21/2022    GFRAA >60.0 06/21/2022    AGRATIO 1.4 10/03/2018    LABGLOM >60.0 06/21/2022    GLUCOSE 197 06/21/2022    GLUCOSE 181 10/03/2018    PROT 6.8 06/18/2022    LABALBU 4.0 06/18/2022    LABALBU 3.7 10/03/2018    CALCIUM 8.6 06/21/2022    BILITOT 0.3 06/18/2022    ALKPHOS 81 06/18/2022    AST 13 06/18/2022    ALT 11 06/18/2022     Lab Results   Component Value Date PROTIME 9.8 08/24/2015    INR 0.9 08/24/2015     Lab Results   Component Value Date    TSH 0.48 09/21/2018    GDLUDCDH24 406 10/25/2021    FOLATE 5.3 10/25/2021     Lab Results   Component Value Date    TRIG 58 06/12/2015    HDL 50 06/12/2015    LDLCALC 100 06/12/2015     Lab Results   Component Value Date    LABAMPH Neg 03/14/2016    BARBSCNU Neg 03/14/2016    LABBENZ Neg 03/14/2016    OPIATESCREENURINE Neg 03/14/2016    PHENCYCLIDINESCREENURINE Neg 03/14/2016     No results found for: LITHIUM, DILFRTOT, VALPROATE    Assessment:   Multiple sclerosis with new symptoms in the left upper arm and elbow without any notable peripheral nerve dysfunction on EMG. She is feeling somewhat better with the steroid though still has deep elbow pain on the left. She does complain of numbness in both her hands and therefore recommended MRI of the cervical spine to see if there is any new plaque. Patient continues on Aubagio with a EDSS score of around 2 or 3. She has not had an MRI of the brain for quite some time and therefore we will repeat this to see what the accumulation of MS plaques are. Patient will complete 5 treatments of methylprednisone to be discharged to home tomorrow depending on her MRI. This consultation includes my consultation the emergency room. Adilson Medina MD, Deangelo Weldon, American Board of Psychiatry & Neurology  Board Certified in Vascular Neurology  Board Certified in Neuromuscular Medicine  Certified in [facility]:

## 2022-06-21 NOTE — TELEPHONE ENCOUNTER
Called and spoke to Tutu letting her know that July 21 is ok for her next appointment. She states the only thing she feels she needs is her migraine medication changed because she is having to take it more often.

## 2022-06-21 NOTE — PROGRESS NOTES
Hospitalist Progress Note      PCP: Tea Hawkins MD    Date of Admission: 6/19/2022    Chief Complaint:    No chief complaint on file. Subjective:  Patient is starting to feel better. 12 point ROS negative other than mentioned above     Medications:  Reviewed    Infusion Medications   Scheduled Medications    enoxaparin  40 mg SubCUTAneous Daily    methylPREDNISolone  1,000 mg IntraVENous Daily    nicotine  1 patch TransDERmal Daily    lidocaine  1 patch TransDERmal Daily    Teriflunomide  14 mg Oral Daily    baclofen  10 mg Oral 4x Daily    escitalopram  20 mg Oral Daily    gabapentin  300 mg Oral TID    etodolac  400 mg Oral BID     PRN Meds: LORazepam, ondansetron **OR** ondansetron, acetaminophen    No intake or output data in the 24 hours ending 06/21/22 1211  Exam:    /73   Pulse 70   Temp 97.3 °F (36.3 °C) (Oral)   Resp 16   Ht 4' 11\" (1.499 m)   Wt 184 lb 9.6 oz (83.7 kg)   SpO2 99%   BMI 37.28 kg/m²     General appearance: No apparent distress, appears stated age and cooperative. HEENT:  Conjunctivae/corneas clear. Neck: Trachea midline. Respiratory:  Normal respiratory effort. Clear to auscultation  Cardiovascular: Regular rate and rhythm   Abdomen: Soft, non-tender, non-distended with normal bowel sounds. Musculoskeletal: No clubbing, cyanosis or edema bilaterally. Neuro: b/l  weakness  Capillary Refill: Brisk,< 3 seconds   Peripheral Pulses: +2 palpable, equal bilaterally     Labs:   Recent Labs     06/18/22  1852 06/20/22  0534 06/21/22  0502   WBC 18.7* 18.5* 17.6*   HGB 14.6 14.3 14.0   HCT 43.9 43.0 42.5    305 296     Recent Labs     06/18/22  1852 06/20/22  0534 06/21/22  0502    143 142   K 4.2 4.6 4.6    109* 107   CO2 25 22 26   BUN 9 11 14   CREATININE 0.72 0.69 0.82   CALCIUM 9.3 8.4* 8.6     Recent Labs     06/18/22  1852   AST 13   ALT 11   BILITOT 0.3   ALKPHOS 81     No results for input(s): INR in the last 72 hours.   Recent Labs 06/18/22  1853   CKTOTAL 52     Urinalysis:      Lab Results   Component Value Date    NITRU Negative 06/18/2022    WBCUA 3-5 06/18/2022    BACTERIA RARE 06/18/2022    RBCUA 0-2 06/18/2022    BLOODU Negative 06/18/2022    SPECGRAV 1.015 06/18/2022    GLUCOSEU Negative 06/18/2022     Radiology:  MRI CERVICAL SPINE W WO CONTRAST    (Results Pending)   MRI BRAIN W WO CONTRAST    (Results Pending)     Assessment/Plan:    #MS exacerbation    - improving with IV steroids; neurology consulted; PT/OT; hopeful for d/c tomorrow    #Depression    - continue home meds    Active Hospital Problems    Diagnosis Date Noted    MS (multiple sclerosis) ([facility] [address] My Jeramy 07/06/2015     Additional work up or/and treatment plan may be added today or then after based on clinical progression. I am managing a portion of pt care. Some medical issues are handled by other specialists. Additional work up and treatment should be done in out pt setting by pt PCP and other out pt providers. In addition to examining and evaluating pt, I spent additional time explaining care, normal and abnormal findings, and treatment plan. All of pt questions were answered. Counseling, diet and education were  provided. Case will be discussed with nursing staff when appropriate. Family will be updated if and when appropriate. Diet: ADULT DIET;  Regular    Code Status: Full Code    Electronically signed by Paco Escalante MD on 6/21/2022 at 12:11 PM

## 2022-06-22 ENCOUNTER — APPOINTMENT (OUTPATIENT)
Dept: MRI IMAGING | Age: 42
DRG: 060 | End: 2022-06-22
Attending: INTERNAL MEDICINE
Payer: COMMERCIAL

## 2022-06-22 VITALS
RESPIRATION RATE: 16 BRPM | HEART RATE: 75 BPM | OXYGEN SATURATION: 100 % | BODY MASS INDEX: 37.21 KG/M2 | SYSTOLIC BLOOD PRESSURE: 142 MMHG | DIASTOLIC BLOOD PRESSURE: 82 MMHG | TEMPERATURE: 97.7 F | WEIGHT: 184.6 LBS | HEIGHT: 59 IN

## 2022-06-22 LAB
ANION GAP SERPL CALCULATED.3IONS-SCNC: 10 MEQ/L (ref 9–15)
BASOPHILS ABSOLUTE: 0 K/UL (ref 0–0.2)
BASOPHILS RELATIVE PERCENT: 0.1 %
BUN BLDV-MCNC: 18 MG/DL (ref 6–20)
CALCIUM SERPL-MCNC: 8.5 MG/DL (ref 8.5–9.9)
CHLORIDE BLD-SCNC: 102 MEQ/L (ref 95–107)
CO2: 24 MEQ/L (ref 20–31)
CREAT SERPL-MCNC: 0.91 MG/DL (ref 0.5–0.9)
EOSINOPHILS ABSOLUTE: 0 K/UL (ref 0–0.7)
EOSINOPHILS RELATIVE PERCENT: 0 %
GFR AFRICAN AMERICAN: >60
GFR NON-AFRICAN AMERICAN: >60
GLUCOSE BLD-MCNC: 201 MG/DL (ref 70–99)
HCT VFR BLD CALC: 42.9 % (ref 37–47)
HEMOGLOBIN: 14 G/DL (ref 12–16)
LYMPHOCYTES ABSOLUTE: 1.6 K/UL (ref 1–4.8)
LYMPHOCYTES RELATIVE PERCENT: 11 %
MCH RBC QN AUTO: 31.4 PG (ref 27–31.3)
MCHC RBC AUTO-ENTMCNC: 32.6 % (ref 33–37)
MCV RBC AUTO: 96.2 FL (ref 82–100)
MONOCYTES ABSOLUTE: 0.3 K/UL (ref 0.2–0.8)
MONOCYTES RELATIVE PERCENT: 2 %
NEUTROPHILS ABSOLUTE: 12.4 K/UL (ref 1.4–6.5)
NEUTROPHILS RELATIVE PERCENT: 86.9 %
PDW BLD-RTO: 14.6 % (ref 11.5–14.5)
PLATELET # BLD: 281 K/UL (ref 130–400)
POTASSIUM REFLEX MAGNESIUM: 4.8 MEQ/L (ref 3.4–4.9)
RBC # BLD: 4.46 M/UL (ref 4.2–5.4)
SODIUM BLD-SCNC: 136 MEQ/L (ref 135–144)
WBC # BLD: 14.2 K/UL (ref 4.8–10.8)

## 2022-06-22 PROCEDURE — 36415 COLL VENOUS BLD VENIPUNCTURE: CPT

## 2022-06-22 PROCEDURE — 2580000003 HC RX 258: Performed by: INTERNAL MEDICINE

## 2022-06-22 PROCEDURE — 72156 MRI NECK SPINE W/O & W/DYE: CPT

## 2022-06-22 PROCEDURE — 85025 COMPLETE CBC W/AUTO DIFF WBC: CPT

## 2022-06-22 PROCEDURE — A9579 GAD-BASE MR CONTRAST NOS,1ML: HCPCS | Performed by: PSYCHIATRY & NEUROLOGY

## 2022-06-22 PROCEDURE — 99233 SBSQ HOSP IP/OBS HIGH 50: CPT | Performed by: PSYCHIATRY & NEUROLOGY

## 2022-06-22 PROCEDURE — 70553 MRI BRAIN STEM W/O & W/DYE: CPT

## 2022-06-22 PROCEDURE — 6370000000 HC RX 637 (ALT 250 FOR IP): Performed by: INTERNAL MEDICINE

## 2022-06-22 PROCEDURE — 80048 BASIC METABOLIC PNL TOTAL CA: CPT

## 2022-06-22 PROCEDURE — 6360000002 HC RX W HCPCS: Performed by: PSYCHIATRY & NEUROLOGY

## 2022-06-22 PROCEDURE — 6360000002 HC RX W HCPCS: Performed by: INTERNAL MEDICINE

## 2022-06-22 PROCEDURE — APPSS30 APP SPLIT SHARED TIME 16-30 MINUTES: Performed by: NURSE PRACTITIONER

## 2022-06-22 PROCEDURE — 6360000004 HC RX CONTRAST MEDICATION: Performed by: PSYCHIATRY & NEUROLOGY

## 2022-06-22 PROCEDURE — 6370000000 HC RX 637 (ALT 250 FOR IP): Performed by: NURSE PRACTITIONER

## 2022-06-22 RX ORDER — LIDOCAINE 4 G/G
1 PATCH TOPICAL DAILY
Qty: 1 BOX | Refills: 1 | Status: SHIPPED | OUTPATIENT
Start: 2022-06-23 | End: 2022-09-30

## 2022-06-22 RX ORDER — LORAZEPAM 2 MG/ML
1 INJECTION INTRAMUSCULAR
Status: COMPLETED | OUTPATIENT
Start: 2022-06-22 | End: 2022-06-22

## 2022-06-22 RX ADMIN — LORAZEPAM 1 MG: 2 INJECTION INTRAMUSCULAR; INTRAVENOUS at 10:11

## 2022-06-22 RX ADMIN — SODIUM CHLORIDE 1000 MG: 9 INJECTION, SOLUTION INTRAVENOUS at 12:32

## 2022-06-22 RX ADMIN — BACLOFEN 10 MG: 10 TABLET ORAL at 12:27

## 2022-06-22 RX ADMIN — GABAPENTIN 300 MG: 300 CAPSULE ORAL at 12:27

## 2022-06-22 RX ADMIN — ETODOLAC 400 MG: 400 TABLET, FILM COATED ORAL at 08:21

## 2022-06-22 RX ADMIN — GADOTERIDOL 15 ML: 279.3 INJECTION, SOLUTION INTRAVENOUS at 12:11

## 2022-06-22 RX ADMIN — BACLOFEN 10 MG: 10 TABLET ORAL at 08:18

## 2022-06-22 RX ADMIN — ESCITALOPRAM 20 MG: 20 TABLET, FILM COATED ORAL at 08:18

## 2022-06-22 RX ADMIN — GABAPENTIN 300 MG: 300 CAPSULE ORAL at 08:19

## 2022-06-22 ASSESSMENT — ENCOUNTER SYMPTOMS
VOMITING: 0
COLOR CHANGE: 0
NAUSEA: 0
WHEEZING: 0
CHEST TIGHTNESS: 0
SHORTNESS OF BREATH: 0
TROUBLE SWALLOWING: 0
COUGH: 0

## 2022-06-22 NOTE — PROGRESS NOTES
1700 discharge instructions reviewed with patient, states understanding, spoke to outpatient pharmacy, they state Lidocaine isn't covered under her insurance, and she would be able to purchase over the counter. Patient states understanding of OTC purchase.   Electronically signed by Lenny Doan RN on 6/22/2022 at 5:17 PM     [phone] left unit via wheelchair and all belongings with transporter Electronically signed by Lenny Doan RN on 6/22/2022 at 5:18 PM

## 2022-06-22 NOTE — PROGRESS NOTES
[address] [facility] Neurology Daily Progress Note  Name: Viri Aragon  Age: 43 y.o. Gender: female  CodeStatus: Full Code  Allergies: No Known Allergies    Chief Complaint:No chief complaint on file. Primary Care Provider: Honey Chacon MD  InpatientTreatment Team: Treatment Team: Attending Provider: Moose Kaye MD; Utilization Reviewer: Maik Jacobsen RN; Registered Nurse: Sandy Rand RN; Consulting Physician: Dick Gallego MD; Registered Nurse: Ashanti Neumann RN; Patient Care Tech: Michael Barahona  Admission Date: 6/19/2022      HPI   Pt seen and examined for neuro follow up for MS exacerbation. Patient alert and oriented x3, no acute distress, cooperative. Continues to have numbness of the bilateral hands. Pain to the left elbow. Afebrile. Mild cervicalgia. Pt still has elbow pain and numbness    Vitals:    06/22/22 0719   BP: (!) 142/82   Pulse: 75   Resp: 16   Temp: 97.7 °F (36.5 °C)   SpO2: 100%      Review of Systems   Constitutional: Negative for fatigue and fever. HENT: Negative for hearing loss and trouble swallowing. Eyes: Negative for visual disturbance. Respiratory: Negative for cough, chest tightness, shortness of breath and wheezing. Cardiovascular: Negative for chest pain, palpitations and leg swelling. Gastrointestinal: Negative for nausea and vomiting. Genitourinary: Negative for difficulty urinating. Musculoskeletal: Positive for arthralgias and neck pain. Negative for gait problem. Skin: Negative for color change and rash. Neurological: Positive for numbness. Negative for dizziness, tremors, seizures, syncope, facial asymmetry, speech difficulty, weakness, light-headedness and headaches. Psychiatric/Behavioral: Negative for agitation, confusion and hallucinations. The patient is not nervous/anxious. Physical Exam  Vitals and nursing note reviewed. Constitutional:       General: She is not in acute distress. Appearance: She is not diaphoretic.    HENT: Head: Normocephalic. Eyes:      Extraocular Movements: Extraocular movements intact. Pupils: Pupils are equal, round, and reactive to light. Cardiovascular:      Rate and Rhythm: Normal rate and regular rhythm. Pulmonary:      Effort: Pulmonary effort is normal. No respiratory distress. Breath sounds: Normal breath sounds. Abdominal:      General: Bowel sounds are normal.      Palpations: Abdomen is soft. Skin:     General: Skin is warm and dry. Neurological:      Mental Status: She is alert and oriented to person, place, and time. Cranial Nerves: No cranial nerve deficit. Sensory: Sensory deficit present. Motor: No weakness, tremor, atrophy, abnormal muscle tone, seizure activity or pronator drift. Coordination: Coordination normal. Finger-Nose-Finger Test normal.      Deep Tendon Reflexes: Reflexes abnormal.      Reflex Scores:       Bicep reflexes are 1+ on the left side. Brachioradialis reflexes are 1+ on the left side. Patellar reflexes are 3+ on the right side and 3+ on the left side. Achilles reflexes are 3+ on the right side and 3+ on the left side.     As noted  left arm numbness, elbow,  weakness legs           Medications:  Reviewed    Infusion Medications:    Scheduled Medications:    enoxaparin  40 mg SubCUTAneous Daily    methylPREDNISolone  1,000 mg IntraVENous Daily    nicotine  1 patch TransDERmal Daily    lidocaine  1 patch TransDERmal Daily    Teriflunomide  14 mg Oral Daily    baclofen  10 mg Oral 4x Daily    escitalopram  20 mg Oral Daily    gabapentin  300 mg Oral TID    etodolac  400 mg Oral BID     PRN Meds: ondansetron **OR** ondansetron, acetaminophen    Labs:   Recent Labs     06/20/22  0534 06/21/22  0502 06/22/22  0500   WBC 18.5* 17.6* 14.2*   HGB 14.3 14.0 14.0   HCT 43.0 42.5 42.9    296 281     Recent Labs     06/20/22  0534 06/21/22  0502 06/22/22  0500    142 136   K 4.6 4.6 4.8   * 107 102   CO2 22 26 24   BUN 11 14 18   CREATININE 0.69 0.82 0.91*   CALCIUM 8.4* 8.6 8.5     No results for input(s): AST, ALT, BILIDIR, BILITOT, ALKPHOS in the last 72 hours. No results for input(s): INR in the last 72 hours. No results for input(s): Disha Malik in the last 72 hours. Urinalysis:   Lab Results   Component Value Date    NITRU Negative 06/18/2022    WBCUA 3-5 06/18/2022    BACTERIA RARE 06/18/2022    RBCUA 0-2 06/18/2022    BLOODU Negative 06/18/2022    SPECGRAV 1.015 06/18/2022    GLUCOSEU Negative 06/18/2022       Radiology:   Most recent    EEG No valid procedures specified. MRI of Brain Results for orders placed during the hospital encounter of 12/17/20    MRI BRAIN W WO CONTRAST    Narrative  EXAM:MRI BRAIN W WO CONTRAST    DATE:12/17/2020 12:13 PM    CLINICAL HISTORY:Demyelinating disease. Followup. COMPARISON: November 14, 2017    TECHNIQUE:  Multi-sequence multiplanar imaging of the brain was performed. Sequences included T1-weighted, T2-weighted, FLAIR, diffusion-weighted, ADC maps, and  susceptibility weighted imaging. VOLUME: 20 mL   MR CONTRAST: ProHance    FINDINGS: New lesions No change in multiple periventricular, juxtacortical and infratentorial white matter lesions compatible with known demyelinating disease. Enhancing lesions : There are no enhancing lesions. Diffusion restriction: No diffusion restriction to suggest any acute infarct. Overall disease burden: No significant change    Parenchymal atrophy: No significant change in parenchymal volume. Other findings: Stable 9 mm pineal cyst. Brain is otherwise unremarkable. Impression  STABLE INTRACRANIAL WHITE MATTER LESIONS CONSISTENT WITH MULTIPLE SCLEROSIS. NO NEW LESIONS. No results found for this or any previous visit. MRA of the Head and Neck: No results found for this or any previous visit. No results found for this or any previous visit.   Results for orders placed during the hospital encounter of 08/29/15    MRA Head WO Contrast    Narrative  EXAMINATION MRA of the head without contrast    CLINICAL HISTORY Bilateral lower extremity weakness and numbness,  fatigue, headaches    TECHNIQUE 3-D time-of-flight MRA imaging of the head was performed  without the use of contrast.    FINDINGS Visible portions of the intracranial internal carotid  arteries demonstrate no significant stenosis. The vertebrobasilar  junction, basilar artery, and basilar tip appear unremarkable. Visible  portions of the anterior, middle, and posterior cerebral arteries  demonstrate no high-grade stenosis or occlusion. The left A1 segment  is small in size, most likely congenital.  There are no signs of an  aneurysm. IMPRESSION NO HIGH-GRADE STENOSIS OR OCCLUSION. Aniceto Brannon M.D. Released Gloria Brannon M.D. Released Date Time- 08/29/15 1015  This document has been electronically signed. ------------------------------------------------------------------------------                            CT of the Head: Results for orders placed during the hospital encounter of 06/18/22    CT HEAD WO CONTRAST    Narrative  Patient: ALEXANDRIA PRICE  Time Out: 20:16  Exam(s): CT HEAD Without Contrast    EXAM:  CT Head Without Intravenous Contrast    CLINICAL HISTORY:  Reason for exam: Left upper extremity weakness hx of MS. Patient  states she is having left elbow pain, denies injury but states she has  been diagnosed with MS. States she is having weakness in left hand. TECHNIQUE:  Axial computed tomography images of the head/brain without intravenous  contrast.  All CT scan at this facility use dose modulation, iterative  reconstruction, and/or weight based dosing when appropriate to reduce  radiation dose to as low as reasonably achievable. COMPARISON:  No relevant prior studies available. FINDINGS:  Brain:  No acute infarct or hemorrhage. Ventricles:  Unremarkable.   No ventriculomegaly. Bones/joints:  Unremarkable. No acute calvarial fracture. Soft tissues:  Unremarkable. Sinuses:  Mild mucosal thickening the paranasal sinuses. Mastoid air cells:  Unremarkable as visualized. Electronically signed by Rich Fatima MD on 06-18-22 at 2016    Impression  No acute findings in the head/brain. No results found for this or any previous visit. No results found for this or any previous visit. Carotid duplex: No results found for this or any previous visit. No results found for this or any previous visit. No results found for this or any previous visit. Echo No results found for this or any previous visit. Assessment/Plan:  6/21/22:  Multiple sclerosis with new symptoms in the left upper arm and elbow without any notable peripheral nerve dysfunction on EMG. She is feeling somewhat better with the steroid though still has deep elbow pain on the left. She does complain of numbness in both her hands and therefore recommended MRI of the cervical spine to see if there is any new plaque. Patient continues on Aubagio with a EDSS score of around 2 or 3. She has not had an MRI of the brain for quite some time and therefore we will repeat this to see what the accumulation of MS plaques are. Patient will complete 5 treatments of methylprednisone to be discharged to home tomorrow depending on her MRI.    6/22/2022:  MS exacerbation  Patient has had 3 days of IV steroids with plan for 5 days total  MRI of the brain and cervical spine remain pending   Collaborating physicians: Dr Robles Wilkinson    I have personally performed a face to face diagnostic evaluation on this patient, reviewed all data and investigations, and am the sole provider of all clinical decisions on the neurological status of this patient. pt still has left elbow numbness, and pain,MRI seen, and pt has some new plaques in the cervical spine,  may expalin findings, pt already treated with prednisone, will follow OP  OK d/c       Adilson Brand MD, [address], American Board of Psychiatry & Neurology  Board Certified in Vascular Neurology  Board Certified in Neuromuscular Medicine  Certified in Neurorehabilitation       Electronically signed by CARLTON Guevara CNP on 6/22/2022 at 10:18 AM

## 2022-06-23 ENCOUNTER — TELEPHONE (OUTPATIENT)
Dept: FAMILY MEDICINE CLINIC | Age: 42
End: 2022-06-23

## 2022-06-24 NOTE — DISCHARGE SUMMARY
Hospital Medicine Discharge Summary    Danica Livingston  :  1980  MRN:  [id number]    Admit date:  2022  Discharge date:  22    Admitting Physician:  Bolivar Ayala DO  Primary Care Physician:  Carlos Haley MD    Discharge Diagnoses:    MS flare    No chief complaint on file. Hospital Course:   Patient presented with an MS flare which responded well to IV steroids per neurology recommendations. Exam on discharge:   BP (!) 142/82   Pulse 75   Temp 97.7 °F (36.5 °C) (Oral)   Resp 16   Ht 4' 11\" (1.499 m)   Wt 184 lb 9.6 oz (83.7 kg)   SpO2 100%   BMI 37.28 kg/m²   distress, appears stated age and cooperative. HEENT:  Conjunctivae/corneas clear. Neck: Trachea midline. Respiratory:  Normal respiratory effort. Clear to auscultation  Cardiovascular: Regular rate and rhythm   Abdomen: Soft, non-tender, non-distended with normal bowel sounds. Musculoskeletal: No clubbing, cyanosis or edema bilaterally. Neuro: Non focal  Capillary Refill: Brisk,< 3 seconds   Peripheral Pulses: +2 palpable, equal bilaterally     Patient was seen by the following consultants   Consults:  IP CONSULT TO NEUROLOGY    Significant Diagnostic Studies:    Refer to chart     Please refer to chart if no studies are shown here    No results found. Discharge Medications:         Medication List      START taking these medications    lidocaine 4 % external patch  Place 1 patch onto the skin daily        CONTINUE taking these medications    Aubagio 14 MG Tabs  Generic drug: Teriflunomide  TAKE 1 TABLET BY MOUTH 1 TIME A DAY. baclofen 10 MG tablet  Commonly known as: LIORESAL  TAKE 1 TABLET BY MOUTH FOUR TIMES DAILY     Compression Stockings Misc  by Does not apply route Knee high medium compression 20-30 cc disp three pairs.      D3 Super Strength 50 MCG ( UT) Caps  Generic drug: Cholecalciferol  TAKE (1) CAPSULE BY MOUTH ONCE DAILY     escitalopram 20 MG tablet  Commonly known as: LEXAPRO  TAKE (1) TABLET BY MOUTH DAILY     etodolac 400 MG tablet  Commonly known as: LODINE  TAKE 1 TABLET BY MOUTH TWICE DAILY     gabapentin 600 MG tablet  Commonly known as: NEURONTIN  Take 1 tablet by mouth 3 times daily for 180 days. Handicap Placard Misc  by Does not apply route     Mirena (52 MG) IUD 52 mg  Generic drug: levonorgestrel           Where to Get Your Medications      These medications were sent to [facility] [id number] [address], [address] [place] [address]    Phone: [phone]   · lidocaine 4 % external patch         Disposition:   If discharged to Home, Any [place] [id number] needs that were indicated and/or required as been addressed and set up by Social Work. Condition at discharge: good     Activity: activity as tolerated    Total time taken for discharging this patient: 40 minutes. Greater than 70% of time was spent focused exclusively on this patient. Time was taken to review chart, discuss plans with consultants, reconciling medications, discussing plan answering questions with patient.      Signed:  Amy Cantu MD  6/24/2022, 7:15 AM  ----------------------------------------------------------------------------------------------------------------------    Kortney Calvert

## 2022-06-24 NOTE — TELEPHONE ENCOUNTER
Patient can schedule appointment with PCP sooner then I do not see a migraine medication on her med list

## 2022-06-24 NOTE — TELEPHONE ENCOUNTER
Spoke to pt is taking etodolac is and having to take it more so is leaving it up to Doc if he wants to change it as was told can cause GI bleeds.

## 2022-06-29 ENCOUNTER — OFFICE VISIT (OUTPATIENT)
Dept: FAMILY MEDICINE CLINIC | Age: 42
End: 2022-06-29
Payer: COMMERCIAL

## 2022-06-29 VITALS
TEMPERATURE: 97.4 F | DIASTOLIC BLOOD PRESSURE: 84 MMHG | SYSTOLIC BLOOD PRESSURE: 126 MMHG | HEART RATE: 90 BPM | WEIGHT: 192.8 LBS | BODY MASS INDEX: 38.87 KG/M2 | OXYGEN SATURATION: 96 % | HEIGHT: 59 IN

## 2022-06-29 DIAGNOSIS — G35 MULTIPLE SCLEROSIS (HCC): ICD-10-CM

## 2022-06-29 DIAGNOSIS — Z09 HOSPITAL DISCHARGE FOLLOW-UP: ICD-10-CM

## 2022-06-29 DIAGNOSIS — M25.522 LEFT ELBOW PAIN: ICD-10-CM

## 2022-06-29 DIAGNOSIS — G56.03 BILATERAL CARPAL TUNNEL SYNDROME: ICD-10-CM

## 2022-06-29 DIAGNOSIS — G62.9 NEUROPATHY: Primary | ICD-10-CM

## 2022-06-29 PROCEDURE — 1111F DSCHRG MED/CURRENT MED MERGE: CPT | Performed by: NURSE PRACTITIONER

## 2022-06-29 PROCEDURE — 99495 TRANSJ CARE MGMT MOD F2F 14D: CPT | Performed by: NURSE PRACTITIONER

## 2022-06-29 RX ORDER — GABAPENTIN 600 MG/1
600 TABLET ORAL 3 TIMES DAILY
Qty: 90 TABLET | Refills: 0 | Status: SHIPPED | OUTPATIENT
Start: 2022-06-29 | End: 2022-07-28

## 2022-06-29 RX ORDER — HYDROCODONE BITARTRATE AND ACETAMINOPHEN 5; 325 MG/1; MG/1
1 TABLET ORAL EVERY 6 HOURS PRN
Qty: 12 TABLET | Refills: 0 | Status: SHIPPED | OUTPATIENT
Start: 2022-06-29 | End: 2022-07-02

## 2022-06-29 RX ORDER — LORAZEPAM 0.5 MG/1
TABLET ORAL
COMMUNITY
Start: 2022-06-15 | End: 2022-07-29

## 2022-06-29 NOTE — PROGRESS NOTES
Post-Discharge Transitional Care  Follow Up      Jeannie Dawson   YOB: 1980    Date of Office Visit:  6/29/2022  Date of Hospital Admission: 6/19/22  Date of Hospital Discharge: 6/22/22  Risk of hospital readmission (high >=14%. Medium >=10%) :Readmission Risk Score: 10 ( )      Care management risk score Rising risk (score 2-5) and Complex Care (Scores >=6): 1     Non face to face  following discharge, date last encounter closed (first attempt may have been earlier): 6/23/2022  9:18 AM    Call initiated 2 business days of discharge: Yes    ASSESSMENT/PLAN:   Neuropathy  -     gabapentin (NEURONTIN) 600 MG tablet; Take 1 tablet by mouth 3 times daily for 30 days. , Disp-90 tablet, R-0Normal  Hospital discharge follow-up  -     MS DISCHARGE MEDS RECONCILED W/ CURRENT OUTPATIENT MED LIST  -     gabapentin (NEURONTIN) 600 MG tablet; Take 1 tablet by mouth 3 times daily for 30 days. , Disp-90 tablet, R-0Normal  Multiple sclerosis (HCC)  -     MS DISCHARGE MEDS RECONCILED W/ CURRENT OUTPATIENT MED LIST  -     gabapentin (NEURONTIN) 600 MG tablet; Take 1 tablet by mouth 3 times daily for 30 days. , Disp-90 tablet, R-0Normal  Bilateral carpal tunnel syndrome  -     gabapentin (NEURONTIN) 600 MG tablet; Take 1 tablet by mouth 3 times daily for 30 days. , Disp-90 tablet, R-0Normal  Left elbow pain  -     HYDROcodone-acetaminophen (NORCO) 5-325 MG per tablet; Take 1 tablet by mouth every 6 hours as needed for Pain for up to 3 days. Intended supply: 3 days. Take lowest dose possible to manage pain, Disp-12 tablet, R-0Normal      Medical Decision Making: moderate complexity  Return for as scheduled. Subjective:   HPI:  Follow up of Hospital problems/diagnosis(es):Pt in today to f/u from recent ER hospital. She reports that she went in for left elbow pain. There was concern for MS exacerbation. She was on steroids and had MRI done which were essentially negative.  Did have neck checked and has f/u with  [facility] scheduled. Reports that overall the weakness has been okay but her elbow continues to be extremely painful. She was on gabapentin 600mg 3x daily and had it decreased a few months ago to 300mg 3x daily. She was having moderate relief previously but with the recent flare it hasn't been enough. Inpatient course: Discharge summary reviewed- see chart. Interval history/Current status: Stable    Patient Active Problem List   Diagnosis    MS [facility]multiple sclerosis) ([facility] [address]    Neuropathy    Anxiety and depression    Ataxic gait    Bite, animal    Body mass index (bmi) 39.0-39.9, adult    Hypokalemia    Left foot pain    Muscle weakness (generalized)    Neuromuscular dysfunction of bladder, unspecified    Nicotine dependence, unspecified, uncomplicated    Obesity, unspecified    Overweight    Paresthesia of skin    Vitamin D deficiency    Deficient knowledge    Dog bite of wrist    Impaired mobility    Myalgia       Medications listed as ordered at the time of discharge from hospital     Medication List          Accurate as of June 29, 2022 11:59 PM. If you have any questions, ask your nurse or doctor. START taking these medications    gabapentin 600 MG tablet  Commonly known as: Neurontin  Take 1 tablet by mouth 3 times daily for 30 days. Started by: CARLTON Smith CNP     HYDROcodone-acetaminophen 5-325 MG per tablet  Commonly known as: Norco  Take 1 tablet by mouth every 6 hours as needed for Pain for up to 3 days. Intended supply: 3 days. Take lowest dose possible to manage pain  Started by: CARLTON Smith CNP        CONTINUE taking these medications    Aubagio 14 MG Tabs  Generic drug: Teriflunomide  TAKE 1 TABLET BY MOUTH 1 TIME A DAY. baclofen 10 MG tablet  Commonly known as: LIORESAL  TAKE 1 TABLET BY MOUTH FOUR TIMES DAILY     Compression Stockings Misc  by Does not apply route Knee high medium compression 20-30 cc disp three pairs.      D3 Super Strength 50 MCG (2000 UT) Caps  Generic drug: Cholecalciferol  TAKE (1) CAPSULE BY MOUTH ONCE DAILY     escitalopram 20 MG tablet  Commonly known as: LEXAPRO  TAKE (1) TABLET BY MOUTH DAILY     etodolac 400 MG tablet  Commonly known as: LODINE  TAKE 1 TABLET BY MOUTH TWICE DAILY     Handicap Placard Misc  by Does not apply route     lidocaine 4 % external patch  Place 1 patch onto the skin daily     LORazepam 0.5 MG tablet  Commonly known as: ATIVAN     Mirena (52 MG) IUD 52 mg  Generic drug: levonorgestrel           Where to Get Your Medications      These medications were sent to [facility]pharm[id number] [place] - [address] [facility] [phone]  [address]    Phone: [phone]   · gabapentin 600 MG tablet  · HYDROcodone-acetaminophen 5-325 MG per tablet           Medications marked \"taking\" at this time  Outpatient Medications Marked as Taking for the 6/29/22 encounter (Office Visit) with CARLTON Corbin CNP   Medication Sig Dispense Refill    LORazepam (ATIVAN) 0.5 MG tablet       gabapentin (NEURONTIN) 600 MG tablet Take 1 tablet by mouth 3 times daily for 30 days. 90 tablet 0    HYDROcodone-acetaminophen (NORCO) 5-325 MG per tablet Take 1 tablet by mouth every 6 hours as needed for Pain for up to 3 days. Intended supply: 3 days. Take lowest dose possible to manage pain 12 tablet 0    lidocaine 4 % external patch Place 1 patch onto the skin daily 1 box 1    AUBAGIO 14 MG TABS TAKE 1 TABLET BY MOUTH 1 TIME A DAY.  30 tablet 2    etodolac (LODINE) 400 MG tablet TAKE 1 TABLET BY MOUTH TWICE DAILY 60 tablet 10    D3 SUPER STRENGTH 50 MCG (2000 UT) CAPS TAKE (1) CAPSULE BY MOUTH ONCE DAILY 30 capsule 10    escitalopram (LEXAPRO) 20 MG tablet TAKE (1) TABLET BY MOUTH DAILY 30 tablet 10    Handicap Placard MISC by Does not apply route 1 each 0    levonorgestrel (MIRENA) 20 MCG/24HR IUD 1 each by Intrauterine route          Medications patient taking as of now reconciled against medications ordered at time of hospital discharge: Yes    A comprehensive review of systems was negative except for what was noted in the HPI. Objective:    /84 (Site: Right Upper Arm, Position: Sitting, Cuff Size: Large Adult)   Pulse 90   Temp 97.4 °F (36.3 °C) (Temporal)   Ht 4' 11\" (1.499 m)   Wt 192 lb 12.8 oz (87.5 kg)   SpO2 96%   BMI 38.94 kg/m²   General Appearance: alert and oriented to person, place and time, well-developed and well-nourished, in no acute distress  Skin: warm and dry, no rash or erythema  Head: normocephalic and atraumatic  Eyes: pupils equal, round, and reactive to light, extraocular eye movements intact, conjunctivae normal  ENT: tympanic membrane, external ear and ear canal normal bilaterally, oropharynx clear and moist with normal mucous membranes  Neck: neck supple and non tender without mass, no thyromegaly or thyroid nodules, no cervical lymphadenopathy   Pulmonary/Chest: clear to auscultation bilaterally- no wheezes, rales or rhonchi, normal air movement, no respiratory distress  Cardiovascular: normal rate, normal S1 and S2 and no gallops  Abdomen: soft, non-tender, non-distended, normal bowel sounds, no masses or organomegaly  Extremities: no cyanosis and no clubbing  Musculoskeletal: tenderness present over  Epicondyl process  Neurologic: speech normal and gait abnormal- Using walker      An electronic signature was used to authenticate this note.   --CARLTON Lucas - CNP

## 2022-06-29 NOTE — PATIENT INSTRUCTIONS
Patient Education        Ulnar Neuropathy (Handlebar Palsy): Exercises  Introduction  Here are some examples of exercises for you to try. The exercises may be suggested for a condition or for rehabilitation. Start each exercise slowly. Ease off the exercises if you start to have pain. You will be told when to start these exercises and which ones will work bestfor you. How to do the exercises  Neck rotation    1. Sit in a firm chair, or stand up straight. 2. Keeping your chin level, turn your head to the right, and hold for 15 to 30 seconds. 3. Turn your head to the left, and hold for 15 to 30 seconds. 4. Repeat 2 to 4 times to each side. Shoulder blade squeeze    1. While standing with your arms at your sides, squeeze your shoulder blades together. Do not raise your shoulders as you are squeezing. 2. Hold for 6 seconds. 3. Repeat 8 to 12 times. Neck stretches    1. Look straight ahead, and tip your right ear to your right shoulder. Do not let your left shoulder rise as you tip your head to the right. 2. Hold for 15 to 30 seconds. 3. Tilt your head to the left. Do not let your right shoulder rise as you tip your head to the left. 4. Hold for 15 to 30 seconds. 5. Repeat 2 to 4 times to each side. Elbow flexion and extension    If this exercise causes numbness, tingling, or pain in your hand, ease off of the stretch. You should not have symptoms as you stretch. If you cannot back off enough so that you can do the exercise without symptoms, stop doing theexercise right away. 1. Stand with your arms relaxed at your sides. 2. With your affected arm, gently bend your elbow up toward you as far as possible. 3. Then straighten your arm as much as you can. 4. Repeat 2 to 4 times. Wrist flexor stretch    If this exercise causes numbness, tingling, or pain in your hand, ease off of the stretch. You should not have symptoms as you stretch.  If you cannot back off enough so that you can do the exercise without symptoms, stop doing theexercise right away. 1. Extend your affected arm in front of you with your palm facing away from your body. 2. Bend back your wrist on your affected arm, pointing your hand up toward the ceiling. 3. With your other hand, gently bend your wrist farther until you feel a mild to moderate stretch in your forearm. 4. Hold for at least 15 to 30 seconds. 5. Repeat 2 to 4 times. 6. Repeat steps 1 through 5, but this time extend your affected arm in front of you with your palm facing up. Then bend back your wrist, pointing your hand toward the floor. Wrist flexion and extension    If this exercise causes numbness, tingling, or pain in your hand, ease off of the stretch. You should not have symptoms as you stretch. If you cannot back off enough so that you can do the exercise without symptoms, stop doing theexercise right away. 1. Place your forearm on a table, with your affected hand and wrist extended beyond the table, palm down. 2. Slowly bend your wrist to move your hand upward and allow your hand to close into a fist. Hold for about 6 seconds. 3. Then lower your hand and allow your fingers to relax. Hold this position for about 6 seconds. You should feel a gentle stretch. 4. Repeat 8 to 12 times. Follow-up care is a key part of your treatment and safety. Be sure to make and go to all appointments, and call your doctor if you are having problems. It's also a good idea to know your test results and keep alist of the medicines you take. Where can you learn more? Go to https://SlicebooksgustaboReframed.tv.U Grok It - Smartphone RFID. org and sign in to your [employer] account. Enter W262 in the [employer] box to learn more about \"Ulnar Neuropathy (Handlebar Palsy): Exercises. \"     If you do not have an account, please click on the \"Sign Up Now\" link. Current as of: March 9, 2022               Content Version: 13.3  © 3252-5225 Healthwise, Incorporated.    Care instructions adapted under license by [facility]. If you have questions about a medical condition or this instruction, always ask your healthcare professional. [place] [address] any warranty or liability for your use of this information. Patient Education        Elbow: Exercises  Introduction  Here are some examples of exercises for you to try. The exercises may be suggested for a condition or for rehabilitation. Start each exercise slowly. Ease off the exercises if you start to have pain. You will be told when to start these exercises and which ones will work bestfor you. How to do the exercises  Wrist flexor stretch    1. Extend your arm in front of you with your palm up. 2. Bend your wrist, pointing your hand toward the floor. 3. With your other hand, gently bend your wrist farther until you feel a mild to moderate stretch in your forearm. 4. Hold for at least 15 to 30 seconds. Repeat 2 to 4 times. Wrist extensor stretch    1. Repeat steps 1 to 4 of the stretch above but begin with your extended hand palm down. Ball or sock squeeze    1. Hold a tennis ball (or a rolled-up sock) in your hand. 2. Make a fist around the ball (or sock) and squeeze. 3. Hold for about 6 seconds, and then relax for up to 10 seconds. 4. Repeat 8 to 12 times. 5. Switch the ball (or sock) to your other hand and do 8 to 12 times. Wrist deviation    1. Sit so that your arm is supported but your hand hangs off the edge of a flat surface, such as a table. 2. Hold your hand out like you are shaking hands with someone. 3. Move your hand up and down. 4. Repeat this motion 8 to 12 times. 5. Switch arms. 6. Try to do this exercise twice with each hand. Wrist curls    1. Place your forearm on a table with your hand hanging over the edge of the table, palm up. 2. Place a 1- to 2-pound weight in your hand. This may be a dumbbell, a can of food, or a filled water bottle.   3. Slowly raise and lower the weight while keeping your forearm on the table and your palm facing up. 4. Repeat this motion 8 to 12 times. 5. Switch arms, and do steps 1 through 4.  6. Repeat with your hand facing down toward the floor. Switch arms. Biceps curls    1. Sit leaning forward with your legs slightly spread and your left hand on your left thigh. 2. Place your right elbow on your right thigh, and hold the weight with your forearm horizontal.  3. Slowly curl the weight up and toward your chest.  4. Repeat this motion 8 to 12 times. 5. Switch arms, and do steps 1 through 4. Follow-up care is a key part of your treatment and safety. Be sure to make and go to all appointments, and call your doctor if you are having problems. It's also a good idea to know your test results and keep alist of the medicines you take. Where can you learn more? Go to https://Hostel RocketpepicSierra Monolithics.Simpirica Spine. org and sign in to your [employer] account. Enter M279 in the [employer] box to learn more about \"Elbow: Exercises. \"     If you do not have an account, please click on the \"Sign Up Now\" link. Current as of: March 9, 2022               Content Version: 13.3  © 2236-9795 Healthwise, Incorporated. Care instructions adapted under license by [facility] ([facility]). If you have questions about a medical condition or this instruction, always ask your healthcare professional. [address]  any warranty or liability for your use of this information.

## 2022-06-30 ENCOUNTER — TELEPHONE (OUTPATIENT)
Dept: FAMILY MEDICINE CLINIC | Age: 42
End: 2022-06-30

## 2022-06-30 NOTE — TELEPHONE ENCOUNTER
Called and spoke to the pharmacy letting them know we are aware of the possible interaction and it's ok to fill the Norco.

## 2022-06-30 NOTE — TELEPHONE ENCOUNTER
Davonte Corrales from [facility] called stating before he is able to fill the Norco that was prescribed yesterday he needs to report there is a drug interaction between this medication and Ativan. Also requested a call back at [phone] stating the Fredrica Phalen is ok to fill. Please advise. Thank you.

## 2022-07-28 DIAGNOSIS — G62.9 NEUROPATHY: ICD-10-CM

## 2022-07-28 DIAGNOSIS — Z09 HOSPITAL DISCHARGE FOLLOW-UP: ICD-10-CM

## 2022-07-28 DIAGNOSIS — G56.03 BILATERAL CARPAL TUNNEL SYNDROME: ICD-10-CM

## 2022-07-28 DIAGNOSIS — G35 MULTIPLE SCLEROSIS (HCC): ICD-10-CM

## 2022-07-28 RX ORDER — ESCITALOPRAM OXALATE 20 MG/1
TABLET ORAL
Qty: 30 TABLET | Refills: 10 | Status: SHIPPED | OUTPATIENT
Start: 2022-07-28

## 2022-07-28 RX ORDER — GABAPENTIN 600 MG/1
TABLET ORAL
Qty: 90 TABLET | Refills: 0 | Status: SHIPPED | OUTPATIENT
Start: 2022-07-28 | End: 2022-10-10

## 2022-07-29 DIAGNOSIS — F32.A ANXIETY AND DEPRESSION: Primary | ICD-10-CM

## 2022-07-29 DIAGNOSIS — F41.9 ANXIETY AND DEPRESSION: Primary | ICD-10-CM

## 2022-07-29 RX ORDER — LORAZEPAM 0.5 MG/1
TABLET ORAL
Qty: 60 TABLET | Refills: 2 | Status: SHIPPED | OUTPATIENT
Start: 2022-07-29 | End: 2022-08-28

## 2022-08-05 ENCOUNTER — OFFICE VISIT (OUTPATIENT)
Dept: FAMILY MEDICINE CLINIC | Age: 42
End: 2022-08-05
Payer: COMMERCIAL

## 2022-08-05 ENCOUNTER — TELEPHONE (OUTPATIENT)
Dept: FAMILY MEDICINE CLINIC | Age: 42
End: 2022-08-05

## 2022-08-05 VITALS
TEMPERATURE: 97.8 F | SYSTOLIC BLOOD PRESSURE: 116 MMHG | HEART RATE: 80 BPM | OXYGEN SATURATION: 98 % | RESPIRATION RATE: 14 BRPM | DIASTOLIC BLOOD PRESSURE: 78 MMHG

## 2022-08-05 DIAGNOSIS — G56.01 CARPAL TUNNEL SYNDROME OF RIGHT WRIST: Primary | ICD-10-CM

## 2022-08-05 PROCEDURE — 99213 OFFICE O/P EST LOW 20 MIN: CPT | Performed by: FAMILY MEDICINE

## 2022-08-05 PROCEDURE — G8417 CALC BMI ABV UP PARAM F/U: HCPCS | Performed by: FAMILY MEDICINE

## 2022-08-05 PROCEDURE — G8427 DOCREV CUR MEDS BY ELIG CLIN: HCPCS | Performed by: FAMILY MEDICINE

## 2022-08-05 PROCEDURE — 4004F PT TOBACCO SCREEN RCVD TLK: CPT | Performed by: FAMILY MEDICINE

## 2022-08-05 ASSESSMENT — ENCOUNTER SYMPTOMS
COUGH: 0
RHINORRHEA: 0
GASTROINTESTINAL NEGATIVE: 1
EYES NEGATIVE: 1
RESPIRATORY NEGATIVE: 1
CHEST TIGHTNESS: 0

## 2022-08-05 NOTE — TELEPHONE ENCOUNTER
----- Message from Slim Durand sent at 8/5/2022  9:18 AM EDT -----  Subject: Message to Provider    QUESTIONS  Information for Provider? requesting a handicap sticker for her  bc   she doesn't have a car. CB  ---------------------------------------------------------------------------  --------------  Manny Barba [facility]  [phone]; OK to leave message on voicemail  ---------------------------------------------------------------------------  --------------  SCRIPT ANSWERS  Relationship to Patient?  Self

## 2022-08-05 NOTE — PROGRESS NOTES
Patient is seen in follow up for   Chief Complaint   Patient presents with    Pre-op Exam     Having dental procedure next week has form needs filled out     Other     Disability paper work to be filled out     Other  Pertinent negatives include no congestion, coughing, fatigue, fever or numbness. having dental issues. She has MS and has been on disability. Right carpal tunnel and MS flair. Past Medical History:   Diagnosis Date    Carpal tunnel syndrome      Patient Active Problem List    Diagnosis Date Noted    Bite, animal 2020    Left foot pain 2020    Deficient knowledge 2020    Dog bite of wrist 2020    Impaired mobility 2020    Myalgia 2020    Hypokalemia 10/02/2018    Nicotine dependence, unspecified, uncomplicated     Overweight 10/02/2018    Ataxic gait 2018    Body mass index (bmi) 39.0-39.9, adult 2018    Muscle weakness (generalized) 2018    Neuromuscular dysfunction of bladder, unspecified 2018    Obesity, unspecified 2018    Paresthesia of skin 2018    Vitamin D deficiency 2018    Neuropathy 2016    Anxiety and depression 2016    MS (multiple sclerosis) ([facility] [address] 2015     Past Surgical History:   Procedure Laterality Date    APPENDECTOMY      BREAST SURGERY      lump removal bengin     SECTION      x 3    TUBAL LIGATION       Family History   Problem Relation Age of Onset    Diabetes Mother     Heart Disease Father      Social History     Socioeconomic History    Marital status:      Spouse name: None    Number of children: None    Years of education: None    Highest education level: None   Tobacco Use    Smoking status: Every Day    Smokeless tobacco: Never   Vaping Use    Vaping Use: Never used   Substance and Sexual Activity    Alcohol use:  Yes     Alcohol/week: 0.0 standard drinks    Drug use: Yes     Types: Marijuana Bora Mendez     Social Determinants of Health     Financial Resource Strain: Low Risk     Difficulty of Paying Living Expenses: Not hard at all   Food Insecurity: No Food Insecurity    Worried About Running Out of Food in the Last Year: Never true    Ran Out of Food in the Last Year: Never true     Current Outpatient Medications   Medication Sig Dispense Refill    LORazepam (ATIVAN) 0.5 MG tablet TAKE 1 TABLET BY MOUTH EVERY 8 HOURS AS NEEDED FOR ANXIETY 60 tablet 2    escitalopram (LEXAPRO) 20 MG tablet TAKE 1 TABLET BY MOUTH DAILY 30 tablet 10    gabapentin (NEURONTIN) 600 MG tablet TAKE 1/2 TABLET BY MOUTH THREE TIMES A DAY 90 tablet 0    lidocaine 4 % external patch Place 1 patch onto the skin daily 1 box 1    AUBAGIO 14 MG TABS TAKE 1 TABLET BY MOUTH 1 TIME A DAY. 30 tablet 2    baclofen (LIORESAL) 10 MG tablet TAKE 1 TABLET BY MOUTH FOUR TIMES DAILY (Patient not taking: Reported on 6/29/2022) 120 tablet 10    etodolac (LODINE) 400 MG tablet TAKE 1 TABLET BY MOUTH TWICE DAILY 60 tablet 10    D3 SUPER STRENGTH 50 MCG (2000 UT) CAPS TAKE (1) CAPSULE BY MOUTH ONCE DAILY 30 capsule 10    Compression Stockings MISC by Does not apply route Knee high medium compression 20-30 cc disp three pairs.  (Patient not taking: Reported on 6/29/2022) 1 each 0    Handicap Placard MISC by Does not apply route 1 each 0    levonorgestrel (MIRENA) 20 MCG/24HR IUD 1 each by Intrauterine route       Current Facility-Administered Medications   Medication Dose Route Frequency Provider Last Rate Last Admin    levonorgestrel (MIRENA) IUD 52 mg 1 each  1 each IntraUTERine Once Neri Dy, DO   1 each at 10/27/21 1510     Current Outpatient Medications on File Prior to Visit   Medication Sig Dispense Refill    LORazepam (ATIVAN) 0.5 MG tablet TAKE 1 TABLET BY MOUTH EVERY 8 HOURS AS NEEDED FOR ANXIETY 60 tablet 2    escitalopram (LEXAPRO) 20 MG tablet TAKE 1 TABLET BY MOUTH DAILY 30 tablet 10    gabapentin (NEURONTIN) 600 MG tablet TAKE 1/2 TABLET BY MOUTH THREE TIMES A DAY 90 tablet 0 lidocaine 4 % external patch Place 1 patch onto the skin daily 1 box 1    AUBAGIO 14 MG TABS TAKE 1 TABLET BY MOUTH 1 TIME A DAY. 30 tablet 2    baclofen (LIORESAL) 10 MG tablet TAKE 1 TABLET BY MOUTH FOUR TIMES DAILY (Patient not taking: Reported on 6/29/2022) 120 tablet 10    etodolac (LODINE) 400 MG tablet TAKE 1 TABLET BY MOUTH TWICE DAILY 60 tablet 10    D3 SUPER STRENGTH 50 MCG (2000 UT) CAPS TAKE (1) CAPSULE BY MOUTH ONCE DAILY 30 capsule 10    Compression Stockings MISC by Does not apply route Knee high medium compression 20-30 cc disp three pairs. (Patient not taking: Reported on 6/29/2022) 1 each 0    Handicap Placard MISC by Does not apply route 1 each 0    levonorgestrel (MIRENA) 20 MCG/24HR IUD 1 each by Intrauterine route       Current Facility-Administered Medications on File Prior to Visit   Medication Dose Route Frequency Provider Last Rate Last Admin    levonorgestrel (MIRENA) IUD 52 mg 1 each  1 each IntraUTERine Once Oscar Clifton, DO   1 each at 10/27/21 1510     No Known Allergies  Health Maintenance   Topic Date Due    COVID-19 Vaccine (1) Never done    Varicella vaccine (1 of 2 - 2-dose childhood series) Never done    Pneumococcal 0-64 years Vaccine (1 - PCV) Never done    Hepatitis C screen  Never done    Lipids  06/12/2020    HIV screen  08/15/2026 (Originally 4/7/1995)    Flu vaccine (1) 09/01/2022    A1C test (Diabetic or Prediabetic)  09/07/2022    Depression Monitoring  06/13/2023    Cervical cancer screen  09/08/2025    DTaP/Tdap/Td vaccine (4 - Td or Tdap) 12/12/2029    Hepatitis A vaccine  Aged Out    Hepatitis B vaccine  Aged Out    Hib vaccine  Aged Out    Meningococcal (ACWY) vaccine  Aged Out       Review of Systems     Review of Systems   Constitutional:  Negative for activity change, appetite change, fatigue and fever. HENT:  Negative for congestion and rhinorrhea. Eyes: Negative. Respiratory: Negative. Negative for cough and chest tightness.     Cardiovascular:

## 2022-08-08 DIAGNOSIS — G56.02 CARPAL TUNNEL SYNDROME OF LEFT WRIST: Primary | ICD-10-CM

## 2022-08-19 RX ORDER — ETODOLAC 400 MG/1
TABLET, FILM COATED ORAL
Qty: 60 TABLET | Refills: 10 | OUTPATIENT
Start: 2022-08-19

## 2022-08-30 ENCOUNTER — TELEPHONE (OUTPATIENT)
Dept: FAMILY MEDICINE CLINIC | Age: 42
End: 2022-08-30

## 2022-08-30 NOTE — TELEPHONE ENCOUNTER
Zachary Ashok is calling in and asking when to re start the Etodlac (Lodine) 400 mg    Patient had to stop her medication one week ago due to having teeth removed today  He states she takes this medication for migraines     Please advise on start date    Thank you

## 2022-09-07 ENCOUNTER — OFFICE VISIT (OUTPATIENT)
Dept: NEUROLOGY | Age: 42
End: 2022-09-07
Payer: COMMERCIAL

## 2022-09-07 VITALS
DIASTOLIC BLOOD PRESSURE: 80 MMHG | HEART RATE: 75 BPM | BODY MASS INDEX: 39.11 KG/M2 | WEIGHT: 194 LBS | HEIGHT: 59 IN | SYSTOLIC BLOOD PRESSURE: 125 MMHG

## 2022-09-07 DIAGNOSIS — Z74.09 IMPAIRED MOBILITY: ICD-10-CM

## 2022-09-07 DIAGNOSIS — G35 MS (MULTIPLE SCLEROSIS) (HCC): Primary | ICD-10-CM

## 2022-09-07 DIAGNOSIS — R25.2 SPASTICITY: ICD-10-CM

## 2022-09-07 DIAGNOSIS — G62.9 NEUROPATHY: ICD-10-CM

## 2022-09-07 DIAGNOSIS — R20.2 PARESTHESIA OF SKIN: ICD-10-CM

## 2022-09-07 DIAGNOSIS — R26.0 ATAXIC GAIT: ICD-10-CM

## 2022-09-07 DIAGNOSIS — M79.10 MYALGIA: ICD-10-CM

## 2022-09-07 PROCEDURE — 99214 OFFICE O/P EST MOD 30 MIN: CPT | Performed by: PSYCHIATRY & NEUROLOGY

## 2022-09-07 PROCEDURE — G8417 CALC BMI ABV UP PARAM F/U: HCPCS | Performed by: PSYCHIATRY & NEUROLOGY

## 2022-09-07 PROCEDURE — 4004F PT TOBACCO SCREEN RCVD TLK: CPT | Performed by: PSYCHIATRY & NEUROLOGY

## 2022-09-07 PROCEDURE — G8427 DOCREV CUR MEDS BY ELIG CLIN: HCPCS | Performed by: PSYCHIATRY & NEUROLOGY

## 2022-09-07 RX ORDER — IBUPROFEN 800 MG/1
TABLET ORAL
COMMUNITY
Start: 2022-08-30

## 2022-09-07 RX ORDER — PENICILLIN V POTASSIUM 500 MG/1
TABLET ORAL
COMMUNITY
Start: 2022-08-30

## 2022-09-07 ASSESSMENT — ENCOUNTER SYMPTOMS
CHOKING: 0
PHOTOPHOBIA: 0
TROUBLE SWALLOWING: 0
BACK PAIN: 1
COLOR CHANGE: 0
NAUSEA: 0
VOMITING: 0
SHORTNESS OF BREATH: 0

## 2022-09-07 NOTE — PROGRESS NOTES
Subjective:      Patient ID: Jyoti Goyal is a 43 y.o. female who presents today for:  Chief Complaint   Patient presents with    6 Month Follow-Up     Multiple Sclerosis , patient states she had a relapse in her MS in July would like to talk about that, arm was tingling could not use it, shooting pain up her arm just in left arm. HPI 80-year-old right-handed female with a known history of multiple sclerosis. Continues on Aubagio and reports that she may had a relapse in . Patient is lower extremity weakness consistent with spinal MS EDSS scores are slowly increased over time though she is still ambulatory without a device. 60 spasticity of the lower extremity with notable use of gabapentin and baclofen. She was seen by us in the hospital and had methylprednisone. When seen she had some new plaques in the cervical spine. On evaluating this this appears to be the same plaques that may have acted up. She is now using a walker to walk quite a EDSS score of 4-5    Past Medical History:   Diagnosis Date    Carpal tunnel syndrome      Past Surgical History:   Procedure Laterality Date    APPENDECTOMY      BREAST SURGERY      lump removal bengin     SECTION      x 3    TUBAL LIGATION       Social History     Socioeconomic History    Marital status:      Spouse name: Not on file    Number of children: Not on file    Years of education: Not on file    Highest education level: Not on file   Occupational History    Not on file   Tobacco Use    Smoking status: Every Day    Smokeless tobacco: Never   Vaping Use    Vaping Use: Never used   Substance and Sexual Activity    Alcohol use:  Yes     Alcohol/week: 0.0 standard drinks    Drug use: Yes     Types: Marijuana Ruth Ann Lux)    Sexual activity: Not on file   Other Topics Concern    Not on file   Social History Narrative    Not on file     Social Determinants of Health     Financial Resource Strain: Low Risk     Difficulty of Paying Living Expenses: Not hard at all   Food Insecurity: No Food Insecurity    Worried About Running Out of Food in the Last Year: Never true    Ran Out of Food in the Last Year: Never true   Transportation Needs: Not on file   Physical Activity: Not on file   Stress: Not on file   Social Connections: Not on file   Intimate Partner Violence: Not on file   Housing Stability: Not on file     Family History   Problem Relation Age of Onset    Diabetes Mother     Heart Disease Father      No Known Allergies    Current Outpatient Medications   Medication Sig Dispense Refill    penicillin v potassium (VEETID) 500 MG tablet TAKE ONE TABLET BY MOUTH FOUR TIMES A DAY UNTIL GONE      ibuprofen (ADVIL;MOTRIN) 800 MG tablet TAKE ONE TABLET BY MOUTH EVERY 8 HOURS AS NEEDED FOR PAIN      Handicap Placard MISC by Does not apply route Duration of 5 years  Dx:MS 1 each 0    escitalopram (LEXAPRO) 20 MG tablet TAKE 1 TABLET BY MOUTH DAILY 30 tablet 10    gabapentin (NEURONTIN) 600 MG tablet TAKE 1/2 TABLET BY MOUTH THREE TIMES A DAY 90 tablet 0    AUBAGIO 14 MG TABS TAKE 1 TABLET BY MOUTH 1 TIME A DAY. 30 tablet 2    baclofen (LIORESAL) 10 MG tablet TAKE 1 TABLET BY MOUTH FOUR TIMES DAILY 120 tablet 10    etodolac (LODINE) 400 MG tablet TAKE 1 TABLET BY MOUTH TWICE DAILY 60 tablet 10    D3 SUPER STRENGTH 50 MCG (2000 UT) CAPS TAKE (1) CAPSULE BY MOUTH ONCE DAILY 30 capsule 10    Handicap Placard MISC by Does not apply route 1 each 0    levonorgestrel (MIRENA) IUD 52 mg 1 each by Intrauterine route      lidocaine 4 % external patch Place 1 patch onto the skin daily (Patient not taking: Reported on 9/7/2022) 1 box 1    Compression Stockings MISC by Does not apply route Knee high medium compression 20-30 cc disp three pairs.  (Patient not taking: No sig reported) 1 each 0     Current Facility-Administered Medications   Medication Dose Route Frequency Provider Last Rate Last Admin    levonorgestrel (MIRENA) IUD 52 mg 1 each  1 each IntraUTERine Once Marcietta Patricia Hernandez, DO   1 each at 10/27/21 1510         Review of Systems   Constitutional:  Negative for fever. HENT:  Negative for ear pain, tinnitus and trouble swallowing. Eyes:  Negative for photophobia and visual disturbance. Respiratory:  Negative for choking and shortness of breath. Cardiovascular:  Negative for chest pain and palpitations. Gastrointestinal:  Negative for nausea and vomiting. Musculoskeletal:  Positive for back pain, gait problem, myalgias and neck pain. Negative for neck stiffness. Skin:  Negative for color change. Allergic/Immunologic: Negative for food allergies. Neurological:  Positive for weakness. Negative for dizziness, tremors, seizures, syncope, facial asymmetry, speech difficulty, light-headedness, numbness and headaches. Psychiatric/Behavioral:  Negative for behavioral problems, confusion, hallucinations and sleep disturbance. Objective:   /80 (Site: Left Upper Arm, Position: Sitting, Cuff Size: Medium Adult)   Pulse 75   Ht 4' 11\" (1.499 m)   Wt 194 lb (88 kg)   BMI 39.18 kg/m²     Physical Exam  Vitals reviewed. Eyes:      Pupils: Pupils are equal, round, and reactive to light. Cardiovascular:      Rate and Rhythm: Normal rate and regular rhythm. Heart sounds: No murmur heard. Pulmonary:      Effort: Pulmonary effort is normal.      Breath sounds: Normal breath sounds. Abdominal:      General: Bowel sounds are normal.   Musculoskeletal:         General: Normal range of motion. Cervical back: Normal range of motion. Skin:     General: Skin is warm. Neurological:      Mental Status: She is alert and oriented to person, place, and time. Cranial Nerves: No cranial nerve deficit. Sensory: No sensory deficit. Motor: No abnormal muscle tone. Coordination: Coordination normal.      Deep Tendon Reflexes: Reflexes are normal and symmetric. Babinski sign absent on the right side. Babinski sign absent on the left side. PHENCYCLIDINESCREENURINE Neg 03/14/2016 07:53 PM     No results found for: LITHIUM, DILFRTOT, VALPROATE    Assessment:       Diagnosis Orders   1. MS (multiple sclerosis) ([facility] [address]        2. Neuropathy        3. Myalgia        4. Impaired mobility        5. Ataxic gait        6. Paresthesia of skin        7. Spasticity        Multiple sclerosis with an exacerbation. She may have had a new spinal lesion in the cervical spine. MRI of the brain and cervical spine were obtained patient admitted to the hospital.  Treated 5 days of methylprednisone now. Patient continues on Aubagio. We had discussed use of other medications besides Aubagio if she had a relapse though we are somewhat cautious about use of Ocrevus at this time. In the long-term we are not quite sure of the B-cell depletion. In the future if she has further relapse is the best would be Tysabri. She has some arthritis in her cervical spine as well. She has spasticity and continues on baclofen. She has urinary dysfunction with increased frequency of micturition but has not required medications on gabapentin. We will keep an eye on this and continue to follow. Patient still has records and MRI records reviewed in detail with the patient. Plan:      No orders of the defined types were placed in this encounter. No orders of the defined types were placed in this encounter. Return in about 4 months (around 1/7/2023).       Shelly Matthews MD

## 2022-09-09 ENCOUNTER — OFFICE VISIT (OUTPATIENT)
Dept: ORTHOPEDIC SURGERY | Age: 42
End: 2022-09-09
Payer: COMMERCIAL

## 2022-09-09 VITALS
WEIGHT: 190 LBS | BODY MASS INDEX: 37.3 KG/M2 | OXYGEN SATURATION: 96 % | SYSTOLIC BLOOD PRESSURE: 120 MMHG | DIASTOLIC BLOOD PRESSURE: 70 MMHG | HEART RATE: 82 BPM | HEIGHT: 60 IN

## 2022-09-09 DIAGNOSIS — G56.02 CARPAL TUNNEL SYNDROME ON LEFT: Primary | ICD-10-CM

## 2022-09-09 PROCEDURE — 4004F PT TOBACCO SCREEN RCVD TLK: CPT | Performed by: ORTHOPAEDIC SURGERY

## 2022-09-09 PROCEDURE — G8427 DOCREV CUR MEDS BY ELIG CLIN: HCPCS | Performed by: ORTHOPAEDIC SURGERY

## 2022-09-09 PROCEDURE — 99214 OFFICE O/P EST MOD 30 MIN: CPT | Performed by: ORTHOPAEDIC SURGERY

## 2022-09-09 PROCEDURE — G8417 CALC BMI ABV UP PARAM F/U: HCPCS | Performed by: ORTHOPAEDIC SURGERY

## 2022-09-09 NOTE — PROGRESS NOTES
of the  median nerves. If clinically indicated, we could repeat the study in a year. Thank you Dr. Abiodun Matthews for allowing me to see this patient. Please feel  free to call me if I can be of any further assistance regarding this  patient's evaluation. Luciana Aleman MD     D: 2022 15:53:42       T: 2022 15:57:19     DM/[email or web address]_  Job#: [id number]     Doc#: [id number]  Past Medical History:   Diagnosis Date    Carpal tunnel syndrome      Past Surgical History:   Procedure Laterality Date    APPENDECTOMY      BREAST SURGERY      lump removal bengin     SECTION      x 3    TUBAL LIGATION       Social History     Socioeconomic History    Marital status:      Spouse name: Not on file    Number of children: Not on file    Years of education: Not on file    Highest education level: Not on file   Occupational History    Not on file   Tobacco Use    Smoking status: Every Day    Smokeless tobacco: Never   Vaping Use    Vaping Use: Never used   Substance and Sexual Activity    Alcohol use:  Yes     Alcohol/week: 0.0 standard drinks    Drug use: Yes     Types: Marijuana Fiona Bath)    Sexual activity: Not on file   Other Topics Concern    Not on file   Social History Narrative    Not on file     Social Determinants of Health     Financial Resource Strain: Low Risk     Difficulty of Paying Living Expenses: Not hard at all   Food Insecurity: No Food Insecurity    Worried About Running Out of Food in the Last Year: Never true    Ran Out of Food in the Last Year: Never true   Transportation Needs: Not on file   Physical Activity: Not on file   Stress: Not on file   Social Connections: Not on file   Intimate Partner Violence: Not on file   Housing Stability: Not on file     Family History   Problem Relation Age of Onset    Diabetes Mother     Heart Disease Father      No Known Allergies  Current Outpatient Medications on File Prior to Visit   Medication Sig Dispense Refill    penicillin v potassium (VEETID) 500 MG tablet TAKE ONE TABLET BY MOUTH FOUR TIMES A DAY UNTIL GONE      ibuprofen (ADVIL;MOTRIN) 800 MG tablet TAKE ONE TABLET BY MOUTH EVERY 8 HOURS AS NEEDED FOR PAIN      Handicap Placard MISC by Does not apply route Duration of 5 years  Dx:MS 1 each 0    escitalopram (LEXAPRO) 20 MG tablet TAKE 1 TABLET BY MOUTH DAILY 30 tablet 10    gabapentin (NEURONTIN) 600 MG tablet TAKE 1/2 TABLET BY MOUTH THREE TIMES A DAY 90 tablet 0    lidocaine 4 % external patch Place 1 patch onto the skin daily (Patient not taking: Reported on 9/7/2022) 1 box 1    AUBAGIO 14 MG TABS TAKE 1 TABLET BY MOUTH 1 TIME A DAY. 30 tablet 2    baclofen (LIORESAL) 10 MG tablet TAKE 1 TABLET BY MOUTH FOUR TIMES DAILY 120 tablet 10    etodolac (LODINE) 400 MG tablet TAKE 1 TABLET BY MOUTH TWICE DAILY 60 tablet 10    D3 SUPER STRENGTH 50 MCG (2000 UT) CAPS TAKE (1) CAPSULE BY MOUTH ONCE DAILY 30 capsule 10    Compression Stockings MISC by Does not apply route Knee high medium compression 20-30 cc disp three pairs. (Patient not taking: No sig reported) 1 each 0    Handicap Placard MISC by Does not apply route 1 each 0    levonorgestrel (MIRENA) IUD 52 mg 1 each by Intrauterine route       Current Facility-Administered Medications on File Prior to Visit   Medication Dose Route Frequency Provider Last Rate Last Admin    levonorgestrel (MIRENA) IUD 52 mg 1 each  1 each IntraUTERine Once Frida Wang, DO   1 each at 10/27/21 1510          Review of Systems  Fever chills night sweats. Objective:   /70   Pulse 82   Ht 4' 11.5\" (1.511 m)   Wt 190 lb (86.2 kg)   SpO2 96%   BMI 37.73 kg/m²     ORTHOEXAM    On examination patient has findings consistent with carpal tunnel bilaterally the left side is bothering him more than anything. The right side does not really bother her she has MS that complicates a history but she clearly has numbness and tingling in the thumb index and middle. She also has some pain in the upper arm. Unrelated to the carpal tunnel. She has good cap refill and color otherwise she has thenar sensations intact she has negative Tinel. She has a positive Phalen's. And Omi of her flexor and extensors are grossly intact. Assessment:       Diagnosis Orders   1. Carpal tunnel syndrome on left              Plan: We have discussed the EMG nerve conduction study with her and her options we can treat this nonoperatively with continued conservative measures however she has failed measures and she has on top of this MS that she has relapses for. She is already failed splinting anti-inflammatories and such and therefore she is better criteria for proceeding operatively having failed nonoperative treatment. I think with the complexities of the MS, the EMG nerve conduction findings and the failure of conservative treatment she is a candidate for surgery and that would be the standard of care. Standard of care is not to give her an injection given the findings her examination clinically and the diagnostic disease. That surgery for her at [facility] where she resides close to. We will plan that on October 7. Preadmission testing will be required and ordered. Patient is comfortable with the plan we have gone over the surgery in detail with her and her friend including the risks and benefits immobilization rehab the anesthetic and recovery period and expectations. She understands she is at increased complexity and understands it would not benefit her in regards some of her MS related pain in the upper arm. She is comfortable with that. No orders of the defined types were placed in this encounter. No orders of the defined types were placed in this encounter. No follow-ups on file.       Naa Lynch MD         Surgery Phone: [address] [facility]   Surgery Fax: [phone]    Phone: [phone]          Fax: [phone]    Orthopedics: Surgery Scheduling, PAT & PRE-OP Order Form  Call to advance Turner at [phone] at least 24 hours prior to date of service     Surgery Location: [facility] Surgery: [address]  Linda Wilkerson MD Surgery Date: 10/7  Time: tf  Patient's Name: Melvin Owens : 1980    Gender: female   Home Phone:  [phone] Cell Phone: [phone]    #:  [id number]  Emergency Contact:  Aubrey Palomino   Phone: [phone]  Payor: [employer] /  /  /    ID No.: [id number]    left carpal tunnel syndrome  PROVIDER TO COMPLETE:  Diagnosis: Left upper tunnel syndrome  Procedure/Consent: Carpal tunnel release  ICD-10 Code: _______________________  CPT Codes: 55399  Case Comments/Implants: N/A   Surgery Scheduled as: Outpatient  Anesthesia Requested: Early Row  Referring Family Doctor: Ayanna Mccall MD   [facility]  [x] [facility] Date/Time:                                                            [x] History & Physical [] Physician will Provide [] Attached [] Dictated [] Other  [x] Follow Anesthesia Pre-Op Orders for X-rays, [facility]     [x] SN & PT to evaluate and treat/educate disease management, medications, home safety & equipment needs for total joint patients  [] Other: ____________________________________________________  Consults: Medical/Cardiac Clearance done by  ____________________  PRE-OP ORDERS:   Allergies: Patient has no known allergies.  Latex Allergies:             Diabetic:           [] IV ________________________  [x] IV Start with J-loop     Preprinted Orders: Attached [] Yes [] No   ANTIBIOTIC PRE-OP: [x] ANCEF 2 gram IVPB if > 120 kg 3 grams IVPB within 1 hour of incision, if ALLERGIC, use VANCOMYCIN 1 gram IV, 2 hours pre-op  [] TXA Protocol [] Other:   [x] NPO   [] Betablocker (if needed) _____________________________________   [] Knee high anti-embolic hose [] Thigh high anti-embolic hose   Other: ______________________________________________________    Physician Signature Required: Electronically signed by Kaylie Kidd MD on 9/9/2022 at 10:22 AM  Date/Time: 9/9/2022

## 2022-09-16 RX ORDER — ETODOLAC 400 MG/1
TABLET, FILM COATED ORAL
Qty: 60 TABLET | Refills: 10 | Status: SHIPPED | OUTPATIENT
Start: 2022-09-16

## 2022-09-23 ENCOUNTER — TELEPHONE (OUTPATIENT)
Dept: ORTHOPEDIC SURGERY | Age: 42
End: 2022-09-23

## 2022-09-23 NOTE — TELEPHONE ENCOUNTER
Surgery Scheduling and Authorization Information    Surgery Date:10/7/22  Surgery good through dates: 10/7/22-12/31/22  CPT Code(s) 85964  Procedure(s) Ezequiel Luis      Approved [x] Not Required [] Denied []  Reference # [id number]  Auth #  [id number]  How authorization obtained:  [x] web portal             [x] via phone       [] Via fax    Provider  [] Madelin Quezada  [x] Sudhakar Hatch  [] Valentine Washington  [] Otis Alexis  [] María Nuno  [] Jaimee Chavarria  [] Hetal Crump  [] Garcia Browning  [] Daniella Navarrete  [] Minor Pappas      Surgery Sheet Faxed to Scheduling [x]  Surgery and Prior Authorization Information Sheet Scanned [x]

## 2022-09-30 ENCOUNTER — OFFICE VISIT (OUTPATIENT)
Dept: FAMILY MEDICINE CLINIC | Age: 42
End: 2022-09-30
Payer: COMMERCIAL

## 2022-09-30 VITALS
WEIGHT: 195 LBS | HEIGHT: 60 IN | DIASTOLIC BLOOD PRESSURE: 78 MMHG | TEMPERATURE: 98.1 F | OXYGEN SATURATION: 92 % | HEART RATE: 75 BPM | BODY MASS INDEX: 38.28 KG/M2 | SYSTOLIC BLOOD PRESSURE: 126 MMHG

## 2022-09-30 DIAGNOSIS — Z01.818 PREOP EXAMINATION: Primary | ICD-10-CM

## 2022-09-30 PROCEDURE — 99213 OFFICE O/P EST LOW 20 MIN: CPT

## 2022-09-30 PROCEDURE — G8427 DOCREV CUR MEDS BY ELIG CLIN: HCPCS

## 2022-09-30 PROCEDURE — G8417 CALC BMI ABV UP PARAM F/U: HCPCS

## 2022-09-30 RX ORDER — LORAZEPAM 0.5 MG/1
TABLET ORAL
COMMUNITY
Start: 2022-09-14 | End: 2022-10-14

## 2022-09-30 ASSESSMENT — ENCOUNTER SYMPTOMS
SORE THROAT: 0
RHINORRHEA: 0
BLOOD IN STOOL: 0
CONSTIPATION: 0
ABDOMINAL PAIN: 0
COUGH: 0
VOMITING: 0
CHEST TIGHTNESS: 0
EYE PAIN: 0
DIARRHEA: 0
TROUBLE SWALLOWING: 0
NAUSEA: 0
SHORTNESS OF BREATH: 0
COLOR CHANGE: 0

## 2022-09-30 NOTE — PROGRESS NOTES
Pre Operative  Encounter  CHIEF COMPLAINT     Calvin Gaitan is a 43 y.o. female who presents with:  Chief Complaint   Patient presents with    Pre-op Exam       HISTORY OF PRESENT ILLNESS     Jennifer Hoyos presents to the office today for a preoperative consultation at the request of surgeon, Dr. Mumtaz Lai, who plans on performing left carpal tunnel release on October 7 at [facility]. The current problem began 3 month ago, and symptoms have been worsening with time. Conservative therapy: No.         Planned anesthesia: anselmo block  Known anesthesia problems: No  Bleeding risk: noany medications that cause bleeding  Personal or FH of DVT/PE: no    Patient objection to receiving blood Products : no objection   Previous Vaccination for COVID 19: no    REVIEW OF SYSTEMS     Review of Systems   Constitutional:  Negative for chills, fatigue and unexpected weight change. HENT:  Negative for congestion, ear pain, hearing loss, rhinorrhea, sore throat and trouble swallowing. Eyes:  Negative for pain and visual disturbance. Respiratory:  Negative for cough, chest tightness and shortness of breath. Cardiovascular:  Negative for chest pain, palpitations and leg swelling. Gastrointestinal:  Negative for abdominal pain, blood in stool, constipation, diarrhea, nausea and vomiting. Endocrine: Negative for polyphagia and polyuria. Genitourinary:  Positive for enuresis (Chronic). Negative for difficulty urinating, dysuria, flank pain, frequency and hematuria. Musculoskeletal:  Negative for arthralgias, myalgias and neck stiffness. Skin:  Negative for color change, rash and wound. Neurological:  Negative for dizziness, weakness, light-headedness, numbness and headaches. Hematological:  Negative for adenopathy. Does not bruise/bleed easily. Psychiatric/Behavioral:  Negative for confusion, hallucinations and sleep disturbance. The patient is not nervous/anxious.     PAST MEDICAL HISTORY         Diagnosis Date Carpal tunnel syndrome      SURGICAL HISTORY     Patient  has a past surgical history that includes Appendectomy; Breast surgery;  section; and Tubal ligation. CURRENT MEDICATIONS       Previous Medications    AUBAGIO 14 MG TABS    TAKE 1 TABLET BY MOUTH 1 TIME A DAY. BACLOFEN (LIORESAL) 10 MG TABLET    TAKE 1 TABLET BY MOUTH FOUR TIMES DAILY    COMPRESSION STOCKINGS MISC    by Does not apply route Knee high medium compression 20-30 cc disp three pairs. D3 SUPER STRENGTH 50 MCG (2000 UT) CAPS    TAKE (1) CAPSULE BY MOUTH ONCE DAILY    ESCITALOPRAM (LEXAPRO) 20 MG TABLET    TAKE 1 TABLET BY MOUTH DAILY    ETODOLAC (LODINE) 400 MG TABLET    TAKE 1 TABLET BY MOUTH TWICE DAILY *EMERGENCY REFILL*    GABAPENTIN (NEURONTIN) 600 MG TABLET    TAKE 1/2 TABLET BY MOUTH THREE TIMES A DAY    HANDICAP PLACARD MISC    by Does not apply route    HANDICAP PLACARD MISC    by Does not apply route Duration of 5 years  Dx:MS    IBUPROFEN (ADVIL;MOTRIN) 800 MG TABLET    TAKE ONE TABLET BY MOUTH EVERY 8 HOURS AS NEEDED FOR PAIN    LEVONORGESTREL (MIRENA) IUD 52 MG    1 each by Intrauterine route    LORAZEPAM (ATIVAN) 0.5 MG TABLET        PENICILLIN V POTASSIUM (VEETID) 500 MG TABLET    TAKE ONE TABLET BY MOUTH FOUR TIMES A DAY UNTIL GONE     ALLERGIES     Patient is has No Known Allergies. FAMILY HISTORY     Patient'sfamily history includes Diabetes in her mother; Heart Disease in her father. HISTORY     Patient  reports that she has been smoking. She has never used smokeless tobacco. She reports current alcohol use. She reports current drug use. Drug: Marijuana Ruth Ann Lux). PHYSICAL EXAM     VITALS  BP: 126/78, Temp: 98.1 °F (36.7 °C), Heart Rate: 75,  , SpO2: 92 %  Physical Exam  Constitutional:       General: She is not in acute distress. Appearance: Normal appearance. She is not ill-appearing. HENT:      Head: Normocephalic. Right Ear: Tympanic membrane normal. There is no impacted cerumen.  Tympanic membrane is not retracted or bulging. Left Ear: Tympanic membrane normal. There is no impacted cerumen. Tympanic membrane is not retracted or bulging. Nose: Nose normal. No congestion. Mouth/Throat:      Mouth: Mucous membranes are moist.      Pharynx: No oropharyngeal exudate or posterior oropharyngeal erythema. Tonsils: No tonsillar exudate or tonsillar abscesses. 0 on the right. 0 on the left. Eyes:      General:         Right eye: No discharge. Left eye: No discharge. Conjunctiva/sclera: Conjunctivae normal.      Pupils: Pupils are equal, round, and reactive to light. Cardiovascular:      Rate and Rhythm: Normal rate. Pulses: Normal pulses. Heart sounds: Normal heart sounds. No murmur heard. No friction rub. No gallop. Pulmonary:      Effort: Pulmonary effort is normal. No respiratory distress. Breath sounds: Normal breath sounds. No wheezing, rhonchi or rales. Chest:      Chest wall: No tenderness. Abdominal:      General: Bowel sounds are normal. There is no distension. Tenderness: There is no abdominal tenderness. There is no right CVA tenderness, left CVA tenderness or guarding. Musculoskeletal:         General: No swelling, tenderness, deformity or signs of injury. Normal range of motion. Cervical back: Normal range of motion and neck supple. No tenderness. Lymphadenopathy:      Cervical: No cervical adenopathy. Skin:     General: Skin is warm and dry. Capillary Refill: Capillary refill takes less than 2 seconds. Coloration: Skin is not pale. Findings: No bruising or erythema. Neurological:      General: No focal deficit present. Mental Status: She is alert and oriented to person, place, and time. Mental status is at baseline. Motor: No weakness.       Coordination: Coordination normal.      Gait: Gait normal.   Psychiatric:         Mood and Affect: Mood normal.         Behavior: Behavior normal.       Predictors of intubation difficulty:   Morbid obesity? no    Neck range of motion: normal    Known risk factors for perioperative complications: None          Cardiographics  ECG: rhythm: Not indicated under age 48            READY CARE COURSE   No orders of the defined types were placed in this encounter. Labs:  No results found for this visit on 09/30/22. IMAGING:  No orders to display     Scheduled Meds:   levonorgestrel  1 each IntraUTERine Once     Continuous Infusions:  PRN Meds:. PROCEDURES:  FINAL IMPRESSION      1. Preop examination        DISPOSITION/PLAN     Change in medication regimen before surgery: discontinue all meds the day of surgery accept: discontinue all  Use of Mari-Hex to shower 2 days prior to surgery Not Indicated   Patient was informed that she is unable to eat or drink anything the midnight before her surgery. Informed to shower the night before or morning of surgery and not to apply any lotions or powders after showering  Patient verbally, acknowledges understanding. PATIENT REFERRED TO:  Return for [facility]. DISCHARGE MEDICATIONS:  New Prescriptions    No medications on file     Cannot display discharge medications since this is not an admission.        Lane Dallas, APRN - CNP

## 2022-10-06 ENCOUNTER — ANESTHESIA EVENT (OUTPATIENT)
Dept: OPERATING ROOM | Age: 42
End: 2022-10-06
Payer: COMMERCIAL

## 2022-10-06 NOTE — ANESTHESIA PRE PROCEDURE
Department of Anesthesiology  Preprocedure Note       Name:  Omaira Magana   Age:  43 y.o.  :  1980                                          MRN:  [id number]         Date:  10/6/2022      Surgeon: Tyler Morales):  Alhaji Bonilla MD    Procedure: Procedure(s):  LEFT CARPAL TUNNEL RELEASE ( [facility],  [address])    Medications prior to admission:   Prior to Admission medications    Medication Sig Start Date End Date Taking? Authorizing Provider   LORazepam (ATIVAN) 0.5 MG tablet  22   Historical Provider, MD   etodolac (LODINE) 400 MG tablet TAKE 1 TABLET BY MOUTH TWICE DAILY *EMERGENCY REFILL* 22   Angelique Gutiérrez MD   penicillin v potassium (VEETID) 500 MG tablet TAKE ONE TABLET BY MOUTH FOUR TIMES A DAY UNTIL GONE 22   Historical Provider, MD   ibuprofen (ADVIL;MOTRIN) 800 MG tablet TAKE ONE TABLET BY MOUTH EVERY 8 HOURS AS NEEDED FOR PAIN 22   Historical Provider, MD   Handicap Placard MISC by Does not apply route Duration of 5 years  Dx:MS 22   Angelique Gutiérrez MD   escitalopram (LEXAPRO) 20 MG tablet TAKE 1 TABLET BY MOUTH DAILY 22   Angelique Gutiérrez MD   gabapentin (NEURONTIN) 600 MG tablet TAKE 1/2 TABLET BY MOUTH THREE TIMES A DAY 22  Angelique Gutiérrez MD   AUBAGIO 14 MG TABS TAKE 1 TABLET BY MOUTH 1 TIME A DAY. 6/15/22   Adriana Harden MD   baclofen (LIORESAL) 10 MG tablet TAKE 1 TABLET BY MOUTH FOUR TIMES DAILY 22   Angelique Gutiérrez MD   D3 SUPER STRENGTH 50 MCG ( UT) CAPS TAKE (1) CAPSULE BY MOUTH ONCE DAILY 22   Angelique Gutiérrez MD   Compression Stockings MISC by Does not apply route Knee high medium compression 20-30 cc disp three pairs.   Patient not taking: No sig reported 3/12/20   MD Swati Reyes MISC by Does not apply route 10/12/18   Angelique Gutiérrez MD   levonorgestrel [facility]) IUD 52 mg 1 each by Intrauterine route    Historical Provider, MD       Current medications:    Current Facility-Administered Medications   Medication Dose Route Frequency Provider Last Rate Last Admin    levonorgestrel (MIRENA) IUD 52 mg 1 each  1 each IntraUTERine Once Dipika Olguinill, DO   1 each at 10/27/21 1510     Current Outpatient Medications   Medication Sig Dispense Refill    LORazepam (ATIVAN) 0.5 MG tablet       etodolac (LODINE) 400 MG tablet TAKE 1 TABLET BY MOUTH TWICE DAILY *EMERGENCY REFILL* 60 tablet 10    penicillin v potassium (VEETID) 500 MG tablet TAKE ONE TABLET BY MOUTH FOUR TIMES A DAY UNTIL GONE      ibuprofen (ADVIL;MOTRIN) 800 MG tablet TAKE ONE TABLET BY MOUTH EVERY 8 HOURS AS NEEDED FOR PAIN      Handicap Placard MISC by Does not apply route Duration of 5 years  Dx:MS 1 each 0    escitalopram (LEXAPRO) 20 MG tablet TAKE 1 TABLET BY MOUTH DAILY 30 tablet 10    gabapentin (NEURONTIN) 600 MG tablet TAKE 1/2 TABLET BY MOUTH THREE TIMES A DAY 90 tablet 0    AUBAGIO 14 MG TABS TAKE 1 TABLET BY MOUTH 1 TIME A DAY. 30 tablet 2    baclofen (LIORESAL) 10 MG tablet TAKE 1 TABLET BY MOUTH FOUR TIMES DAILY 120 tablet 10    D3 SUPER STRENGTH 50 MCG (2000 UT) CAPS TAKE (1) CAPSULE BY MOUTH ONCE DAILY 30 capsule 10    Compression Stockings MISC by Does not apply route Knee high medium compression 20-30 cc disp three pairs. (Patient not taking: No sig reported) 1 each 0    Handicap Placard MISC by Does not apply route 1 each 0    levonorgestrel (MIRENA) IUD 52 mg 1 each by Intrauterine route         Allergies:  No Known Allergies    Problem List:    Patient Active Problem List   Diagnosis Code    MS (multiple sclerosis) ([facility] [address] G35    Neuropathy G62.9    Anxiety and depression F41.9, F32. A    Ataxic gait R26.0    Bite, animal T14. 8XXA    Body mass index (bmi) 39.0-39.9, adult Z68.39    Hypokalemia E87.6    Left foot pain M79.672    Muscle weakness (generalized) M62.81    Neuromuscular dysfunction of bladder, unspecified N31.9    Nicotine dependence, unspecified, uncomplicated C53.613    Obesity, unspecified E66.9    Overweight E66.3    Paresthesia of skin R20.2    Vitamin D deficiency E55.9    Deficient knowledge [id number]    Dog bite of wrist S61.559A, W54. 0XXA    Impaired mobility Z74.09    Myalgia M79.10    Spasticity R25.2       Past Medical History:        Diagnosis Date    Carpal tunnel syndrome        Past Surgical History:        Procedure Laterality Date    APPENDECTOMY      BREAST SURGERY      lump removal bengin     SECTION      x 3    TUBAL LIGATION         Social History:    Social History     Tobacco Use    Smoking status: Every Day    Smokeless tobacco: Never   Substance Use Topics    Alcohol use: Yes     Alcohol/week: 0.0 standard drinks                                Ready to quit: Not Answered  Counseling given: Not Answered      Vital Signs (Current): There were no vitals filed for this visit.                                            BP Readings from Last 3 Encounters:   22 126/78   22 120/70   22 125/80       NPO Status:                                                                                 BMI:   Wt Readings from Last 3 Encounters:   22 195 lb (88.5 kg)   22 190 lb (86.2 kg)   22 194 lb (88 kg)     There is no height or weight on file to calculate BMI.    CBC:   Lab Results   Component Value Date/Time    WBC 14.2 2022 05:00 AM    RBC 4.46 2022 05:00 AM    RBC 4.56 10/03/2018 05:36 AM    HGB 14.0 2022 05:00 AM    HCT 42.9 2022 05:00 AM    MCV 96.2 2022 05:00 AM    RDW 14.6 2022 05:00 AM     2022 05:00 AM       CMP:   Lab Results   Component Value Date/Time     2022 05:00 AM    K 4.8 2022 05:00 AM     2022 05:00 AM    CO2 24 2022 05:00 AM    BUN 18 2022 05:00 AM    CREATININE 0.91 2022 05:00 AM    GFRAA >60.0 2022 05:00 AM    AGRATIO 1.4 10/03/2018 05:36 AM    LABGLOM >60.0 2022 05:00 AM    GLUCOSE 201 2022 05:00 AM    GLUCOSE 181 10/03/2018 05:36 AM    PROT 6.8 06/18/2022 06:52 PM    CALCIUM 8.5 06/22/2022 05:00 AM    BILITOT 0.3 06/18/2022 06:52 PM    ALKPHOS 81 06/18/2022 06:52 PM    AST 13 06/18/2022 06:52 PM    ALT 11 06/18/2022 06:52 PM       POC Tests: No results for input(s): POCGLU, POCNA, POCK, POCCL, POCBUN, POCHEMO, POCHCT in the last 72 hours. Coags:   Lab Results   Component Value Date/Time    PROTIME 9.8 08/24/2015 09:25 AM    INR 0.9 08/24/2015 09:25 AM    APTT 31.5 08/24/2015 09:25 AM       HCG (If Applicable):   Lab Results   Component Value Date    PREGTESTUR neg 10/27/2021    PREGSERUM Negative 10/02/2018        ABGs: No results found for: PHART, PO2ART, VGW7CWW, AXO1GBV, BEART, Y7KDPKAE     Type & Screen (If Applicable):  No results found for: LABABO, LABRH    Drug/Infectious Status (If Applicable):  No results found for: HIV, HEPCAB    COVID-19 Screening (If Applicable): No results found for: COVID19        Anesthesia Evaluation  Patient summary reviewed and Nursing notes reviewed no history of anesthetic complications:   Airway: Mallampati: II  TM distance: >3 FB   Neck ROM: full  Mouth opening: > = 3 FB   Dental: normal exam         Pulmonary:Negative Pulmonary ROS breath sounds clear to auscultation                             Cardiovascular:Negative CV ROS  Exercise tolerance: good (>4 METS),         ECG reviewed  Rhythm: regular  Rate: normal           Beta Blocker:  Not on Beta Blocker         Neuro/Psych:   Negative Neuro/Psych ROS  (+) neuromuscular disease: multiple sclerosis, psychiatric history:depression/anxiety             GI/Hepatic/Renal:   (+) morbid obesity          Endo/Other: Negative Endo/Other ROS             Pt had PAT visit. Abdominal:             Vascular: negative vascular ROS. Other Findings:           Anesthesia Plan      MAC and regional     ASA 2     (MAC  ADA block)  Induction: intravenous. MIPS: Prophylactic antiemetics administered.   Anesthetic plan and risks discussed with patient.         Attending anesthesiologist reviewed and agrees with Preprocedure content      Post-op pain plan if not by surgeon: hayes Robbins,    10/6/2022

## 2022-10-07 ENCOUNTER — HOSPITAL ENCOUNTER (OUTPATIENT)
Age: 42
Setting detail: OUTPATIENT SURGERY
Discharge: HOME | End: 2022-10-07
Attending: ORTHOPAEDIC SURGERY | Admitting: ORTHOPAEDIC SURGERY
Payer: COMMERCIAL

## 2022-10-07 ENCOUNTER — ANESTHESIA (OUTPATIENT)
Dept: OPERATING ROOM | Age: 42
End: 2022-10-07
Payer: COMMERCIAL

## 2022-10-07 VITALS
HEART RATE: 85 BPM | RESPIRATION RATE: 16 BRPM | DIASTOLIC BLOOD PRESSURE: 47 MMHG | TEMPERATURE: 97.9 F | WEIGHT: 195 LBS | OXYGEN SATURATION: 95 % | BODY MASS INDEX: 39.31 KG/M2 | SYSTOLIC BLOOD PRESSURE: 108 MMHG | HEIGHT: 59 IN

## 2022-10-07 DIAGNOSIS — G56.03 BILATERAL CARPAL TUNNEL SYNDROME: ICD-10-CM

## 2022-10-07 DIAGNOSIS — G89.18 POST-OPERATIVE PAIN: Primary | ICD-10-CM

## 2022-10-07 DIAGNOSIS — G35 MULTIPLE SCLEROSIS (HCC): ICD-10-CM

## 2022-10-07 DIAGNOSIS — Z09 HOSPITAL DISCHARGE FOLLOW-UP: ICD-10-CM

## 2022-10-07 DIAGNOSIS — G62.9 NEUROPATHY: ICD-10-CM

## 2022-10-07 PROBLEM — G56.02 LEFT CARPAL TUNNEL SYNDROME: Status: ACTIVE | Noted: 2022-10-07

## 2022-10-07 LAB — HCG(URINE) PREGNANCY TEST: NEGATIVE

## 2022-10-07 PROCEDURE — 64721 CARPAL TUNNEL SURGERY: CPT | Performed by: ORTHOPAEDIC SURGERY

## 2022-10-07 PROCEDURE — 2580000003 HC RX 258: Performed by: ORTHOPAEDIC SURGERY

## 2022-10-07 PROCEDURE — 3600000013 HC SURGERY LEVEL 3 ADDTL 15MIN: Performed by: ORTHOPAEDIC SURGERY

## 2022-10-07 PROCEDURE — 2500000003 HC RX 250 WO HCPCS: Performed by: STUDENT IN AN ORGANIZED HEALTH CARE EDUCATION/TRAINING PROGRAM

## 2022-10-07 PROCEDURE — 6360000002 HC RX W HCPCS: Performed by: ORTHOPAEDIC SURGERY

## 2022-10-07 PROCEDURE — 7100000010 HC PHASE II RECOVERY - FIRST 15 MIN: Performed by: ORTHOPAEDIC SURGERY

## 2022-10-07 PROCEDURE — 3600000003 HC SURGERY LEVEL 3 BASE: Performed by: ORTHOPAEDIC SURGERY

## 2022-10-07 PROCEDURE — 6360000002 HC RX W HCPCS: Performed by: STUDENT IN AN ORGANIZED HEALTH CARE EDUCATION/TRAINING PROGRAM

## 2022-10-07 PROCEDURE — 2709999900 HC NON-CHARGEABLE SUPPLY: Performed by: ORTHOPAEDIC SURGERY

## 2022-10-07 PROCEDURE — 3700000001 HC ADD 15 MINUTES (ANESTHESIA): Performed by: ORTHOPAEDIC SURGERY

## 2022-10-07 PROCEDURE — 84703 CHORIONIC GONADOTROPIN ASSAY: CPT

## 2022-10-07 PROCEDURE — 3700000000 HC ANESTHESIA ATTENDED CARE: Performed by: ORTHOPAEDIC SURGERY

## 2022-10-07 PROCEDURE — 2500000003 HC RX 250 WO HCPCS: Performed by: ORTHOPAEDIC SURGERY

## 2022-10-07 PROCEDURE — 7100000011 HC PHASE II RECOVERY - ADDTL 15 MIN: Performed by: ORTHOPAEDIC SURGERY

## 2022-10-07 PROCEDURE — A4217 STERILE WATER/SALINE, 500 ML: HCPCS | Performed by: ORTHOPAEDIC SURGERY

## 2022-10-07 RX ORDER — PROPOFOL 10 MG/ML
INJECTION, EMULSION INTRAVENOUS CONTINUOUS PRN
Status: DISCONTINUED | OUTPATIENT
Start: 2022-10-07 | End: 2022-10-07 | Stop reason: SDUPTHER

## 2022-10-07 RX ORDER — DIPHENHYDRAMINE HYDROCHLORIDE 50 MG/ML
12.5 INJECTION INTRAMUSCULAR; INTRAVENOUS
Status: DISCONTINUED | OUTPATIENT
Start: 2022-10-07 | End: 2022-10-07 | Stop reason: HOSPADM

## 2022-10-07 RX ORDER — IPRATROPIUM BROMIDE AND ALBUTEROL SULFATE 2.5; .5 MG/3ML; MG/3ML
1 SOLUTION RESPIRATORY (INHALATION)
Status: DISCONTINUED | OUTPATIENT
Start: 2022-10-07 | End: 2022-10-07 | Stop reason: HOSPADM

## 2022-10-07 RX ORDER — SODIUM CHLORIDE 0.9 % (FLUSH) 0.9 %
5-40 SYRINGE (ML) INJECTION PRN
Status: DISCONTINUED | OUTPATIENT
Start: 2022-10-07 | End: 2022-10-07 | Stop reason: HOSPADM

## 2022-10-07 RX ORDER — SODIUM CHLORIDE 9 MG/ML
INJECTION, SOLUTION INTRAVENOUS PRN
Status: DISCONTINUED | OUTPATIENT
Start: 2022-10-07 | End: 2022-10-07 | Stop reason: HOSPADM

## 2022-10-07 RX ORDER — LABETALOL HYDROCHLORIDE 5 MG/ML
10 INJECTION, SOLUTION INTRAVENOUS
Status: DISCONTINUED | OUTPATIENT
Start: 2022-10-07 | End: 2022-10-07 | Stop reason: HOSPADM

## 2022-10-07 RX ORDER — OXYCODONE HYDROCHLORIDE 5 MG/1
10 TABLET ORAL PRN
Status: DISCONTINUED | OUTPATIENT
Start: 2022-10-07 | End: 2022-10-07 | Stop reason: HOSPADM

## 2022-10-07 RX ORDER — MIDAZOLAM HYDROCHLORIDE 1 MG/ML
INJECTION INTRAMUSCULAR; INTRAVENOUS PRN
Status: DISCONTINUED | OUTPATIENT
Start: 2022-10-07 | End: 2022-10-07 | Stop reason: SDUPTHER

## 2022-10-07 RX ORDER — SODIUM CHLORIDE 0.9 % (FLUSH) 0.9 %
5-40 SYRINGE (ML) INJECTION EVERY 12 HOURS SCHEDULED
Status: DISCONTINUED | OUTPATIENT
Start: 2022-10-07 | End: 2022-10-07 | Stop reason: HOSPADM

## 2022-10-07 RX ORDER — HYDRALAZINE HYDROCHLORIDE 20 MG/ML
10 INJECTION INTRAMUSCULAR; INTRAVENOUS
Status: DISCONTINUED | OUTPATIENT
Start: 2022-10-07 | End: 2022-10-07 | Stop reason: HOSPADM

## 2022-10-07 RX ORDER — OXYCODONE HYDROCHLORIDE 5 MG/1
5 TABLET ORAL PRN
Status: DISCONTINUED | OUTPATIENT
Start: 2022-10-07 | End: 2022-10-07 | Stop reason: HOSPADM

## 2022-10-07 RX ORDER — BUPIVACAINE HYDROCHLORIDE 5 MG/ML
INJECTION, SOLUTION EPIDURAL; INTRACAUDAL PRN
Status: DISCONTINUED | OUTPATIENT
Start: 2022-10-07 | End: 2022-10-07 | Stop reason: HOSPADM

## 2022-10-07 RX ORDER — ESMOLOL HYDROCHLORIDE 10 MG/ML
INJECTION INTRAVENOUS PRN
Status: DISCONTINUED | OUTPATIENT
Start: 2022-10-07 | End: 2022-10-07 | Stop reason: SDUPTHER

## 2022-10-07 RX ORDER — MORPHINE SULFATE 2 MG/ML
2 INJECTION, SOLUTION INTRAMUSCULAR; INTRAVENOUS
Status: DISCONTINUED | OUTPATIENT
Start: 2022-10-07 | End: 2022-10-07 | Stop reason: HOSPADM

## 2022-10-07 RX ORDER — MAGNESIUM HYDROXIDE 1200 MG/15ML
LIQUID ORAL CONTINUOUS PRN
Status: DISCONTINUED | OUTPATIENT
Start: 2022-10-07 | End: 2022-10-07 | Stop reason: HOSPADM

## 2022-10-07 RX ORDER — SODIUM CHLORIDE 9 MG/ML
50 INJECTION, SOLUTION INTRAVENOUS CONTINUOUS
Status: DISCONTINUED | OUTPATIENT
Start: 2022-10-07 | End: 2022-10-07 | Stop reason: HOSPADM

## 2022-10-07 RX ORDER — ONDANSETRON 2 MG/ML
4 INJECTION INTRAMUSCULAR; INTRAVENOUS
Status: DISCONTINUED | OUTPATIENT
Start: 2022-10-07 | End: 2022-10-07 | Stop reason: HOSPADM

## 2022-10-07 RX ORDER — SODIUM CHLORIDE, SODIUM LACTATE, POTASSIUM CHLORIDE, CALCIUM CHLORIDE 600; 310; 30; 20 MG/100ML; MG/100ML; MG/100ML; MG/100ML
INJECTION, SOLUTION INTRAVENOUS CONTINUOUS
Status: DISCONTINUED | OUTPATIENT
Start: 2022-10-07 | End: 2022-10-07 | Stop reason: HOSPADM

## 2022-10-07 RX ORDER — HYDROCODONE BITARTRATE AND ACETAMINOPHEN 5; 325 MG/1; MG/1
1 TABLET ORAL EVERY 4 HOURS PRN
Qty: 12 TABLET | Refills: 0 | Status: SHIPPED | OUTPATIENT
Start: 2022-10-07 | End: 2022-10-10

## 2022-10-07 RX ORDER — MORPHINE SULFATE 4 MG/ML
4 INJECTION, SOLUTION INTRAMUSCULAR; INTRAVENOUS
Status: DISCONTINUED | OUTPATIENT
Start: 2022-10-07 | End: 2022-10-07 | Stop reason: HOSPADM

## 2022-10-07 RX ORDER — LIDOCAINE HYDROCHLORIDE 5 MG/ML
INJECTION, SOLUTION INFILTRATION; INTRAVENOUS
Status: COMPLETED | OUTPATIENT
Start: 2022-10-07 | End: 2022-10-07

## 2022-10-07 RX ORDER — ACETAMINOPHEN 325 MG/1
650 TABLET ORAL
Status: DISCONTINUED | OUTPATIENT
Start: 2022-10-07 | End: 2022-10-07 | Stop reason: HOSPADM

## 2022-10-07 RX ORDER — ONDANSETRON 2 MG/ML
INJECTION INTRAMUSCULAR; INTRAVENOUS PRN
Status: DISCONTINUED | OUTPATIENT
Start: 2022-10-07 | End: 2022-10-07 | Stop reason: SDUPTHER

## 2022-10-07 RX ORDER — PROCHLORPERAZINE EDISYLATE 5 MG/ML
5 INJECTION INTRAMUSCULAR; INTRAVENOUS
Status: DISCONTINUED | OUTPATIENT
Start: 2022-10-07 | End: 2022-10-07 | Stop reason: HOSPADM

## 2022-10-07 RX ORDER — MEPERIDINE HYDROCHLORIDE 50 MG/ML
12.5 INJECTION INTRAMUSCULAR; INTRAVENOUS; SUBCUTANEOUS EVERY 5 MIN PRN
Status: DISCONTINUED | OUTPATIENT
Start: 2022-10-07 | End: 2022-10-07 | Stop reason: HOSPADM

## 2022-10-07 RX ORDER — FENTANYL CITRATE 50 UG/ML
50 INJECTION, SOLUTION INTRAMUSCULAR; INTRAVENOUS EVERY 10 MIN PRN
Status: DISCONTINUED | OUTPATIENT
Start: 2022-10-07 | End: 2022-10-07 | Stop reason: HOSPADM

## 2022-10-07 RX ORDER — OXYCODONE HYDROCHLORIDE 5 MG/1
5 TABLET ORAL EVERY 4 HOURS PRN
Status: DISCONTINUED | OUTPATIENT
Start: 2022-10-07 | End: 2022-10-07 | Stop reason: HOSPADM

## 2022-10-07 RX ORDER — HYDROMORPHONE HCL 110MG/55ML
0.5 PATIENT CONTROLLED ANALGESIA SYRINGE INTRAVENOUS EVERY 5 MIN PRN
Status: DISCONTINUED | OUTPATIENT
Start: 2022-10-07 | End: 2022-10-07 | Stop reason: HOSPADM

## 2022-10-07 RX ADMIN — PROPOFOL 30 MG: 10 INJECTION, EMULSION INTRAVENOUS at 10:43

## 2022-10-07 RX ADMIN — PROPOFOL 100 MCG/KG/MIN: 10 INJECTION, EMULSION INTRAVENOUS at 10:32

## 2022-10-07 RX ADMIN — ESMOLOL HYDROCHLORIDE 50 MG: 10 INJECTION, SOLUTION INTRAVENOUS at 10:59

## 2022-10-07 RX ADMIN — SODIUM CHLORIDE, POTASSIUM CHLORIDE, SODIUM LACTATE AND CALCIUM CHLORIDE: 600; 310; 30; 20 INJECTION, SOLUTION INTRAVENOUS at 09:44

## 2022-10-07 RX ADMIN — CEFAZOLIN 2000 MG: 2 INJECTION, POWDER, FOR SOLUTION INTRAMUSCULAR; INTRAVENOUS at 10:43

## 2022-10-07 RX ADMIN — ONDANSETRON 4 MG: 2 INJECTION INTRAMUSCULAR; INTRAVENOUS at 10:50

## 2022-10-07 RX ADMIN — LIDOCAINE HYDROCHLORIDE 40 ML: 5 INJECTION, SOLUTION INFILTRATION; INTRAVENOUS at 10:39

## 2022-10-07 RX ADMIN — PROPOFOL 30 MG: 10 INJECTION, EMULSION INTRAVENOUS at 10:35

## 2022-10-07 RX ADMIN — MIDAZOLAM HYDROCHLORIDE 2 MG: 2 INJECTION, SOLUTION INTRAMUSCULAR; INTRAVENOUS at 10:43

## 2022-10-07 RX ADMIN — MIDAZOLAM HYDROCHLORIDE 2 MG: 2 INJECTION, SOLUTION INTRAMUSCULAR; INTRAVENOUS at 10:27

## 2022-10-07 RX ADMIN — PROPOFOL 30 MG: 10 INJECTION, EMULSION INTRAVENOUS at 10:33

## 2022-10-07 RX ADMIN — PROPOFOL 40 MG: 10 INJECTION, EMULSION INTRAVENOUS at 10:34

## 2022-10-07 ASSESSMENT — PAIN - FUNCTIONAL ASSESSMENT: PAIN_FUNCTIONAL_ASSESSMENT: 0-10

## 2022-10-07 NOTE — DISCHARGE INSTRUCTIONS
Keep dressing on and dry until follow-up, cover for showering purposes, may use fingers is much as she would like.

## 2022-10-07 NOTE — OP NOTE
Operative Note      Patient: Maribel Covarrubias  YOB: 1980  MRN: [id number]    Date of Procedure: 10/7/2022    Pre-Op Diagnosis: LEFT CARPAL TUNNEL SYNDROME    Post-Op Diagnosis: Same       Procedure(s):  LEFT CARPAL TUNNEL RELEASE ( [facility],   [address])    Surgeon(s):  Familia Jovel MD    Assistant:   * No surgical staff found *    Anesthesia: [place] Block    Estimated Blood Loss (mL): Minimal    Complications: None    Specimens:   * No specimens in log *    Implants:  * No implants in log *      Drains: * No LDAs found *    Findings: Compressed nerve carpal tunnel    Detailed Description of Procedure:     Brief clinical note:    Patient presents for a left carpal tunnel release today. The risks and benefits the procedure were discussed with the patient in detail. These include but not limited to injury soft tissue nerves and vessels, incisional problems, pillar pain, residual deficits, and recurrence. Medical and anesthetic risks were also discussed. Patient is consented and the left carpal tunnel is marked preoperatively. Operative note:    Patient is taken the op room after anesthesia was administered in form of a Anderson block the left upper extremity  is prepped and draped in usual manner. A final timeout is done with the operative team.  An incision is carried out with a 15 blade scalpel in line with the ring finger in the palm. Meticulous dissection is then carried out with a tenotomy incising the palmar fascia and a Heiss retractor is applied. The transverse carpal ligament is identified under direct visualization and incised with a 15 blade scalpel. Dissection is then carried out distally to the sentinel fat and proximally across the wrist with a tenotomy. This fully decompresses the contents of the carpal tunnel including the median nerve. A tenotomy was utilized to ensure no adhesions are present around the median nerve.   Once this is confirmed this completes the release and the nerve is noted to be in continuity. Wound is irrigated and closed with several nylon sutures. Marcaine without epinephrine is infiltrated in the area for postoperative pain relief. Dressings include Xeroform fluffs web roll and a volar splint. The patient tolerated procedure well without any intraoperative complications.     Electronically signed by Rehan Park MD on 10/7/2022 at 11:03 AM

## 2022-10-07 NOTE — ANESTHESIA POSTPROCEDURE EVALUATION
Department of Anesthesiology  Postprocedure Note    Patient: Loura Najjar  MRN: [id number]  YOB: 1980  Date of evaluation: 10/7/2022      Procedure Summary     Date: 10/07/22 Room / Location: [facility] OR [address] [place] [address]    Anesthesia Start: 1028 Anesthesia Stop:     Procedure: LEFT CARPAL TUNNEL RELEASE ( [facility],  [address]) (Left: Hand) Diagnosis:       Carpal tunnel syndrome on left      (LEFT CARPAL TUNNEL SYNDROME)    Surgeons: Yunior Mary MD Responsible Provider: Sheryl Thao DO    Anesthesia Type: MAC, regional ASA Status: 2          Anesthesia Type: No value filed.     John Phase I: John Score: 10    John Phase II:        Anesthesia Post Evaluation    Patient location during evaluation: PACU  Patient participation: complete - patient cannot participate  Level of consciousness: sleepy but conscious  Pain score: 0  Airway patency: patent  Nausea & Vomiting: no nausea  Complications: no  Cardiovascular status: blood pressure returned to baseline and hemodynamically stable  Respiratory status: acceptable, face mask and spontaneous ventilation  Hydration status: euvolemic  Multimodal analgesia pain management approach

## 2022-10-07 NOTE — ANESTHESIA PROCEDURE NOTES
Peripheral Block    Patient location during procedure: OR  Reason for block: primary anesthetic and at surgeon's request  Start time: 10/7/2022 10:38 AM  End time: 10/7/2022 10:39 AM  Staffing  Performed: anesthesiologist   Anesthesiologist: Ole Flores DO  Preanesthetic Checklist  Completed: patient identified, IV checked, site marked, risks and benefits discussed, surgical/procedural consents, equipment checked, pre-op evaluation, timeout performed, anesthesia consent given, oxygen available and monitors applied/VS acknowledged  Peripheral Block   Patient position: supine  Provider prep: mask (Sterile probe cover)  Patient monitoring: cardiac monitor, continuous pulse ox, frequent blood pressure checks, IV access, continuous capnometry and oxygen  Block type: [place] block  Laterality: left  Injection technique: single-shot (IV block)  Guidance: other and IV block  Local infiltration: ropivacaine and lidocaine  Infiltration strength: 0.5 %  Local infiltration: ropivacaine and lidocaine  Dose: 40 mL    Assessment   Slow fractionated injection: yes  Hemodynamics: stable  Real-time US image taken/store: yes  Outcomes: uncomplicated and patient tolerated procedure well    Additional Notes  Ultrasound image printed and saved in patient chart.     Sterile probe cover used    Medications Administered  lidocaine PF 0.5 % - Perineural   40 mL - 10/7/2022 10:39:00 AM

## 2022-10-10 RX ORDER — GABAPENTIN 600 MG/1
600 TABLET ORAL 3 TIMES DAILY
Qty: 90 TABLET | Refills: 0 | Status: SHIPPED | OUTPATIENT
Start: 2022-10-10 | End: 2022-11-04

## 2022-10-13 DIAGNOSIS — F32.A ANXIETY AND DEPRESSION: Primary | ICD-10-CM

## 2022-10-13 DIAGNOSIS — F41.9 ANXIETY AND DEPRESSION: Primary | ICD-10-CM

## 2022-10-14 RX ORDER — LORAZEPAM 0.5 MG/1
TABLET ORAL
Qty: 60 TABLET | Refills: 2 | Status: SHIPPED | OUTPATIENT
Start: 2022-10-14 | End: 2023-01-12

## 2022-10-14 NOTE — TELEPHONE ENCOUNTER
Comments: This request is coming from the pharmacy. Last Office Visit (last PCP visit):   8/5/2022    Next Visit Date:  Future Appointments   Date Time Provider Rema Smart   10/21/2022  [address] [place] [address] Elly Garcia MD [address] [place] [address]   11/4/2022  8:45 AM Mel Gutierres MD [facility]   1/19/2023  2:45 PM Papa Osborne MD [facility]       If hasn't been seen in over a year OR hasn't followed up according to last diabetes/ADHD visit, make appointment for patient before sending refill to provider.     Rx requested:  Requested Prescriptions     Pending Prescriptions Disp Refills    LORazepam (ATIVAN) 0.5 MG tablet [Pharmacy Med Name: LORAZEPAM 0.5 MG TABS 0.5 Tablet] 60 tablet 2     Sig: TAKE 1 TABLET BY MOUTH EVERY 8 HOURS AS NEEDED FOR ANXIETY

## 2022-10-17 DIAGNOSIS — G35 MULTIPLE SCLEROSIS (HCC): ICD-10-CM

## 2022-10-17 RX ORDER — RENAGEL 800 MG/1
TABLET ORAL
Qty: 30 TABLET | Refills: 2 | Status: SHIPPED | OUTPATIENT
Start: 2022-10-17

## 2022-10-17 NOTE — TELEPHONE ENCOUNTER
Pharmacy is requesting medication refill. Please approve or deny this request.    Rx requested:  Requested Prescriptions     Pending Prescriptions Disp Refills    AUBAGIO 14 MG TABS [Pharmacy Med Name: AUBAGIO 14MG] 30 tablet 2     Sig: TAKE 1 TABLET BY MOUTH 1 TIME A DAY.          Last Office Visit:   9/7/2022      Next Visit Date:  Future Appointments   Date Time Provider Rema Smart   10/21/2022  9:15 AM Bulah Mohs, MD [address] [place] [address]   11/4/2022  8:45 AM Ramiro Lanier MD [facility]   1/19/2023  2:45 PM Hernan Cabrera MD [facility]

## 2022-10-19 DIAGNOSIS — G35 MULTIPLE SCLEROSIS (HCC): ICD-10-CM

## 2022-10-19 DIAGNOSIS — G62.9 NEUROPATHY: ICD-10-CM

## 2022-10-19 DIAGNOSIS — G56.03 BILATERAL CARPAL TUNNEL SYNDROME: ICD-10-CM

## 2022-10-19 DIAGNOSIS — Z09 HOSPITAL DISCHARGE FOLLOW-UP: ICD-10-CM

## 2022-10-20 RX ORDER — GABAPENTIN 600 MG/1
TABLET ORAL
Qty: 90 TABLET | Refills: 10 | OUTPATIENT
Start: 2022-10-20

## 2022-10-21 ENCOUNTER — OFFICE VISIT (OUTPATIENT)
Dept: ORTHOPEDIC SURGERY | Age: 42
End: 2022-10-21
Payer: COMMERCIAL

## 2022-10-21 DIAGNOSIS — Z12.31 ENCOUNTER FOR SCREENING MAMMOGRAM FOR MALIGNANT NEOPLASM OF BREAST: Primary | ICD-10-CM

## 2022-10-21 DIAGNOSIS — G56.03 BILATERAL CARPAL TUNNEL SYNDROME: Primary | ICD-10-CM

## 2022-10-21 PROCEDURE — 99213 OFFICE O/P EST LOW 20 MIN: CPT | Performed by: ORTHOPAEDIC SURGERY

## 2022-10-21 PROCEDURE — G8417 CALC BMI ABV UP PARAM F/U: HCPCS | Performed by: ORTHOPAEDIC SURGERY

## 2022-10-21 PROCEDURE — G8427 DOCREV CUR MEDS BY ELIG CLIN: HCPCS | Performed by: ORTHOPAEDIC SURGERY

## 2022-10-21 PROCEDURE — G8484 FLU IMMUNIZE NO ADMIN: HCPCS | Performed by: ORTHOPAEDIC SURGERY

## 2022-10-21 PROCEDURE — 4004F PT TOBACCO SCREEN RCVD TLK: CPT | Performed by: ORTHOPAEDIC SURGERY

## 2022-10-21 NOTE — PROGRESS NOTES
Subjective:      Patient ID: Fredo Faulkner is a 43 y.o. female who presents today for:  Chief Complaint   Patient presents with    Post-Op Check     Post op visit for Carpal tunnel syndrome on left       HPI    Patient comes in status post carpal tunnel release on the left. The patient is doing well. Sutures removed Steri-Strips were applied. Band-Aids applied. She got some relief on the left already from the surgery and now her right side actually seems much worse than it did previously now that she does not have the pain on the left nearly as much. Past Medical History:   Diagnosis Date    Carpal tunnel syndrome      Past Surgical History:   Procedure Laterality Date    APPENDECTOMY      BREAST SURGERY      lump removal bengin    CARPAL TUNNEL RELEASE Left 10/7/2022    LEFT CARPAL TUNNEL RELEASE ( [facility],   [address]) performed by Jose Anders MD at [address] [place] [address]      x 3    TUBAL LIGATION       Social History     Socioeconomic History    Marital status:      Spouse name: Not on file    Number of children: Not on file    Years of education: Not on file    Highest education level: Not on file   Occupational History    Not on file   Tobacco Use    Smoking status: Every Day    Smokeless tobacco: Never   Vaping Use    Vaping Use: Never used   Substance and Sexual Activity    Alcohol use:  Yes     Alcohol/week: 0.0 standard drinks    Drug use: Yes     Types: Marijuana Davidell Goldberg)    Sexual activity: Not on file   Other Topics Concern    Not on file   Social History Narrative    Not on file     Social Determinants of Health     Financial Resource Strain: Low Risk     Difficulty of Paying Living Expenses: Not hard at all   Food Insecurity: No Food Insecurity    Worried About Running Out of Food in the Last Year: Never true    Ran Out of Food in the Last Year: Never true   Transportation Needs: Not on file   Physical Activity: Not on file   Stress: Not on file   Social Connections: Not on file   Intimate Partner Violence: Not on file   Housing Stability: Not on file     Family History   Problem Relation Age of Onset    Diabetes Mother     Heart Disease Father      No Known Allergies  Current Outpatient Medications on File Prior to Visit   Medication Sig Dispense Refill    AUBAGIO 14 MG TABS TAKE 1 TABLET BY MOUTH 1 TIME A DAY. 30 tablet 2    LORazepam (ATIVAN) 0.5 MG tablet TAKE 1 TABLET BY MOUTH EVERY 8 HOURS AS NEEDED FOR ANXIETY 60 tablet 2    gabapentin (NEURONTIN) 600 MG tablet Take 1 tablet by mouth 3 times daily for 30 days. 90 tablet 0    etodolac (LODINE) 400 MG tablet TAKE 1 TABLET BY MOUTH TWICE DAILY *EMERGENCY REFILL* 60 tablet 10    penicillin v potassium (VEETID) 500 MG tablet TAKE ONE TABLET BY MOUTH FOUR TIMES A DAY UNTIL GONE      ibuprofen (ADVIL;MOTRIN) 800 MG tablet TAKE ONE TABLET BY MOUTH EVERY 8 HOURS AS NEEDED FOR PAIN      Handicap Placard MISC by Does not apply route Duration of 5 years  Dx:MS 1 each 0    escitalopram (LEXAPRO) 20 MG tablet TAKE 1 TABLET BY MOUTH DAILY 30 tablet 10    baclofen (LIORESAL) 10 MG tablet TAKE 1 TABLET BY MOUTH FOUR TIMES DAILY 120 tablet 10    D3 SUPER STRENGTH 50 MCG (2000 UT) CAPS TAKE (1) CAPSULE BY MOUTH ONCE DAILY 30 capsule 10    Handicap Placard MISC by Does not apply route 1 each 0    Compression Stockings MISC by Does not apply route Knee high medium compression 20-30 cc disp three pairs. (Patient not taking: No sig reported) 1 each 0    levonorgestrel (MIRENA) IUD 52 mg 1 each by Intrauterine route       Current Facility-Administered Medications on File Prior to Visit   Medication Dose Route Frequency Provider Last Rate Last Admin    levonorgestrel (MIRENA) IUD 52 mg 1 each  1 each IntraUTERine Once Carli Dickinson DO   1 each at 10/27/21 1510          Review of Systems  Fever chills night sweats. No complications with anesthesia. Objective: There were no vitals taken for this visit.     ORTHOEXAM    examination on the left demonstrates well-healed incisions and the patient has improvement in her sensation of the median nerve distributional she has good cap refill and color. The sutures were removed x3. A Band-Aid is applied. She has a splint at home that she will wear. On the right the patient has numbness and tingling in distribution a positive Phalen's test decree sensation in those thumb index and middle. The patient has good cap refill and color no significant thenar wasting she has interossei strength which is intact. Assessment:       Diagnosis Orders   1. Bilateral carpal tunnel syndrome              Plan:       Proceed with carpal tunnel release on the right per her request.  Therefore have gone over the surgery with her and detail again. The risks and benefits inherent to such. If we can do this relatively quickly we may be able to just simply update her H&P and her preadmission testing. She like to do this sooner than later and give herself a couple weeks or months to recover and therefore we will see her back on November 21 for such here at [facility].  She is comfortable with the plan we will see how she is doing on her left wrist at that time as well without a separate appointment. Days visit does not follow under global given that we are discussing the right side as well including operative intervention and scheduling. No orders of the defined types were placed in this encounter. No orders of the defined types were placed in this encounter. No follow-ups on file.       Jonathan Barrios MD       Surgery Phone: [phone]   [facility]   Surgery Fax: [phone]    Phone: [phone]          Fax: [phone]    Orthopedics: Surgery Scheduling, PAT & PRE-OP Order Form  Call to advance [place] at [phone] at least 24 hours prior to date of service     Surgery Location: [facility]: [address] [place] [address], [address]  OFFICE   Surgeon's [facility], MD Surgery Date:   Time: tf   Patient's Name: Nia Bhardwaj : 1980    Gender: female   Home Phone:  [phone] Cell Phone: [phone]    #:  [id number]  Emergency Contact:  Jules Orourke   Phone: [phone]  Payor: [employer] /  /  /    ID No.: [id number]      PROVIDER TO COMPLETE:  Diagnosis: Right carpal tunnel syndrome  Procedure/Consent: Carpal tunnel release  ICD-10 Code: _______________________  CPT Codes: 21824  Case Comments/Implants: N/A   Surgery Scheduled as: Outpatient  Anesthesia Requested: Kortney Webb  Referring Family Doctor: Rafa Calero MD   [facility]  [x] [facility] Date/Time:                                                            [x] History & Physical [] Physician will Provide [] Attached [] Dictated [] Other  [x] Follow Anesthesia Pre-Op Orders for X-rays, [facility]     [x] SN & PT to evaluate and treat/educate disease management, medications, home safety & equipment needs for total joint patients  [] Other: ____________________________________________________  Consults: Medical/Cardiac Clearance done by  ____________________  PRE-OP ORDERS:   Allergies: Patient has no known allergies.  Latex Allergies:             Diabetic:           [] IV ________________________  [x] IV Start with J-loop     Preprinted Orders: Attached [] Yes [] No   ANTIBIOTIC PRE-OP: [x] ANCEF 2 gram IVPB if > 120 kg 3 grams IVPB within 1 hour of incision, if ALLERGIC, use VANCOMYCIN 1 gram IV, 2 hours pre-op  [] TXA Protocol [] Other:   [x] NPO   [] Betablocker (if needed) _____________________________________   [] Knee high anti-embolic hose [] Thigh high anti-embolic hose   Other: ______________________________________________________    Physician Signature Required: Electronically signed by Winston Cee MD on 10/21/2022 at 9:59 AM  Date/Time: 10/21/2022

## 2022-10-24 ENCOUNTER — TELEPHONE (OUTPATIENT)
Dept: FAMILY MEDICINE CLINIC | Age: 42
End: 2022-10-24

## 2022-10-24 NOTE — TELEPHONE ENCOUNTER
----- Message from Garcia Rowland sent at 10/24/2022 10:04 AM EDT -----  Subject: Message to Provider    QUESTIONS  Information for Provider? patient states that she is faxing over a form   that needs to be completed so that she can have a  animal.  ---------------------------------------------------------------------------  --------------  Madi PEÑA  [phone]; OK to leave message on voicemail  ---------------------------------------------------------------------------  --------------  SCRIPT ANSWERS  Relationship to Patient?  Self

## 2022-10-25 DIAGNOSIS — G35 MULTIPLE SCLEROSIS (HCC): ICD-10-CM

## 2022-10-25 DIAGNOSIS — G56.03 BILATERAL CARPAL TUNNEL SYNDROME: ICD-10-CM

## 2022-10-25 DIAGNOSIS — Z09 HOSPITAL DISCHARGE FOLLOW-UP: ICD-10-CM

## 2022-10-25 DIAGNOSIS — G62.9 NEUROPATHY: ICD-10-CM

## 2022-10-25 RX ORDER — GABAPENTIN 600 MG/1
TABLET ORAL
Qty: 90 TABLET | Refills: 10 | OUTPATIENT
Start: 2022-10-25

## 2022-11-03 DIAGNOSIS — Z09 HOSPITAL DISCHARGE FOLLOW-UP: ICD-10-CM

## 2022-11-03 DIAGNOSIS — G56.03 BILATERAL CARPAL TUNNEL SYNDROME: ICD-10-CM

## 2022-11-03 DIAGNOSIS — G62.9 NEUROPATHY: ICD-10-CM

## 2022-11-03 DIAGNOSIS — G35 MULTIPLE SCLEROSIS (HCC): ICD-10-CM

## 2022-11-04 ENCOUNTER — OFFICE VISIT (OUTPATIENT)
Dept: FAMILY MEDICINE CLINIC | Age: 42
End: 2022-11-04
Payer: COMMERCIAL

## 2022-11-04 VITALS
DIASTOLIC BLOOD PRESSURE: 80 MMHG | TEMPERATURE: 98.3 F | BODY MASS INDEX: 39.39 KG/M2 | HEIGHT: 59 IN | HEART RATE: 73 BPM | SYSTOLIC BLOOD PRESSURE: 126 MMHG | RESPIRATION RATE: 15 BRPM | OXYGEN SATURATION: 95 %

## 2022-11-04 DIAGNOSIS — G62.9 NEUROPATHY: Primary | ICD-10-CM

## 2022-11-04 DIAGNOSIS — G35 MULTIPLE SCLEROSIS (HCC): ICD-10-CM

## 2022-11-04 DIAGNOSIS — M79.10 MYALGIA: ICD-10-CM

## 2022-11-04 PROCEDURE — 99213 OFFICE O/P EST LOW 20 MIN: CPT | Performed by: FAMILY MEDICINE

## 2022-11-04 PROCEDURE — G8484 FLU IMMUNIZE NO ADMIN: HCPCS | Performed by: FAMILY MEDICINE

## 2022-11-04 PROCEDURE — 4004F PT TOBACCO SCREEN RCVD TLK: CPT | Performed by: FAMILY MEDICINE

## 2022-11-04 PROCEDURE — G8427 DOCREV CUR MEDS BY ELIG CLIN: HCPCS | Performed by: FAMILY MEDICINE

## 2022-11-04 PROCEDURE — G8417 CALC BMI ABV UP PARAM F/U: HCPCS | Performed by: FAMILY MEDICINE

## 2022-11-04 RX ORDER — GABAPENTIN 600 MG/1
TABLET ORAL
Qty: 90 TABLET | Refills: 10 | Status: SHIPPED | OUTPATIENT
Start: 2022-11-04 | End: 2022-12-04

## 2022-11-04 ASSESSMENT — ENCOUNTER SYMPTOMS
RESPIRATORY NEGATIVE: 1
EYES NEGATIVE: 1
CHEST TIGHTNESS: 0
RHINORRHEA: 0
GASTROINTESTINAL NEGATIVE: 1
COUGH: 0

## 2022-11-04 NOTE — TELEPHONE ENCOUNTER
requesting medication refill.  Please approve or deny this request.    Rx requested:  Requested Prescriptions     Pending Prescriptions Disp Refills    gabapentin (NEURONTIN) 600 MG tablet [Pharmacy Med Name: GABAPENTIN 600 MG TABS 600 Tablet] 90 tablet 10     Sig: TAKE 1 TABLET BY MOUTH THREE TIMES DAILY         Last Office Visit:   8/5/2022      Next Visit Date:  Future Appointments   Date Time Provider Rema Smart   11/4/2022  8:45 AM Saeed Wilcox MD [facility]   11/14/2022 10:00 AM [facility] ROOM 1 [facility] RAD   1/19/2023  2:45 PM MD Renay Bonilla Neurology -

## 2022-11-04 NOTE — PROGRESS NOTES
Patient is seen in follow up for   Chief Complaint   Patient presents with    [address] for follow up feeling well neurontin has been helping. Past Medical History:   Diagnosis Date    Carpal tunnel syndrome      Patient Active Problem List    Diagnosis Date Noted    Bilateral carpal tunnel syndrome 10/21/2022    Left carpal tunnel syndrome 10/07/2022    Spasticity 09/07/2022    Bite, animal 06/03/2020    Left foot pain 06/03/2020    Deficient knowledge 06/03/2020    Dog bite of wrist 06/03/2020    Impaired mobility 06/03/2020    Myalgia 06/03/2020    Hypokalemia 10/02/2018    Nicotine dependence, unspecified, uncomplicated 23/78/2974    Overweight 10/02/2018    Ataxic gait 09/21/2018    Body mass index (bmi) 39.0-39.9, adult 09/21/2018    Muscle weakness (generalized) 09/21/2018    Neuromuscular dysfunction of bladder, unspecified 09/21/2018    Obesity, unspecified 09/21/2018    Paresthesia of skin 09/21/2018    Vitamin D deficiency 09/21/2018    Neuropathy 12/30/2016    Anxiety and depression 12/30/2016    MS (multiple sclerosis) ([facility] [place] [facility] 07/06/2015     Past Surgical History:   Procedure Laterality Date    APPENDECTOMY      BREAST SURGERY      lump removal bengin    CARPAL TUNNEL RELEASE Left 10/7/2022    LEFT CARPAL TUNNEL RELEASE ( [facility],  [address]) performed by Kizzy Diaz MD at [facility]      x 3    TUBAL LIGATION       Family History   Problem Relation Age of Onset    Diabetes Mother     Heart Disease Father      Social History     Socioeconomic History    Marital status:      Spouse name: None    Number of children: None    Years of education: None    Highest education level: None   Tobacco Use    Smoking status: Every Day    Smokeless tobacco: Never   Vaping Use    Vaping Use: Never used   Substance and Sexual Activity    Alcohol use:  Yes     Alcohol/week: 0.0 standard drinks    Drug use: Yes     Types: Marijuana Velora Hamilton)     Social Determinants of Health     Financial Resource Strain: Low Risk     Difficulty of Paying Living Expenses: Not hard at all   Food Insecurity: No Food Insecurity    Worried About [facility] in the Last Year: Never true    Ran Out of Food in the Last Year: Never true     Current Outpatient Medications   Medication Sig Dispense Refill    AUBAGIO 14 MG TABS TAKE 1 TABLET BY MOUTH 1 TIME A DAY. 30 tablet 2    LORazepam (ATIVAN) 0.5 MG tablet TAKE 1 TABLET BY MOUTH EVERY 8 HOURS AS NEEDED FOR ANXIETY 60 tablet 2    gabapentin (NEURONTIN) 600 MG tablet Take 1 tablet by mouth 3 times daily for 30 days. 90 tablet 0    etodolac (LODINE) 400 MG tablet TAKE 1 TABLET BY MOUTH TWICE DAILY *EMERGENCY REFILL* 60 tablet 10    penicillin v potassium (VEETID) 500 MG tablet TAKE ONE TABLET BY MOUTH FOUR TIMES A DAY UNTIL GONE      ibuprofen (ADVIL;MOTRIN) 800 MG tablet TAKE ONE TABLET BY MOUTH EVERY 8 HOURS AS NEEDED FOR PAIN      Handicap Placard MISC by Does not apply route Duration of 5 years  Dx:MS 1 each 0    escitalopram (LEXAPRO) 20 MG tablet TAKE 1 TABLET BY MOUTH DAILY 30 tablet 10    baclofen (LIORESAL) 10 MG tablet TAKE 1 TABLET BY MOUTH FOUR TIMES DAILY 120 tablet 10    D3 SUPER STRENGTH 50 MCG (2000 UT) CAPS TAKE (1) CAPSULE BY MOUTH ONCE DAILY 30 capsule 10    Compression Stockings MISC by Does not apply route Knee high medium compression 20-30 cc disp three pairs. (Patient not taking: No sig reported) 1 each 0    Handicap Placard MISC by Does not apply route 1 each 0    levonorgestrel (MIRENA) IUD 52 mg 1 each by Intrauterine route       Current Facility-Administered Medications   Medication Dose Route Frequency Provider Last Rate Last Admin    levonorgestrel (MIRENA) IUD 52 mg 1 each  1 each IntraUTERine Once William Knapp DO   1 each at 10/27/21 1510     Current Outpatient Medications on File Prior to Visit   Medication Sig Dispense Refill    AUBAGIO 14 MG TABS TAKE 1 TABLET BY MOUTH 1 TIME A DAY.  30 tablet 2 LORazepam (ATIVAN) 0.5 MG tablet TAKE 1 TABLET BY MOUTH EVERY 8 HOURS AS NEEDED FOR ANXIETY 60 tablet 2    gabapentin (NEURONTIN) 600 MG tablet Take 1 tablet by mouth 3 times daily for 30 days. 90 tablet 0    etodolac (LODINE) 400 MG tablet TAKE 1 TABLET BY MOUTH TWICE DAILY *EMERGENCY REFILL* 60 tablet 10    penicillin v potassium (VEETID) 500 MG tablet TAKE ONE TABLET BY MOUTH FOUR TIMES A DAY UNTIL GONE      ibuprofen (ADVIL;MOTRIN) 800 MG tablet TAKE ONE TABLET BY MOUTH EVERY 8 HOURS AS NEEDED FOR PAIN      Handicap Placard MISC by Does not apply route Duration of 5 years  Dx:MS 1 each 0    escitalopram (LEXAPRO) 20 MG tablet TAKE 1 TABLET BY MOUTH DAILY 30 tablet 10    baclofen (LIORESAL) 10 MG tablet TAKE 1 TABLET BY MOUTH FOUR TIMES DAILY 120 tablet 10    D3 SUPER STRENGTH 50 MCG (2000 UT) CAPS TAKE (1) CAPSULE BY MOUTH ONCE DAILY 30 capsule 10    Compression Stockings MISC by Does not apply route Knee high medium compression 20-30 cc disp three pairs.  (Patient not taking: No sig reported) 1 each 0    Handicap Placard MISC by Does not apply route 1 each 0    levonorgestrel (MIRENA) IUD 52 mg 1 each by Intrauterine route       Current Facility-Administered Medications on File Prior to Visit   Medication Dose Route Frequency Provider Last Rate Last Admin    levonorgestrel (MIRENA) IUD 52 mg 1 each  1 each IntraUTERine Once Anais Begum, DO   1 each at 10/27/21 1510     No Known Allergies  Health Maintenance   Topic Date Due    COVID-19 Vaccine (1) Never done    Varicella vaccine (1 of 2 - 2-dose childhood series) Never done    Pneumococcal 0-64 years Vaccine (1 - PCV) Never done    Hepatitis C screen  Never done    Lipids  06/12/2020    Flu vaccine (1) 08/01/2022    A1C test (Diabetic or Prediabetic)  09/07/2022    HIV screen  08/15/2026 (Originally 4/7/1995)    Depression Monitoring  06/13/2023    Cervical cancer screen  09/08/2025    DTaP/Tdap/Td vaccine (4 - Td or Tdap) 12/12/2029    Hepatitis A vaccine Aged Out    Hib vaccine  Aged Out    Meningococcal (ACWY) vaccine  Aged Out       Review of Systems     Review of Systems   Constitutional:  Negative for activity change, appetite change, fatigue and fever. HENT:  Negative for congestion and rhinorrhea. Eyes: Negative. Respiratory: Negative. Negative for cough and chest tightness. Cardiovascular: Negative. Gastrointestinal: Negative. Endocrine: Negative. Genitourinary: Negative. Musculoskeletal: Negative. Skin: Negative. Neurological:  Negative for dizziness, light-headedness and numbness. Hematological: Negative. Psychiatric/Behavioral: Negative. Physical Exam  Vitals:    11/04/22 0813   BP: 126/80   Pulse: 73   Resp: 15   Temp: 98.3 °F (36.8 °C)   SpO2: 95%   Height: 4' 11\" (1.499 m)       Physical Exam  Constitutional:       Appearance: She is well-developed. HENT:      Right Ear: External ear normal.      Left Ear: External ear normal.   Eyes:      Pupils: Pupils are equal, round, and reactive to light. Neck:      Thyroid: No thyromegaly. Cardiovascular:      Rate and Rhythm: Normal rate and regular rhythm. Heart sounds: Normal heart sounds. No murmur heard. No friction rub. No gallop. Pulmonary:      Effort: Pulmonary effort is normal. No respiratory distress. Breath sounds: No wheezing or rales. Chest:      Chest wall: No tenderness. Abdominal:      General: Bowel sounds are normal. There is no distension. Palpations: Abdomen is soft. There is no mass. Tenderness: There is no abdominal tenderness. There is no guarding or rebound. Musculoskeletal:         General: Normal range of motion. Cervical back: Normal range of motion and neck supple. Lymphadenopathy:      Cervical: No cervical adenopathy. Skin:     General: Skin is warm and dry. Neurological:      Mental Status: She is alert and oriented to person, place, and time. Cranial Nerves: No cranial nerve deficit. Coordination: Coordination normal.       Assessment   Diagnosis Orders   1. Neuropathy        2. Multiple sclerosis ([facility] [address]        3. Myalgia          Problem List       Neuropathy - Primary    Relevant Medications    gabapentin (NEURONTIN) 600 MG tablet    Myalgia       Plan  No orders of the defined types were placed in this encounter. No orders of the defined types were placed in this encounter. No follow-ups on file.   Mariama Parikh MD

## 2022-11-14 ENCOUNTER — HOSPITAL ENCOUNTER (OUTPATIENT)
Dept: WOMENS IMAGING | Age: 42
Discharge: HOME | End: 2022-11-16
Payer: COMMERCIAL

## 2022-11-14 ENCOUNTER — TELEPHONE (OUTPATIENT)
Dept: NEUROLOGY | Age: 42
End: 2022-11-14

## 2022-11-14 DIAGNOSIS — Z12.31 ENCOUNTER FOR SCREENING MAMMOGRAM FOR MALIGNANT NEOPLASM OF BREAST: ICD-10-CM

## 2022-11-14 PROCEDURE — 77067 SCR MAMMO BI INCL CAD: CPT

## 2022-11-25 ENCOUNTER — OFFICE VISIT (OUTPATIENT)
Dept: FAMILY MEDICINE CLINIC | Age: 42
End: 2022-11-25
Payer: COMMERCIAL

## 2022-11-25 VITALS
DIASTOLIC BLOOD PRESSURE: 68 MMHG | OXYGEN SATURATION: 98 % | SYSTOLIC BLOOD PRESSURE: 134 MMHG | BODY MASS INDEX: 37.38 KG/M2 | TEMPERATURE: 97.7 F | WEIGHT: 190.4 LBS | HEIGHT: 60 IN | HEART RATE: 88 BPM

## 2022-11-25 DIAGNOSIS — Z01.818 PRE-OP EXAM: Primary | ICD-10-CM

## 2022-11-25 PROCEDURE — G8427 DOCREV CUR MEDS BY ELIG CLIN: HCPCS

## 2022-11-25 PROCEDURE — G8417 CALC BMI ABV UP PARAM F/U: HCPCS

## 2022-11-25 PROCEDURE — G8484 FLU IMMUNIZE NO ADMIN: HCPCS

## 2022-11-25 PROCEDURE — 99213 OFFICE O/P EST LOW 20 MIN: CPT

## 2022-11-25 ASSESSMENT — ENCOUNTER SYMPTOMS: COLOR CHANGE: 0

## 2022-11-25 NOTE — PROGRESS NOTES
Pre Operative  Encounter  CHIEF COMPLAINT     Samir Roa is a 43 y.o. female who presents with:  Chief Complaint   Patient presents with    Pre-op Exam       HISTORY OF PRESENT ILLNESS     Bev Andrade presents to the office today for a preoperative consultation at the request of surgeon, Dr. Raysa Deras, who plans on performing right carpal tunnel release on December 2 at [facility]. The current problem began 1 year ago, and symptoms have been worsening with time. Conservative therapy: N/A. Planned anesthesia: [place] Block  Known anesthesia problems: No  Bleeding risk: Etodolac may cause bleeding  Personal or FH of DVT/PE: NO    Patient objection to receiving blood Products : No Objectsion  Previous Vaccination for COVID 19: No    REVIEW OF SYSTEMS     Review of Systems   Constitutional:  Positive for fatigue (chronic). Negative for chills and unexpected weight change. HENT:  Negative for congestion, ear pain, hearing loss, rhinorrhea, sore throat and trouble swallowing. Eyes:  Negative for pain and visual disturbance. Respiratory:  Negative for cough, chest tightness and shortness of breath. Cardiovascular:  Negative for chest pain, palpitations and leg swelling. Gastrointestinal:  Negative for abdominal pain, blood in stool, constipation, diarrhea, nausea and vomiting. Endocrine: Negative for polyphagia and polyuria. Genitourinary:  Negative for difficulty urinating, dysuria, flank pain, frequency and hematuria. Musculoskeletal:  Positive for arthralgias (Chronic). Negative for myalgias and neck stiffness. Skin:  Negative for color change, rash and wound. Neurological:  Positive for numbness (chronic) and headaches (Chronic). Negative for dizziness, weakness and light-headedness. Hematological:  Negative for adenopathy. Does not bruise/bleed easily. Psychiatric/Behavioral:  Negative for confusion, hallucinations and sleep disturbance. The patient is not nervous/anxious.     PAST MEDICAL HISTORY         Diagnosis Date    Carpal tunnel syndrome      SURGICAL HISTORY     Patient  has a past surgical history that includes Appendectomy; Breast surgery;  section; Tubal ligation; and Carpal tunnel release (Left, 10/7/2022). CURRENT MEDICATIONS       Previous Medications    AUBAGIO 14 MG TABS    TAKE 1 TABLET BY MOUTH 1 TIME A DAY. BACLOFEN (LIORESAL) 10 MG TABLET    TAKE 1 TABLET BY MOUTH FOUR TIMES DAILY    COMPRESSION STOCKINGS MISC    by Does not apply route Knee high medium compression 20-30 cc disp three pairs. D3 SUPER STRENGTH 50 MCG (2000 UT) CAPS    TAKE (1) CAPSULE BY MOUTH ONCE DAILY    ESCITALOPRAM (LEXAPRO) 20 MG TABLET    TAKE 1 TABLET BY MOUTH DAILY    ETODOLAC (LODINE) 400 MG TABLET    TAKE 1 TABLET BY MOUTH TWICE DAILY *EMERGENCY REFILL*    GABAPENTIN (NEURONTIN) 600 MG TABLET    TAKE 1 TABLET BY MOUTH THREE TIMES DAILY    HANDICAP PLACARD MISC    by Does not apply route    HANDICAP PLACARD MISC    by Does not apply route Duration of 5 years  Dx:MS    IBUPROFEN (ADVIL;MOTRIN) 800 MG TABLET    TAKE ONE TABLET BY MOUTH EVERY 8 HOURS AS NEEDED FOR PAIN    LEVONORGESTREL (MIRENA) IUD 52 MG    1 each by Intrauterine route    LORAZEPAM (ATIVAN) 0.5 MG TABLET    TAKE 1 TABLET BY MOUTH EVERY 8 HOURS AS NEEDED FOR ANXIETY    PENICILLIN V POTASSIUM (VEETID) 500 MG TABLET    TAKE ONE TABLET BY MOUTH FOUR TIMES A DAY UNTIL GONE     ALLERGIES     Patient is has No Known Allergies. FAMILY HISTORY     Patient'sfamily history includes Diabetes in her mother; Heart Disease in her father. HISTORY     Patient  reports that she has been smoking. She has never used smokeless tobacco. She reports current alcohol use. She reports current drug use. Drug: Marijuana Nan Ryan). PHYSICAL EXAM     VITALS  BP: 134/68, Temp: 97.7 °F (36.5 °C), Heart Rate: 88,  , SpO2: 98 %  Physical Exam  Constitutional:       General: She is not in acute distress. Appearance: Normal appearance.  She is not ill-appearing. HENT:      Head: Normocephalic. Right Ear: Tympanic membrane normal. There is no impacted cerumen. Left Ear: Tympanic membrane normal. There is no impacted cerumen. Nose: Nose normal. No congestion. Mouth/Throat:      Mouth: Mucous membranes are moist.      Pharynx: No oropharyngeal exudate or posterior oropharyngeal erythema. Tonsils: No tonsillar exudate or tonsillar abscesses. 0 on the right. 0 on the left. Eyes:      General:         Right eye: No discharge. Left eye: No discharge. Conjunctiva/sclera: Conjunctivae normal.      Pupils: Pupils are equal, round, and reactive to light. Cardiovascular:      Rate and Rhythm: Normal rate. Pulses: Normal pulses. Heart sounds: Normal heart sounds. No murmur heard. No friction rub. No gallop. Pulmonary:      Effort: Pulmonary effort is normal. No respiratory distress. Breath sounds: Normal breath sounds. No wheezing, rhonchi or rales. Chest:      Chest wall: No tenderness. Abdominal:      General: Bowel sounds are normal. There is no distension. Tenderness: There is no abdominal tenderness. There is no right CVA tenderness, left CVA tenderness or guarding. Musculoskeletal:         General: No swelling, tenderness, deformity or signs of injury. Normal range of motion. Cervical back: Normal range of motion and neck supple. No tenderness. Lymphadenopathy:      Cervical: No cervical adenopathy. Skin:     General: Skin is warm and dry. Capillary Refill: Capillary refill takes less than 2 seconds. Coloration: Skin is not pale. Findings: No bruising or erythema. Neurological:      General: No focal deficit present. Mental Status: She is alert and oriented to person, place, and time. Mental status is at baseline. Motor: No weakness.       Coordination: Coordination normal.      Gait: Gait normal.   Psychiatric:         Mood and Affect: Mood normal. Behavior: Behavior normal.       Predictors of intubation difficulty:   Morbid obesity? no    Neck range of motion: normal    Known risk factors for perioperative complications: None          Cardiographics  ECG: rhythm, not performed under age 48. READY CARE COURSE   No orders of the defined types were placed in this encounter. Labs:  No results found for this visit on 11/25/22. IMAGING:  No orders to display     Scheduled Meds:   levonorgestrel  1 each IntraUTERine Once     Continuous Infusions:  PRN Meds:. PROCEDURES:  FINAL IMPRESSION    No diagnosis found. DISPOSITION/PLAN     Change in medication regimen before surgery: discontinue all meds the day of surgery accept: No exceptions  Use of Mari-Hex to shower 2 days prior to surgery Not Indicated   Patient was informed that she is unable to eat or drink anything the midnight before her surgery. Informed to shower the night before or morning of surgery and not to apply any lotions or powders after showering  Patient verbally, acknowledges understanding. PATIENT REFERRED TO:  No follow-ups on file. DISCHARGE MEDICATIONS:  New Prescriptions    No medications on file     Cannot display discharge medications since this is not an admission.        Sheila Moreno, APRN - CNP

## 2022-11-28 ASSESSMENT — ENCOUNTER SYMPTOMS
SORE THROAT: 0
ABDOMINAL PAIN: 0
TROUBLE SWALLOWING: 0
EYE PAIN: 0
NAUSEA: 0
BLOOD IN STOOL: 0
COUGH: 0
VOMITING: 0
CONSTIPATION: 0
RHINORRHEA: 0
CHEST TIGHTNESS: 0
DIARRHEA: 0
SHORTNESS OF BREATH: 0

## 2022-12-01 ENCOUNTER — TELEPHONE (OUTPATIENT)
Dept: ORTHOPEDIC SURGERY | Age: 42
End: 2022-12-01

## 2022-12-01 NOTE — TELEPHONE ENCOUNTER
Surgery Scheduling and Authorization Information    Surgery Date:12/2/22  Surgery good through dates: 12/2/22-3/2/23  CPT Code(s) 06297  Procedure(s)    RCTR      Approved [x] Not Required [] Denied []  Reference # [id number]  Auth #  [id number]  How authorization obtained:  [] web portal             [] via phone       [x] Via fax    Provider  [] Pervis Remedies  [x] Velvet Ozuna  [] Mary Anne Vasquez  [] Adam Gatica  [] Radha Farmer  [] Letty Bruce  [] Rony Larson  [] Gisselle Zhu  [] Ana Garcia      Surgery Sheet Faxed to Scheduling [x]  Surgery and Prior Authorization Information Sheet Scanned [x]

## 2022-12-02 ENCOUNTER — ANESTHESIA EVENT (OUTPATIENT)
Dept: OPERATING ROOM | Age: 42
End: 2022-12-02
Payer: COMMERCIAL

## 2022-12-02 ENCOUNTER — HOSPITAL ENCOUNTER (OUTPATIENT)
Age: 42
Setting detail: OUTPATIENT SURGERY
Discharge: HOME | End: 2022-12-02
Attending: ORTHOPAEDIC SURGERY | Admitting: ORTHOPAEDIC SURGERY
Payer: COMMERCIAL

## 2022-12-02 ENCOUNTER — ANESTHESIA (OUTPATIENT)
Dept: OPERATING ROOM | Age: 42
End: 2022-12-02
Payer: COMMERCIAL

## 2022-12-02 VITALS
WEIGHT: 190 LBS | HEART RATE: 73 BPM | OXYGEN SATURATION: 95 % | TEMPERATURE: 97.7 F | DIASTOLIC BLOOD PRESSURE: 81 MMHG | SYSTOLIC BLOOD PRESSURE: 92 MMHG | RESPIRATION RATE: 16 BRPM | BODY MASS INDEX: 38.3 KG/M2 | HEIGHT: 59 IN

## 2022-12-02 DIAGNOSIS — G56.03 BILATERAL CARPAL TUNNEL SYNDROME: Primary | ICD-10-CM

## 2022-12-02 PROCEDURE — 7100000011 HC PHASE II RECOVERY - ADDTL 15 MIN: Performed by: ORTHOPAEDIC SURGERY

## 2022-12-02 PROCEDURE — 2580000003 HC RX 258: Performed by: ORTHOPAEDIC SURGERY

## 2022-12-02 PROCEDURE — 3700000000 HC ANESTHESIA ATTENDED CARE: Performed by: ORTHOPAEDIC SURGERY

## 2022-12-02 PROCEDURE — 2709999900 HC NON-CHARGEABLE SUPPLY: Performed by: ORTHOPAEDIC SURGERY

## 2022-12-02 PROCEDURE — A4217 STERILE WATER/SALINE, 500 ML: HCPCS | Performed by: ORTHOPAEDIC SURGERY

## 2022-12-02 PROCEDURE — 6360000002 HC RX W HCPCS: Performed by: ORTHOPAEDIC SURGERY

## 2022-12-02 PROCEDURE — 2500000003 HC RX 250 WO HCPCS: Performed by: ORTHOPAEDIC SURGERY

## 2022-12-02 PROCEDURE — 76942 ECHO GUIDE FOR BIOPSY: CPT | Performed by: ANESTHESIOLOGY

## 2022-12-02 PROCEDURE — 3700000001 HC ADD 15 MINUTES (ANESTHESIA): Performed by: ORTHOPAEDIC SURGERY

## 2022-12-02 PROCEDURE — 7100000010 HC PHASE II RECOVERY - FIRST 15 MIN: Performed by: ORTHOPAEDIC SURGERY

## 2022-12-02 PROCEDURE — 2580000003 HC RX 258: Performed by: ANESTHESIOLOGY

## 2022-12-02 PROCEDURE — 6360000002 HC RX W HCPCS: Performed by: ANESTHESIOLOGY

## 2022-12-02 PROCEDURE — 2500000003 HC RX 250 WO HCPCS: Performed by: ANESTHESIOLOGY

## 2022-12-02 PROCEDURE — 3600000013 HC SURGERY LEVEL 3 ADDTL 15MIN: Performed by: ORTHOPAEDIC SURGERY

## 2022-12-02 PROCEDURE — 3600000003 HC SURGERY LEVEL 3 BASE: Performed by: ORTHOPAEDIC SURGERY

## 2022-12-02 RX ORDER — MIDAZOLAM HYDROCHLORIDE 1 MG/ML
INJECTION INTRAMUSCULAR; INTRAVENOUS PRN
Status: DISCONTINUED | OUTPATIENT
Start: 2022-12-02 | End: 2022-12-02 | Stop reason: SDUPTHER

## 2022-12-02 RX ORDER — MAGNESIUM HYDROXIDE 1200 MG/15ML
LIQUID ORAL CONTINUOUS PRN
Status: COMPLETED | OUTPATIENT
Start: 2022-12-02 | End: 2022-12-02

## 2022-12-02 RX ORDER — HYDROCODONE BITARTRATE AND ACETAMINOPHEN 5; 325 MG/1; MG/1
1 TABLET ORAL EVERY 4 HOURS PRN
Qty: 18 TABLET | Refills: 0 | Status: SHIPPED | OUTPATIENT
Start: 2022-12-02 | End: 2022-12-05

## 2022-12-02 RX ORDER — SODIUM CHLORIDE, SODIUM LACTATE, POTASSIUM CHLORIDE, CALCIUM CHLORIDE 600; 310; 30; 20 MG/100ML; MG/100ML; MG/100ML; MG/100ML
1000 INJECTION, SOLUTION INTRAVENOUS CONTINUOUS
Status: DISCONTINUED | OUTPATIENT
Start: 2022-12-02 | End: 2022-12-02 | Stop reason: HOSPADM

## 2022-12-02 RX ORDER — BUPIVACAINE HYDROCHLORIDE 5 MG/ML
INJECTION, SOLUTION EPIDURAL; INTRACAUDAL PRN
Status: DISCONTINUED | OUTPATIENT
Start: 2022-12-02 | End: 2022-12-02 | Stop reason: ALTCHOICE

## 2022-12-02 RX ORDER — LIDOCAINE HYDROCHLORIDE 5 MG/ML
INJECTION, SOLUTION INFILTRATION; INTRAVENOUS
Status: COMPLETED | OUTPATIENT
Start: 2022-12-02 | End: 2022-12-02

## 2022-12-02 RX ORDER — PROPOFOL 10 MG/ML
INJECTION, EMULSION INTRAVENOUS PRN
Status: DISCONTINUED | OUTPATIENT
Start: 2022-12-02 | End: 2022-12-02 | Stop reason: SDUPTHER

## 2022-12-02 RX ADMIN — PROPOFOL 30 MG: 10 INJECTION, EMULSION INTRAVENOUS at 12:57

## 2022-12-02 RX ADMIN — MIDAZOLAM HYDROCHLORIDE 2 MG: 2 INJECTION, SOLUTION INTRAMUSCULAR; INTRAVENOUS at 12:45

## 2022-12-02 RX ADMIN — PROPOFOL 40 MG: 10 INJECTION, EMULSION INTRAVENOUS at 13:00

## 2022-12-02 RX ADMIN — SODIUM CHLORIDE, POTASSIUM CHLORIDE, SODIUM LACTATE AND CALCIUM CHLORIDE 1000 ML: 600; 310; 30; 20 INJECTION, SOLUTION INTRAVENOUS at 12:10

## 2022-12-02 RX ADMIN — PROPOFOL 30 MG: 10 INJECTION, EMULSION INTRAVENOUS at 13:01

## 2022-12-02 RX ADMIN — LIDOCAINE HYDROCHLORIDE 40 ML: 5 INJECTION, SOLUTION INFILTRATION; INTRAVENOUS at 12:50

## 2022-12-02 RX ADMIN — PROPOFOL 30 MG: 10 INJECTION, EMULSION INTRAVENOUS at 12:52

## 2022-12-02 RX ADMIN — PROPOFOL 120 MG: 10 INJECTION, EMULSION INTRAVENOUS at 12:54

## 2022-12-02 RX ADMIN — CEFAZOLIN 2000 MG: 2 INJECTION, POWDER, FOR SOLUTION INTRAMUSCULAR; INTRAVENOUS at 12:48

## 2022-12-02 RX ADMIN — PROPOFOL 40 MG: 10 INJECTION, EMULSION INTRAVENOUS at 12:51

## 2022-12-02 ASSESSMENT — PAIN SCALES - GENERAL
PAINLEVEL_OUTOF10: 0
PAINLEVEL_OUTOF10: 0

## 2022-12-02 ASSESSMENT — PAIN - FUNCTIONAL ASSESSMENT: PAIN_FUNCTIONAL_ASSESSMENT: 0-10

## 2022-12-02 NOTE — ANESTHESIA PROCEDURE NOTES
Peripheral Block    Patient location during procedure: pre-op  Reason for block: post-op pain management and at surgeon's request  Start time: 12/2/2022 12:50 PM  End time: 12/2/2022 12:50 PM  Staffing  Performed: anesthesiologist   Anesthesiologist: Nuzhat Khanna MD  Preanesthetic Checklist  Completed: patient identified, IV checked, site marked, risks and benefits discussed, surgical/procedural consents, equipment checked, pre-op evaluation, timeout performed, anesthesia consent given, oxygen available and monitors applied/VS acknowledged  Peripheral Block   Patient position: supine  Prep: ChloraPrep  Provider prep: mask and sterile gloves (Sterile probe cover)  Patient monitoring: cardiac monitor, continuous pulse ox, frequent blood pressure checks and IV access  Block type: [place] block  Laterality: right  Injection technique: single-shot  Guidance: nerve stimulator and ultrasound guided  Local infiltration: ropivacaine and lidocaine  Infiltration strength: 0.5 %  Local infiltration: ropivacaine and lidocaine  Dose: 40 mL    Needle   Needle type: combined needle/nerve stimulator   Needle gauge: 22 G  Needle localization: anatomical landmarks and ultrasound guidance  Needle length: 5 cm  Assessment   Injection assessment: negative aspiration for heme, no paresthesia on injection and local visualized surrounding nerve on ultrasound  Paresthesia pain: immediately resolved  Slow fractionated injection: yes  Hemodynamics: stable  Real-time US image taken/store: yes    Additional Notes  Ultrasound image printed and saved in patient chart.     Sterile probe cover used    Medications Administered  lidocaine PF 0.5 % - Perineural   40 mL - 12/2/2022 12:50:00 PM

## 2022-12-02 NOTE — ANESTHESIA POSTPROCEDURE EVALUATION
Department of Anesthesiology  Postprocedure Note    Patient: Angelita Jean  MRN: [id number]  YOB: 1980  Date of evaluation: 12/2/2022      Procedure Summary     Date: 12/02/22 Room / Location: [address] [place] [address]    Anesthesia Start: 7272 Anesthesia Stop: 4756    Procedure: RIGHT CARPAL TUNNEL RELEASE (Right: Hand) Diagnosis:       Carpal tunnel syndrome on right      (RIGHT CARPAL TUNNEL SYNDROME)    Surgeons: Joi Mishra MD Responsible Provider: Gita Reeder MD    Anesthesia Type: MAC, [place] block ASA Status: 2          Anesthesia Type: No value filed.     John Phase I: John Score: 10    John Phase II:        Anesthesia Post Evaluation    Patient location during evaluation: bedside  Patient participation: complete - patient participated  Level of consciousness: awake and awake and alert  Airway patency: patent  Nausea & Vomiting: no nausea and no vomiting  Complications: no  Cardiovascular status: blood pressure returned to baseline and hemodynamically stable  Respiratory status: acceptable  Hydration status: euvolemic

## 2022-12-02 NOTE — OP NOTE
Operative Note      Patient: Felicita Matthew  YOB: 1980  MRN: [id number]    Date of Procedure: 12/2/2022    Pre-Op Diagnosis: RIGHT CARPAL TUNNEL SYNDROME    Post-Op Diagnosis: Same       Procedure(s):  RIGHT CARPAL TUNNEL RELEASE    Surgeon(s):  Chaparro German MD    Assistant:   * No surgical staff found *    Anesthesia: [place] Block    Estimated Blood Loss (mL): Minimal    Complications: None    Specimens:   * No specimens in log *    Implants:  * No implants in log *      Drains: * No LDAs found *    Findings: Compressed nerve carpal tunnel    Detailed Description of Procedure:     Brief clinical note:    Patient presents for a right carpal tunnel release today. The risks and benefits that procedure were discussed with the patient in detail. These include, but not limited to injury soft tissue nerves and vessels, incisional problems, pillar pain, residual deficits, and recurrence. Medical and anesthetic risks were also discussed. Patient is consented and the right carpal tunnel is marked preoperatively. Operative note:    Patient is taken the op room after anesthesia was administered in form of a Anderson block the area is prepped and draped in usual manner. A final timeout is done with the operative team.  An incision is carried out with a 15 blade scalpel in line with the ring finger in the palm. Meticulous dissection is then carried out with a tenotomy incising the palmar fascia and a Heiss retractor is applied. The transverse carpal ligament is identified under direct visualization and incised with a 15 blade scalpel. Dissection is then carried out distally to the sentinel fat and proximally across the wrist with a tenotomy. This fully decompresses the contents of the carpal tunnel including the median nerve. A tenotomy was utilized to ensure no adhesions are present around the median nerve. Once this is confirmed this completes the release and the nerve is noted to be in continuity. Wound is irrigated and closed with several nylon sutures. Marcaine without epinephrine is infiltrated in the area for postoperative pain relief. Dressings include Xeroform fluffs web roll and a volar splint. The patient tolerated procedure well without any intraoperative complications.     Electronically signed by Rehan Park MD on 12/2/2022 at 1:09 PM

## 2022-12-02 NOTE — OP NOTE
Operative Note      Patient: Neri Lechuga  YOB: 1980  MRN: [id number]    Date of Procedure: 12/2/2022    Pre-Op Diagnosis: RIGHT CARPAL TUNNEL SYNDROME    Post-Op Diagnosis: Same       Procedure(s):  RIGHT CARPAL TUNNEL RELEASE    Surgeon(s):  Kori Valentino MD    Assistant:   * No surgical staff found *    Anesthesia: [place] Block    Estimated Blood Loss (mL): Minimal    Complications: None    Specimens:   * No specimens in log *    Implants:  * No implants in log *      Drains: * No LDAs found *    Findings: Compressed nerve carpal tunnel    Detailed Description of Procedure:     Brief clinical note:    Patient presents for a right carpal tunnel release today. The risks and benefits that procedure were discussed with the patient in detail. These include, but not limited to injury soft tissue nerves and vessels, incisional problems, pillar pain, residual deficits, and recurrence. Medical and anesthetic risks were also discussed. Patient is consented and the right carpal tunnel is marked preoperatively. Operative note:    Patient is taken the op room after anesthesia was administered in form of a [place] block the area is prepped and draped in usual manner. A final timeout is done with the operative team.  An incision is carried out with a 15 blade scalpel in line with the ring finger in the palm. Meticulous dissection is then carried out with a tenotomy incising the palmar fascia and a Heiss retractor is applied. The transverse carpal ligament is identified under direct visualization and incised with a 15 blade scalpel. Dissection is then carried out distally to the sentinel fat and proximally across the wrist with a tenotomy. This fully decompresses the contents of the carpal tunnel including the median nerve. A tenotomy was utilized to ensure no adhesions are present around the median nerve. Once this is confirmed this completes the release and the nerve is noted to be in continuity. Wound is irrigated and closed with several nylon sutures. Marcaine without epinephrine is infiltrated in the area for postoperative pain relief. Dressings include Xeroform fluffs web roll and a volar splint. The patient tolerated procedure well without any intraoperative complications.     Electronically signed by Gabrielle Rashid MD on 12/2/2022 at 1:38 PM

## 2022-12-02 NOTE — ANESTHESIA PRE PROCEDURE
Department of Anesthesiology  Preprocedure Note       Name:  Loura Najjar   Age:  43 y.o.  :  1980                                          MRN:  [id number]         Date:  2022      Surgeon: Yamile Alexander):  Yunior Mary MD    Procedure: Procedure(s):  RIGHT CARPAL TUNNEL RELEASE (PAT AT [facility] )    Medications prior to admission:   Prior to Admission medications    Medication Sig Start Date End Date Taking? Authorizing Provider   HYDROcodone-acetaminophen (NORCO) 5-325 MG per tablet Take 1 tablet by mouth every 4 hours as needed for Pain for up to 3 days. Intended supply: 3 days. Take lowest dose possible to manage pain 22 Yes Mckenna Chavez PA-C   gabapentin (NEURONTIN) 600 MG tablet TAKE 1 TABLET BY MOUTH THREE TIMES DAILY 22  Toi Aguilar MD   AUBAGIO 14 MG TABS TAKE 1 TABLET BY MOUTH 1 TIME A DAY.  10/17/22   Ole Sorensen MD   LORazepam (ATIVAN) 0.5 MG tablet TAKE 1 TABLET BY MOUTH EVERY 8 HOURS AS NEEDED FOR ANXIETY 10/14/22 1/12/23  Toi Aguilar MD   etodolac (LODINE) 400 MG tablet TAKE 1 TABLET BY MOUTH TWICE DAILY *EMERGENCY REFILL* 22   Toi Aguilar MD   penicillin v potassium (VEETID) 500 MG tablet TAKE ONE TABLET BY MOUTH FOUR TIMES A DAY UNTIL GONE  Patient not taking: Reported on 2022   Historical Provider, MD   ibuprofen (ADVIL;MOTRIN) 800 MG tablet TAKE ONE TABLET BY MOUTH EVERY 8 HOURS AS NEEDED FOR PAIN  Patient not taking: Reported on 2022   Historical Provider, MD   Handicap Placard [facility] by Does not apply route Duration of 5 years  Dx:MS 22   Toi Aguilar MD   escitalopram (LEXAPRO) 20 MG tablet TAKE 1 TABLET BY MOUTH DAILY 22   Toi Aguilar MD   baclofen (LIORESAL) 10 MG tablet TAKE 1 TABLET BY MOUTH FOUR TIMES DAILY 22   Toi Aguilar MD   D3 SUPER STRENGTH 50 MCG (2000 UT) CAPS TAKE (1) CAPSULE BY MOUTH ONCE DAILY 22   Toi Aguilar MD   Compression Stockings MISC by Does not apply route Knee high medium compression 20-30 cc disp three pairs. Patient not taking: No sig reported 3/12/20   Mel Gutierres MD   Handicap Placard MISC by Does not apply route 10/12/18   Mel Gutierres MD   levonorgestrel [facility]) IUD 52 mg 1 each by Intrauterine route    Historical Provider, MD       Current medications:    Current Facility-Administered Medications   Medication Dose Route Frequency Provider Last Rate Last Admin    lactated ringers infusion 1,000 mL  1,000 mL IntraVENous Continuous Reji Mcclain MD 50 mL/hr at 12/02/22 1210 1,000 mL at 12/02/22 1210    ceFAZolin (ANCEF) 2,000 mg in dextrose 5 % 100 mL IVPB  2,000 mg IntraVENous Once Jonathan Barrios MD           Allergies:  No Known Allergies    Problem List:    Patient Active Problem List   Diagnosis Code    MS (multiple sclerosis) ([facility] [address] G35    Neuropathy G62.9    Anxiety and depression F41.9, F32. A    Ataxic gait R26.0    Bite, animal T14. 8XXA    Body mass index (bmi) 39.0-39.9, adult Z68.39    Hypokalemia E87.6    Left foot pain M79.672    Muscle weakness (generalized) M62.81    Neuromuscular dysfunction of bladder, unspecified N31.9    Nicotine dependence, unspecified, uncomplicated H77.034    Obesity, unspecified E66.9    Overweight E66.3    Paresthesia of skin R20.2    Vitamin D deficiency E55.9    Deficient knowledge [id number]    Dog bite of wrist S61.559A, W54. 0XXA    Impaired mobility Z74.09    Myalgia M79.10    Spasticity R25.2    Left carpal tunnel syndrome G56.02    Bilateral carpal tunnel syndrome G56.03       Past Medical History:        Diagnosis Date    Carpal tunnel syndrome     Multiple sclerosis ([facility] [address]        Past Surgical History:        Procedure Laterality Date    APPENDECTOMY      BREAST SURGERY      lump removal bengin    CARPAL TUNNEL RELEASE Left 10/7/2022    LEFT CARPAL TUNNEL RELEASE ([facility]) performed by Jonathan Barrios MD at [address] [place] [address] x 3    TUBAL LIGATION         Social History:    Social History     Tobacco Use    Smoking status: Every Day    Smokeless tobacco: Never   Substance Use Topics    Alcohol use: Yes     Alcohol/week: 0.0 standard drinks                                Ready to quit: Not Answered  Counseling given: Not Answered      Vital Signs (Current):   Vitals:    12/02/22 1139   BP: 109/76   Pulse: 63   Resp: 20   Temp: 97.7 °F (36.5 °C)   TempSrc: Temporal   SpO2: 95%   Weight: 190 lb (86.2 kg)   Height: 4' 11\" (1.499 m)                                              BP Readings from Last 3 Encounters:   12/02/22 109/76   11/25/22 134/68   11/04/22 126/80       NPO Status: Time of last liquid consumption: 2330                        Time of last solid consumption: 2200                        Date of last liquid consumption: 12/01/22                        Date of last solid food consumption: 12/01/22    BMI:   Wt Readings from Last 3 Encounters:   12/02/22 190 lb (86.2 kg)   11/25/22 190 lb 6.4 oz (86.4 kg)   10/07/22 195 lb (88.5 kg)     Body mass index is 38.38 kg/m².     CBC:   Lab Results   Component Value Date/Time    WBC 14.2 06/22/2022 05:00 AM    RBC 4.46 06/22/2022 05:00 AM    RBC 4.56 10/03/2018 05:36 AM    HGB 14.0 06/22/2022 05:00 AM    HCT 42.9 06/22/2022 05:00 AM    MCV 96.2 06/22/2022 05:00 AM    RDW 14.6 06/22/2022 05:00 AM     06/22/2022 05:00 AM       CMP:   Lab Results   Component Value Date/Time     06/22/2022 05:00 AM    K 4.8 06/22/2022 05:00 AM     06/22/2022 05:00 AM    CO2 24 06/22/2022 05:00 AM    BUN 18 06/22/2022 05:00 AM    CREATININE 0.91 06/22/2022 05:00 AM    GFRAA >60.0 06/22/2022 05:00 AM    AGRATIO 1.4 10/03/2018 05:36 AM    LABGLOM >60.0 06/22/2022 05:00 AM    GLUCOSE 201 06/22/2022 05:00 AM    GLUCOSE 181 10/03/2018 05:36 AM    PROT 6.8 06/18/2022 06:52 PM    CALCIUM 8.5 06/22/2022 05:00 AM    BILITOT 0.3 06/18/2022 06:52 PM    ALKPHOS 81 06/18/2022 06:52 PM    AST 13 06/18/2022 06:52 PM    ALT 11 06/18/2022 06:52 PM       POC Tests: No results for input(s): POCGLU, POCNA, POCK, POCCL, POCBUN, POCHEMO, POCHCT in the last 72 hours. Coags:   Lab Results   Component Value Date/Time    PROTIME 9.8 08/24/2015 09:25 AM    INR 0.9 08/24/2015 09:25 AM    APTT 31.5 08/24/2015 09:25 AM       HCG (If Applicable):   Lab Results   Component Value Date    PREGTESTUR Negative 10/07/2022    PREGSERUM Negative 10/02/2018        ABGs: No results found for: PHART, PO2ART, UQM2RTF, WJR7ZHC, BEART, R2TFKSJY     Type & Screen (If Applicable):  No results found for: LABABO, LABRH    Drug/Infectious Status (If Applicable):  No results found for: HIV, HEPCAB    COVID-19 Screening (If Applicable): No results found for: COVID19        Anesthesia Evaluation   no history of anesthetic complications:   Airway: Mallampati: III  TM distance: >3 FB     Mouth opening: > = 3 FB   Dental:          Pulmonary:Negative Pulmonary ROS                              Cardiovascular:Negative CV ROS                      Neuro/Psych:   (+) neuromuscular disease: multiple sclerosis,             GI/Hepatic/Renal: Neg GI/Hepatic/Renal ROS            Endo/Other: Negative Endo/Other ROS                    Abdominal:             Vascular: Other Findings:           Anesthesia Plan      MAC and [place] block     ASA 2       Induction: intravenous. MIPS: Postoperative opioids intended. Anesthetic plan and risks discussed with patient. Plan discussed with attending.                     Mily Gao MD   12/2/2022

## 2022-12-14 RX ORDER — CHOLECALCIFEROL (VITAMIN D3) 50 MCG
CAPSULE ORAL
Qty: 30 CAPSULE | Refills: 10 | Status: SHIPPED | OUTPATIENT
Start: 2022-12-14

## 2022-12-14 NOTE — TELEPHONE ENCOUNTER
requesting medication refill.  Please approve or deny this request.    Rx requested:  Requested Prescriptions     Pending Prescriptions Disp Refills    Cholecalciferol (D3 SUPER STRENGTH) 50 MCG (2000 UT) CAPS [Pharmacy Med Name: VIT D-3 50MCG SGC (2000U) 50 MCG Capsule] 30 capsule 10     Sig: TAKE ONE (1) CAPSULE BY MOUTH ONCE DAILY         Last Office Visit:   11/4/2022      Next Visit Date:  Future Appointments   Date Time Provider Rema Parki   12/16/2022 10:00 AM Lydia Calvo MD [address] [place] [address]   12/19/2022 11:45 AM Brooke Kelly MD [facility]   1/11/2023 10:00 AM Bhavani Cruz MD [facility]   1/19/2023  2:45 PM Adilson Concepcion MD [facility]   2/3/2023  9:00 AM Bhavani Cruz MD [address] [place] [address] 7

## 2022-12-16 ENCOUNTER — OFFICE VISIT (OUTPATIENT)
Dept: ORTHOPEDIC SURGERY | Age: 42
End: 2022-12-16

## 2022-12-16 DIAGNOSIS — G56.03 BILATERAL CARPAL TUNNEL SYNDROME: Primary | ICD-10-CM

## 2022-12-16 PROCEDURE — 99024 POSTOP FOLLOW-UP VISIT: CPT | Performed by: ORTHOPAEDIC SURGERY

## 2022-12-16 NOTE — PROGRESS NOTES
Subjective:      Patient ID: Felicita Matthew is a 43 y.o. female who presents today for:  No chief complaint on file. HPI    Patient comes in status post right carpal tunnel releases. Is recall she had a left carpal tunnel release more remotely. She is doing well on the right and she is actually removed her dressing herself and work with a little bit. She comes in with a splint and a Band-Aid on today. Past Medical History:   Diagnosis Date    Carpal tunnel syndrome     Multiple sclerosis ([facility] [address]      Past Surgical History:   Procedure Laterality Date    APPENDECTOMY      BREAST SURGERY      lump removal bengin    CARPAL TUNNEL RELEASE Left 10/7/2022    LEFT CARPAL TUNNEL RELEASE ( [facility],  [address]) performed by Chaparro German MD at [address] [place] [address] Right 12/2/2022    RIGHT CARPAL TUNNEL RELEASE performed by Chaparro German MD at [address] [place] [address]      x 3    TUBAL LIGATION       Social History     Socioeconomic History    Marital status:      Spouse name: Not on file    Number of children: Not on file    Years of education: Not on file    Highest education level: Not on file   Occupational History    Not on file   Tobacco Use    Smoking status: Every Day    Smokeless tobacco: Never   Vaping Use    Vaping Use: Never used   Substance and Sexual Activity    Alcohol use:  Yes     Alcohol/week: 0.0 standard drinks    Drug use: Yes     Types: Marijuana [employer])    Sexual activity: Not on file   Other Topics Concern    Not on file   Social History Narrative    Not on file     Social Determinants of Health     Financial Resource Strain: Low Risk     Difficulty of Paying Living Expenses: Not hard at all   Food Insecurity: No Food Insecurity    Worried About Running Out of Food in the Last Year: Never true    Ran Out of Food in the Last Year: Never true   Transportation Needs: Not on file   Physical Activity: Not on file   Stress: Not on file   Social Connections: Not on file   Intimate Partner Violence: Not on file   Housing Stability: Not on file     Family History   Problem Relation Age of Onset    Diabetes Mother     Heart Disease Father      No Known Allergies  Current Outpatient Medications on File Prior to Visit   Medication Sig Dispense Refill    Cholecalciferol (D3 SUPER STRENGTH) 50 MCG (2000 UT) CAPS TAKE ONE (1) CAPSULE BY MOUTH ONCE DAILY 30 capsule 10    gabapentin (NEURONTIN) 600 MG tablet TAKE 1 TABLET BY MOUTH THREE TIMES DAILY 90 tablet 10    AUBAGIO 14 MG TABS TAKE 1 TABLET BY MOUTH 1 TIME A DAY. 30 tablet 2    LORazepam (ATIVAN) 0.5 MG tablet TAKE 1 TABLET BY MOUTH EVERY 8 HOURS AS NEEDED FOR ANXIETY 60 tablet 2    etodolac (LODINE) 400 MG tablet TAKE 1 TABLET BY MOUTH TWICE DAILY *EMERGENCY REFILL* 60 tablet 10    penicillin v potassium (VEETID) 500 MG tablet TAKE ONE TABLET BY MOUTH FOUR TIMES A DAY UNTIL GONE (Patient not taking: Reported on 12/2/2022)      ibuprofen (ADVIL;MOTRIN) 800 MG tablet TAKE ONE TABLET BY MOUTH EVERY 8 HOURS AS NEEDED FOR PAIN (Patient not taking: Reported on 12/2/2022)      Handicap Placard MISC by Does not apply route Duration of 5 years  Dx:MS 1 each 0    escitalopram (LEXAPRO) 20 MG tablet TAKE 1 TABLET BY MOUTH DAILY 30 tablet 10    baclofen (LIORESAL) 10 MG tablet TAKE 1 TABLET BY MOUTH FOUR TIMES DAILY 120 tablet 10    Compression Stockings MISC by Does not apply route Knee high medium compression 20-30 cc disp three pairs. (Patient not taking: No sig reported) 1 each 0    Handicap Placard MISC by Does not apply route 1 each 0    levonorgestrel (MIRENA) IUD 52 mg 1 each by Intrauterine route       Current Facility-Administered Medications on File Prior to Visit   Medication Dose Route Frequency Provider Last Rate Last Admin    levonorgestrel (MIRENA) IUD 52 mg 1 each  1 each IntraUTERine Once Connor Ellsworth DO   1 each at 10/27/21 1510          Review of Systems  Fever chills night sweats. Objective:    There were no vitals taken for this visit. ORTHOEXAM    Examination the patient has a well-healed incision sutures removed Steri-Strips were applied. The patient is a little stiff but not too bad. The other side still has a little soreness around the incision. But no issues or numbness and tingling is resolved significantly. Assessment:       Diagnosis Orders   1. Bilateral carpal tunnel syndrome              Plan:   Remove the stitches on the right and put a Band-Aid on it. She knows what she wants to do as far as a her own course and does not want to do any formal therapy therefore she will keep it dry for 24 hours massage area in 5 days time. If she has any issues in the future on either side follow-up would be appropriate. At this time follow-up will be made as needed. She already has a splint therefore 1 is not required. He is pleased with the results. No orders of the defined types were placed in this encounter. No orders of the defined types were placed in this encounter. No follow-ups on file.       Joe Barbour MD

## 2022-12-19 ENCOUNTER — OFFICE VISIT (OUTPATIENT)
Dept: FAMILY MEDICINE CLINIC | Age: 42
End: 2022-12-19
Payer: COMMERCIAL

## 2022-12-19 VITALS
OXYGEN SATURATION: 95 % | HEIGHT: 59 IN | SYSTOLIC BLOOD PRESSURE: 130 MMHG | TEMPERATURE: 97.6 F | RESPIRATION RATE: 16 BRPM | WEIGHT: 190 LBS | BODY MASS INDEX: 38.3 KG/M2 | HEART RATE: 72 BPM | DIASTOLIC BLOOD PRESSURE: 84 MMHG

## 2022-12-19 DIAGNOSIS — M54.2 NECK PAIN ON LEFT SIDE: Primary | ICD-10-CM

## 2022-12-19 DIAGNOSIS — G35 MS (MULTIPLE SCLEROSIS) (HCC): ICD-10-CM

## 2022-12-19 PROCEDURE — 99213 OFFICE O/P EST LOW 20 MIN: CPT | Performed by: FAMILY MEDICINE

## 2022-12-19 PROCEDURE — G8484 FLU IMMUNIZE NO ADMIN: HCPCS | Performed by: FAMILY MEDICINE

## 2022-12-19 PROCEDURE — 4004F PT TOBACCO SCREEN RCVD TLK: CPT | Performed by: FAMILY MEDICINE

## 2022-12-19 PROCEDURE — G8427 DOCREV CUR MEDS BY ELIG CLIN: HCPCS | Performed by: FAMILY MEDICINE

## 2022-12-19 PROCEDURE — G8417 CALC BMI ABV UP PARAM F/U: HCPCS | Performed by: FAMILY MEDICINE

## 2022-12-19 RX ORDER — PREDNISONE 10 MG/1
TABLET ORAL
Qty: 30 TABLET | Refills: 0 | Status: SHIPPED | OUTPATIENT
Start: 2022-12-19

## 2022-12-19 ASSESSMENT — ENCOUNTER SYMPTOMS
VOMITING: 0
COUGH: 0

## 2022-12-19 NOTE — PROGRESS NOTES
Subjective:      Patient ID: Israel Mauricio is a 43 y.o. female    Neck Pain   Pertinent negatives include no fever or weakness. Here with acute visit. Routinely seen by . has had neck pain for last few weeks. No injury. Pain on left side of neck which radiates to shoulder on left. No numbness of upper back or neck. Right hand dominant. Taking motrin which has been somewhat helpful. Trying to do stretching exercises which help slightly does see neurology for ms. Just had mri on c spine done in summer. Review of Systems   Constitutional:  Negative for chills and fever. Respiratory:  Negative for cough. Gastrointestinal:  Negative for vomiting. Musculoskeletal:  Positive for neck pain. Skin:  Negative for rash. Neurological:  Negative for weakness. Reviewed allergy, medical, social, surgical, family and med list changes and updated   Files     Social History     Socioeconomic History    Marital status:    Tobacco Use    Smoking status: Every Day    Smokeless tobacco: Never   Vaping Use    Vaping Use: Never used   Substance and Sexual Activity    Alcohol use: Yes     Alcohol/week: 0.0 standard drinks    Drug use: Yes     Types: Marijuana [employer]it [employer]illo)     Social Determinants of Health     Financial Resource Strain: Low Risk     Difficulty of Paying Living Expenses: Not hard at all   Food Insecurity: No Food Insecurity    Worried About Running Out of Food in the Last Year: Never true    Ran Out of Food in the Last Year: Never true     Current Outpatient Medications   Medication Sig Dispense Refill    Cholecalciferol (D3 SUPER STRENGTH) 50 MCG (2000 UT) CAPS TAKE ONE (1) CAPSULE BY MOUTH ONCE DAILY 30 capsule 10    AUBAGIO 14 MG TABS TAKE 1 TABLET BY MOUTH 1 TIME A DAY.  30 tablet 2    LORazepam (ATIVAN) 0.5 MG tablet TAKE 1 TABLET BY MOUTH EVERY 8 HOURS AS NEEDED FOR ANXIETY 60 tablet 2    etodolac (LODINE) 400 MG tablet TAKE 1 TABLET BY MOUTH TWICE DAILY *EMERGENCY REFILL* 60 tablet 10    penicillin v potassium (VEETID) 500 MG tablet       ibuprofen (ADVIL;MOTRIN) 800 MG tablet       Handicap Placard MISC by Does not apply route Duration of 5 years  Dx:MS 1 each 0    escitalopram (LEXAPRO) 20 MG tablet TAKE 1 TABLET BY MOUTH DAILY 30 tablet 10    baclofen (LIORESAL) 10 MG tablet TAKE 1 TABLET BY MOUTH FOUR TIMES DAILY 120 tablet 10    Handicap Placard MISC by Does not apply route 1 each 0    levonorgestrel (MIRENA) IUD 52 mg 1 each by Intrauterine route      gabapentin (NEURONTIN) 600 MG tablet TAKE 1 TABLET BY MOUTH THREE TIMES DAILY 90 tablet 10     Current Facility-Administered Medications   Medication Dose Route Frequency Provider Last Rate Last Admin    levonorgestrel (MIRENA) IUD 52 mg 1 each  1 each IntraUTERine Once Shelbie Hippo, DO   1 each at 10/27/21 1510     Family History   Problem Relation Age of Onset    Diabetes Mother     Heart Disease Father      Past Medical History:   Diagnosis Date    Carpal tunnel syndrome     Multiple sclerosis (HCC)      Objective:   /84   Pulse 72   Temp 97.6 °F (36.4 °C)   Resp 16   Ht 4' 11\" (1.499 m)   Wt 190 lb (86.2 kg)   SpO2 95%   BMI 38.38 kg/m²     Physical Exam  Neck:     moderate decreased rom of neck. .   No masses. No thyroid asymmetry                       Mild Tenderness posteriorly along left side of neck   Neuro:                   3/5 on left -chronic per patient    and bicep and tricep strength 4/5 on left   Radial pules:  2+ equal    Assessment:       Diagnosis Orders   1. Neck pain on left side        2.  MS (multiple sclerosis) ([facility] [address]               Plan:     Orders Placed This Encounter   Medications    predniSONE (DELTASONE) 10 MG tablet     Simg daily x 4d, 30mg daily x 3d, 20mg x 2d, 10mg x 1d, then d/c     Dispense:  30 tablet     Refill:  0    F/u with primary in few weeks

## 2022-12-23 NOTE — PROGRESS NOTES
Subjective:      Patient ID: Keyonna Tipton is a 39 y.o. female who presents today for:  Chief Complaint   Patient presents with    Follow-up     4 month f/u, her head on the top is very painful cant comb her hair, walking is getting harder        HPI 72-year-old right-handed female with a history of sclerosis. Patient was seen in October. Patient continues on Aubagio. Lower extremity weakness consistent with spinal MS. EDSS score is slowly increased over time though she is still ambulatory without a device. She is not any falls injuries or trauma. Patient has not developed any urinary dysfunction. Patient continues on baclofen as well as gabapentin. Last seen we had obtained urgent liver tests and they were all normal.  Her vitamin D levels are normal.  Patient's walking is getting somewhat difficult. Planes of paresthesias in the top of her head  Patient reports each hair hurts. Otherwise she is remained stable. She had some issues with getting her gabapentin which is now getting. No past medical history on file.   Past Surgical History:   Procedure Laterality Date    APPENDECTOMY      BREAST SURGERY      lump removal bengin     SECTION      x 3     Social History     Socioeconomic History    Marital status:      Spouse name: Not on file    Number of children: Not on file    Years of education: Not on file    Highest education level: Not on file   Occupational History    Not on file   Tobacco Use    Smoking status: Current Every Day Smoker    Smokeless tobacco: Never Used   Substance and Sexual Activity    Alcohol use: No     Alcohol/week: 0.0 standard drinks    Drug use: No    Sexual activity: Not on file   Other Topics Concern    Not on file   Social History Narrative    Not on file     Social Determinants of Health     Financial Resource Strain: Low Risk     Difficulty of Paying Living Expenses: Not hard at all   Food Insecurity: No Food Insecurity    Worried About Thank you for connecting with us today on the [employer] [facility] service of [facility]. If you have any questions after your visit, please feel free to contact us at [phone].    If you do not have a primary care provider or have any questions about your visit, please call the Virtual Health RN at [phone].    If you are experiencing a medical emergency, call 911 immediately.   Symptoms that require immediate attention require a visit at Urgent Care ([place]), Immediate Care Center ([place]) or the Emergency Room of a nearby hospital.  If your symptoms worsen or do not improve, please contact your primary care provider to schedule an in-person visit.       If you have any billing questions please call the billing department.    Phone: [phone]. Hours: Mon. - Thu. 7:30 a.m. - 6 p.m. and Friday 7:30 p.m. - 5 p.m.       Running Out of Food in the Last Year: Never true    Ran Out of Food in the Last Year: Never true   Transportation Needs: No Transportation Needs    Lack of Transportation (Medical): No    Lack of Transportation (Non-Medical): No   Physical Activity:     Days of Exercise per Week: Not on file    Minutes of Exercise per Session: Not on file   Stress:     Feeling of Stress : Not on file   Social Connections:     Frequency of Communication with Friends and Family: Not on file    Frequency of Social Gatherings with Friends and Family: Not on file    Attends Jewish Services: Not on file    Active Member of [address] [place] [address] [employer] or Organizations: Not on file    Attends Club or Organization Meetings: Not on file    Marital Status: Not on file   Intimate Partner Violence:     Fear of Current or Ex-Partner: Not on file    Emotionally Abused: Not on file    Physically Abused: Not on file    Sexually Abused: Not on file   Housing Stability:     Unable to Pay for Housing in the Last Year: Not on file    Number of [place] in the Last Year: Not on file    Unstable Housing in the Last Year: Not on file     Family History   Problem Relation Age of Onset    Diabetes Mother     Heart Disease Father      No Known Allergies    Current Outpatient Medications   Medication Sig Dispense Refill    D3 SUPER STRENGTH 50 MCG (2000 UT) CAPS TAKE (1) CAPSULE BY MOUTH ONCE DAILY 30 capsule 10    LORazepam (ATIVAN) 0.5 MG tablet Take 1 tablet by mouth every 8 hours as needed for Anxiety for up to 90 days. 60 tablet 2    gabapentin (NEURONTIN) 600 MG tablet Take 1 tablet by mouth 3 times daily for 180 days. 90 tablet 5    baclofen (LIORESAL) 10 MG tablet TAKE 1 TABLET BY MOUTH FOUR TIMES DAILY 120 tablet 3    AUBAGIO 14 MG TABS TAKE 1 TABLET BY MOUTH 1 TIME A DAY.  30 tablet 2    escitalopram (LEXAPRO) 20 MG tablet TAKE (1) TABLET BY MOUTH DAILY 30 tablet 10    etodolac (LODINE) 400 MG tablet TAKE 1 TABLET BY MOUTH TWICE DAILY 60 tablet 10    Compression Stockings MISC by Does not apply route Knee high medium compression 20-30 cc disp three pairs. 1 each 0    Handicap Placard MISC by Does not apply route 1 each 0    cyclobenzaprine (FLEXERIL) 5 MG tablet TK 1 T PO BID  0    levonorgestrel (MIRENA) 20 MCG/24HR IUD 1 each by Intrauterine route       Current Facility-Administered Medications   Medication Dose Route Frequency Provider Last Rate Last Admin    levonorgestrel (MIRENA) IUD 52 mg 1 each  1 each IntraUTERine Once [facility], DO   1 each at 10/27/21 1510         Review of Systems   Constitutional: Negative for fever. HENT: Negative for ear pain, tinnitus and trouble swallowing. Eyes: Negative for photophobia and visual disturbance. Respiratory: Negative for choking and shortness of breath. Cardiovascular: Negative for chest pain and palpitations. Gastrointestinal: Negative for nausea and vomiting. Musculoskeletal: Positive for back pain, gait problem and myalgias. Negative for joint swelling, neck pain and neck stiffness. Skin: Negative for color change. Allergic/Immunologic: Negative for food allergies. Neurological: Positive for weakness and numbness. Negative for dizziness, tremors, seizures, syncope, facial asymmetry, speech difficulty, light-headedness and headaches. Psychiatric/Behavioral: Negative for behavioral problems, confusion, hallucinations and sleep disturbance. Objective:   /80 (Site: Left Upper Arm, Position: Sitting, Cuff Size: Medium Adult)   Pulse 108   Wt 196 lb (88.9 kg)   BMI 40.96 kg/m²     Physical Exam  Vitals reviewed. Eyes:      Pupils: Pupils are equal, round, and reactive to light. Cardiovascular:      Rate and Rhythm: Normal rate and regular rhythm. Heart sounds: No murmur heard. Pulmonary:      Effort: Pulmonary effort is normal.      Breath sounds: Normal breath sounds.    Abdominal:      General: Bowel sounds are normal.   Musculoskeletal: General: Normal range of motion. Cervical back: Normal range of motion. Skin:     General: Skin is warm. Neurological:      Mental Status: She is alert and oriented to person, place, and time. Cranial Nerves: No cranial nerve deficit. Sensory: No sensory deficit. Motor: No abnormal muscle tone. Coordination: Coordination normal.      Deep Tendon Reflexes: Reflexes are normal and symmetric. Babinski sign absent on the right side. Babinski sign absent on the left side. Psychiatric:         Mood and Affect: Mood normal.     Ataxia with mild lower extremity weakness    US NON OB TRANSVAGINAL    Result Date: 12/1/2021  EXAMINATION: US PELVIS COMPLETE, US NON OB TRANSVAGINAL CLINICAL HISTORY: DYSFUNCTIONAL UTERINE BLEEDING. TECHNICAL FACTORS: Transabdominal and transvaginal sonography performed with transvaginal imaging obtained to better assess pelvic anatomy. FINDINGS: Uterus measures 10.1 x 6.2 x 4.7 cm. Endometrium normal measuring 6 mm. Intrauterine device identified within endometrial canal. Right ovary measures 5.3 x 4.3 x 4.1 cm, and contains a 4.3 x 4.0 x 3.6 cm cyst having a thin central septation. Color flow and Doppler without anomaly. Left ovary not identified. No free fluid. No adnexal masses. INTRAUTERINE DEVICE IDENTIFIED WITHIN ENDOMETRIAL CANAL. LEFT OVARY NOT VISUALIZED. SEPTATED RIGHT OVARIAN CYST.    US PELVIS COMPLETE    Result Date: 12/1/2021  EXAMINATION: US PELVIS COMPLETE, US NON OB TRANSVAGINAL CLINICAL HISTORY: DYSFUNCTIONAL UTERINE BLEEDING. TECHNICAL FACTORS: Transabdominal and transvaginal sonography performed with transvaginal imaging obtained to better assess pelvic anatomy. FINDINGS: Uterus measures 10.1 x 6.2 x 4.7 cm. Endometrium normal measuring 6 mm. Intrauterine device identified within endometrial canal. Right ovary measures 5.3 x 4.3 x 4.1 cm, and contains a 4.3 x 4.0 x 3.6 cm cyst having a thin central septation.  Color flow and Doppler without anomaly. Left ovary not identified. No free fluid. No adnexal masses. INTRAUTERINE DEVICE IDENTIFIED WITHIN ENDOMETRIAL CANAL. LEFT OVARY NOT VISUALIZED. SEPTATED RIGHT OVARIAN CYST. Lab Results   Component Value Date    WBC 12.1 10/25/2021    RBC 4.86 10/25/2021    RBC 4.56 10/03/2018    HGB 15.6 10/25/2021    HCT 46.2 10/25/2021    MCV 95.2 10/25/2021    MCH 32.0 10/25/2021    MCHC 33.6 10/25/2021    RDW 14.1 10/25/2021     10/25/2021    MPV 9.8 10/03/2018     Lab Results   Component Value Date     09/07/2021    K 4.5 09/07/2021     09/07/2021    CO2 23 09/07/2021    BUN 9 09/07/2021    CREATININE 0.72 09/07/2021    GFRAA >60.0 09/07/2021    AGRATIO 1.4 10/03/2018    LABGLOM >60.0 09/07/2021    GLUCOSE 190 09/07/2021    GLUCOSE 181 10/03/2018    PROT 6.4 10/25/2021    LABALBU 4.2 10/25/2021    LABALBU 3.7 10/03/2018    CALCIUM 9.1 09/07/2021    BILITOT <0.2 10/25/2021    ALKPHOS 89 10/25/2021    AST 16 10/25/2021    ALT 15 10/25/2021     Lab Results   Component Value Date    PROTIME 9.8 08/24/2015    INR 0.9 08/24/2015     Lab Results   Component Value Date    TSH 0.48 09/21/2018    [id number] 406 10/25/2021    FOLATE 5.3 10/25/2021     Lab Results   Component Value Date    TRIG 58 06/12/2015    HDL 50 06/12/2015    LDLCALC 100 06/12/2015     Lab Results   Component Value Date    LABAMPH Neg 03/14/2016    BARBSCNU Neg 03/14/2016    LABBENZ Neg 03/14/2016    OPIATESCREENURINE Neg 03/14/2016    PHENCYCLIDINESCREENURINE Neg 03/14/2016     No results found for: LITHIUM, DILFRTOT, VALPROATE    Assessment:       Diagnosis Orders   1. MS (multiple sclerosis) ([facility] [address]     2. Neuropathy     3. Paresthesia of skin     4. Ataxic gait     5. Myalgia     6. Muscle weakness (generalized)     7. Neuromuscular dysfunction of bladder, unspecified     Multiple sclerosis which has been stable with a EDSS score around 3-4. She walks with a walker.   Her lower extremity weakness and spasticity appears to be somewhat improved with gabapentin and baclofen. Her main issues appears to be pain and some of this is paresthetic pain. She has pain in her scalp and she feels that each hair follicle hurts. Again this may not relate to multiple sclerosis. She is not developed any new bladder issues either. Patient continues on Aubagio and this been stable. Her last liver tests were obtained urgently and all of her tests appear to be normal.  She has not developed any hypotensive issues either. We will keep an eye on this and continue to follow her with the same medication unless she has more relapses. She is likely to enter a secondary progressive course at some point      Plan:      No orders of the defined types were placed in this encounter. No orders of the defined types were placed in this encounter. No follow-ups on file.       Brit Reynolds MD

## 2023-01-11 DIAGNOSIS — F32.A ANXIETY AND DEPRESSION: ICD-10-CM

## 2023-01-11 DIAGNOSIS — F41.9 ANXIETY AND DEPRESSION: ICD-10-CM

## 2023-01-12 RX ORDER — LORAZEPAM 0.5 MG/1
TABLET ORAL
Qty: 60 TABLET | Refills: 2 | Status: SHIPPED | OUTPATIENT
Start: 2023-01-12 | End: 2023-04-12

## 2023-01-14 ENCOUNTER — APPOINTMENT (OUTPATIENT)
Dept: GENERAL RADIOLOGY | Age: 43
End: 2023-01-14
Payer: COMMERCIAL

## 2023-01-14 ENCOUNTER — HOSPITAL ENCOUNTER (EMERGENCY)
Age: 43
Discharge: HOME | End: 2023-01-15
Attending: EMERGENCY MEDICINE
Payer: COMMERCIAL

## 2023-01-14 VITALS
TEMPERATURE: 97.3 F | RESPIRATION RATE: 20 BRPM | SYSTOLIC BLOOD PRESSURE: 115 MMHG | HEART RATE: 79 BPM | BODY MASS INDEX: 38.3 KG/M2 | OXYGEN SATURATION: 93 % | DIASTOLIC BLOOD PRESSURE: 72 MMHG | HEIGHT: 59 IN | WEIGHT: 190 LBS

## 2023-01-14 DIAGNOSIS — F41.1 ANXIETY STATE: ICD-10-CM

## 2023-01-14 DIAGNOSIS — N39.0 ACUTE UTI: Primary | ICD-10-CM

## 2023-01-14 LAB
ALBUMIN SERPL-MCNC: 3.8 G/DL (ref 3.5–4.6)
ALP BLD-CCNC: 68 U/L (ref 40–130)
ALT SERPL-CCNC: 9 U/L (ref 0–33)
ANION GAP SERPL CALCULATED.3IONS-SCNC: 12 MEQ/L (ref 9–15)
AST SERPL-CCNC: 13 U/L (ref 0–35)
BASOPHILS ABSOLUTE: 0.1 K/UL (ref 0–0.1)
BASOPHILS RELATIVE PERCENT: 0.5 % (ref 0.1–1.2)
BILIRUB SERPL-MCNC: <0.2 MG/DL (ref 0.2–0.7)
BUN BLDV-MCNC: 7 MG/DL (ref 6–20)
CALCIUM SERPL-MCNC: 9.2 MG/DL (ref 8.5–9.9)
CHLORIDE BLD-SCNC: 104 MEQ/L (ref 95–107)
CO2: 23 MEQ/L (ref 20–31)
CREAT SERPL-MCNC: 0.75 MG/DL (ref 0.5–0.9)
EOSINOPHILS ABSOLUTE: 0.3 K/UL (ref 0–0.4)
EOSINOPHILS RELATIVE PERCENT: 2.8 % (ref 0.7–5.8)
ETHANOL PERCENT: NORMAL G/DL
ETHANOL: <10 MG/DL (ref 0–0.08)
GFR SERPL CREATININE-BSD FRML MDRD: >60 ML/MIN/{1.73_M2}
GLOBULIN: 2.7 G/DL (ref 2.3–3.5)
GLUCOSE BLD-MCNC: 103 MG/DL (ref 70–99)
HCT VFR BLD CALC: 45.2 % (ref 37–47)
HEMOGLOBIN: 14.9 G/DL (ref 11.2–15.7)
IMMATURE GRANULOCYTES #: 0 K/UL
IMMATURE GRANULOCYTES %: 0.2 %
INFLUENZA A BY PCR: NEGATIVE
INFLUENZA B BY PCR: NEGATIVE
LYMPHOCYTES ABSOLUTE: 3.3 K/UL (ref 1.2–3.7)
LYMPHOCYTES RELATIVE PERCENT: 35.7 %
MAGNESIUM: 1.8 MG/DL (ref 1.7–2.4)
MCH RBC QN AUTO: 31.8 PG (ref 25.6–32.2)
MCHC RBC AUTO-ENTMCNC: 33 % (ref 32.2–35.5)
MCV RBC AUTO: 96.4 FL (ref 79.4–94.8)
MONOCYTES ABSOLUTE: 0.6 K/UL (ref 0.2–0.9)
MONOCYTES RELATIVE PERCENT: 6.4 % (ref 4.7–12.5)
NEUTROPHILS ABSOLUTE: 5 K/UL (ref 1.6–6.1)
NEUTROPHILS RELATIVE PERCENT: 54.4 % (ref 34–71.1)
PDW BLD-RTO: 14.5 % (ref 11.7–14.4)
PLATELET # BLD: 306 K/UL (ref 182–369)
POTASSIUM SERPL-SCNC: 3.8 MEQ/L (ref 3.4–4.9)
RBC # BLD: 4.69 M/UL (ref 3.93–5.22)
SARS-COV-2, NAAT: NOT DETECTED
SODIUM BLD-SCNC: 139 MEQ/L (ref 135–144)
TOTAL PROTEIN: 6.5 G/DL (ref 6.3–8)
WBC # BLD: 9.2 K/UL (ref 4–10)

## 2023-01-14 PROCEDURE — 80053 COMPREHEN METABOLIC PANEL: CPT

## 2023-01-14 PROCEDURE — 87635 SARS-COV-2 COVID-19 AMP PRB: CPT

## 2023-01-14 PROCEDURE — 99285 EMERGENCY DEPT VISIT HI MDM: CPT

## 2023-01-14 PROCEDURE — 82077 ASSAY SPEC XCP UR&BREATH IA: CPT

## 2023-01-14 PROCEDURE — 87502 INFLUENZA DNA AMP PROBE: CPT

## 2023-01-14 PROCEDURE — 93005 ELECTROCARDIOGRAM TRACING: CPT

## 2023-01-14 PROCEDURE — 83735 ASSAY OF MAGNESIUM: CPT

## 2023-01-14 PROCEDURE — 85025 COMPLETE CBC W/AUTO DIFF WBC: CPT

## 2023-01-14 PROCEDURE — 96361 HYDRATE IV INFUSION ADD-ON: CPT

## 2023-01-14 PROCEDURE — 71045 X-RAY EXAM CHEST 1 VIEW: CPT

## 2023-01-14 PROCEDURE — 36415 COLL VENOUS BLD VENIPUNCTURE: CPT

## 2023-01-14 PROCEDURE — 2580000003 HC RX 258: Performed by: EMERGENCY MEDICINE

## 2023-01-14 PROCEDURE — 96360 HYDRATION IV INFUSION INIT: CPT

## 2023-01-14 RX ORDER — 0.9 % SODIUM CHLORIDE 0.9 %
1000 INTRAVENOUS SOLUTION INTRAVENOUS ONCE
Status: COMPLETED | OUTPATIENT
Start: 2023-01-14 | End: 2023-01-15

## 2023-01-14 RX ADMIN — SODIUM CHLORIDE 1000 ML: 900 INJECTION, SOLUTION INTRAVENOUS at 23:07

## 2023-01-14 ASSESSMENT — ENCOUNTER SYMPTOMS
RHINORRHEA: 0
COUGH: 0
PHOTOPHOBIA: 0
COLOR CHANGE: 0
ABDOMINAL DISTENTION: 0
NAUSEA: 0
SHORTNESS OF BREATH: 1
WHEEZING: 0
ABDOMINAL PAIN: 0
EYE PAIN: 0
CONSTIPATION: 0
BACK PAIN: 0
SORE THROAT: 0
VOMITING: 0
APNEA: 0
SINUS PRESSURE: 0
DIARRHEA: 0

## 2023-01-14 ASSESSMENT — PAIN - FUNCTIONAL ASSESSMENT: PAIN_FUNCTIONAL_ASSESSMENT: NONE - DENIES PAIN

## 2023-01-15 LAB
AMPHETAMINE SCREEN, URINE: ABNORMAL
BACTERIA: ABNORMAL /HPF
BARBITURATE SCREEN URINE: ABNORMAL
BENZODIAZEPINE SCREEN, URINE: ABNORMAL
BILIRUBIN URINE: ABNORMAL
BLOOD, URINE: NEGATIVE
CANNABINOID SCREEN URINE: POSITIVE
CLARITY: CLEAR
COCAINE METABOLITE SCREEN URINE: ABNORMAL
COLOR: YELLOW
EKG ATRIAL RATE: 80 BPM
EKG P AXIS: 30 DEGREES
EKG P-R INTERVAL: 150 MS
EKG Q-T INTERVAL: 396 MS
EKG QRS DURATION: 76 MS
EKG QTC CALCULATION (BAZETT): 456 MS
EKG R AXIS: -3 DEGREES
EKG T AXIS: 3 DEGREES
EKG VENTRICULAR RATE: 80 BPM
EPITHELIAL CELLS, UA: ABNORMAL /HPF
GLUCOSE URINE: NEGATIVE MG/DL
HCG(URINE) PREGNANCY TEST: NEGATIVE
KETONES, URINE: NEGATIVE MG/DL
LEUKOCYTE ESTERASE, URINE: ABNORMAL
Lab: ABNORMAL
NITRITE, URINE: NEGATIVE
OPIATE SCREEN URINE: ABNORMAL
PH UA: 5.5 (ref 5–9)
PHENCYCLIDINE SCREEN URINE: ABNORMAL
PROTEIN UA: 30 MG/DL
RBC UA: ABNORMAL /HPF (ref 0–2)
SPECIFIC GRAVITY UA: 1.01 (ref 1–1.03)
TRICYCLIC, URINE: ABNORMAL
UROBILINOGEN, URINE: 0.2 E.U./DL
WBC UA: ABNORMAL /HPF (ref 0–5)

## 2023-01-15 PROCEDURE — 84703 CHORIONIC GONADOTROPIN ASSAY: CPT

## 2023-01-15 PROCEDURE — 6370000000 HC RX 637 (ALT 250 FOR IP): Performed by: EMERGENCY MEDICINE

## 2023-01-15 PROCEDURE — 93010 ELECTROCARDIOGRAM REPORT: CPT | Performed by: INTERNAL MEDICINE

## 2023-01-15 PROCEDURE — 80306 DRUG TEST PRSMV INSTRMNT: CPT

## 2023-01-15 PROCEDURE — 81001 URINALYSIS AUTO W/SCOPE: CPT

## 2023-01-15 RX ORDER — CEPHALEXIN 500 MG/1
500 CAPSULE ORAL 4 TIMES DAILY
Qty: 40 CAPSULE | Refills: 0 | Status: SHIPPED | OUTPATIENT
Start: 2023-01-15 | End: 2023-01-25

## 2023-01-15 RX ORDER — CEPHALEXIN 500 MG/1
500 CAPSULE ORAL ONCE
Status: COMPLETED | OUTPATIENT
Start: 2023-01-15 | End: 2023-01-15

## 2023-01-15 RX ORDER — ALPRAZOLAM 0.25 MG/1
0.25 TABLET ORAL 3 TIMES DAILY PRN
Qty: 10 TABLET | Refills: 0 | Status: SHIPPED | OUTPATIENT
Start: 2023-01-15 | End: 2023-02-14

## 2023-01-15 RX ADMIN — CEPHALEXIN 500 MG: 500 CAPSULE ORAL at 00:56

## 2023-01-15 ASSESSMENT — ENCOUNTER SYMPTOMS
CONSTIPATION: 0
BACK PAIN: 0
NAUSEA: 0
SORE THROAT: 0
RHINORRHEA: 0
PHOTOPHOBIA: 0
ABDOMINAL PAIN: 0
WHEEZING: 0
VOMITING: 0
ABDOMINAL DISTENTION: 0
DIARRHEA: 0
EYE PAIN: 0
COUGH: 0
SINUS PRESSURE: 0
COLOR CHANGE: 0
SHORTNESS OF BREATH: 1
APNEA: 0

## 2023-01-15 NOTE — ED PROVIDER NOTES
[facility] ED  eMERGENCY dEPARTMENT eNCOUnter      Pt Name: Kortney Calvert  MRN: [id number]  [place] 1980  Date of evaluation: 1/14/2023  Provider: Jan Mcnally MD    CHIEF COMPLAINT       Chief Complaint   Patient presents with    Fatigue         HISTORY OF PRESENT ILLNESS   (Location/Symptom, Timing/Onset,Context/Setting, Quality, Duration, Modifying Factors, Severity)  Note limiting factors. Kortney Calvert is a 43 y.o. female who presents to the emergency department with complaint of extreme fatigue, shortness of breath and generalized weakness while sitting on toilet just prior to presentation. History of multiple sclerosis. Had some twitches which she attributed to her multiple sclerosis. Had a few beers prior to incident and smoked weed. Denies history of drug use otherwise. Smokes 1 pack of cigarettes a day. No fever or chills. No nausea or vomiting. No sick contacts. Has extensive comorbid conditions which includes bilateral carpal tunnel syndrome, neuropathy, Sherrill and depression, ataxic gait, obesity with BMI of 38.38, hypokalemia syndrome, generalized muscle weakness, neuromuscular disorder of the bladder, paresthesias of the skin, vitamin D deficiency. HPI    Nursing Notes were reviewed. REVIEW OF SYSTEMS    (2-9 systems for level 4, 10 or more for level 5)     Review of Systems   Constitutional:  Positive for fatigue. Negative for activity change, appetite change, chills and fever. HENT:  Negative for congestion, ear discharge, ear pain, hearing loss, rhinorrhea, sinus pressure and sore throat. Eyes:  Negative for photophobia, pain and visual disturbance. Respiratory:  Positive for shortness of breath. Negative for apnea, cough and wheezing. Cardiovascular:  Negative for chest pain, palpitations and leg swelling. Gastrointestinal:  Negative for abdominal distention, abdominal pain, constipation, diarrhea, nausea and vomiting.    Endocrine: Negative for cold intolerance, heat intolerance and polyuria. Genitourinary:  Negative for dysuria, flank pain, frequency and urgency. Musculoskeletal:  Positive for arthralgias and myalgias. Negative for back pain, gait problem and neck stiffness. Skin:  Negative for color change, pallor and rash. Allergic/Immunologic: Negative for food allergies and immunocompromised state. Neurological:  Positive for weakness. Negative for dizziness, tremors, syncope, light-headedness and headaches. Psychiatric/Behavioral:  Negative for agitation, confusion and hallucinations. All other systems reviewed and are negative. Except as noted above the remainder of the review of systems was reviewed and negative. PAST MEDICAL HISTORY     Past Medical History:   Diagnosis Date    Carpal tunnel syndrome     Multiple sclerosis ([facility] [address]          SURGICAL HISTORY       Past Surgical History:   Procedure Laterality Date    APPENDECTOMY      BREAST SURGERY      lump removal bengin    CARPAL TUNNEL RELEASE Left 10/7/2022    LEFT CARPAL TUNNEL RELEASE ( [facility],  [address]) performed by Jt Fortune MD at [place] [address]. Right 12/2/2022    RIGHT CARPAL TUNNEL RELEASE performed by Jt Fortune MD at [facility] [address] [place] [address]      x 3    TUBAL LIGATION           CURRENT MEDICATIONS       Previous Medications    AUBAGIO 14 MG TABS    TAKE 1 TABLET BY MOUTH 1 TIME A DAY.     BACLOFEN (LIORESAL) 10 MG TABLET    TAKE 1 TABLET BY MOUTH FOUR TIMES DAILY    CHOLECALCIFEROL (D3 SUPER STRENGTH) 50 MCG (2000 UT) CAPS    TAKE ONE (1) CAPSULE BY MOUTH ONCE DAILY    ESCITALOPRAM (LEXAPRO) 20 MG TABLET    TAKE 1 TABLET BY MOUTH DAILY    GABAPENTIN (NEURONTIN) 600 MG TABLET    TAKE 1 TABLET BY MOUTH THREE TIMES DAILY    HANDICAP PLACARD MISC    by Does not apply route    HANDICAP PLACARD MISC    by Does not apply route Duration of 5 years  Dx:MS    LEVONORGESTREL (MIRENA) IUD 52 MG    1 each by Intrauterine route    LORAZEPAM (ATIVAN) 0.5 MG TABLET    TAKE 1 TABLET BY MOUTH EVERY 8 HOURS AS NEEDED FOR ANXIETY    PENICILLIN V POTASSIUM (VEETID) 500 MG TABLET        PREDNISONE (DELTASONE) 10 MG TABLET    40mg daily x 4d, 30mg daily x 3d, 20mg x 2d, 10mg x 1d, then d/c       ALLERGIES     Patient has no known allergies. FAMILY HISTORY       Family History   Problem Relation Age of Onset    Diabetes Mother     Heart Disease Father           SOCIAL HISTORY       Social History     Socioeconomic History    Marital status:      Spouse name: None    Number of children: None    Years of education: None    Highest education level: None   Tobacco Use    Smoking status: Every Day    Smokeless tobacco: Never   Vaping Use    Vaping Use: Never used   Substance and Sexual Activity    Alcohol use: Yes     Alcohol/week: 0.0 standard drinks    Drug use: Yes     Types: Marijuana Lovena Mems)     Social Determinants of Health     Financial Resource Strain: Low Risk     Difficulty of Paying Living Expenses: Not hard at all   Food Insecurity: No Food Insecurity    Worried About Running Out of Food in the Last Year: Never true    Sommer of Food in the Last Year: Never true       SCREENINGS    [place] Coma Scale  Eye Opening: Spontaneous  Best Verbal Response: Oriented  Best Motor Response: Obeys commands  [place] Coma Scale Score: 15        PHYSICAL EXAM    (up to 7 for level 4, 8 or more for level 5)     ED Triage Vitals [01/14/23 2253]   BP Temp Temp Source Heart Rate Resp SpO2 Height Weight   115/72 97.3 °F (36.3 °C) Oral 79 20 93 % 4' 11\" (1.499 m) 190 lb (86.2 kg)       Physical Exam  Vitals and nursing note reviewed. Constitutional:       General: She is not in acute distress. Appearance: Normal appearance. She is well-developed. She is obese. She is not ill-appearing, toxic-appearing or diaphoretic. HENT:      Head: Normocephalic and atraumatic. Nose: Nose normal. No congestion or rhinorrhea. Mouth/Throat:      Mouth: Mucous membranes are moist.      Pharynx: Oropharynx is clear. No oropharyngeal exudate or posterior oropharyngeal erythema. Eyes:      General: No scleral icterus. Right eye: No discharge. Left eye: No discharge. Extraocular Movements: Extraocular movements intact. Conjunctiva/sclera: Conjunctivae normal.      Pupils: Pupils are equal, round, and reactive to light. Neck:      Thyroid: No thyromegaly. Vascular: No carotid bruit or JVD. Trachea: No tracheal deviation. Cardiovascular:      Rate and Rhythm: Normal rate and regular rhythm. Pulses: Normal pulses. Heart sounds: Normal heart sounds. No murmur heard. No friction rub. No gallop. Pulmonary:      Effort: Pulmonary effort is normal. No respiratory distress. Breath sounds: Normal breath sounds. No stridor. No wheezing, rhonchi or rales. Chest:      Chest wall: No tenderness. Abdominal:      General: Abdomen is flat. Bowel sounds are normal. There is no distension. Palpations: Abdomen is soft. There is no mass. Tenderness: There is no abdominal tenderness. There is no right CVA tenderness, left CVA tenderness, guarding or rebound. Hernia: No hernia is present. Musculoskeletal:         General: No swelling, tenderness, deformity or signs of injury. Normal range of motion. Cervical back: Normal range of motion and neck supple. No rigidity or tenderness. Right lower leg: No edema. Left lower leg: No edema. Lymphadenopathy:      Cervical: No cervical adenopathy. Skin:     General: Skin is warm and dry. Capillary Refill: Capillary refill takes less than 2 seconds. Coloration: Skin is not jaundiced or pale. Findings: No bruising, erythema, lesion or rash. Neurological:      General: No focal deficit present. Mental Status: She is alert and oriented to person, place, and time. Mental status is at baseline.       Cranial Nerves: No cranial nerve deficit. Sensory: No sensory deficit. Motor: No weakness or abnormal muscle tone. Coordination: Coordination normal.      Gait: Gait normal.      Deep Tendon Reflexes: Reflexes are normal and symmetric. Reflexes normal.   Psychiatric:         Mood and Affect: Mood normal.         Behavior: Behavior normal.         Thought Content: Thought content normal.         Judgment: Judgment normal.       DIAGNOSTIC RESULTS     EKG: All EKG's are interpreted by the Emergency Department Physician who either signs or Co-signs this chart in the absence of a cardiologist.    Twelve-lead EKG shows normal sinus rhythm, rate 80 bpm, normal intervals, normal electrical axis, no acute ST-T wave changes. My interpretation. RADIOLOGY:   Non-plain film images such as CT, Ultrasound and MRI are read by the radiologist. Casandra Villa radiographicimages are visualized and preliminarily interpreted by the emergency physician with the below findings:    Chest x-ray per my interpretation shows no acute cardiopulmonary process. Interpretation per the Radiologist below, if available at the time of this note:    XR CHEST PORTABLE   Final Result   No evidence of active cardiopulmonary pathology.                ED BEDSIDE ULTRASOUND:   Performed by ED Physician - none    LABS:  Labs Reviewed   COMPREHENSIVE METABOLIC PANEL - Abnormal; Notable for the following components:       Result Value    Glucose 103 (*)     All other components within normal limits   CBC WITH AUTO DIFFERENTIAL - Abnormal; Notable for the following components:    MCV 96.4 (*)     RDW 14.5 (*)     All other components within normal limits   URINALYSIS - Abnormal; Notable for the following components:    Protein, UA 30 (*)     All other components within normal limits   URINE DRUG SCREEN, COMPREHENSIVE - Abnormal; Notable for the following components:    Cannabinoid Scrn, Ur POSITIVE (*)     All other components within normal limits MICROSCOPIC URINALYSIS - Abnormal; Notable for the following components:    WBC, UA 10-20 (*)     Bacteria, UA RARE (*)     All other components within normal limits   COVID-19, RAPID   RAPID INFLUENZA A/B ANTIGENS   MAGNESIUM   PREGNANCY, URINE   ETHANOL       All other labs were within normal range or not returned as of this dictation. EMERGENCY DEPARTMENT COURSE and DIFFERENTIALDIAGNOSIS/MDM:    Differential diagnosis includes #1 fatigue #2 anxiety state #3 dyspnea    Twelve-lead EKG showed no acute ischemic changes. Chest x-ray showed no acute cardiopulmonary process. Troponin was negative. Vital signs were stable patient was afebrile. Rest of the chemistry and CBC were all within normal limits. Screening with influenza and COVID were normal.  Alcohol  Screening was within normal limits. Urinalysis was consistent with acute UTI. Urine drug screen was negative but for cannabinoids. She was started on Keflex and will be discharged on Keflex and Xanax. She was advised to follow-up with family doctor within 3 days and to come back to the ED for new or worsening symptoms. She was agreeable with plan of care.    Vitals:    Vitals:    01/14/23 2253   BP: 115/72   Pulse: 79   Resp: 20   Temp: 97.3 °F (36.3 °C)   TempSrc: Oral   SpO2: 93%   Weight: 190 lb (86.2 kg)   Height: 4' 11\" (1.499 m)           MDM     Amount and/or Complexity of Data Reviewed  Clinical lab tests: reviewed and ordered  Tests in the radiology section of CPT®: ordered and reviewed  Tests in the medicine section of CPT®: ordered and reviewed  Review and summarize past medical records: yes  Independent visualization of images, tracings, or specimens: yes    Risk of Complications, Morbidity, and/or Mortality  Presenting problems: moderate  Diagnostic procedures: moderate  Management options: moderate    Patient Progress  Patient progress: improved      CRITICAL CARE TIME   Total Critical Care time was  minutes, excluding separately reportable procedures. There was a high probability of clinically significant/life threatening deterioration in the patient's condition which required my urgentintervention. CONSULTS:  None    PROCEDURES:  Unless otherwise noted below, none     Procedures    FINAL IMPRESSION      1. Acute UTI    2. Anxiety state          DISPOSITION/PLAN   DISPOSITION Decision To Discharge 01/15/2023 12:51:28 AM      PATIENT REFERRED TO:  Mary Nicholas MD  [id number] [place] [address]  [phone]    In 3 days      DISCHARGE MEDICATIONS:  New Prescriptions    ALPRAZOLAM (XANAX) 0.25 MG TABLET    Take 1 tablet by mouth 3 times daily as needed for Anxiety for up to 30 days.  Max Daily Amount: 0.75 mg    CEPHALEXIN (KEFLEX) 500 MG CAPSULE    Take 1 capsule by mouth 4 times daily for 10 days          (Please note that portions of this note were completed with a voice recognitionprogram.  Efforts were made to edit the dictations but occasionally words are mis-transcribed.)    Jolynn Carrillo MD (electronically signed)  Attending Emergency Physician          Jolynn Carrillo MD  01/15/23 790 Adam Fox MD  01/15/23 1613

## 2023-01-19 ENCOUNTER — OFFICE VISIT (OUTPATIENT)
Dept: NEUROLOGY | Age: 43
End: 2023-01-19
Payer: COMMERCIAL

## 2023-01-19 VITALS
SYSTOLIC BLOOD PRESSURE: 116 MMHG | WEIGHT: 193 LBS | DIASTOLIC BLOOD PRESSURE: 70 MMHG | BODY MASS INDEX: 38.98 KG/M2 | HEART RATE: 88 BPM

## 2023-01-19 DIAGNOSIS — R39.15 URINARY URGENCY: ICD-10-CM

## 2023-01-19 DIAGNOSIS — R26.0 ATAXIC GAIT: ICD-10-CM

## 2023-01-19 DIAGNOSIS — G35 MS (MULTIPLE SCLEROSIS) (HCC): ICD-10-CM

## 2023-01-19 DIAGNOSIS — G62.9 NEUROPATHY: ICD-10-CM

## 2023-01-19 DIAGNOSIS — G35 MS (MULTIPLE SCLEROSIS) (HCC): Primary | ICD-10-CM

## 2023-01-19 DIAGNOSIS — R25.2 SPASTICITY: ICD-10-CM

## 2023-01-19 PROCEDURE — G8427 DOCREV CUR MEDS BY ELIG CLIN: HCPCS | Performed by: PSYCHIATRY & NEUROLOGY

## 2023-01-19 PROCEDURE — 4004F PT TOBACCO SCREEN RCVD TLK: CPT | Performed by: PSYCHIATRY & NEUROLOGY

## 2023-01-19 PROCEDURE — G8417 CALC BMI ABV UP PARAM F/U: HCPCS | Performed by: PSYCHIATRY & NEUROLOGY

## 2023-01-19 PROCEDURE — 99214 OFFICE O/P EST MOD 30 MIN: CPT | Performed by: PSYCHIATRY & NEUROLOGY

## 2023-01-19 PROCEDURE — G8484 FLU IMMUNIZE NO ADMIN: HCPCS | Performed by: PSYCHIATRY & NEUROLOGY

## 2023-01-19 RX ORDER — ETODOLAC 400 MG/1
TABLET, FILM COATED ORAL
COMMUNITY
Start: 2023-01-11

## 2023-01-19 ASSESSMENT — ENCOUNTER SYMPTOMS
TROUBLE SWALLOWING: 0
VOMITING: 0
CHOKING: 0
SHORTNESS OF BREATH: 0
COLOR CHANGE: 0
PHOTOPHOBIA: 0
NAUSEA: 0
BACK PAIN: 0

## 2023-01-19 NOTE — PROGRESS NOTES
Subjective:      Patient ID: Tutu Austin is a 43 y.o. female who presents today for:  Chief Complaint   Patient presents with    Follow-up     Pt states she isn't feel to great, pt said over the weekend she was in the hospital she was going to the bathroom and just passed out. Pt states she is concerned she's going to have a relapse. Pt stated her feet are hurting and its causing her issues with walking. HPI. 43 right-handed female with a known history of multiple sclerosis. Patient continues on Aubagio. Since last seen her EDSS score have increased over time. She has spasticity in his lower extremities and continues on gabapentin and baclofen. Patient had a relapse few months ago and was treated with methylprednisone. She had some new plaques in the cervical spine as well and we were considering her for Ceres or Tysabri. Patient had a syncopal episode and was in the hospital patient's feet are hurting and she is having issues with walking. Her EDSS scores have not changed. Patient is having aortic infection and her symptoms may have worsened. She is having lower extremity spasticity.     Past Medical History:   Diagnosis Date    Carpal tunnel syndrome     Multiple sclerosis ([facility] [address]      Past Surgical History:   Procedure Laterality Date    APPENDECTOMY      BREAST SURGERY      lump removal bengin    CARPAL TUNNEL RELEASE Left 10/7/2022    LEFT CARPAL TUNNEL RELEASE ( [facility],  [address]) performed by Howard Huntley MD at [address] [place] [address]  12/2/2022    RIGHT CARPAL TUNNEL RELEASE performed by Howard Huntley MD at [address] [place] [address] [place]      x 3    TUBAL LIGATION       Social History     Socioeconomic History    Marital status:      Spouse name: Not on file    Number of children: Not on file    Years of education: Not on file    Highest education level: Not on file   Occupational History    Not on file   Tobacco Use    Smoking status: Every Day Smokeless tobacco: Never   Vaping Use    Vaping Use: Never used   Substance and Sexual Activity    Alcohol use: Yes     Alcohol/week: 0.0 standard drinks    Drug use: Yes     Types: Marijuana Cyn Nikita)    Sexual activity: Not on file   Other Topics Concern    Not on file   Social History Narrative    Not on file     Social Determinants of Health     Financial Resource Strain: Low Risk     Difficulty of Paying Living Expenses: Not hard at all   Food Insecurity: No Food Insecurity    Worried About Running Out of Food in the Last Year: Never true    Ran Out of Food in the Last Year: Never true   Transportation Needs: Not on file   Physical Activity: Not on file   Stress: Not on file   Social Connections: Not on file   Intimate Partner Violence: Not on file   Housing Stability: Not on file     Family History   Problem Relation Age of Onset    Diabetes Mother     Heart Disease Father      No Known Allergies    Current Outpatient Medications   Medication Sig Dispense Refill    etodolac (LODINE) 400 MG tablet TAKE ONE (1) TABLET BY MOUTH TWICE DAILY      cephALEXin (KEFLEX) 500 MG capsule Take 1 capsule by mouth 4 times daily for 10 days 40 capsule 0    ALPRAZolam (XANAX) 0.25 MG tablet Take 1 tablet by mouth 3 times daily as needed for Anxiety for up to 30 days. Max Daily Amount: 0.75 mg 10 tablet 0    LORazepam (ATIVAN) 0.5 MG tablet TAKE 1 TABLET BY MOUTH EVERY 8 HOURS AS NEEDED FOR ANXIETY 60 tablet 2    predniSONE (DELTASONE) 10 MG tablet 40mg daily x 4d, 30mg daily x 3d, 20mg x 2d, 10mg x 1d, then d/c 30 tablet 0    Cholecalciferol (D3 SUPER STRENGTH) 50 MCG (2000 UT) CAPS TAKE ONE (1) CAPSULE BY MOUTH ONCE DAILY 30 capsule 10    AUBAGIO 14 MG TABS TAKE 1 TABLET BY MOUTH 1 TIME A DAY.  30 tablet 2    penicillin v potassium (VEETID) 500 MG tablet       Handicap Placard MISC by Does not apply route Duration of 5 years  Dx:MS 1 each 0    escitalopram (LEXAPRO) 20 MG tablet TAKE 1 TABLET BY MOUTH DAILY 30 tablet 10 baclofen (LIORESAL) 10 MG tablet TAKE 1 TABLET BY MOUTH FOUR TIMES DAILY 120 tablet 10    Handicap Placard MISC by Does not apply route 1 each 0    levonorgestrel (MIRENA) IUD 52 mg 1 each by Intrauterine route      gabapentin (NEURONTIN) 600 MG tablet TAKE 1 TABLET BY MOUTH THREE TIMES DAILY 90 tablet 10     Current Facility-Administered Medications   Medication Dose Route Frequency Provider Last Rate Last Admin    levonorgestrel (MIRENA) IUD 52 mg 1 each  1 each IntraUTERine Once Laura Mylar, DO   1 each at 10/27/21 1510         Review of Systems   Constitutional:  Negative for fever. HENT:  Negative for ear pain, tinnitus and trouble swallowing. Eyes:  Negative for photophobia and visual disturbance. Respiratory:  Negative for choking and shortness of breath. Cardiovascular:  Negative for chest pain and palpitations. Gastrointestinal:  Negative for nausea and vomiting. Musculoskeletal:  Positive for gait problem and myalgias. Negative for back pain, joint swelling, neck pain and neck stiffness. Skin:  Negative for color change. Allergic/Immunologic: Negative for food allergies. Neurological:  Positive for weakness and numbness. Negative for dizziness, tremors, seizures, syncope, facial asymmetry, speech difficulty, light-headedness and headaches. Psychiatric/Behavioral:  Negative for behavioral problems, confusion, hallucinations and sleep disturbance. Objective:   /70 (Site: Right Upper Arm, Position: Sitting, Cuff Size: Medium Adult)   Pulse 88   Wt 193 lb (87.5 kg)   BMI 38.98 kg/m²     Physical Exam  Vitals reviewed. Eyes:      Pupils: Pupils are equal, round, and reactive to light. Cardiovascular:      Rate and Rhythm: Normal rate and regular rhythm. Heart sounds: No murmur heard. Pulmonary:      Effort: Pulmonary effort is normal.      Breath sounds: Normal breath sounds.    Abdominal:      General: Bowel sounds are normal.   Musculoskeletal: General: Normal range of motion. Cervical back: Normal range of motion. Skin:     General: Skin is warm. Neurological:      Mental Status: She is alert and oriented to person, place, and time. Cranial Nerves: No cranial nerve deficit. Sensory: No sensory deficit. Motor: No abnormal muscle tone. Coordination: Coordination normal.      Deep Tendon Reflexes: Reflexes are normal and symmetric. Babinski sign absent on the right side. Babinski sign absent on the left side. Comments: Lower extremity strength of 4 5 with increased reflexes and spasticity patient is walking with a walker   Psychiatric:         Mood and Affect: Mood normal.       XR CHEST PORTABLE    Result Date: 1/14/2023  EXAMINATION: ONE XRAY VIEW OF THE CHEST 1/14/2023 11:06 pm COMPARISON: None. HISTORY: ORDERING SYSTEM PROVIDED HISTORY: Fatigue and shortness of breath TECHNOLOGIST PROVIDED HISTORY: Reason for exam:->Fatigue and shortness of breath Is the patient pregnant?->No What reading provider will be dictating this exam?->CRC FINDINGS: Adequate and symmetric aeration of the lungs. There are no formed consolidations, pleural effusions, or pneumothoraces. Trachea and central mainstem bronchi appear clear. The cardiomediastinal silhouette and pulmonary vascularity appear within normal limits. Osseous and thoracic soft tissue structures demonstrate no acute findings. No evidence of active cardiopulmonary pathology.        Lab Results   Component Value Date/Time    WBC 9.2 01/14/2023 11:06 PM    RBC 4.69 01/14/2023 11:06 PM    RBC 4.56 10/03/2018 05:36 AM    HGB 14.9 01/14/2023 11:06 PM    HCT 45.2 01/14/2023 11:06 PM    MCV 96.4 01/14/2023 11:06 PM    MCH 31.8 01/14/2023 11:06 PM    MCHC 33.0 01/14/2023 11:06 PM    RDW 14.5 01/14/2023 11:06 PM     01/14/2023 11:06 PM    MPV 9.8 10/03/2018 05:36 AM     Lab Results   Component Value Date/Time     01/14/2023 11:06 PM    K 3.8 01/14/2023 11:06 PM    K 4.8 06/22/2022 05:00 AM     01/14/2023 11:06 PM    CO2 23 01/14/2023 11:06 PM    BUN 7 01/14/2023 11:06 PM    CREATININE 0.75 01/14/2023 11:06 PM    GFRAA >60.0 06/22/2022 05:00 AM    AGRATIO 1.4 10/03/2018 05:36 AM    LABGLOM >60.0 01/14/2023 11:06 PM    GLUCOSE 103 01/14/2023 11:06 PM    GLUCOSE 181 10/03/2018 05:36 AM    PROT 6.5 01/14/2023 11:06 PM    LABALBU 3.8 01/14/2023 11:06 PM    LABALBU 3.7 10/03/2018 05:36 AM    CALCIUM 9.2 01/14/2023 11:06 PM    BILITOT <0.2 01/14/2023 11:06 PM    ALKPHOS 68 01/14/2023 11:06 PM    AST 13 01/14/2023 11:06 PM    ALT 9 01/14/2023 11:06 PM     Lab Results   Component Value Date/Time    PROTIME 9.8 08/24/2015 09:25 AM    INR 0.9 08/24/2015 09:25 AM     Lab Results   Component Value Date/Time    TSH 0.48 09/21/2018 06:27 AM    RLFURFXU22 406 10/25/2021 03:25 PM    FOLATE 5.3 10/25/2021 03:25 PM     Lab Results   Component Value Date/Time    TRIG 58 06/12/2015 11:12 AM    HDL 50 06/12/2015 11:12 AM    LDLCALC 100 06/12/2015 11:12 AM     Lab Results   Component Value Date/Time    LABAMPH Neg 01/15/2023 12:38 AM    BARBSCNU Neg 01/15/2023 12:38 AM    LABBENZ Neg 01/15/2023 12:38 AM    OPIATESCREENURINE Neg 01/15/2023 12:38 AM    PHENCYCLIDINESCREENURINE Neg 01/15/2023 12:38 AM    ETOH <10 01/14/2023 11:06 PM     No results found for: LITHIUM, DILFRTOT, VALPROATE    Assessment:       Diagnosis Orders   1. MS (multiple sclerosis) (HCC)  MRI BRAIN W WO CONTRAST    Juan Virus      2. Ataxic gait        3. Neuropathy        4. Spasticity        5. Urinary urgency        Multiple sclerosis with further worsening.  When last seen she had some new lesions that were difficult to define in the cervical spine.  Patient had continued to worsen and therefore we recommended changing the medication and she has been on Aubagio.  Patient is having some more accumulation of disease.  Recommended an MRI of the brain to see if there is any more lesions as if she truly has then we will consider  Tysabri.  We will arrange for JUAN virus antibody titers.  We will treat her with methylprednisone 3 days treatment after review tract infections.  Which is in 3 days.  Patient continues on pain medications as well as required though she is not on narcotics but only gabapentin.  She is on baclofen as well.      Adilson Valderrama MD, JOHNY  Diplomate, American Board of Psychiatry & Neurology  Board Certified in Vascular Neurology  Board Certified in Neuromuscular Medicine  Certified in Neurorehabilitation         Plan:      Orders Placed This Encounter   Procedures    MRI BRAIN W WO CONTRAST     Standing Status:   Future     Standing Expiration Date:   1/19/2024     Order Specific Question:   STAT Creatinine as needed:     Answer:   No     Order Specific Question:   Reason for exam:     Answer:   ms exacerbation     Order Specific Question:   What is the sedation requirement?     Answer:   None    Juan Virus     Standing Status:   Future     Number of Occurrences:   1     Standing Expiration Date:   1/19/2024     No orders of the defined types were placed in this encounter.      No follow-ups on file.      Adilson Valderrama MD

## 2023-01-20 DIAGNOSIS — G35 MS (MULTIPLE SCLEROSIS) (HCC): Primary | ICD-10-CM

## 2023-01-20 RX ORDER — PREDNISONE 10 MG/1
TABLET ORAL
Qty: 27 TABLET | Refills: 0 | Status: SHIPPED | OUTPATIENT
Start: 2023-01-20 | End: 2023-01-30

## 2023-01-23 LAB
JC VIRUS SOURCE: NORMAL
JC VIRUS, DNA: NOT DETECTED

## 2023-01-25 ENCOUNTER — TELEMEDICINE (OUTPATIENT)
Dept: FAMILY MEDICINE CLINIC | Age: 43
End: 2023-01-25
Payer: COMMERCIAL

## 2023-01-25 DIAGNOSIS — G35 MS (MULTIPLE SCLEROSIS) (HCC): Primary | ICD-10-CM

## 2023-01-25 DIAGNOSIS — M79.671 PAIN IN BOTH FEET: ICD-10-CM

## 2023-01-25 DIAGNOSIS — M79.672 PAIN IN BOTH FEET: ICD-10-CM

## 2023-01-25 PROCEDURE — 99442 PR PHYS/QHP TELEPHONE EVALUATION 11-20 MIN: CPT | Performed by: FAMILY MEDICINE

## 2023-01-25 PROCEDURE — G8428 CUR MEDS NOT DOCUMENT: HCPCS | Performed by: FAMILY MEDICINE

## 2023-01-25 ASSESSMENT — ENCOUNTER SYMPTOMS
RHINORRHEA: 0
GASTROINTESTINAL NEGATIVE: 1
CHEST TIGHTNESS: 0
RESPIRATORY NEGATIVE: 1
COUGH: 0
EYES NEGATIVE: 1

## 2023-01-25 NOTE — PROGRESS NOTES
Patient is seen in follow up for   Chief Complaint   Patient presents with    Foot Pain     Foot Pain   Pertinent negatives include no fever or numbness. Here with a flair in [address]. Jody Peguero is a 43 y.o. female evaluated via telephone on 1/25/2023 for Foot Pain  . Documentation:  I communicated with the patient and/or health care decision maker about see below. Details of this discussion including any medical advice provided: see below    Total Time: minutes: 11-20 minutes    Jody Peguero was evaluated through a synchronous (real-time) audio encounter. Patient identification was verified at the start of the visit. She (or guardian if applicable) is aware that this is a billable service, which includes applicable co-pays. This visit was conducted with the patient's (and/or legal guardian's) verbal consent. She has not had a related appointment within my department in the past 7 days or scheduled within the next 24 hours. The patient was located at Home: [address] [place] [address]. The provider was located at [facility] (Appt Dept): [address] [place] [address].     Note: not billable if this call serves to triage the patient into an appointment for the relevant concern    Migdalia Alfonso MD     Past Medical History:   Diagnosis Date    Carpal tunnel syndrome     Multiple sclerosis [facility])      Patient Active Problem List    Diagnosis Date Noted    Urinary urgency 01/19/2023    Bilateral carpal tunnel syndrome 10/21/2022    Left carpal tunnel syndrome 10/07/2022    Spasticity 09/07/2022    Bite, animal 06/03/2020    Left foot pain 06/03/2020    Deficient knowledge 06/03/2020    Dog bite of wrist 06/03/2020    Impaired mobility 06/03/2020    Myalgia 06/03/2020    Hypokalemia 10/02/2018    Nicotine dependence, unspecified, uncomplicated 34/66/7808    Overweight 10/02/2018    Ataxic gait 09/21/2018    Body mass index (bmi) 39.0-39.9, adult 09/21/2018    Muscle weakness (generalized) 09/21/2018    Neuromuscular dysfunction of bladder, unspecified 09/21/2018    Obesity, unspecified 09/21/2018    Paresthesia of skin 09/21/2018    Vitamin D deficiency 09/21/2018    Neuropathy 12/30/2016    Anxiety and depression 12/30/2016    MS (multiple sclerosis) ([facility] [address] 07/06/2015     Past Surgical History:   Procedure Laterality Date    APPENDECTOMY      BREAST SURGERY      lump removal bengin    CARPAL TUNNEL RELEASE Left 10/7/2022    LEFT CARPAL TUNNEL RELEASE ( [facility],  [address]) performed by Oanh Cox MD at [address] [place] [address] Right 12/2/2022    RIGHT CARPAL TUNNEL RELEASE performed by Oanh Cox MD at [address] [place] [address] [place]      x 3    TUBAL LIGATION       Family History   Problem Relation Age of Onset    Diabetes Mother     Heart Disease Father      Social History     Socioeconomic History    Marital status:    Tobacco Use    Smoking status: Every Day    Smokeless tobacco: Never   Vaping Use    Vaping Use: Never used   Substance and Sexual Activity    Alcohol use: Yes     Alcohol/week: 0.0 standard drinks    Drug use: Yes     Types: Marijuana [facility])     Social Determinants of Health     Financial Resource Strain: Low Risk     Difficulty of Paying Living Expenses: Not hard at all   Food Insecurity: No Food Insecurity    Worried About Running Out of Food in the Last Year: Never true    Ran Out of Food in the Last Year: Never true     Current Outpatient Medications   Medication Sig Dispense Refill    predniSONE (DELTASONE) 10 MG tablet Take 2 tablets by mouth 2 times daily for 3 days, THEN 1.5 tablets 2 times daily for 3 days, THEN 1 tablet 2 times daily for 2 days, THEN 1 tablet daily for 2 days. Start taking day after solumedrol infusion.  27 tablet 0    etodolac (LODINE) 400 MG tablet TAKE ONE (1) TABLET BY MOUTH TWICE DAILY      cephALEXin (KEFLEX) 500 MG capsule Take 1 capsule by mouth 4 times daily for 10 days 40 capsule 0    ALPRAZolam (XANAX) 0.25 MG tablet Take 1 tablet by mouth 3 times daily as needed for Anxiety for up to 30 days. Max Daily Amount: 0.75 mg 10 tablet 0    LORazepam (ATIVAN) 0.5 MG tablet TAKE 1 TABLET BY MOUTH EVERY 8 HOURS AS NEEDED FOR ANXIETY 60 tablet 2    predniSONE (DELTASONE) 10 MG tablet 40mg daily x 4d, 30mg daily x 3d, 20mg x 2d, 10mg x 1d, then d/c 30 tablet 0    Cholecalciferol (D3 SUPER STRENGTH) 50 MCG (2000 UT) CAPS TAKE ONE (1) CAPSULE BY MOUTH ONCE DAILY 30 capsule 10    gabapentin (NEURONTIN) 600 MG tablet TAKE 1 TABLET BY MOUTH THREE TIMES DAILY 90 tablet 10    AUBAGIO 14 MG TABS TAKE 1 TABLET BY MOUTH 1 TIME A DAY. 30 tablet 2    penicillin v potassium (VEETID) 500 MG tablet       Handicap Placard MISC by Does not apply route Duration of 5 years  Dx:MS 1 each 0    escitalopram (LEXAPRO) 20 MG tablet TAKE 1 TABLET BY MOUTH DAILY 30 tablet 10    baclofen (LIORESAL) 10 MG tablet TAKE 1 TABLET BY MOUTH FOUR TIMES DAILY 120 tablet 10    Handicap Placard MISC by Does not apply route 1 each 0    levonorgestrel (MIRENA) IUD 52 mg 1 each by Intrauterine route       Current Facility-Administered Medications   Medication Dose Route Frequency Provider Last Rate Last Admin    levonorgestrel (MIRENA) IUD 52 mg 1 each  1 each IntraUTERine Once Didier Abdul, DO   1 each at 10/27/21 1510     Current Outpatient Medications on File Prior to Visit   Medication Sig Dispense Refill    predniSONE (DELTASONE) 10 MG tablet Take 2 tablets by mouth 2 times daily for 3 days, THEN 1.5 tablets 2 times daily for 3 days, THEN 1 tablet 2 times daily for 2 days, THEN 1 tablet daily for 2 days. Start taking day after solumedrol infusion. 27 tablet 0    etodolac (LODINE) 400 MG tablet TAKE ONE (1) TABLET BY MOUTH TWICE DAILY      cephALEXin (KEFLEX) 500 MG capsule Take 1 capsule by mouth 4 times daily for 10 days 40 capsule 0    ALPRAZolam (XANAX) 0.25 MG tablet Take 1 tablet by mouth 3 times daily as needed for Anxiety for up to 30 days.  Max Daily Amount: 0.75 mg 10 tablet 0    LORazepam (ATIVAN) 0.5 MG tablet TAKE 1 TABLET BY MOUTH EVERY 8 HOURS AS NEEDED FOR ANXIETY 60 tablet 2    predniSONE (DELTASONE) 10 MG tablet 40mg daily x 4d, 30mg daily x 3d, 20mg x 2d, 10mg x 1d, then d/c 30 tablet 0    Cholecalciferol (D3 SUPER STRENGTH) 50 MCG (2000 UT) CAPS TAKE ONE (1) CAPSULE BY MOUTH ONCE DAILY 30 capsule 10    gabapentin (NEURONTIN) 600 MG tablet TAKE 1 TABLET BY MOUTH THREE TIMES DAILY 90 tablet 10    AUBAGIO 14 MG TABS TAKE 1 TABLET BY MOUTH 1 TIME A DAY.  30 tablet 2    penicillin v potassium (VEETID) 500 MG tablet       Handicap Placard MISC by Does not apply route Duration of 5 years  Dx:MS 1 each 0    escitalopram (LEXAPRO) 20 MG tablet TAKE 1 TABLET BY MOUTH DAILY 30 tablet 10    baclofen (LIORESAL) 10 MG tablet TAKE 1 TABLET BY MOUTH FOUR TIMES DAILY 120 tablet 10    Handicap Placard MISC by Does not apply route 1 each 0    levonorgestrel (MIRENA) IUD 52 mg 1 each by Intrauterine route       Current Facility-Administered Medications on File Prior to Visit   Medication Dose Route Frequency Provider Last Rate Last Admin    levonorgestrel (MIRENA) IUD 52 mg 1 each  1 each IntraUTERine Once Ramón Valenzuela, DO   1 each at 10/27/21 1510     No Known Allergies  Health Maintenance   Topic Date Due    COVID-19 Vaccine (1) Never done    Varicella vaccine (1 of 2 - 2-dose childhood series) Never done    Pneumococcal 0-64 years Vaccine (1 - PCV) Never done    Hepatitis C screen  Never done    Lipids  06/12/2020    Flu vaccine (1) 08/01/2022    A1C test (Diabetic or Prediabetic)  09/07/2022    HIV screen  08/15/2026 (Originally 4/7/1995)    Depression Monitoring  06/13/2023    Cervical cancer screen  09/08/2025    DTaP/Tdap/Td vaccine (4 - Td or Tdap) 12/12/2029    Hepatitis A vaccine  Aged Out    Hib vaccine  Aged Out    Meningococcal (ACWY) vaccine  Aged Out       Review of Systems     Review of Systems   Constitutional:  Negative for activity change, appetite change, fatigue and fever.   HENT:  Negative for congestion and rhinorrhea.    Eyes: Negative.    Respiratory: Negative.  Negative for cough and chest tightness.    Cardiovascular: Negative.    Gastrointestinal: Negative.    Endocrine: Negative.    Genitourinary: Negative.    Musculoskeletal: Negative.    Skin: Negative.    Neurological:  Negative for dizziness, light-headedness and numbness.   Hematological: Negative.    Psychiatric/Behavioral: Negative.       Physical Exam  There were no vitals filed for this visit.    Physical Exam  Constitutional:       Appearance: She is well-developed.   HENT:      Right Ear: External ear normal.      Left Ear: External ear normal.   Eyes:      Pupils: Pupils are equal, round, and reactive to light.   Neck:      Thyroid: No thyromegaly.   Cardiovascular:      Rate and Rhythm: Normal rate and regular rhythm.      Heart sounds: Normal heart sounds. No murmur heard.    No friction rub. No gallop.   Pulmonary:      Effort: Pulmonary effort is normal. No respiratory distress.      Breath sounds: No wheezing or rales.   Chest:      Chest wall: No tenderness.   Abdominal:      General: Bowel sounds are normal. There is no distension.      Palpations: Abdomen is soft. There is no mass.      Tenderness: There is no abdominal tenderness. There is no guarding or rebound.   Musculoskeletal:         General: Normal range of motion.      Cervical back: Normal range of motion and neck supple.   Lymphadenopathy:      Cervical: No cervical adenopathy.   Skin:     General: Skin is warm and dry.   Neurological:      Mental Status: She is alert and oriented to person, place, and time.      Cranial Nerves: No cranial nerve deficit.      Coordination: Coordination normal.       Assessment   Diagnosis Orders   1. MS (multiple sclerosis) ([facility])        2. Pain in both feet          Problem List       MS (multiple sclerosis) ([facility]) - Primary    Relevant Medications    AUBAGIO 14 MG TABS    predniSONE  (DELTASONE) 10 MG tablet       Plan  Pain will improve with steroid infusion. No orders of the defined types were placed in this encounter. No follow-ups on file.   Angelica Conway MD

## 2023-01-30 ENCOUNTER — HOSPITAL ENCOUNTER (OUTPATIENT)
Dept: INFUSION THERAPY | Age: 43
Setting detail: INFUSION SERIES
Discharge: HOME | End: 2023-01-30
Payer: COMMERCIAL

## 2023-01-30 VITALS
HEART RATE: 76 BPM | SYSTOLIC BLOOD PRESSURE: 104 MMHG | RESPIRATION RATE: 18 BRPM | DIASTOLIC BLOOD PRESSURE: 75 MMHG | TEMPERATURE: 98.2 F

## 2023-01-30 DIAGNOSIS — G35 MS (MULTIPLE SCLEROSIS) (HCC): Primary | ICD-10-CM

## 2023-01-30 PROCEDURE — 2580000003 HC RX 258: Performed by: PSYCHIATRY & NEUROLOGY

## 2023-01-30 PROCEDURE — 6360000002 HC RX W HCPCS: Performed by: PSYCHIATRY & NEUROLOGY

## 2023-01-30 PROCEDURE — 96365 THER/PROPH/DIAG IV INF INIT: CPT

## 2023-01-30 RX ADMIN — METHYLPREDNISOLONE SODIUM SUCCINATE 1000 MG: 1 INJECTION, POWDER, LYOPHILIZED, FOR SOLUTION INTRAMUSCULAR; INTRAVENOUS at 12:32

## 2023-01-30 NOTE — PROGRESS NOTES
Pt declined AVS.  States will take it on Wednesday. No c/o or requests. Left with Maxie Meigs, transporter. All equipment used in the care for this patient has been cleaned.

## 2023-01-30 NOTE — PROGRESS NOTES
Pt to [facility] for solumedrol, via w/c with transporter Rufus Hernandez, along with w/w. Pt states no c/o.

## 2023-01-31 ENCOUNTER — HOSPITAL ENCOUNTER (OUTPATIENT)
Dept: INFUSION THERAPY | Age: 43
Setting detail: INFUSION SERIES
Discharge: HOME | End: 2023-01-31
Payer: COMMERCIAL

## 2023-01-31 ENCOUNTER — HOSPITAL ENCOUNTER (OUTPATIENT)
Dept: MRI IMAGING | Age: 43
Discharge: HOME | End: 2023-02-02
Payer: COMMERCIAL

## 2023-01-31 VITALS
DIASTOLIC BLOOD PRESSURE: 68 MMHG | RESPIRATION RATE: 18 BRPM | HEART RATE: 84 BPM | SYSTOLIC BLOOD PRESSURE: 101 MMHG | TEMPERATURE: 97.8 F

## 2023-01-31 DIAGNOSIS — G35 MS (MULTIPLE SCLEROSIS) (HCC): ICD-10-CM

## 2023-01-31 DIAGNOSIS — G35 MS (MULTIPLE SCLEROSIS) (HCC): Primary | ICD-10-CM

## 2023-01-31 PROCEDURE — 6360000004 HC RX CONTRAST MEDICATION: Performed by: PSYCHIATRY & NEUROLOGY

## 2023-01-31 PROCEDURE — 96365 THER/PROPH/DIAG IV INF INIT: CPT

## 2023-01-31 PROCEDURE — 70553 MRI BRAIN STEM W/O & W/DYE: CPT

## 2023-01-31 PROCEDURE — 2580000003 HC RX 258: Performed by: PSYCHIATRY & NEUROLOGY

## 2023-01-31 PROCEDURE — 6360000002 HC RX W HCPCS: Performed by: PSYCHIATRY & NEUROLOGY

## 2023-01-31 PROCEDURE — A9579 GAD-BASE MR CONTRAST NOS,1ML: HCPCS | Performed by: PSYCHIATRY & NEUROLOGY

## 2023-01-31 RX ADMIN — GADOTERIDOL 20 ML: 279.3 INJECTION, SOLUTION INTRAVENOUS at 16:10

## 2023-01-31 RX ADMIN — METHYLPREDNISOLONE SODIUM SUCCINATE 1000 MG: 1 INJECTION, POWDER, LYOPHILIZED, FOR SOLUTION INTRAMUSCULAR; INTRAVENOUS at 12:55

## 2023-01-31 NOTE — FLOWSHEET NOTE
Patient to the floor ambulatory for her Day #2 Solumedrol infusion. Vital signs taken. Denies any discomfort. Call light within reach.

## 2023-02-01 ENCOUNTER — HOSPITAL ENCOUNTER (OUTPATIENT)
Dept: INFUSION THERAPY | Age: 43
Setting detail: INFUSION SERIES
Discharge: HOME | End: 2023-02-01
Payer: COMMERCIAL

## 2023-02-01 VITALS
SYSTOLIC BLOOD PRESSURE: 129 MMHG | RESPIRATION RATE: 18 BRPM | TEMPERATURE: 98.4 F | DIASTOLIC BLOOD PRESSURE: 76 MMHG | HEART RATE: 66 BPM

## 2023-02-01 DIAGNOSIS — G35 MS (MULTIPLE SCLEROSIS) (HCC): Primary | ICD-10-CM

## 2023-02-01 PROCEDURE — 96365 THER/PROPH/DIAG IV INF INIT: CPT

## 2023-02-01 PROCEDURE — 2580000003 HC RX 258: Performed by: PSYCHIATRY & NEUROLOGY

## 2023-02-01 PROCEDURE — 6360000002 HC RX W HCPCS: Performed by: PSYCHIATRY & NEUROLOGY

## 2023-02-01 RX ADMIN — METHYLPREDNISOLONE SODIUM SUCCINATE 1000 MG: 1 INJECTION, POWDER, LYOPHILIZED, FOR SOLUTION INTRAMUSCULAR; INTRAVENOUS at 14:17

## 2023-02-01 NOTE — FLOWSHEET NOTE
Patient to the floor via wheelchair for her Day #3 Solumedrol infusion. Vital signs taken. No distress noted. Call light within reach.

## 2023-02-01 NOTE — FLOWSHEET NOTE
Infusion is complete. Tolerated well. Declined an AVS. Left the unit ambulatory. All equipment used in the care for this patient has been cleaned.

## 2023-02-03 ENCOUNTER — OFFICE VISIT (OUTPATIENT)
Dept: FAMILY MEDICINE CLINIC | Age: 43
End: 2023-02-03

## 2023-02-03 VITALS
DIASTOLIC BLOOD PRESSURE: 76 MMHG | TEMPERATURE: 98.3 F | HEART RATE: 65 BPM | RESPIRATION RATE: 16 BRPM | OXYGEN SATURATION: 96 % | SYSTOLIC BLOOD PRESSURE: 128 MMHG

## 2023-02-03 DIAGNOSIS — G35 MS (MULTIPLE SCLEROSIS) (HCC): ICD-10-CM

## 2023-02-03 DIAGNOSIS — M79.671 PAIN OF BOTH HEELS: Primary | ICD-10-CM

## 2023-02-03 DIAGNOSIS — M79.672 PAIN OF BOTH HEELS: Primary | ICD-10-CM

## 2023-02-03 SDOH — ECONOMIC STABILITY: FOOD INSECURITY: WITHIN THE PAST 12 MONTHS, THE FOOD YOU BOUGHT JUST DIDN'T LAST AND YOU DIDN'T HAVE MONEY TO GET MORE.: NEVER TRUE

## 2023-02-03 SDOH — ECONOMIC STABILITY: INCOME INSECURITY: HOW HARD IS IT FOR YOU TO PAY FOR THE VERY BASICS LIKE FOOD, HOUSING, MEDICAL CARE, AND HEATING?: NOT VERY HARD

## 2023-02-03 SDOH — ECONOMIC STABILITY: FOOD INSECURITY: WITHIN THE PAST 12 MONTHS, YOU WORRIED THAT YOUR FOOD WOULD RUN OUT BEFORE YOU GOT MONEY TO BUY MORE.: NEVER TRUE

## 2023-02-03 SDOH — ECONOMIC STABILITY: HOUSING INSECURITY
IN THE LAST 12 MONTHS, WAS THERE A TIME WHEN YOU DID NOT HAVE A STEADY PLACE TO SLEEP OR SLEPT IN A SHELTER (INCLUDING NOW)?: NO

## 2023-02-03 ASSESSMENT — PATIENT HEALTH QUESTIONNAIRE - PHQ9
3. TROUBLE FALLING OR STAYING ASLEEP: 0
8. MOVING OR SPEAKING SO SLOWLY THAT OTHER PEOPLE COULD HAVE NOTICED. OR THE OPPOSITE, BEING SO FIGETY OR RESTLESS THAT YOU HAVE BEEN MOVING AROUND A LOT MORE THAN USUAL: 0
9. THOUGHTS THAT YOU WOULD BE BETTER OFF DEAD, OR OF HURTING YOURSELF: 0
SUM OF ALL RESPONSES TO PHQ QUESTIONS 1-9: 0
SUM OF ALL RESPONSES TO PHQ9 QUESTIONS 1 & 2: 0
SUM OF ALL RESPONSES TO PHQ QUESTIONS 1-9: 0
5. POOR APPETITE OR OVEREATING: 0
SUM OF ALL RESPONSES TO PHQ QUESTIONS 1-9: 0
4. FEELING TIRED OR HAVING LITTLE ENERGY: 0
6. FEELING BAD ABOUT YOURSELF - OR THAT YOU ARE A FAILURE OR HAVE LET YOURSELF OR YOUR FAMILY DOWN: 0
7. TROUBLE CONCENTRATING ON THINGS, SUCH AS READING THE NEWSPAPER OR WATCHING TELEVISION: 0
SUM OF ALL RESPONSES TO PHQ QUESTIONS 1-9: 0
2. FEELING DOWN, DEPRESSED OR HOPELESS: 0
1. LITTLE INTEREST OR PLEASURE IN DOING THINGS: 0

## 2023-02-03 ASSESSMENT — ENCOUNTER SYMPTOMS
COUGH: 0
CHEST TIGHTNESS: 0
BACK PAIN: 1
GASTROINTESTINAL NEGATIVE: 1
RESPIRATORY NEGATIVE: 1
RHINORRHEA: 0
EYES NEGATIVE: 1

## 2023-02-03 NOTE — PROGRESS NOTES
Patient is seen in follow up for   Chief Complaint   Patient presents with    Foot Pain     Heels still hurt     Foot Pain   Pertinent negatives include no fever or numbness. here with bilateral heel pain for a month.     Past Medical History:   Diagnosis Date    Carpal tunnel syndrome     Multiple sclerosis [facility])      Patient Active Problem List    Diagnosis Date Noted    Urinary urgency 01/19/2023    Bilateral carpal tunnel syndrome 10/21/2022    Left carpal tunnel syndrome 10/07/2022    Spasticity 09/07/2022    Bite, animal 06/03/2020    Left foot pain 06/03/2020    Deficient knowledge 06/03/2020    Dog bite of wrist 06/03/2020    Impaired mobility 06/03/2020    Myalgia 06/03/2020    Hypokalemia 10/02/2018    Nicotine dependence, unspecified, uncomplicated 34/81/1276    Overweight 10/02/2018    Ataxic gait 09/21/2018    Body mass index (bmi) 39.0-39.9, adult 09/21/2018    Muscle weakness (generalized) 09/21/2018    Neuromuscular dysfunction of bladder, unspecified 09/21/2018    Obesity, unspecified 09/21/2018    Paresthesia of skin 09/21/2018    Vitamin D deficiency 09/21/2018    Neuropathy 12/30/2016    Anxiety and depression 12/30/2016    MS (multiple sclerosis) ([facility] [address] 07/06/2015     Past Surgical History:   Procedure Laterality Date    APPENDECTOMY      BREAST SURGERY      lump removal bengin    CARPAL TUNNEL RELEASE Left 10/7/2022    LEFT CARPAL TUNNEL RELEASE ( [facility],  [address]) performed by Cecy Cobb MD at [address] Right 12/2/2022    RIGHT CARPAL TUNNEL RELEASE performed by Cecy Cobb MD at [address] [place] [address] [place]      x 3    TUBAL LIGATION       Family History   Problem Relation Age of Onset    Diabetes Mother     Heart Disease Father      Social History     Socioeconomic History    Marital status:      Spouse name: None    Number of children: None    Years of education: None    Highest education level: None   Tobacco Use    Smoking status: Every Day    Smokeless tobacco: Never   Vaping Use    Vaping Use: Never used   Substance and Sexual Activity    Alcohol use: Yes     Alcohol/week: 0.0 standard drinks    Drug use: Yes     Types: Marijuana (Weed)     Social Determinants of Health     Financial Resource Strain: Low Risk     Difficulty of Paying Living Expenses: Not very hard   Food Insecurity: No Food Insecurity    Worried About Running Out of Food in the Last Year: Never true    Ran Out of Food in the Last Year: Never true   Transportation Needs: Unknown    Lack of Transportation (Non-Medical): No   Housing Stability: Unknown    Unstable Housing in the Last Year: No     Current Outpatient Medications   Medication Sig Dispense Refill    etodolac (LODINE) 400 MG tablet TAKE ONE (1) TABLET BY MOUTH TWICE DAILY      ALPRAZolam (XANAX) 0.25 MG tablet Take 1 tablet by mouth 3 times daily as needed for Anxiety for up to 30 days. Max Daily Amount: 0.75 mg 10 tablet 0    LORazepam (ATIVAN) 0.5 MG tablet TAKE 1 TABLET BY MOUTH EVERY 8 HOURS AS NEEDED FOR ANXIETY 60 tablet 2    Cholecalciferol (D3 SUPER STRENGTH) 50 MCG (2000 UT) CAPS TAKE ONE (1) CAPSULE BY MOUTH ONCE DAILY 30 capsule 10    gabapentin (NEURONTIN) 600 MG tablet TAKE 1 TABLET BY MOUTH THREE TIMES DAILY (Patient not taking: Reported on 1/30/2023) 90 tablet 10    AUBAGIO 14 MG TABS TAKE 1 TABLET BY MOUTH 1 TIME A DAY.  30 tablet 2    Handicap Placard MISC by Does not apply route Duration of 5 years  Dx:MS 1 each 0    escitalopram (LEXAPRO) 20 MG tablet TAKE 1 TABLET BY MOUTH DAILY 30 tablet 10    baclofen (LIORESAL) 10 MG tablet TAKE 1 TABLET BY MOUTH FOUR TIMES DAILY 120 tablet 10    levonorgestrel (MIRENA) IUD 52 mg 1 each by Intrauterine route       Current Facility-Administered Medications   Medication Dose Route Frequency Provider Last Rate Last Admin    levonorgestrel (MIRENA) IUD 52 mg 1 each  1 each IntraUTERine Once Mariaelena Urbina DO   1 each at 10/27/21 1510     Current Outpatient Medications on File Prior to Visit   Medication Sig Dispense Refill    etodolac (LODINE) 400 MG tablet TAKE ONE (1) TABLET BY MOUTH TWICE DAILY      ALPRAZolam (XANAX) 0.25 MG tablet Take 1 tablet by mouth 3 times daily as needed for Anxiety for up to 30 days. Max Daily Amount: 0.75 mg 10 tablet 0    LORazepam (ATIVAN) 0.5 MG tablet TAKE 1 TABLET BY MOUTH EVERY 8 HOURS AS NEEDED FOR ANXIETY 60 tablet 2    Cholecalciferol (D3 SUPER STRENGTH) 50 MCG (2000 UT) CAPS TAKE ONE (1) CAPSULE BY MOUTH ONCE DAILY 30 capsule 10    gabapentin (NEURONTIN) 600 MG tablet TAKE 1 TABLET BY MOUTH THREE TIMES DAILY (Patient not taking: Reported on 1/30/2023) 90 tablet 10    AUBAGIO 14 MG TABS TAKE 1 TABLET BY MOUTH 1 TIME A DAY.  30 tablet 2    Handicap Placard MISC by Does not apply route Duration of 5 years  Dx:MS 1 each 0    escitalopram (LEXAPRO) 20 MG tablet TAKE 1 TABLET BY MOUTH DAILY 30 tablet 10    baclofen (LIORESAL) 10 MG tablet TAKE 1 TABLET BY MOUTH FOUR TIMES DAILY 120 tablet 10    levonorgestrel (MIRENA) IUD 52 mg 1 each by Intrauterine route       Current Facility-Administered Medications on File Prior to Visit   Medication Dose Route Frequency Provider Last Rate Last Admin    levonorgestrel (MIRENA) IUD 52 mg 1 each  1 each IntraUTERine Once [place], DO   1 each at 10/27/21 1510     No Known Allergies  Health Maintenance   Topic Date Due    COVID-19 Vaccine (1) Never done    Varicella vaccine (1 of 2 - 2-dose childhood series) Never done    Pneumococcal 0-64 years Vaccine (1 - PCV) Never done    Hepatitis C screen  Never done    Lipids  06/12/2020    Flu vaccine (1) 08/01/2022    A1C test (Diabetic or Prediabetic)  09/07/2022    HIV screen  08/15/2026 (Originally 4/7/1995)    Depression Monitoring  06/13/2023    Cervical cancer screen  09/08/2025    DTaP/Tdap/Td vaccine (4 - Td or Tdap) 12/12/2029    Hepatitis A vaccine  Aged Out    Hib vaccine  Aged Out    Meningococcal (ACWY) vaccine  Aged Out       Review of Systems     Review of Systems   Constitutional:  Negative for activity change, appetite change, fatigue and fever. HENT:  Negative for congestion and rhinorrhea. Eyes: Negative. Respiratory: Negative. Negative for cough and chest tightness. Cardiovascular: Negative. Gastrointestinal: Negative. Endocrine: Negative. Genitourinary: Negative. Musculoskeletal:  Positive for arthralgias and back pain. Skin: Negative. Neurological:  Negative for dizziness, light-headedness and numbness. Hematological: Negative. Psychiatric/Behavioral: Negative. Physical Exam  Vitals:    02/03/23 0819   BP: 128/76   Pulse: 65   Resp: 16   Temp: 98.3 °F (36.8 °C)   SpO2: 96%       Physical Exam  Constitutional:       Appearance: She is well-developed. HENT:      Right Ear: External ear normal.      Left Ear: External ear normal.   Eyes:      Pupils: Pupils are equal, round, and reactive to light. Neck:      Thyroid: No thyromegaly. Cardiovascular:      Rate and Rhythm: Normal rate and regular rhythm. Heart sounds: Normal heart sounds. No murmur heard. No friction rub. No gallop. Pulmonary:      Effort: Pulmonary effort is normal. No respiratory distress. Breath sounds: No wheezing or rales. Chest:      Chest wall: No tenderness. Abdominal:      General: Bowel sounds are normal. There is no distension. Palpations: Abdomen is soft. There is no mass. Tenderness: There is no abdominal tenderness. There is no guarding or rebound. Musculoskeletal:      Cervical back: Normal range of motion and neck supple. Right foot: Decreased range of motion. Left foot: Decreased range of motion. Feet:    Feet:      Right foot:      Skin integrity: Skin integrity normal.      Left foot:      Skin integrity: Skin integrity normal.   Lymphadenopathy:      Cervical: No cervical adenopathy. Skin:     General: Skin is warm and dry.    Neurological:      Mental Status: She is alert and oriented to person, place, and time. Cranial Nerves: No cranial nerve deficit. Coordination: Coordination normal.       Assessment   Diagnosis Orders   1. Pain of both heels  XR ANKLE RIGHT (MIN 3 VIEWS)    XR ANKLE LEFT (MIN 3 VIEWS)    [facility], Podiatry, [place]      2. MS (multiple sclerosis) [facility])  [address] [facility] Royce Arango DPM, Podiatry, [place]        Problem List       MS (multiple sclerosis) [facility])    Relevant Medications    AUBAGIO 14 MG TABS    Other Relevant Orders    [address], Tay Jiménez DPM, Podiatry, [place]       Plan  Orders Placed This Encounter   Procedures    XR ANKLE RIGHT (MIN 3 VIEWS)     Standing Status:   Future     Standing Expiration Date:   2/3/2024    XR ANKLE LEFT (MIN 3 VIEWS)     Standing Status:   Future     Standing Expiration Date:   2/3/2024    [id number] [facility] Physical Therapy [facility]     Referral Priority:   Routine     Referral Type:   Eval and Treat     Referral Reason:   Specialty Services Required     Requested Specialty:   Physical Therapist     Number of Visits Requested:   [facility] [address] [place] [address] HOSPITAL, Podiatry, [place]     Referral Priority:   Routine     Referral Type:   Eval and Treat     Referral Reason:   Specialty Services Required     Referred to Provider:   Belinda Hernandez DPM     Requested Specialty:   Podiatry     Number of Visits Requested:   1       No orders of the defined types were placed in this encounter. No follow-ups on file.   Angelique Gutiérrez MD

## 2023-03-09 DIAGNOSIS — G35 MULTIPLE SCLEROSIS (HCC): ICD-10-CM

## 2023-03-09 RX ORDER — RENAGEL 800 MG/1
TABLET ORAL
Qty: 30 TABLET | Refills: 2 | Status: ACTIVE | OUTPATIENT
Start: 2023-03-09

## 2023-03-09 NOTE — TELEPHONE ENCOUNTER
Pharmacy is requesting medication refill. Please approve or deny this request.    Rx requested:  Requested Prescriptions     Pending Prescriptions Disp Refills    AUBAGIO 14 MG TABS [Pharmacy Med Name: AUBAGIO 14MG] 30 tablet 2     Sig: TAKE 1 TABLET BY MOUTH 1 TIME A DAY.          Last Office Visit:   1/19/2023      Next Visit Date:  Future Appointments   Date Time Provider Rema Smart   3/21/2023 10:00 AM Sohail Valenzuela DPM [facility] OB POD [facility] EMERGENCY [facility] AT [place]   5/5/2023  9:00 AM Kleber Roach  [place] [id number]   5/11/2023  2:15 PM Daylene Homes, MD [facility] -

## 2023-03-21 ENCOUNTER — INITIAL CONSULT (OUTPATIENT)
Dept: PODIATRY | Age: 43
End: 2023-03-21
Payer: COMMERCIAL

## 2023-03-21 VITALS — TEMPERATURE: 97.1 F | HEIGHT: 59 IN | WEIGHT: 190 LBS | BODY MASS INDEX: 38.3 KG/M2

## 2023-03-21 DIAGNOSIS — M72.2 PLANTAR FASCIITIS: ICD-10-CM

## 2023-03-21 DIAGNOSIS — M20.5X1 ACQUIRED HALLUX LIMITUS OF BOTH FEET: ICD-10-CM

## 2023-03-21 DIAGNOSIS — M79.671 PAIN IN BOTH FEET: Primary | ICD-10-CM

## 2023-03-21 DIAGNOSIS — M79.672 PAIN IN BOTH FEET: Primary | ICD-10-CM

## 2023-03-21 DIAGNOSIS — G35 MS (MULTIPLE SCLEROSIS) (HCC): ICD-10-CM

## 2023-03-21 DIAGNOSIS — R26.9 GAIT ABNORMALITY: ICD-10-CM

## 2023-03-21 DIAGNOSIS — M20.5X2 ACQUIRED HALLUX LIMITUS OF BOTH FEET: ICD-10-CM

## 2023-03-21 PROCEDURE — 4004F PT TOBACCO SCREEN RCVD TLK: CPT | Performed by: PODIATRIST

## 2023-03-21 PROCEDURE — G8484 FLU IMMUNIZE NO ADMIN: HCPCS | Performed by: PODIATRIST

## 2023-03-21 PROCEDURE — G8427 DOCREV CUR MEDS BY ELIG CLIN: HCPCS | Performed by: PODIATRIST

## 2023-03-21 PROCEDURE — 99203 OFFICE O/P NEW LOW 30 MIN: CPT | Performed by: PODIATRIST

## 2023-03-21 PROCEDURE — G8417 CALC BMI ABV UP PARAM F/U: HCPCS | Performed by: PODIATRIST

## 2023-03-22 ASSESSMENT — ENCOUNTER SYMPTOMS
SHORTNESS OF BREATH: 0
NAUSEA: 0
VOMITING: 0
BACK PAIN: 0

## 2023-03-22 NOTE — PROGRESS NOTES
[address] [facility]  [facility] [address] [facility] [address]  Dept: [phone]  Loc: [address] [facility]  (1980)    3/22/23    Subjective     Nile Wood is 43 y.o. female who complains today of:    Chief Complaint   Patient presents with    Foot Pain     Both heels       Nile Wood is seen in consultation at the request of Susanne Boles MD for evaluation of bilateral heel pain. HPI: Patient presents with a complaint of bilateral heel pain. Patient describes pain as achy, tingling, and burning. Patient rates the pain at 8/10 at its worst.  Patient states she began having pain about 4 weeks ago and states that it has worsened over time. Patient reports a sudden onset. Patient does not recall recent injury but does report a history of lower extremity numbness and gait problems due to multiple sclerosis. No prior treatment other than rest has been attempted. Patient states she has been wearing a cushioned house shoe or tennis shoes since the pain began. Review of Systems   Constitutional:  Negative for chills and fever. HENT:  Negative for hearing loss. Respiratory:  Negative for shortness of breath. Cardiovascular:  Negative for chest pain. Gastrointestinal:  Negative for nausea and vomiting. Genitourinary:  Negative for difficulty urinating. Musculoskeletal:  Positive for gait problem. Negative for back pain. Skin:  Negative for wound. Neurological:  Positive for numbness. Hematological:  Bruises/bleeds easily. Psychiatric/Behavioral:  Negative for sleep disturbance. The patient is not a diabetic. PCP/ Endocrinologist: Susanne Boles MD   Date last seen: 2/3/23    Allergies:  Patient has no known allergies. Current Outpatient Medications on File Prior to Visit   Medication Sig Dispense Refill    AUBAGIO 14 MG TABS TAKE 1 TABLET BY MOUTH 1 TIME A DAY.  27

## 2023-05-02 ENCOUNTER — OFFICE VISIT (OUTPATIENT)
Dept: PODIATRY | Age: 43
End: 2023-05-02
Payer: COMMERCIAL

## 2023-05-02 VITALS — HEIGHT: 60 IN | TEMPERATURE: 96.8 F | WEIGHT: 190 LBS | BODY MASS INDEX: 37.3 KG/M2

## 2023-05-02 DIAGNOSIS — M20.5X1 ACQUIRED HALLUX LIMITUS OF BOTH FEET: ICD-10-CM

## 2023-05-02 DIAGNOSIS — G35 MS (MULTIPLE SCLEROSIS) (HCC): ICD-10-CM

## 2023-05-02 DIAGNOSIS — M65.871 OTHER SYNOVITIS AND TENOSYNOVITIS, RIGHT ANKLE AND FOOT: ICD-10-CM

## 2023-05-02 DIAGNOSIS — M79.672 LEFT FOOT PAIN: ICD-10-CM

## 2023-05-02 DIAGNOSIS — M79.671 RIGHT FOOT PAIN: Primary | ICD-10-CM

## 2023-05-02 DIAGNOSIS — M20.5X2 ACQUIRED HALLUX LIMITUS OF BOTH FEET: ICD-10-CM

## 2023-05-02 DIAGNOSIS — M72.2 PLANTAR FASCIITIS: ICD-10-CM

## 2023-05-02 DIAGNOSIS — G57.92 NEURITIS OF LEFT FOOT: ICD-10-CM

## 2023-05-02 DIAGNOSIS — R26.9 GAIT ABNORMALITY: ICD-10-CM

## 2023-05-02 PROCEDURE — G8427 DOCREV CUR MEDS BY ELIG CLIN: HCPCS | Performed by: PODIATRIST

## 2023-05-02 PROCEDURE — G8417 CALC BMI ABV UP PARAM F/U: HCPCS | Performed by: PODIATRIST

## 2023-05-02 PROCEDURE — 4004F PT TOBACCO SCREEN RCVD TLK: CPT | Performed by: PODIATRIST

## 2023-05-02 PROCEDURE — 99213 OFFICE O/P EST LOW 20 MIN: CPT | Performed by: PODIATRIST

## 2023-05-02 PROCEDURE — 20550 NJX 1 TENDON SHEATH/LIGAMENT: CPT | Performed by: PODIATRIST

## 2023-05-02 RX ORDER — LIDOCAINE HYDROCHLORIDE 10 MG/ML
0.5 INJECTION, SOLUTION INFILTRATION; PERINEURAL ONCE
Status: COMPLETED | OUTPATIENT
Start: 2023-05-02 | End: 2023-05-02

## 2023-05-02 RX ORDER — BUPIVACAINE HYDROCHLORIDE 5 MG/ML
0.5 INJECTION, SOLUTION PERINEURAL ONCE
Status: COMPLETED | OUTPATIENT
Start: 2023-05-02 | End: 2023-05-02

## 2023-05-02 RX ORDER — DEXAMETHASONE SODIUM PHOSPHATE 10 MG/ML
10 INJECTION INTRAMUSCULAR; INTRAVENOUS ONCE
Status: COMPLETED | OUTPATIENT
Start: 2023-05-02 | End: 2023-05-02

## 2023-05-02 RX ORDER — BETAMETHASONE SODIUM PHOSPHATE AND BETAMETHASONE ACETATE 3; 3 MG/ML; MG/ML
6 INJECTION, SUSPENSION INTRA-ARTICULAR; INTRALESIONAL; INTRAMUSCULAR; SOFT TISSUE ONCE
Status: COMPLETED | OUTPATIENT
Start: 2023-05-02 | End: 2023-05-02

## 2023-05-02 RX ADMIN — BUPIVACAINE HYDROCHLORIDE 2.5 MG: 5 INJECTION, SOLUTION PERINEURAL at 11:33

## 2023-05-02 RX ADMIN — BETAMETHASONE SODIUM PHOSPHATE AND BETAMETHASONE ACETATE 6 MG: 3; 3 INJECTION, SUSPENSION INTRA-ARTICULAR; INTRALESIONAL; INTRAMUSCULAR; SOFT TISSUE at 11:33

## 2023-05-02 RX ADMIN — LIDOCAINE HYDROCHLORIDE 0.5 ML: 10 INJECTION, SOLUTION INFILTRATION; PERINEURAL at 11:34

## 2023-05-02 RX ADMIN — DEXAMETHASONE SODIUM PHOSPHATE 10 MG: 10 INJECTION INTRAMUSCULAR; INTRAVENOUS at 11:34

## 2023-05-02 ASSESSMENT — ENCOUNTER SYMPTOMS
VOMITING: 0
NAUSEA: 0
SHORTNESS OF BREATH: 0
BACK PAIN: 0

## 2023-05-02 NOTE — PROGRESS NOTES
[address]  [facility] 82 [address] [facility] [address]  Dept: [phone]  Loc: 2005 [facility]  (1980)    5/2/23    Jassi Pedroza is 37 y.o. female who complains today of:    Chief Complaint   Patient presents with    Foot Pain     Both feet       HPI: Patient presents for follow-up for bilateral heel pain. Patient denies significant improvement since her last visit. Patient states has been following the home-going instructions but states that she was not contacted by physical therapy to schedule for assessment, she states that she was also not called by the  to be fitted for orthotics. Patient reports having new onset pain extending from the medial right foot to the ankle she describes as sharp with activity and and new shooting nerve pain to the top of the left foot. Review of Systems   Constitutional:  Negative for chills and fever. HENT:  Negative for hearing loss. Respiratory:  Negative for shortness of breath. Cardiovascular:  Negative for chest pain. Gastrointestinal:  Negative for nausea and vomiting. Genitourinary:  Negative for difficulty urinating. Musculoskeletal:  Positive for arthralgias and gait problem. Negative for back pain. Skin:  Negative for wound. Neurological:  Positive for numbness. Hematological:  Does not bruise/bleed easily. Psychiatric/Behavioral:  Negative for sleep disturbance. Allergies:  Patient has no known allergies. Current Outpatient Medications on File Prior to Visit   Medication Sig Dispense Refill    LORazepam (ATIVAN) 0.5 MG tablet TAKE 1 TABLET BY MOUTH EVERY 8 HOURS AS NEEDED FOR ANXIETY 60 tablet 2    AUBAGIO 14 MG TABS TAKE 1 TABLET BY MOUTH 1 TIME A DAY.  30 tablet 2    etodolac (LODINE) 400 MG tablet TAKE ONE (1) TABLET BY MOUTH TWICE DAILY      Cholecalciferol (D3 SUPER STRENGTH) 50 MCG

## 2023-05-05 ENCOUNTER — OFFICE VISIT (OUTPATIENT)
Dept: FAMILY MEDICINE CLINIC | Age: 43
End: 2023-05-05
Payer: COMMERCIAL

## 2023-05-05 VITALS
RESPIRATION RATE: 16 BRPM | BODY MASS INDEX: 38.3 KG/M2 | TEMPERATURE: 97.9 F | HEIGHT: 59 IN | DIASTOLIC BLOOD PRESSURE: 68 MMHG | SYSTOLIC BLOOD PRESSURE: 116 MMHG | WEIGHT: 190 LBS | HEART RATE: 74 BPM | OXYGEN SATURATION: 98 %

## 2023-05-05 DIAGNOSIS — F41.9 ANXIETY AND DEPRESSION: ICD-10-CM

## 2023-05-05 DIAGNOSIS — F32.A ANXIETY AND DEPRESSION: ICD-10-CM

## 2023-05-05 DIAGNOSIS — F32.A ANXIETY AND DEPRESSION: Primary | ICD-10-CM

## 2023-05-05 DIAGNOSIS — G35 MS (MULTIPLE SCLEROSIS) (HCC): ICD-10-CM

## 2023-05-05 DIAGNOSIS — F41.9 ANXIETY AND DEPRESSION: Primary | ICD-10-CM

## 2023-05-05 DIAGNOSIS — M79.672 PAIN OF BOTH HEELS: ICD-10-CM

## 2023-05-05 DIAGNOSIS — M79.671 PAIN OF BOTH HEELS: ICD-10-CM

## 2023-05-05 PROCEDURE — 4004F PT TOBACCO SCREEN RCVD TLK: CPT | Performed by: FAMILY MEDICINE

## 2023-05-05 PROCEDURE — G8417 CALC BMI ABV UP PARAM F/U: HCPCS | Performed by: FAMILY MEDICINE

## 2023-05-05 PROCEDURE — G8427 DOCREV CUR MEDS BY ELIG CLIN: HCPCS | Performed by: FAMILY MEDICINE

## 2023-05-05 PROCEDURE — 99213 OFFICE O/P EST LOW 20 MIN: CPT | Performed by: FAMILY MEDICINE

## 2023-05-05 RX ORDER — VARENICLINE TARTRATE 1 MG/1
1 TABLET, FILM COATED ORAL 2 TIMES DAILY
Qty: 60 TABLET | Refills: 3 | Status: SHIPPED | OUTPATIENT
Start: 2023-05-05

## 2023-05-05 ASSESSMENT — ENCOUNTER SYMPTOMS
RESPIRATORY NEGATIVE: 1
RHINORRHEA: 0
COUGH: 0
CHEST TIGHTNESS: 0
EYES NEGATIVE: 1
GASTROINTESTINAL NEGATIVE: 1

## 2023-05-08 ENCOUNTER — TELEPHONE (OUTPATIENT)
Dept: PODIATRY | Age: 43
End: 2023-05-08

## 2023-05-08 LAB
6MAM UR QL: NOT DETECTED
7-AMINOCLONAZEPAM: NOT DETECTED
ALPHA-OH-ALPRAZOLAM: NOT DETECTED
ALPHA-OH-MIDAZOLAM, URINE: NOT DETECTED
ALPRAZOLAM: NOT DETECTED
AMPHET UR QL SCN: NOT DETECTED
BARBITURATES: NOT DETECTED
BENZOYLECGONINE: NOT DETECTED
BUPRENORPHINE: NOT DETECTED
CARISOPRODOL UR QL: NOT DETECTED
CLONAZEPAM UR QL: NOT DETECTED
CODEINE: NOT DETECTED
CREAT UR-MCNC: 130 MG/DL (ref 20–400)
DIAZEPAM: NOT DETECTED
ETHYL GLUCURONIDE: NOT DETECTED
FENTANYL UR QL: NOT DETECTED
GABAPENTIN: PRESENT
HYDROCODONE UR QL: NOT DETECTED
HYDROMORPHONE: NOT DETECTED
LORAZEPAM UR QL: PRESENT
MARIJUANA METABOLITE: PRESENT
MDA: NOT DETECTED
MDEA: NOT DETECTED
MDMA UR QL: NOT DETECTED
MEPERIDINE: NOT DETECTED
METHADONE: NOT DETECTED
METHAMPHETAMINE: NOT DETECTED
METHYLPHENIDATE: NOT DETECTED
MIDAZOLAM UR QL SCN: NOT DETECTED
MORPHINE: NOT DETECTED
NALOXONE: NOT DETECTED
NORBUPRENORPHINE, FREE: NOT DETECTED
NORDIAZEPAM: NOT DETECTED
NORFENTANYL: NOT DETECTED
NORHYDROCODONE, URINE: NOT DETECTED
NOROXYCODONE: NOT DETECTED
NOROXYMORPHONE, URINE: NOT DETECTED
OXAZEPAM UR QL: NOT DETECTED
OXYCODONE UR QL: NOT DETECTED
OXYMORPHONE UR QL: NOT DETECTED
PAIN MANAGEMENT DRUG PANEL: NORMAL
PATHOLOGY STUDY: NORMAL
PCP: NOT DETECTED
PHENTERMINE: NOT DETECTED
PREGABALIN: NOT DETECTED
TAPENTADOL, URINE: NOT DETECTED
TAPENTADOL-O-SULFATE, URINE: NOT DETECTED
TEMAZEPAM: NOT DETECTED
TRAMADOL: NOT DETECTED
ZOLPIDEM: NOT DETECTED

## 2023-05-11 ENCOUNTER — OFFICE VISIT (OUTPATIENT)
Dept: NEUROLOGY | Age: 43
End: 2023-05-11
Payer: COMMERCIAL

## 2023-05-11 VITALS
HEART RATE: 62 BPM | WEIGHT: 187.8 LBS | SYSTOLIC BLOOD PRESSURE: 118 MMHG | DIASTOLIC BLOOD PRESSURE: 70 MMHG | BODY MASS INDEX: 37.93 KG/M2

## 2023-05-11 DIAGNOSIS — R53.82 CHRONIC FATIGUE, UNSPECIFIED: ICD-10-CM

## 2023-05-11 DIAGNOSIS — Z74.09 IMPAIRED MOBILITY: ICD-10-CM

## 2023-05-11 DIAGNOSIS — R39.15 URINARY URGENCY: ICD-10-CM

## 2023-05-11 DIAGNOSIS — G35 MS (MULTIPLE SCLEROSIS) (HCC): Primary | ICD-10-CM

## 2023-05-11 DIAGNOSIS — M79.10 MYALGIA: ICD-10-CM

## 2023-05-11 DIAGNOSIS — G62.9 NEUROPATHY: ICD-10-CM

## 2023-05-11 DIAGNOSIS — R25.2 SPASTICITY: ICD-10-CM

## 2023-05-11 PROCEDURE — 4004F PT TOBACCO SCREEN RCVD TLK: CPT | Performed by: PSYCHIATRY & NEUROLOGY

## 2023-05-11 PROCEDURE — 99214 OFFICE O/P EST MOD 30 MIN: CPT | Performed by: PSYCHIATRY & NEUROLOGY

## 2023-05-11 PROCEDURE — G8417 CALC BMI ABV UP PARAM F/U: HCPCS | Performed by: PSYCHIATRY & NEUROLOGY

## 2023-05-11 PROCEDURE — G8427 DOCREV CUR MEDS BY ELIG CLIN: HCPCS | Performed by: PSYCHIATRY & NEUROLOGY

## 2023-05-11 ASSESSMENT — ENCOUNTER SYMPTOMS
PHOTOPHOBIA: 0
BACK PAIN: 1
VOMITING: 0
CHOKING: 0
NAUSEA: 0
SHORTNESS OF BREATH: 0
TROUBLE SWALLOWING: 0
COLOR CHANGE: 0

## 2023-05-16 ENCOUNTER — OFFICE VISIT (OUTPATIENT)
Dept: PODIATRY | Age: 43
End: 2023-05-16
Payer: COMMERCIAL

## 2023-05-16 VITALS — WEIGHT: 190 LBS | BODY MASS INDEX: 37.3 KG/M2 | HEIGHT: 60 IN | TEMPERATURE: 97.1 F

## 2023-05-16 DIAGNOSIS — G35 MS (MULTIPLE SCLEROSIS) (HCC): ICD-10-CM

## 2023-05-16 DIAGNOSIS — M20.5X1 ACQUIRED HALLUX LIMITUS OF BOTH FEET: ICD-10-CM

## 2023-05-16 DIAGNOSIS — M72.2 PLANTAR FASCIITIS: ICD-10-CM

## 2023-05-16 DIAGNOSIS — M20.5X2 ACQUIRED HALLUX LIMITUS OF BOTH FEET: ICD-10-CM

## 2023-05-16 DIAGNOSIS — M79.672 LEFT FOOT PAIN: Primary | ICD-10-CM

## 2023-05-16 DIAGNOSIS — M79.671 RIGHT FOOT PAIN: ICD-10-CM

## 2023-05-16 DIAGNOSIS — G57.92 NEURITIS OF LEFT FOOT: ICD-10-CM

## 2023-05-16 PROCEDURE — 20550 NJX 1 TENDON SHEATH/LIGAMENT: CPT | Performed by: PODIATRIST

## 2023-05-16 PROCEDURE — 99999 PR OFFICE/OUTPT VISIT,PROCEDURE ONLY: CPT | Performed by: PODIATRIST

## 2023-05-16 RX ORDER — DEXAMETHASONE SODIUM PHOSPHATE 10 MG/ML
10 INJECTION INTRAMUSCULAR; INTRAVENOUS ONCE
Status: COMPLETED | OUTPATIENT
Start: 2023-05-16 | End: 2023-05-16

## 2023-05-16 RX ORDER — BETAMETHASONE SODIUM PHOSPHATE AND BETAMETHASONE ACETATE 3; 3 MG/ML; MG/ML
6 INJECTION, SUSPENSION INTRA-ARTICULAR; INTRALESIONAL; INTRAMUSCULAR; SOFT TISSUE ONCE
Status: COMPLETED | OUTPATIENT
Start: 2023-05-16 | End: 2023-05-16

## 2023-05-16 RX ORDER — LIDOCAINE HYDROCHLORIDE 10 MG/ML
0.5 INJECTION, SOLUTION INFILTRATION; PERINEURAL ONCE
Status: COMPLETED | OUTPATIENT
Start: 2023-05-16 | End: 2023-05-16

## 2023-05-16 RX ORDER — BUPIVACAINE HYDROCHLORIDE 5 MG/ML
0.5 INJECTION, SOLUTION PERINEURAL ONCE
Status: COMPLETED | OUTPATIENT
Start: 2023-05-16 | End: 2023-05-16

## 2023-05-16 RX ADMIN — BUPIVACAINE HYDROCHLORIDE 2.5 MG: 5 INJECTION, SOLUTION PERINEURAL at 11:32

## 2023-05-16 RX ADMIN — DEXAMETHASONE SODIUM PHOSPHATE 10 MG: 10 INJECTION INTRAMUSCULAR; INTRAVENOUS at 11:32

## 2023-05-16 RX ADMIN — BETAMETHASONE SODIUM PHOSPHATE AND BETAMETHASONE ACETATE 6 MG: 3; 3 INJECTION, SUSPENSION INTRA-ARTICULAR; INTRALESIONAL; INTRAMUSCULAR; SOFT TISSUE at 11:31

## 2023-05-16 RX ADMIN — LIDOCAINE HYDROCHLORIDE 0.5 ML: 10 INJECTION, SOLUTION INFILTRATION; PERINEURAL at 11:33

## 2023-05-16 ASSESSMENT — ENCOUNTER SYMPTOMS
SHORTNESS OF BREATH: 0
NAUSEA: 0
VOMITING: 0
BACK PAIN: 1

## 2023-05-16 NOTE — PROGRESS NOTES
[address] [facility]  [facility] [address] [address]  Dept: [phone]  Loc: [phone]       Anna Marie Dawson  (1980)    5/16/23    Jassi Roa is 37 y.o. female who complains today of:    Chief Complaint   Patient presents with    Foot Pain     Both feet       HPI: Patient presents for left foot injection to the plantar fascia origin due to chronic plantar fasciitis. Patient denies having any complications following the shot to the right heel at her last visit. Patient reports that she had more significant decrease in pain to the medial lateral aspects of the foot/the tendons that she did to the plantar fascia. Patient would like to attempt the left foot procedure to see if that is more effective than the right. Patient reports compliance with other home-going instructions. Review of Systems   Constitutional:  Negative for chills and fever. HENT:  Negative for hearing loss. Respiratory:  Negative for shortness of breath. Cardiovascular:  Negative for chest pain. Gastrointestinal:  Negative for nausea and vomiting. Genitourinary:  Negative for difficulty urinating. Musculoskeletal:  Positive for back pain and gait problem. Skin:  Negative for wound. Neurological:  Positive for numbness. Hematological:  Does not bruise/bleed easily. Psychiatric/Behavioral:  Negative for sleep disturbance. Allergies:  Patient has no known allergies. Current Outpatient Medications on File Prior to Visit   Medication Sig Dispense Refill    varenicline (CHANTIX) 1 MG tablet Take 1 tablet by mouth 2 times daily 60 tablet 3    LORazepam (ATIVAN) 0.5 MG tablet TAKE 1 TABLET BY MOUTH EVERY 8 HOURS AS NEEDED FOR ANXIETY 60 tablet 2    AUBAGIO 14 MG TABS TAKE 1 TABLET BY MOUTH 1 TIME A DAY.  30 tablet 2    etodolac (LODINE) 400 MG tablet TAKE ONE (1) TABLET BY MOUTH TWICE DAILY

## 2023-05-22 RX ORDER — DIPHENHYDRAMINE HYDROCHLORIDE 50 MG/ML
25 INJECTION INTRAMUSCULAR; INTRAVENOUS ONCE
Start: 2023-05-30 | End: 2023-05-30

## 2023-05-22 RX ORDER — METHYLPREDNISOLONE SODIUM SUCCINATE 125 MG/2ML
125 INJECTION, POWDER, LYOPHILIZED, FOR SOLUTION INTRAMUSCULAR; INTRAVENOUS ONCE
Start: 2023-05-30 | End: 2023-05-30

## 2023-05-22 RX ORDER — ACETAMINOPHEN 325 MG/1
650 TABLET ORAL ONCE
Start: 2023-05-30 | End: 2023-05-30

## 2023-05-30 RX ORDER — VARENICLINE TARTRATE 1 MG/1
TABLET, FILM COATED ORAL
Qty: 60 TABLET | Refills: 3 | OUTPATIENT
Start: 2023-05-30

## 2023-06-26 DIAGNOSIS — G35 MULTIPLE SCLEROSIS (HCC): ICD-10-CM

## 2023-06-27 RX ORDER — TERIFLUNOMIDE 14 MG/1
TABLET, FILM COATED ORAL
Qty: 30 TABLET | Refills: 2 | Status: ACTIVE | OUTPATIENT
Start: 2023-06-27

## 2023-07-03 ENCOUNTER — HOSPITAL ENCOUNTER (OUTPATIENT)
Dept: LAB | Age: 43
Discharge: HOME | End: 2023-07-03
Payer: COMMERCIAL

## 2023-07-03 DIAGNOSIS — R53.82 CHRONIC FATIGUE, UNSPECIFIED: ICD-10-CM

## 2023-07-03 DIAGNOSIS — G35 MS (MULTIPLE SCLEROSIS) (HCC): ICD-10-CM

## 2023-07-03 LAB
ALBUMIN SERPL-MCNC: 3.8 G/DL (ref 3.5–4.6)
ALP SERPL-CCNC: 76 U/L (ref 40–130)
ALT SERPL-CCNC: <5 U/L (ref 0–33)
AST SERPL-CCNC: 11 U/L (ref 0–35)
BASOPHILS # BLD: 0.1 K/UL (ref 0–0.2)
BASOPHILS NFR BLD: 0.8 %
BILIRUB DIRECT SERPL-MCNC: <0.2 MG/DL (ref 0–0.4)
BILIRUB INDIRECT SERPL-MCNC: NORMAL MG/DL (ref 0–0.6)
BILIRUB SERPL-MCNC: <0.2 MG/DL (ref 0.2–0.7)
EOSINOPHIL # BLD: 0.4 K/UL (ref 0–0.7)
EOSINOPHIL NFR BLD: 3.6 %
ERYTHROCYTE [DISTWIDTH] IN BLOOD BY AUTOMATED COUNT: 14.2 % (ref 11.5–14.5)
HCT VFR BLD AUTO: 44.2 % (ref 37–47)
HGB BLD-MCNC: 14.9 G/DL (ref 12–16)
LYMPHOCYTES # BLD: 2.6 K/UL (ref 1–4.8)
LYMPHOCYTES NFR BLD: 22.4 %
MCH RBC QN AUTO: 33 PG (ref 27–31.3)
MCHC RBC AUTO-ENTMCNC: 33.8 % (ref 33–37)
MCV RBC AUTO: 97.7 FL (ref 79.4–94.8)
MONOCYTES # BLD: 0.6 K/UL (ref 0.2–0.8)
MONOCYTES NFR BLD: 5.3 %
NEUTROPHILS # BLD: 8 K/UL (ref 1.4–6.5)
NEUTS SEG NFR BLD: 67.9 %
PLATELET # BLD AUTO: 349 K/UL (ref 130–400)
PROT SERPL-MCNC: 6.3 G/DL (ref 6.3–8)
RBC # BLD AUTO: 4.52 M/UL (ref 4.2–5.4)
WBC # BLD AUTO: 11.7 K/UL (ref 4.8–10.8)

## 2023-07-03 PROCEDURE — 85025 COMPLETE CBC W/AUTO DIFF WBC: CPT

## 2023-07-03 PROCEDURE — 80074 ACUTE HEPATITIS PANEL: CPT

## 2023-07-03 PROCEDURE — 80076 HEPATIC FUNCTION PANEL: CPT

## 2023-07-03 PROCEDURE — 86787 VARICELLA-ZOSTER ANTIBODY: CPT

## 2023-07-03 PROCEDURE — 36415 COLL VENOUS BLD VENIPUNCTURE: CPT

## 2023-07-04 LAB
HAV IGM SER IA-ACNC: NONREACTIVE
HEPATITIS B CORE IGM ANTIBODY: NONREACTIVE
HEPATITIS B SURF AG,XHBAGS: NONREACTIVE
HEPATITIS C ANTIBODY: NONREACTIVE

## 2023-07-10 ENCOUNTER — HOSPITAL ENCOUNTER (OUTPATIENT)
Dept: LAB | Age: 43
Discharge: HOME | End: 2023-07-10
Payer: COMMERCIAL

## 2023-07-10 DIAGNOSIS — G35 MS (MULTIPLE SCLEROSIS) (HCC): ICD-10-CM

## 2023-07-10 LAB — VZV IGG SER QL IA: 1.84

## 2023-07-10 PROCEDURE — 86480 TB TEST CELL IMMUN MEASURE: CPT

## 2023-07-11 DIAGNOSIS — F41.9 ANXIETY AND DEPRESSION: ICD-10-CM

## 2023-07-11 DIAGNOSIS — F32.A ANXIETY AND DEPRESSION: ICD-10-CM

## 2023-07-12 RX ORDER — ETODOLAC 400 MG/1
TABLET, FILM COATED ORAL
Qty: 60 TABLET | Refills: 0 | Status: SHIPPED | OUTPATIENT
Start: 2023-07-12

## 2023-07-12 RX ORDER — LORAZEPAM 0.5 MG/1
TABLET ORAL
Qty: 60 TABLET | Refills: 0 | Status: SHIPPED | OUTPATIENT
Start: 2023-07-12 | End: 2023-10-10

## 2023-07-13 LAB
QUANTI TB GOLD PLUS: NEGATIVE
QUANTI TB1 MINUS NIL: 0 IU/ML (ref 0–0.34)
QUANTI TB2 MINUS NIL: 0 IU/ML (ref 0–0.34)
QUANTIFERON MITOGEN: >10 IU/ML
QUANTIFERON NIL: 0.02 IU/ML

## 2023-07-16 LAB
VZV IGG SER IA-ACNC: 1347 IV
VZV IGM SER IA-ACNC: 0.23 ISR

## 2023-07-17 RX ORDER — ETODOLAC 400 MG/1
TABLET, FILM COATED ORAL
Qty: 60 TABLET | Refills: 10 | OUTPATIENT
Start: 2023-07-17

## 2023-07-24 RX ORDER — ETODOLAC 400 MG/1
TABLET, FILM COATED ORAL
Qty: 60 TABLET | Refills: 0 | Status: SHIPPED | OUTPATIENT
Start: 2023-07-24 | End: 2023-07-27 | Stop reason: SDUPTHER

## 2023-07-27 RX ORDER — ETODOLAC 400 MG/1
400 TABLET, FILM COATED ORAL 2 TIMES DAILY
Qty: 60 TABLET | Refills: 0 | Status: SHIPPED | OUTPATIENT
Start: 2023-07-27

## 2023-08-03 ENCOUNTER — OFFICE VISIT (OUTPATIENT)
Dept: FAMILY MEDICINE CLINIC | Age: 43
End: 2023-08-03
Payer: COMMERCIAL

## 2023-08-03 VITALS — SYSTOLIC BLOOD PRESSURE: 128 MMHG | HEART RATE: 84 BPM | RESPIRATION RATE: 18 BRPM | DIASTOLIC BLOOD PRESSURE: 74 MMHG

## 2023-08-03 DIAGNOSIS — F32.A ANXIETY AND DEPRESSION: ICD-10-CM

## 2023-08-03 DIAGNOSIS — G35 MS (MULTIPLE SCLEROSIS) (HCC): Primary | ICD-10-CM

## 2023-08-03 DIAGNOSIS — F41.9 ANXIETY AND DEPRESSION: ICD-10-CM

## 2023-08-03 PROCEDURE — G8417 CALC BMI ABV UP PARAM F/U: HCPCS | Performed by: FAMILY MEDICINE

## 2023-08-03 PROCEDURE — 4004F PT TOBACCO SCREEN RCVD TLK: CPT | Performed by: FAMILY MEDICINE

## 2023-08-03 PROCEDURE — 99213 OFFICE O/P EST LOW 20 MIN: CPT | Performed by: FAMILY MEDICINE

## 2023-08-03 PROCEDURE — G8427 DOCREV CUR MEDS BY ELIG CLIN: HCPCS | Performed by: FAMILY MEDICINE

## 2023-08-03 RX ORDER — LORAZEPAM 0.5 MG/1
0.5 TABLET ORAL EVERY 8 HOURS PRN
Qty: 60 TABLET | Refills: 2 | Status: SHIPPED | OUTPATIENT
Start: 2023-08-03 | End: 2023-11-01

## 2023-08-03 ASSESSMENT — ENCOUNTER SYMPTOMS
EYES NEGATIVE: 1
RESPIRATORY NEGATIVE: 1
GASTROINTESTINAL NEGATIVE: 1
CHEST TIGHTNESS: 0
COUGH: 0
RHINORRHEA: 0

## 2023-08-03 NOTE — PROGRESS NOTES
Patient is seen in follow up for   Chief Complaint   Patient presents with    3 Month Follow-Up     Uds and contract done 5/5/23    Anxiety     ativan    Multiple Sclerosis     Anxiety  Patient reports no dizziness. here for follow up on anxiety doing well. MS is getting worse.     Past Medical History:   Diagnosis Date    Carpal tunnel syndrome     Multiple sclerosis [facility])      Patient Active Problem List    Diagnosis Date Noted    Urinary urgency 01/19/2023    Bilateral carpal tunnel syndrome 10/21/2022    Left carpal tunnel syndrome 10/07/2022    Spasticity 09/07/2022    Bite, animal 06/03/2020    Left foot pain 06/03/2020    Deficient knowledge 06/03/2020    Dog bite of wrist 06/03/2020    Impaired mobility 06/03/2020    Myalgia 06/03/2020    Hypokalemia 10/02/2018    Nicotine dependence, unspecified, uncomplicated 84/29/5492    Overweight 10/02/2018    Ataxic gait 09/21/2018    Body mass index (bmi) 39.0-39.9, adult 09/21/2018    Muscle weakness (generalized) 09/21/2018    Neuromuscular dysfunction of bladder, unspecified 09/21/2018    Obesity, unspecified 09/21/2018    Paresthesia of skin 09/21/2018    Vitamin D deficiency 09/21/2018    Neuropathy 12/30/2016    Anxiety and depression 12/30/2016    MS (multiple sclerosis) ([address]) 07/06/2015     Past Surgical History:   Procedure Laterality Date    APPENDECTOMY      BREAST SURGERY      lump removal bengin    CARPAL TUNNEL RELEASE Left 10/7/2022    LEFT CARPAL TUNNEL RELEASE (PAT [address]) performed by Nithin Dia MD at [address] Right 12/2/2022    RIGHT CARPAL TUNNEL RELEASE performed by Nithin Dia MD at [address]      x 3    TUBAL LIGATION       Family History   Problem Relation Age of Onset    Diabetes Mother     Heart Disease Father      Social History     Socioeconomic History    Marital status:    Tobacco Use    Smoking status: Every Day    Smokeless tobacco: Never   Vaping

## 2023-08-08 ENCOUNTER — OFFICE VISIT (OUTPATIENT)
Dept: PODIATRY | Age: 43
End: 2023-08-08
Payer: COMMERCIAL

## 2023-08-08 VITALS — BODY MASS INDEX: 38.3 KG/M2 | HEIGHT: 59 IN | TEMPERATURE: 97.4 F | WEIGHT: 190 LBS

## 2023-08-08 DIAGNOSIS — M20.5X2 ACQUIRED HALLUX LIMITUS OF BOTH FEET: ICD-10-CM

## 2023-08-08 DIAGNOSIS — G57.93 PERIPHERAL NEURITIS OF BOTH FEET: ICD-10-CM

## 2023-08-08 DIAGNOSIS — M72.2 PLANTAR FASCIITIS: ICD-10-CM

## 2023-08-08 DIAGNOSIS — M20.5X1 ACQUIRED HALLUX LIMITUS OF BOTH FEET: ICD-10-CM

## 2023-08-08 DIAGNOSIS — M79.672 PAIN IN BOTH FEET: Primary | ICD-10-CM

## 2023-08-08 DIAGNOSIS — M79.671 PAIN IN BOTH FEET: Primary | ICD-10-CM

## 2023-08-08 DIAGNOSIS — G35 MS (MULTIPLE SCLEROSIS) (HCC): ICD-10-CM

## 2023-08-08 PROCEDURE — G8417 CALC BMI ABV UP PARAM F/U: HCPCS | Performed by: PODIATRIST

## 2023-08-08 PROCEDURE — 4004F PT TOBACCO SCREEN RCVD TLK: CPT | Performed by: PODIATRIST

## 2023-08-08 PROCEDURE — 99213 OFFICE O/P EST LOW 20 MIN: CPT | Performed by: PODIATRIST

## 2023-08-08 PROCEDURE — G8427 DOCREV CUR MEDS BY ELIG CLIN: HCPCS | Performed by: PODIATRIST

## 2023-08-08 ASSESSMENT — ENCOUNTER SYMPTOMS
NAUSEA: 0
VOMITING: 0
SHORTNESS OF BREATH: 0
BACK PAIN: 1

## 2023-08-08 NOTE — PROGRESS NOTES
[address]  [facility]  Dept: [phone]  Loc: [phone]       Mandeep Dawson  (1980)    8/8/23    Subjective     Onalesarah Comas is 37 y.o. female who complains today of:    Chief Complaint   Patient presents with    Foot Pain     Both feet       Foot Pain   Associated symptoms include numbness. Pertinent negatives include no fever. HPI: Patient presents for follow-up for bilateral foot pain. Patient denies significant improvement since her last visit. Patient states she was not contacted by the  to be fitted for a custom AFO bilaterally. Patient reports continued difficulty walking and standing for prolonged periods, she continues to use a wheelchair and assistive devices. Patient does report that she has had an EMG performed of the upper extremities in the past as well and has carpal tunnel syndrome release procedure. Patient states she has not had an EMG of the lower extremities performed. Review of Systems   Constitutional:  Negative for chills and fever. HENT:  Negative for hearing loss. Respiratory:  Negative for shortness of breath. Cardiovascular:  Negative for chest pain. Gastrointestinal:  Negative for nausea and vomiting. Genitourinary:  Negative for difficulty urinating. Musculoskeletal:  Positive for arthralgias, back pain and gait problem. Skin:  Negative for wound. Neurological:  Positive for numbness. Hematological:  Does not bruise/bleed easily. Psychiatric/Behavioral:  Negative for sleep disturbance. Allergies:  Patient has no known allergies. Current Outpatient Medications on File Prior to Visit   Medication Sig Dispense Refill    LORazepam (ATIVAN) 0.5 MG tablet Take 1 tablet by mouth every 8 hours as needed for Anxiety for up to 90 days.  60 tablet 2    etodolac (LODINE) 400 MG tablet Take 1 tablet by mouth

## 2023-08-15 DIAGNOSIS — F32.A ANXIETY AND DEPRESSION: ICD-10-CM

## 2023-08-15 DIAGNOSIS — F41.9 ANXIETY AND DEPRESSION: ICD-10-CM

## 2023-08-16 RX ORDER — LORAZEPAM 0.5 MG/1
TABLET ORAL
Qty: 60 TABLET | Refills: 0 | OUTPATIENT
Start: 2023-08-16

## 2023-08-24 DIAGNOSIS — F41.9 ANXIETY AND DEPRESSION: ICD-10-CM

## 2023-08-24 DIAGNOSIS — F32.A ANXIETY AND DEPRESSION: ICD-10-CM

## 2023-08-24 NOTE — TELEPHONE ENCOUNTER
Future Appointments    Encounter Information    Provider Department Appt Notes   10/12/2023 Vibha Frost MD [facility] Neurology 4 MOS FUP   11/7/2023 Sukh Ascencio MD [facility] Primary and Specialty Care 3 mo f/u   11/14/2023 Sharonda Nieto, [address] [place] [address] 3 months follow up     Past Visits    Date Provider Specialty Visit Type Primary Dx   08/08/2023 Sharonda Nieto DPM Podiatry Office Visit Pain in both feet   08/03/2023 Sukh Ascencio MD Family Medicine Office Visit MS (multiple sclerosis) [facility])

## 2023-08-25 RX ORDER — LORAZEPAM 0.5 MG/1
0.5 TABLET ORAL EVERY 8 HOURS PRN
Qty: 60 TABLET | Refills: 0 | Status: SHIPPED | OUTPATIENT
Start: 2023-08-25 | End: 2023-09-24

## 2023-08-26 DIAGNOSIS — F41.9 ANXIETY AND DEPRESSION: ICD-10-CM

## 2023-08-26 DIAGNOSIS — F32.A ANXIETY AND DEPRESSION: ICD-10-CM

## 2023-08-27 NOTE — TELEPHONE ENCOUNTER
Comments:     Last Office Visit (last PCP visit):   8/3/2023    Next Visit Date:  Future Appointments   Date Time Provider [address]   10/12/2023  4:00 PM MD Samantha Cloud Neurology -   11/7/2023 10:30 AM MD HAILEY CastilloOX Am[facility]   11/14/2023 11:00 AM NIMA Bergeron OB POD [facility] EMERGENCY [facility] AT [facility]       **If hasn't been seen in over a year OR hasn't followed up according to last diabetes/ADHD visit, make appointment for patient before sending refill to provider.     Rx requested:  Requested Prescriptions     Pending Prescriptions Disp Refills    baclofen (LIORESAL) 10 MG tablet [Pharmacy Med Name: BACLOFEN 10 MG TABLET] 360 tablet 3     Sig: TAKE 1 TABLET BY MOUTH 4 TIMES A DAY

## 2023-08-28 RX ORDER — BACLOFEN 10 MG/1
TABLET ORAL
Qty: 360 TABLET | Refills: 3 | Status: SHIPPED | OUTPATIENT
Start: 2023-08-28

## 2023-08-28 RX ORDER — LORAZEPAM 0.5 MG/1
TABLET ORAL
Qty: 60 TABLET | Refills: 0 | Status: SHIPPED | OUTPATIENT
Start: 2023-08-28 | End: 2023-09-27

## 2023-09-08 DIAGNOSIS — Z09 HOSPITAL DISCHARGE FOLLOW-UP: ICD-10-CM

## 2023-09-08 DIAGNOSIS — G35 MULTIPLE SCLEROSIS (HCC): ICD-10-CM

## 2023-09-08 DIAGNOSIS — G56.03 BILATERAL CARPAL TUNNEL SYNDROME: ICD-10-CM

## 2023-09-08 DIAGNOSIS — G62.9 NEUROPATHY: ICD-10-CM

## 2023-09-10 NOTE — TELEPHONE ENCOUNTER
Pharmacy requesting medication refill.  Please approve or deny this request.    Rx requested:  Requested Prescriptions     Pending Prescriptions Disp Refills    gabapentin (NEURONTIN) 600 MG tablet [Pharmacy Med Name: GABAPENTIN 600 MG TABS 600 Tablet] 90 tablet 10     Sig: TAKE 1 TABLET BY MOUTH 3 TIMES DAILY         Last Office Visit:   8/3/2023      Next Visit Date:  Future Appointments   Date Time Provider [facility] [address]   10/12/2023  4:00 PM Stephenie Iqbal MD [facility] Neurology -   11/7/2023 10:30 AM Doris Morton MD [facility] [address]   11/14/2023 11:00 AM Svetlana Cook DPM [facility] OB POD [place]

## 2023-09-11 RX ORDER — GABAPENTIN 600 MG/1
TABLET ORAL
Qty: 90 TABLET | Refills: 10 | Status: SHIPPED | OUTPATIENT
Start: 2023-09-11 | End: 2024-07-07

## 2023-09-22 DIAGNOSIS — G35 MULTIPLE SCLEROSIS (HCC): ICD-10-CM

## 2023-09-22 RX ORDER — TERIFLUNOMIDE 14 MG/1
TABLET, FILM COATED ORAL
Qty: 30 TABLET | Refills: 2 | Status: SHIPPED | OUTPATIENT
Start: 2023-09-22

## 2023-10-09 DIAGNOSIS — F41.9 ANXIETY AND DEPRESSION: ICD-10-CM

## 2023-10-09 DIAGNOSIS — F32.A ANXIETY AND DEPRESSION: ICD-10-CM

## 2023-10-10 RX ORDER — ETODOLAC 400 MG/1
400 TABLET, FILM COATED ORAL 2 TIMES DAILY
Qty: 60 TABLET | Refills: 0 | Status: SHIPPED | OUTPATIENT
Start: 2023-10-10

## 2023-10-10 RX ORDER — LORAZEPAM 0.5 MG/1
TABLET ORAL
Qty: 60 TABLET | Refills: 0 | Status: SHIPPED | OUTPATIENT
Start: 2023-10-10 | End: 2023-11-09

## 2023-10-10 NOTE — TELEPHONE ENCOUNTER
Future Appointments    Encounter Information    Provider Department Appt Notes   10/12/2023 Rodrigo Sanabria MD [facility] Neurology 4 MOS FUP   11/7/2023 Alok Woodall MD [facility] Primary and Specialty Care 3 mo f/u   11/14/2023 Lexis Peters DPM [address] [place] [address] Drive 3 months follow up     Past Visits    Date Provider Specialty Visit Type Primary Dx   08/08/2023 Lexis Peters DPM Podiatry Office Visit Pain in both feet   08/03/2023 Alok Woodall MD Family Medicine Office Visit MS (multiple sclerosis) [facility])

## 2023-10-12 ENCOUNTER — OFFICE VISIT (OUTPATIENT)
Dept: NEUROLOGY | Age: 43
End: 2023-10-12
Payer: COMMERCIAL

## 2023-10-12 VITALS
SYSTOLIC BLOOD PRESSURE: 118 MMHG | DIASTOLIC BLOOD PRESSURE: 68 MMHG | HEART RATE: 97 BPM | BODY MASS INDEX: 38.38 KG/M2 | WEIGHT: 190 LBS

## 2023-10-12 DIAGNOSIS — M21.371 BILATERAL FOOT-DROP: ICD-10-CM

## 2023-10-12 DIAGNOSIS — M21.372 BILATERAL FOOT-DROP: ICD-10-CM

## 2023-10-12 DIAGNOSIS — R39.15 URINARY URGENCY: ICD-10-CM

## 2023-10-12 DIAGNOSIS — R26.0 ATAXIC GAIT: ICD-10-CM

## 2023-10-12 DIAGNOSIS — G62.9 NEUROPATHY: ICD-10-CM

## 2023-10-12 DIAGNOSIS — R25.2 SPASTICITY: ICD-10-CM

## 2023-10-12 DIAGNOSIS — G35 MS (MULTIPLE SCLEROSIS) (HCC): Primary | ICD-10-CM

## 2023-10-12 PROCEDURE — G8417 CALC BMI ABV UP PARAM F/U: HCPCS | Performed by: PSYCHIATRY & NEUROLOGY

## 2023-10-12 PROCEDURE — G8484 FLU IMMUNIZE NO ADMIN: HCPCS | Performed by: PSYCHIATRY & NEUROLOGY

## 2023-10-12 PROCEDURE — 4004F PT TOBACCO SCREEN RCVD TLK: CPT | Performed by: PSYCHIATRY & NEUROLOGY

## 2023-10-12 PROCEDURE — 99214 OFFICE O/P EST MOD 30 MIN: CPT | Performed by: PSYCHIATRY & NEUROLOGY

## 2023-10-12 PROCEDURE — G8427 DOCREV CUR MEDS BY ELIG CLIN: HCPCS | Performed by: PSYCHIATRY & NEUROLOGY

## 2023-10-12 ASSESSMENT — ENCOUNTER SYMPTOMS
BACK PAIN: 0
PHOTOPHOBIA: 0
COLOR CHANGE: 0
TROUBLE SWALLOWING: 0
NAUSEA: 0
CHOKING: 0
VOMITING: 0
SHORTNESS OF BREATH: 0

## 2023-10-12 NOTE — PROGRESS NOTES
Subjective:      Patient ID: Alec Calvert is a 37 y.o. female who presents today for:  Chief Complaint   Patient presents with    Follow-up     Pt states that she is ready to start the briumvi. Pt states that she has not had any flare up lately. She is walking with a walker currently. HPI 37 yrs old right-handed female with a known history of multiple sclerosis. Patient continues to show overall worsening over time. No new lesions have been identified. Patient is JCV  antibody negative and her EDSS score increased while she continued on Aubagio. Recommended B-cell line drug such as Buimvi  Patient still on Aubagio and would like to discontinue once she starts the briumvi    Patient is got her foot braces bilaterally and has started to walk. She has not any falls. All her lipid test and treat done last time were all negative. Positive titers for zoster        Past Medical History:   Diagnosis Date    Carpal tunnel syndrome     Multiple sclerosis ([address] [facility])      Past Surgical History:   Procedure Laterality Date    APPENDECTOMY      BREAST SURGERY      lump removal bengin    CARPAL TUNNEL RELEASE Left 10/7/2022    LEFT CARPAL TUNNEL RELEASE (PAT [address]) performed by Sierra Young MD at [address] Right 12/2/2022    RIGHT CARPAL TUNNEL RELEASE performed by Sierra Young MD at [address]      x 3    TUBAL LIGATION       Social History     Socioeconomic History    Marital status:      Spouse name: Not on file    Number of children: Not on file    Years of education: Not on file    Highest education level: Not on file   Occupational History    Not on file   Tobacco Use    Smoking status: Every Day    Smokeless tobacco: Never   Vaping Use    Vaping Use: Never used   Substance and Sexual Activity    Alcohol use:  Yes     Alcohol/week: 0.0 standard drinks of alcohol    Drug use: Yes     Types: Marijuana Keira Callander)    Sexual activity: Not on

## 2023-10-20 ENCOUNTER — HOSPITAL ENCOUNTER (OUTPATIENT)
Dept: INFUSION THERAPY | Age: 43
Setting detail: INFUSION SERIES
Discharge: HOME | End: 2023-10-20

## 2023-10-20 DIAGNOSIS — G35 MS (MULTIPLE SCLEROSIS) (HCC): Primary | ICD-10-CM

## 2023-10-20 RX ORDER — ACETAMINOPHEN 325 MG/1
650 TABLET ORAL ONCE
Status: DISCONTINUED | OUTPATIENT
Start: 2023-10-20 | End: 2023-10-21 | Stop reason: HOSPADM

## 2023-10-20 RX ORDER — DIPHENHYDRAMINE HYDROCHLORIDE 50 MG/ML
25 INJECTION INTRAMUSCULAR; INTRAVENOUS ONCE
Status: DISCONTINUED | OUTPATIENT
Start: 2023-10-20 | End: 2023-10-21 | Stop reason: HOSPADM

## 2023-10-20 NOTE — PROGRESS NOTES
Pt to [facility] ambulatory with w/walker, accompanied by spouse Terri Mane, for first dose of Briumvi. Pt states c/o pain in legs/arms, a little wobbly and twitchy. Call light in reach.

## 2023-10-23 RX ORDER — ACETAMINOPHEN 325 MG/1
650 TABLET ORAL ONCE
Status: CANCELLED | OUTPATIENT
Start: 2023-10-24

## 2023-10-23 RX ORDER — DIPHENHYDRAMINE HYDROCHLORIDE 50 MG/ML
25 INJECTION INTRAMUSCULAR; INTRAVENOUS ONCE
Start: 2023-11-07 | End: 2023-11-07

## 2023-10-23 RX ORDER — ACETAMINOPHEN 325 MG/1
650 TABLET ORAL ONCE
Start: 2023-11-07 | End: 2023-11-07

## 2023-10-23 RX ORDER — ACETAMINOPHEN 325 MG/1
650 TABLET ORAL ONCE
Start: 2024-04-09 | End: 2024-04-09

## 2023-10-23 RX ORDER — DIPHENHYDRAMINE HYDROCHLORIDE 50 MG/ML
25 INJECTION INTRAMUSCULAR; INTRAVENOUS ONCE
Start: 2024-04-09 | End: 2024-04-09

## 2023-10-23 RX ORDER — DIPHENHYDRAMINE HCL 25 MG
25 CAPSULE ORAL ONCE
Status: CANCELLED
Start: 2023-10-24 | End: 2023-10-24

## 2023-10-24 ENCOUNTER — HOSPITAL ENCOUNTER (OUTPATIENT)
Dept: INFUSION THERAPY | Age: 43
Setting detail: INFUSION SERIES
Discharge: HOME | End: 2023-10-24
Payer: COMMERCIAL

## 2023-10-24 VITALS
DIASTOLIC BLOOD PRESSURE: 77 MMHG | TEMPERATURE: 99.4 F | SYSTOLIC BLOOD PRESSURE: 151 MMHG | RESPIRATION RATE: 18 BRPM | HEART RATE: 102 BPM

## 2023-10-24 DIAGNOSIS — G35 MS (MULTIPLE SCLEROSIS) (HCC): Primary | ICD-10-CM

## 2023-10-24 PROCEDURE — 6360000002 HC RX W HCPCS: Performed by: PSYCHIATRY & NEUROLOGY

## 2023-10-24 PROCEDURE — 96413 CHEMO IV INFUSION 1 HR: CPT

## 2023-10-24 PROCEDURE — 96375 TX/PRO/DX INJ NEW DRUG ADDON: CPT

## 2023-10-24 PROCEDURE — 96415 CHEMO IV INFUSION ADDL HR: CPT

## 2023-10-24 PROCEDURE — 2580000003 HC RX 258: Performed by: PSYCHIATRY & NEUROLOGY

## 2023-10-24 PROCEDURE — 6370000000 HC RX 637 (ALT 250 FOR IP): Performed by: PSYCHIATRY & NEUROLOGY

## 2023-10-24 RX ORDER — ACETAMINOPHEN 325 MG/1
650 TABLET ORAL ONCE
Status: COMPLETED | OUTPATIENT
Start: 2023-10-24 | End: 2023-10-24

## 2023-10-24 RX ORDER — DIPHENHYDRAMINE HCL 25 MG
25 CAPSULE ORAL ONCE
Status: COMPLETED | OUTPATIENT
Start: 2023-10-24 | End: 2023-10-24

## 2023-10-24 RX ADMIN — DIPHENHYDRAMINE HCL 25 MG: 25 TABLET ORAL at 09:52

## 2023-10-24 RX ADMIN — ACETAMINOPHEN 650 MG: 325 TABLET ORAL at 09:52

## 2023-10-24 RX ADMIN — UBLITUXIMAB 150 MG: 150 INJECTION, SOLUTION, CONCENTRATE INTRAVENOUS at 10:37

## 2023-10-24 RX ADMIN — METHYLPREDNISOLONE SODIUM SUCCINATE 125 MG: 125 INJECTION, POWDER, FOR SOLUTION INTRAMUSCULAR; INTRAVENOUS at 09:55

## 2023-10-24 NOTE — PROGRESS NOTES
No signs and or symptoms of reaction observed during 50 minute observation. .pt made next appot for 2 weeks. AVS given.

## 2023-10-24 NOTE — PROCEDURES
Pt to [facility] ambulatory with m_in_law, for first day Briumvi. Pt states no c/o. Pt states all info the same as last Friday. Premeds admin. Observation showed no sxs reactions. Briumvi info given to pt. Pt asked about difference with Ocrevus. Info given on that, also. Briumvi initiated at 10 ml /hr. Call light in reach, mother in law at chairside.

## 2023-10-25 NOTE — PROGRESS NOTES
Late entry. This nurse instructed pt to check temp later. Pt stated thought It might be coffee first time, but second, I am unsure.

## 2023-11-07 ENCOUNTER — HOSPITAL ENCOUNTER (OUTPATIENT)
Dept: INFUSION THERAPY | Age: 43
Setting detail: INFUSION SERIES
Discharge: HOME | End: 2023-11-07
Payer: COMMERCIAL

## 2023-11-07 VITALS
DIASTOLIC BLOOD PRESSURE: 64 MMHG | HEART RATE: 72 BPM | SYSTOLIC BLOOD PRESSURE: 116 MMHG | TEMPERATURE: 98.4 F | RESPIRATION RATE: 18 BRPM

## 2023-11-07 DIAGNOSIS — F41.9 ANXIETY AND DEPRESSION: ICD-10-CM

## 2023-11-07 DIAGNOSIS — G35 MS (MULTIPLE SCLEROSIS) (HCC): Primary | ICD-10-CM

## 2023-11-07 DIAGNOSIS — F32.A ANXIETY AND DEPRESSION: ICD-10-CM

## 2023-11-07 PROCEDURE — 2580000003 HC RX 258: Performed by: PSYCHIATRY & NEUROLOGY

## 2023-11-07 PROCEDURE — 96413 CHEMO IV INFUSION 1 HR: CPT

## 2023-11-07 PROCEDURE — 96375 TX/PRO/DX INJ NEW DRUG ADDON: CPT

## 2023-11-07 PROCEDURE — 6360000002 HC RX W HCPCS: Performed by: PSYCHIATRY & NEUROLOGY

## 2023-11-07 PROCEDURE — 6370000000 HC RX 637 (ALT 250 FOR IP): Performed by: PSYCHIATRY & NEUROLOGY

## 2023-11-07 RX ORDER — DIPHENHYDRAMINE HYDROCHLORIDE 50 MG/ML
25 INJECTION INTRAMUSCULAR; INTRAVENOUS ONCE
Status: DISCONTINUED | OUTPATIENT
Start: 2023-11-07 | End: 2023-11-07

## 2023-11-07 RX ORDER — ACETAMINOPHEN 325 MG/1
650 TABLET ORAL ONCE
Status: COMPLETED | OUTPATIENT
Start: 2023-11-07 | End: 2023-11-07

## 2023-11-07 RX ORDER — DIPHENHYDRAMINE HCL 25 MG
25 TABLET ORAL ONCE
Status: COMPLETED | OUTPATIENT
Start: 2023-11-07 | End: 2023-11-07

## 2023-11-07 RX ADMIN — UBLITUXIMAB 450 MG: 150 INJECTION, SOLUTION, CONCENTRATE INTRAVENOUS at 11:47

## 2023-11-07 RX ADMIN — ACETAMINOPHEN 650 MG: 325 TABLET ORAL at 10:53

## 2023-11-07 RX ADMIN — METHYLPREDNISOLONE SODIUM SUCCINATE 125 MG: 125 INJECTION, POWDER, FOR SOLUTION INTRAMUSCULAR; INTRAVENOUS at 11:10

## 2023-11-07 RX ADMIN — DIPHENHYDRAMINE HCL 25 MG: 25 TABLET ORAL at 11:09

## 2023-11-07 NOTE — PROGRESS NOTES
Pt to [facility] ambulatory with w/walker. Pt states no c/o of sxs from last infusion. Pt states no change in any information since last infusion. Call light in reach.

## 2023-11-07 NOTE — PROGRESS NOTES
No sxs reactions during observation period. Pt states no c/o. AVS given. Pt left walking with w/walker.

## 2023-11-07 NOTE — PROGRESS NOTES
Infusion completed. Pt tolerated well. Observation begins for hour. Pt made aware of sxs reactions. Call light in reach.

## 2023-11-08 RX ORDER — CHOLECALCIFEROL (VITAMIN D3) 50 MCG
CAPSULE ORAL
Qty: 30 CAPSULE | Refills: 10 | Status: SHIPPED | OUTPATIENT
Start: 2023-11-08

## 2023-11-08 RX ORDER — LORAZEPAM 0.5 MG/1
TABLET ORAL
Qty: 60 TABLET | Refills: 0 | Status: SHIPPED | OUTPATIENT
Start: 2023-11-08 | End: 2023-12-08

## 2023-11-08 RX ORDER — ETODOLAC 400 MG/1
400 TABLET, FILM COATED ORAL 2 TIMES DAILY
Qty: 60 TABLET | Refills: 10 | Status: SHIPPED | OUTPATIENT
Start: 2023-11-08

## 2023-11-14 ENCOUNTER — OFFICE VISIT (OUTPATIENT)
Dept: PODIATRY | Age: 43
End: 2023-11-14
Payer: COMMERCIAL

## 2023-11-14 VITALS — WEIGHT: 190 LBS | TEMPERATURE: 97.1 F | HEIGHT: 60 IN | BODY MASS INDEX: 37.3 KG/M2

## 2023-11-14 DIAGNOSIS — M79.672 PAIN IN BOTH FEET: Primary | ICD-10-CM

## 2023-11-14 DIAGNOSIS — M72.2 PLANTAR FASCIITIS: ICD-10-CM

## 2023-11-14 DIAGNOSIS — M20.5X1 ACQUIRED HALLUX LIMITUS OF BOTH FEET: ICD-10-CM

## 2023-11-14 DIAGNOSIS — G35 MS (MULTIPLE SCLEROSIS) (HCC): ICD-10-CM

## 2023-11-14 DIAGNOSIS — M21.371 ACQUIRED BILATERAL FOOT DROP: ICD-10-CM

## 2023-11-14 DIAGNOSIS — M79.671 PAIN IN BOTH FEET: Primary | ICD-10-CM

## 2023-11-14 DIAGNOSIS — M21.372 ACQUIRED BILATERAL FOOT DROP: ICD-10-CM

## 2023-11-14 DIAGNOSIS — M20.5X2 ACQUIRED HALLUX LIMITUS OF BOTH FEET: ICD-10-CM

## 2023-11-14 DIAGNOSIS — G57.93 PERIPHERAL NEURITIS OF BOTH FEET: ICD-10-CM

## 2023-11-14 PROCEDURE — G8427 DOCREV CUR MEDS BY ELIG CLIN: HCPCS | Performed by: PODIATRIST

## 2023-11-14 PROCEDURE — 99213 OFFICE O/P EST LOW 20 MIN: CPT | Performed by: PODIATRIST

## 2023-11-14 PROCEDURE — 4004F PT TOBACCO SCREEN RCVD TLK: CPT | Performed by: PODIATRIST

## 2023-11-14 PROCEDURE — G8417 CALC BMI ABV UP PARAM F/U: HCPCS | Performed by: PODIATRIST

## 2023-11-14 PROCEDURE — G8484 FLU IMMUNIZE NO ADMIN: HCPCS | Performed by: PODIATRIST

## 2023-11-14 RX ORDER — ETODOLAC 400 MG/1
400 TABLET, FILM COATED ORAL 2 TIMES DAILY
Qty: 60 TABLET | Refills: 10 | OUTPATIENT
Start: 2023-11-14

## 2023-11-14 ASSESSMENT — ENCOUNTER SYMPTOMS
BACK PAIN: 1
VOMITING: 0
SHORTNESS OF BREATH: 0
NAUSEA: 0

## 2023-11-14 NOTE — PROGRESS NOTES
0 on the right side and 0 on the left side. Comments: No ankle clonus noted bilaterally. Protective sensations absent bilateral plantar foot. Vibratory sensation is diminished bilaterally. Sharp versus dull discrimination is intact. Radiating pain elicited with percussion of the tibial nerve at the bilateral medial foot. There is radiating pain elicited with percussion of the deep peroneal nerve at the dorsum of the left foot at the level of the tarsometatarsal joints. Psychiatric:         Mood and Affect: Mood normal.         Behavior: Behavior normal.         Assessment:      Diagnosis Orders   1. Pain in both feet        2. Acquired bilateral foot drop        3. Plantar fasciitis        4. Acquired hallux limitus of both feet        5. MS (multiple sclerosis) ([address])        6. Peripheral neuritis of both feet            Plan:     I have recommended continued use of the ankle-foot orthotic appliances since there has been such marked improvement in function and balance over the relatively short time of their use. Continue with all other concomitant conservative care rendered including stretching, icing, and use of supportive shoes. Return to clinic sooner should other concerns arise. Follow up:  Return in about 6 months (around 5/14/2024). Aline Aburto DPM    Level of medical decision making: low. Please note that this report has been partially produced using speech recognition software which may cause errors including grammar, punctuation, and spelling or words and phrases that may seem inappropriate. If there are questions or concerns please feel free to contact me for clarification.

## 2023-11-22 RX ORDER — ETODOLAC 400 MG/1
400 TABLET, FILM COATED ORAL 2 TIMES DAILY
Qty: 180 TABLET | Refills: 1 | Status: SHIPPED | OUTPATIENT
Start: 2023-11-22

## 2023-11-22 NOTE — TELEPHONE ENCOUNTER
Future Appointments    Encounter Information    Provider Department Appt Notes   11/29/2023 [facility] [place] [facility] Appt Reason: Routine Existing Condition Follow Up   RESCHEDULED 3 MONTH F/U VISIT   Booking Code: [id number]   2/15/2024 Pamella Mcclellan MD [facility] Neurology 4 MOS FUP   4/24/2024 [facility] INFUSION CHAIR 2 [facility] OP INFUSION Epic/pt Ublituximab injection   5/14/2024 Jasmine Reusing, DPM [facility] PODIATRY 6 MONTH FOLLOW UP     Past Visits    Date Provider Specialty Visit Type Primary Dx   11/14/2023 Jasmine Reusing, DPM Podiatry Office Visit Pain in both feet   10/12/2023 Pamella Mcclellan MD Neurology Office Visit MS (multiple sclerosis) [facility])   08/08/2023 Jasmine Reusing, DPM Podiatry Office Visit Pain in both feet   08/03/2023 Ibis Guajardo MD Family Medicine Office Visit

## 2023-11-24 RX ORDER — ETODOLAC 400 MG/1
400 TABLET, FILM COATED ORAL 2 TIMES DAILY
Qty: 60 TABLET | Refills: 10 | OUTPATIENT
Start: 2023-11-24

## 2023-11-29 ENCOUNTER — OFFICE VISIT (OUTPATIENT)
Dept: FAMILY MEDICINE CLINIC | Age: 43
End: 2023-11-29
Payer: COMMERCIAL

## 2023-11-29 VITALS
DIASTOLIC BLOOD PRESSURE: 60 MMHG | HEART RATE: 113 BPM | OXYGEN SATURATION: 98 % | SYSTOLIC BLOOD PRESSURE: 118 MMHG | RESPIRATION RATE: 17 BRPM

## 2023-11-29 DIAGNOSIS — F41.9 ANXIETY AND DEPRESSION: Primary | ICD-10-CM

## 2023-11-29 DIAGNOSIS — F32.A ANXIETY AND DEPRESSION: Primary | ICD-10-CM

## 2023-11-29 DIAGNOSIS — G35 MULTIPLE SCLEROSIS (HCC): ICD-10-CM

## 2023-11-29 PROCEDURE — G8417 CALC BMI ABV UP PARAM F/U: HCPCS | Performed by: FAMILY MEDICINE

## 2023-11-29 PROCEDURE — 99213 OFFICE O/P EST LOW 20 MIN: CPT | Performed by: FAMILY MEDICINE

## 2023-11-29 PROCEDURE — G8427 DOCREV CUR MEDS BY ELIG CLIN: HCPCS | Performed by: FAMILY MEDICINE

## 2023-11-29 PROCEDURE — 4004F PT TOBACCO SCREEN RCVD TLK: CPT | Performed by: FAMILY MEDICINE

## 2023-11-29 PROCEDURE — G8484 FLU IMMUNIZE NO ADMIN: HCPCS | Performed by: FAMILY MEDICINE

## 2023-11-29 ASSESSMENT — ENCOUNTER SYMPTOMS
EYES NEGATIVE: 1
COUGH: 0
RESPIRATORY NEGATIVE: 1
CHEST TIGHTNESS: 0
RHINORRHEA: 0
GASTROINTESTINAL NEGATIVE: 1

## 2023-11-29 NOTE — PROGRESS NOTES
Patient is seen in follow up for   Chief Complaint   Patient presents with    Multiple Sclerosis    Anxiety     Ativan//uds and contract done 5/5/23     Anxiety  Patient reports no dizziness. here for follow up doing well. She has afo braces which are helping.   Past Medical History:   Diagnosis Date    Carpal tunnel syndrome     Multiple sclerosis [facility])      Patient Active Problem List    Diagnosis Date Noted    Urinary urgency 01/19/2023    Bilateral carpal tunnel syndrome 10/21/2022    Left carpal tunnel syndrome 10/07/2022    Spasticity 09/07/2022    Bilateral foot-drop 10/12/2023    Bite, animal 06/03/2020    Left foot pain 06/03/2020    Deficient knowledge 06/03/2020    Dog bite of wrist 06/03/2020    Impaired mobility 06/03/2020    Myalgia 06/03/2020    Hypokalemia 10/02/2018    Nicotine dependence, unspecified, uncomplicated 08/11/6564    Overweight 10/02/2018    Ataxic gait 09/21/2018    Body mass index (bmi) 39.0-39.9, adult 09/21/2018    Muscle weakness (generalized) 09/21/2018    Neuromuscular dysfunction of bladder, unspecified 09/21/2018    Obesity, unspecified 09/21/2018    Paresthesia of skin 09/21/2018    Vitamin D deficiency 09/21/2018    Neuropathy 12/30/2016    Anxiety and depression 12/30/2016    MS (multiple sclerosis) ([address] 07/06/2015     Past Surgical History:   Procedure Laterality Date    APPENDECTOMY      BREAST SURGERY      lump removal bengin    CARPAL TUNNEL RELEASE Left 10/7/2022    LEFT CARPAL TUNNEL RELEASE (PAT [address]) performed by Lem Scheuermann, MD [address] Right 12/2/2022    RIGHT CARPAL TUNNEL RELEASE performed by Lem Scheuermann, MD at [address]      x 3    TUBAL LIGATION       Family History   Problem Relation Age of Onset    Diabetes Mother     Heart Disease Father      Social History     Socioeconomic History    Marital status:      Spouse name: None    Number of children: None    Years of

## 2023-12-08 DIAGNOSIS — F41.9 ANXIETY AND DEPRESSION: ICD-10-CM

## 2023-12-08 DIAGNOSIS — F32.A ANXIETY AND DEPRESSION: ICD-10-CM

## 2023-12-11 RX ORDER — LORAZEPAM 0.5 MG/1
TABLET ORAL
Qty: 60 TABLET | Refills: 0 | Status: SHIPPED | OUTPATIENT
Start: 2023-12-11 | End: 2024-01-10

## 2024-01-09 DIAGNOSIS — F41.9 ANXIETY AND DEPRESSION: ICD-10-CM

## 2024-01-09 DIAGNOSIS — F32.A ANXIETY AND DEPRESSION: ICD-10-CM

## 2024-01-10 RX ORDER — LORAZEPAM 0.5 MG/1
TABLET ORAL
Qty: 60 TABLET | Refills: 0 | Status: SHIPPED | OUTPATIENT
Start: 2024-01-10 | End: 2024-02-09

## 2024-02-07 DIAGNOSIS — F41.9 ANXIETY AND DEPRESSION: ICD-10-CM

## 2024-02-07 DIAGNOSIS — F32.A ANXIETY AND DEPRESSION: ICD-10-CM

## 2024-02-08 RX ORDER — LORAZEPAM 0.5 MG/1
TABLET ORAL
Qty: 60 TABLET | Refills: 0 | Status: SHIPPED | OUTPATIENT
Start: 2024-02-08 | End: 2024-03-09

## 2024-02-15 ENCOUNTER — OFFICE VISIT (OUTPATIENT)
Dept: NEUROLOGY | Age: 44
End: 2024-02-15
Payer: COMMERCIAL

## 2024-02-15 VITALS
HEART RATE: 92 BPM | SYSTOLIC BLOOD PRESSURE: 102 MMHG | BODY MASS INDEX: 39.28 KG/M2 | WEIGHT: 197.8 LBS | DIASTOLIC BLOOD PRESSURE: 74 MMHG

## 2024-02-15 DIAGNOSIS — G62.9 NEUROPATHY: ICD-10-CM

## 2024-02-15 DIAGNOSIS — R25.2 SPASTICITY: ICD-10-CM

## 2024-02-15 DIAGNOSIS — G35 MS (MULTIPLE SCLEROSIS) (HCC): Primary | ICD-10-CM

## 2024-02-15 DIAGNOSIS — R39.15 URINARY URGENCY: ICD-10-CM

## 2024-02-15 DIAGNOSIS — M21.372 BILATERAL FOOT-DROP: ICD-10-CM

## 2024-02-15 DIAGNOSIS — R26.0 ATAXIC GAIT: ICD-10-CM

## 2024-02-15 DIAGNOSIS — M21.371 BILATERAL FOOT-DROP: ICD-10-CM

## 2024-02-15 PROCEDURE — G8427 DOCREV CUR MEDS BY ELIG CLIN: HCPCS | Performed by: PSYCHIATRY & NEUROLOGY

## 2024-02-15 PROCEDURE — 4004F PT TOBACCO SCREEN RCVD TLK: CPT | Performed by: PSYCHIATRY & NEUROLOGY

## 2024-02-15 PROCEDURE — G8484 FLU IMMUNIZE NO ADMIN: HCPCS | Performed by: PSYCHIATRY & NEUROLOGY

## 2024-02-15 PROCEDURE — G8417 CALC BMI ABV UP PARAM F/U: HCPCS | Performed by: PSYCHIATRY & NEUROLOGY

## 2024-02-15 PROCEDURE — 99214 OFFICE O/P EST MOD 30 MIN: CPT | Performed by: PSYCHIATRY & NEUROLOGY

## 2024-02-15 NOTE — PROGRESS NOTES
effort is normal.      Breath sounds: Normal breath sounds.   Abdominal:      General: Bowel sounds are normal.   Musculoskeletal:         General: Normal range of motion.      Cervical back: Normal range of motion.   Skin:     General: Skin is warm.   Neurological:      Mental Status: She is alert and oriented to person, place, and time.      Cranial Nerves: No cranial nerve deficit.      Sensory: No sensory deficit.      Motor: No abnormal muscle tone.      Coordination: Coordination normal.      Deep Tendon Reflexes: Reflexes are normal and symmetric. Babinski sign absent on the right side. Babinski sign absent on the left side.   Psychiatric:         Mood and Affect: Mood normal.     Lower extremity weakness bilateral foot drop with hyperreflexia.  She has spasticity in the lower extremities.    No results found.    Lab Results   Component Value Date/Time    WBC 11.7 07/03/2023 10:53 AM    RBC 4.52 07/03/2023 10:53 AM    RBC 4.56 10/03/2018 05:36 AM    HGB 14.9 07/03/2023 10:53 AM    HCT 44.2 07/03/2023 10:53 AM    MCV 97.7 07/03/2023 10:53 AM    MCH 33.0 07/03/2023 10:53 AM    MCHC 33.8 07/03/2023 10:53 AM    RDW 14.2 07/03/2023 10:53 AM     07/03/2023 10:53 AM    MPV 9.8 10/03/2018 05:36 AM     Lab Results   Component Value Date/Time     01/14/2023 11:06 PM    K 3.8 01/14/2023 11:06 PM    K 4.8 06/22/2022 05:00 AM     01/14/2023 11:06 PM    CO2 23 01/14/2023 11:06 PM    BUN 7 01/14/2023 11:06 PM    CREATININE 0.75 01/14/2023 11:06 PM    GFRAA >60.0 06/22/2022 05:00 AM    AGRATIO 1.4 10/03/2018 05:36 AM    LABGLOM >60.0 01/14/2023 11:06 PM    GLUCOSE 103 01/14/2023 11:06 PM    GLUCOSE 181 10/03/2018 05:36 AM    PROT 6.3 07/03/2023 10:53 AM    LABALBU 3.8 07/03/2023 10:53 AM    LABALBU 3.7 10/03/2018 05:36 AM    CALCIUM 9.2 01/14/2023 11:06 PM    BILITOT <0.2 07/03/2023 10:53 AM    ALKPHOS 76 07/03/2023 10:53 AM    AST 11 07/03/2023 10:53 AM    ALT <5 07/03/2023 10:53 AM     Lab Results

## 2024-02-29 ENCOUNTER — OFFICE VISIT (OUTPATIENT)
Dept: FAMILY MEDICINE CLINIC | Age: 44
End: 2024-02-29

## 2024-02-29 VITALS
HEART RATE: 81 BPM | WEIGHT: 190 LBS | RESPIRATION RATE: 17 BRPM | OXYGEN SATURATION: 98 % | SYSTOLIC BLOOD PRESSURE: 114 MMHG | BODY MASS INDEX: 37.73 KG/M2 | DIASTOLIC BLOOD PRESSURE: 76 MMHG

## 2024-02-29 DIAGNOSIS — R26.0 ATAXIC GAIT: ICD-10-CM

## 2024-02-29 DIAGNOSIS — F32.A ANXIETY AND DEPRESSION: ICD-10-CM

## 2024-02-29 DIAGNOSIS — F41.9 ANXIETY AND DEPRESSION: ICD-10-CM

## 2024-02-29 DIAGNOSIS — G35 MULTIPLE SCLEROSIS (HCC): Primary | ICD-10-CM

## 2024-02-29 SDOH — ECONOMIC STABILITY: INCOME INSECURITY: HOW HARD IS IT FOR YOU TO PAY FOR THE VERY BASICS LIKE FOOD, HOUSING, MEDICAL CARE, AND HEATING?: NOT HARD AT ALL

## 2024-02-29 SDOH — ECONOMIC STABILITY: FOOD INSECURITY: WITHIN THE PAST 12 MONTHS, THE FOOD YOU BOUGHT JUST DIDN'T LAST AND YOU DIDN'T HAVE MONEY TO GET MORE.: NEVER TRUE

## 2024-02-29 SDOH — ECONOMIC STABILITY: FOOD INSECURITY: WITHIN THE PAST 12 MONTHS, YOU WORRIED THAT YOUR FOOD WOULD RUN OUT BEFORE YOU GOT MONEY TO BUY MORE.: NEVER TRUE

## 2024-02-29 ASSESSMENT — PATIENT HEALTH QUESTIONNAIRE - PHQ9
7. TROUBLE CONCENTRATING ON THINGS, SUCH AS READING THE NEWSPAPER OR WATCHING TELEVISION: 0
2. FEELING DOWN, DEPRESSED OR HOPELESS: 0
SUM OF ALL RESPONSES TO PHQ QUESTIONS 1-9: 0
8. MOVING OR SPEAKING SO SLOWLY THAT OTHER PEOPLE COULD HAVE NOTICED. OR THE OPPOSITE, BEING SO FIGETY OR RESTLESS THAT YOU HAVE BEEN MOVING AROUND A LOT MORE THAN USUAL: 0
1. LITTLE INTEREST OR PLEASURE IN DOING THINGS: 0
SUM OF ALL RESPONSES TO PHQ QUESTIONS 1-9: 0
SUM OF ALL RESPONSES TO PHQ QUESTIONS 1-9: 0
6. FEELING BAD ABOUT YOURSELF - OR THAT YOU ARE A FAILURE OR HAVE LET YOURSELF OR YOUR FAMILY DOWN: 0
5. POOR APPETITE OR OVEREATING: 0
4. FEELING TIRED OR HAVING LITTLE ENERGY: 0
9. THOUGHTS THAT YOU WOULD BE BETTER OFF DEAD, OR OF HURTING YOURSELF: 0
SUM OF ALL RESPONSES TO PHQ9 QUESTIONS 1 & 2: 0
SUM OF ALL RESPONSES TO PHQ QUESTIONS 1-9: 0
3. TROUBLE FALLING OR STAYING ASLEEP: 0

## 2024-02-29 ASSESSMENT — ENCOUNTER SYMPTOMS
CHEST TIGHTNESS: 0
COUGH: 0
GASTROINTESTINAL NEGATIVE: 1
EYES NEGATIVE: 1
RESPIRATORY NEGATIVE: 1
RHINORRHEA: 0

## 2024-02-29 NOTE — PROGRESS NOTES
Patient is seen in follow up for   Chief Complaint   Patient presents with    3 Month Follow-Up     Uds and contract done 23     [facility] for follow up doing well.    Past Medical History:   Diagnosis Date    Carpal tunnel syndrome     Multiple sclerosis (HCC)      Patient Active Problem List    Diagnosis Date Noted    Urinary urgency 2023    Bilateral carpal tunnel syndrome 10/21/2022    Left carpal tunnel syndrome 10/07/2022    Spasticity 2022    Bilateral foot-drop 10/12/2023    Bite, animal 2020    Left foot pain 2020    Deficient knowledge 2020    Dog bite of wrist 2020    Impaired mobility 2020    Myalgia 2020    Hypokalemia 10/02/2018    Nicotine dependence, unspecified, uncomplicated 10/02/2018    Overweight 10/02/2018    Ataxic gait 2018    Body mass index (bmi) 39.0-39.9, adult 2018    Muscle weakness (generalized) 2018    Neuromuscular dysfunction of bladder, unspecified 2018    Obesity, unspecified 2018    Paresthesia of skin 2018    Vitamin D deficiency 2018    Neuropathy 2016    Anxiety and depression 2016    MS (multiple sclerosis) ([facility]) 2015     Past Surgical History:   Procedure Laterality Date    APPENDECTOMY      BREAST SURGERY      lump removal bengin    CARPAL TUNNEL RELEASE Left 10/7/2022    LEFT CARPAL TUNNEL RELEASE ([facility]) performed by Vahe Dumont MD at [facility] OR    CARPAL TUNNEL RELEASE Right 2022    RIGHT CARPAL TUNNEL RELEASE performed by Vahe Dumont MD at [facility] OR     SECTION      x 3    TUBAL LIGATION       Family History   Problem Relation Age of Onset    Diabetes Mother     Heart Disease Father      Social History     Socioeconomic History    Marital status:      Spouse name: None    Number of children: None    Years of education: None    Highest education level: None   Tobacco Use    Smoking status: Every Day

## 2024-03-07 DIAGNOSIS — F41.9 ANXIETY AND DEPRESSION: ICD-10-CM

## 2024-03-07 DIAGNOSIS — F32.A ANXIETY AND DEPRESSION: ICD-10-CM

## 2024-03-08 RX ORDER — LORAZEPAM 0.5 MG/1
TABLET ORAL
Qty: 60 TABLET | Refills: 0 | Status: SHIPPED | OUTPATIENT
Start: 2024-03-08 | End: 2024-04-07

## 2024-04-05 DIAGNOSIS — F32.A ANXIETY AND DEPRESSION: ICD-10-CM

## 2024-04-05 DIAGNOSIS — F41.9 ANXIETY AND DEPRESSION: ICD-10-CM

## 2024-04-07 NOTE — TELEPHONE ENCOUNTER
Future Appointments    Encounter Information   Provider Department Appt Notes   4/24/2024 M[facility] INFUSION CHAIR 2 M[facility] OP INFUSION Epic/pt Ublituximab injection   5/14/2024 Jaiden Chaparro, DPM [facility] PODIATRY 6 MONTH FOLLOW UP   5/29/2024 Darian Michelle MD [facility] Primary and Specialty Care 3 mo f/u   6/12/2024 Adilson Valderrama MD [facility] Neurology 4 mo     Past Visits    Date Provider Specialty Visit Type Primary Dx   02/29/2024 Darian Michelle MD Family Medicine Office Visit Multiple sclerosis (HCC)

## 2024-04-08 RX ORDER — LORAZEPAM 0.5 MG/1
TABLET ORAL
Qty: 60 TABLET | Refills: 0 | Status: SHIPPED | OUTPATIENT
Start: 2024-04-08 | End: 2024-05-08

## 2024-04-24 ENCOUNTER — HOSPITAL ENCOUNTER (OUTPATIENT)
Dept: INFUSION THERAPY | Age: 44
Setting detail: INFUSION SERIES
Discharge: HOME | End: 2024-04-24
Payer: COMMERCIAL

## 2024-04-24 VITALS
TEMPERATURE: 98.6 F | RESPIRATION RATE: 18 BRPM | HEART RATE: 89 BPM | DIASTOLIC BLOOD PRESSURE: 66 MMHG | SYSTOLIC BLOOD PRESSURE: 115 MMHG | OXYGEN SATURATION: 98 %

## 2024-04-24 DIAGNOSIS — G35 MS (MULTIPLE SCLEROSIS) (HCC): Primary | ICD-10-CM

## 2024-04-24 PROCEDURE — 96413 CHEMO IV INFUSION 1 HR: CPT

## 2024-04-24 PROCEDURE — 2580000003 HC RX 258: Performed by: PSYCHIATRY & NEUROLOGY

## 2024-04-24 PROCEDURE — 2500000003 HC RX 250 WO HCPCS: Performed by: PSYCHIATRY & NEUROLOGY

## 2024-04-24 PROCEDURE — 6360000002 HC RX W HCPCS: Performed by: PSYCHIATRY & NEUROLOGY

## 2024-04-24 PROCEDURE — 96375 TX/PRO/DX INJ NEW DRUG ADDON: CPT

## 2024-04-24 PROCEDURE — 6370000000 HC RX 637 (ALT 250 FOR IP): Performed by: PSYCHIATRY & NEUROLOGY

## 2024-04-24 RX ORDER — DIPHENHYDRAMINE HYDROCHLORIDE 50 MG/ML
25 INJECTION INTRAMUSCULAR; INTRAVENOUS ONCE
Status: COMPLETED | OUTPATIENT
Start: 2024-04-24 | End: 2024-04-24

## 2024-04-24 RX ORDER — ACETAMINOPHEN 325 MG/1
650 TABLET ORAL ONCE
Status: COMPLETED | OUTPATIENT
Start: 2024-04-24 | End: 2024-04-24

## 2024-04-24 RX ADMIN — ACETAMINOPHEN 650 MG: 325 TABLET ORAL at 09:52

## 2024-04-24 RX ADMIN — UBLITUXIMAB 450 MG: 150 INJECTION, SOLUTION, CONCENTRATE INTRAVENOUS at 10:24

## 2024-04-24 RX ADMIN — METHYLPREDNISOLONE SODIUM SUCCINATE 125 MG: 125 INJECTION INTRAMUSCULAR; INTRAVENOUS at 10:03

## 2024-04-24 RX ADMIN — DIPHENHYDRAMINE HYDROCHLORIDE 25 MG: 50 INJECTION INTRAMUSCULAR; INTRAVENOUS at 09:52

## 2024-04-24 NOTE — FLOWSHEET NOTE
Infusion complete.  Patient tolerated well.  Patient left unit via wheelchair.  No distress noted.  All equipment used in the care for this patient has been cleaned.

## 2024-04-24 NOTE — FLOWSHEET NOTE
Patient arrived to unit via wheelchair for infusion.  Vital signs obtained.  No distress noted.  Pre medication given.  Patient tolerated well.

## 2024-05-06 DIAGNOSIS — F32.A ANXIETY AND DEPRESSION: ICD-10-CM

## 2024-05-06 DIAGNOSIS — F41.9 ANXIETY AND DEPRESSION: ICD-10-CM

## 2024-05-07 RX ORDER — ESCITALOPRAM OXALATE 20 MG/1
TABLET ORAL
Qty: 90 TABLET | Refills: 0 | Status: SHIPPED | OUTPATIENT
Start: 2024-05-07

## 2024-05-07 RX ORDER — LORAZEPAM 0.5 MG/1
TABLET ORAL
Qty: 60 TABLET | Refills: 0 | OUTPATIENT
Start: 2024-05-07 | End: 2024-06-06

## 2024-05-07 NOTE — TELEPHONE ENCOUNTER
Future Appointments    Encounter Information   Provider Department Appt Notes   5/14/2024 Jaiden Chaparro DPM [facility] PODIATRY 6 MONTH FOLLOW UP   5/29/2024 Darian Michelle MD [facility] Primary and Specialty Care 3 mo f/u   6/12/2024 Adilson Valderrama MD [facility] Neurology 4 mo     Past Visits    Date Provider Specialty Visit Type Primary Dx   02/29/2024 Darian Michelle MD Family Medicine Office Visit Multiple sclerosis (HCC)

## 2024-05-14 ENCOUNTER — OFFICE VISIT (OUTPATIENT)
Dept: PODIATRY | Age: 44
End: 2024-05-14
Payer: COMMERCIAL

## 2024-05-14 VITALS — HEIGHT: 60 IN | BODY MASS INDEX: 37.3 KG/M2 | WEIGHT: 190 LBS | TEMPERATURE: 97.3 F

## 2024-05-14 DIAGNOSIS — M25.562 ACUTE PAIN OF BOTH KNEES: ICD-10-CM

## 2024-05-14 DIAGNOSIS — M21.372 ACQUIRED BILATERAL FOOT DROP: ICD-10-CM

## 2024-05-14 DIAGNOSIS — M79.672 PAIN IN BOTH FEET: Primary | ICD-10-CM

## 2024-05-14 DIAGNOSIS — M79.671 PAIN IN BOTH FEET: Primary | ICD-10-CM

## 2024-05-14 DIAGNOSIS — G57.93 PERIPHERAL NEURITIS OF BOTH FEET: ICD-10-CM

## 2024-05-14 DIAGNOSIS — G35 MS (MULTIPLE SCLEROSIS) (HCC): ICD-10-CM

## 2024-05-14 DIAGNOSIS — M25.561 ACUTE PAIN OF BOTH KNEES: ICD-10-CM

## 2024-05-14 DIAGNOSIS — M72.2 PLANTAR FASCIITIS: ICD-10-CM

## 2024-05-14 DIAGNOSIS — M21.371 ACQUIRED BILATERAL FOOT DROP: ICD-10-CM

## 2024-05-14 PROCEDURE — G8417 CALC BMI ABV UP PARAM F/U: HCPCS | Performed by: PODIATRIST

## 2024-05-14 PROCEDURE — 99213 OFFICE O/P EST LOW 20 MIN: CPT | Performed by: PODIATRIST

## 2024-05-14 PROCEDURE — 4004F PT TOBACCO SCREEN RCVD TLK: CPT | Performed by: PODIATRIST

## 2024-05-14 PROCEDURE — G8427 DOCREV CUR MEDS BY ELIG CLIN: HCPCS | Performed by: PODIATRIST

## 2024-05-14 ASSESSMENT — ENCOUNTER SYMPTOMS
SHORTNESS OF BREATH: 0
NAUSEA: 0
BACK PAIN: 1
VOMITING: 0

## 2024-05-14 NOTE — PROGRESS NOTES
[facility] PHYSICIANS [place] [facility], [facility]  [address]  [address]  [place] [address]  Dept: [phone]  Loc: [phone]       Maty Dawson  (1980)    5/14/24    Subjective     Maty Dawson is 44 y.o. female who complains today of:    Chief Complaint   Patient presents with    Foot Pain     Both feet       Foot Pain   Associated symptoms include numbness. Pertinent negatives include no fever.     HPI: Patient presents for follow-up.  Patient states she has been using her bilateral custom foot drop AFO devices on a daily basis.  Patient denies observing areas of redness, new callus formation, or blister formation.  Patient reports decreased foot pain and improved stability with their use.  Patient has noticed that with prolonged standing walking she begins to develop knee pain, she states the right knee is more painful than the left when this occurs.  Patient denies similar episodes of knee pain in the past.  Patient states she has not had her knees assessed by orthopedic surgery.  Patient reports continued paresthesia bilaterally, she denies progression of the symptoms.    Review of Systems   Constitutional:  Negative for chills and fever.   HENT:  Negative for hearing loss.    Respiratory:  Negative for shortness of breath.    Cardiovascular:  Negative for chest pain.   Gastrointestinal:  Negative for nausea and vomiting.   Genitourinary:  Negative for difficulty urinating.   Musculoskeletal:  Positive for arthralgias, back pain and gait problem.   Skin:  Negative for wound.   Neurological:  Positive for numbness.   Hematological:  Does not bruise/bleed easily.   Psychiatric/Behavioral:  Negative for sleep disturbance.          Allergies:  Patient has no known allergies.    Current Outpatient Medications on File Prior to Visit   Medication Sig Dispense Refill    escitalopram (LEXAPRO) 20 MG tablet TAKE 1 TABLET BY MOUTH DAILY 90 tablet 0

## 2024-05-28 NOTE — PROGRESS NOTES
Patient ID:  Maty Dawson is a 44 y.o. female who presents today for evaluation of bilateral knee pain.    Injury: no  Metal Allergy: no    Location: bilateral knee pain, located on the global aspect of the knee  Pain: yes; 7 on a scale of 1 to 10  Onset: gradual  Duration: 2 weeks  Frequency:  occurs daily  Quality: aching and boring   Swelling: patient notes intermittent swelling of the joint  Aggravating factors: weight bearing activity, standing, and walking  Alleviating factors: removing weight from leg and rest  Mechanical symptoms: none  Radiation: no    Activities: walking independently  Restriction:  decreased ambulatory tolerance  Progression:  worsening    Previous treatment:  anti-inflammatories  NSAIDs:   lodine  PT:  none    Medications:    Current Outpatient Medications on File Prior to Visit   Medication Sig Dispense Refill    escitalopram (LEXAPRO) 20 MG tablet TAKE 1 TABLET BY MOUTH DAILY 90 tablet 0    etodolac (LODINE) 400 MG tablet TAKE ONE (1) TABLET BY MOUTH TWICE DAILY 180 tablet 1    vitamin D (D3 SUPER STRENGTH) 50 MCG (2000 UT) CAPS capsule TAKE 1 CAPSULE BY MOUTH ONCE DAILY 30 capsule 10    gabapentin (NEURONTIN) 600 MG tablet TAKE 1 TABLET BY MOUTH 3 TIMES DAILY 90 tablet 10    baclofen (LIORESAL) 10 MG tablet TAKE 1 TABLET BY MOUTH 4 TIMES A  tablet 3    varenicline (CHANTIX) 1 MG tablet Take 1 tablet by mouth 2 times daily 60 tablet 3    Handicap Placard MISC by Does not apply route Duration of 5 years  Dx:MS 1 each 0    levonorgestrel (MIRENA) IUD 52 mg 1 each by IntraUTERine route       Current Facility-Administered Medications on File Prior to Visit   Medication Dose Route Frequency Provider Last Rate Last Admin    levonorgestrel (MIRENA) IUD 52 mg 1 each  1 each IntraUTERine Once Erik Hunt DO   1 each at 10/27/21 1510       Allergies:    No Known Allergies    Past Medical History:    Past Medical History:   Diagnosis Date    Carpal tunnel syndrome     Multiple

## 2024-05-29 ENCOUNTER — HOSPITAL ENCOUNTER (OUTPATIENT)
Dept: GENERAL RADIOLOGY | Age: 44
Discharge: HOME | End: 2024-05-31
Payer: COMMERCIAL

## 2024-05-29 ENCOUNTER — OFFICE VISIT (OUTPATIENT)
Dept: ORTHOPEDIC SURGERY | Age: 44
End: 2024-05-29
Payer: COMMERCIAL

## 2024-05-29 VITALS
HEART RATE: 87 BPM | WEIGHT: 190 LBS | OXYGEN SATURATION: 95 % | BODY MASS INDEX: 37.3 KG/M2 | HEIGHT: 60 IN | TEMPERATURE: 98.7 F

## 2024-05-29 DIAGNOSIS — M17.12 PRIMARY OSTEOARTHRITIS OF LEFT KNEE: ICD-10-CM

## 2024-05-29 DIAGNOSIS — M17.11 PRIMARY OSTEOARTHRITIS OF RIGHT KNEE: ICD-10-CM

## 2024-05-29 DIAGNOSIS — M17.11 PRIMARY OSTEOARTHRITIS OF RIGHT KNEE: Primary | ICD-10-CM

## 2024-05-29 PROCEDURE — 20610 DRAIN/INJ JOINT/BURSA W/O US: CPT | Performed by: ORTHOPAEDIC SURGERY

## 2024-05-29 PROCEDURE — G8427 DOCREV CUR MEDS BY ELIG CLIN: HCPCS | Performed by: ORTHOPAEDIC SURGERY

## 2024-05-29 PROCEDURE — G8417 CALC BMI ABV UP PARAM F/U: HCPCS | Performed by: ORTHOPAEDIC SURGERY

## 2024-05-29 PROCEDURE — 4004F PT TOBACCO SCREEN RCVD TLK: CPT | Performed by: ORTHOPAEDIC SURGERY

## 2024-05-29 PROCEDURE — 73564 X-RAY EXAM KNEE 4 OR MORE: CPT | Performed by: ORTHOPAEDIC SURGERY

## 2024-05-29 PROCEDURE — 99204 OFFICE O/P NEW MOD 45 MIN: CPT | Performed by: ORTHOPAEDIC SURGERY

## 2024-05-29 PROCEDURE — 73564 X-RAY EXAM KNEE 4 OR MORE: CPT

## 2024-05-29 RX ORDER — TRIAMCINOLONE ACETONIDE 40 MG/ML
40 INJECTION, SUSPENSION INTRA-ARTICULAR; INTRAMUSCULAR ONCE
Status: COMPLETED | OUTPATIENT
Start: 2024-05-29 | End: 2024-05-29

## 2024-05-29 RX ORDER — LIDOCAINE HYDROCHLORIDE 10 MG/ML
8 INJECTION, SOLUTION INFILTRATION; PERINEURAL ONCE
Status: COMPLETED | OUTPATIENT
Start: 2024-05-29 | End: 2024-05-29

## 2024-05-29 RX ADMIN — LIDOCAINE HYDROCHLORIDE 8 ML: 10 INJECTION, SOLUTION INFILTRATION; PERINEURAL at 11:34

## 2024-05-29 RX ADMIN — TRIAMCINOLONE ACETONIDE 40 MG: 40 INJECTION, SUSPENSION INTRA-ARTICULAR; INTRAMUSCULAR at 11:30

## 2024-05-29 RX ADMIN — LIDOCAINE HYDROCHLORIDE 8 ML: 10 INJECTION, SOLUTION INFILTRATION; PERINEURAL at 11:32

## 2024-05-29 RX ADMIN — TRIAMCINOLONE ACETONIDE 40 MG: 40 INJECTION, SUSPENSION INTRA-ARTICULAR; INTRAMUSCULAR at 11:33

## 2024-05-29 ASSESSMENT — ENCOUNTER SYMPTOMS
CONSTIPATION: 0
EYE ITCHING: 0
EYE DISCHARGE: 0
COUGH: 0
DIARRHEA: 0
EYE PAIN: 0
SHORTNESS OF BREATH: 0
ABDOMINAL PAIN: 0

## 2024-06-12 ENCOUNTER — OFFICE VISIT (OUTPATIENT)
Dept: NEUROLOGY | Age: 44
End: 2024-06-12
Payer: COMMERCIAL

## 2024-06-12 VITALS
SYSTOLIC BLOOD PRESSURE: 120 MMHG | HEART RATE: 90 BPM | DIASTOLIC BLOOD PRESSURE: 70 MMHG | BODY MASS INDEX: 39.32 KG/M2 | WEIGHT: 198 LBS

## 2024-06-12 DIAGNOSIS — M21.372 BILATERAL FOOT-DROP: ICD-10-CM

## 2024-06-12 DIAGNOSIS — R25.2 SPASTICITY: ICD-10-CM

## 2024-06-12 DIAGNOSIS — G62.9 NEUROPATHY: ICD-10-CM

## 2024-06-12 DIAGNOSIS — G35 MS (MULTIPLE SCLEROSIS) (HCC): Primary | ICD-10-CM

## 2024-06-12 DIAGNOSIS — R26.0 ATAXIC GAIT: ICD-10-CM

## 2024-06-12 DIAGNOSIS — R39.15 URINARY URGENCY: ICD-10-CM

## 2024-06-12 DIAGNOSIS — M21.371 BILATERAL FOOT-DROP: ICD-10-CM

## 2024-06-12 PROCEDURE — 4004F PT TOBACCO SCREEN RCVD TLK: CPT | Performed by: PSYCHIATRY & NEUROLOGY

## 2024-06-12 PROCEDURE — 99214 OFFICE O/P EST MOD 30 MIN: CPT | Performed by: PSYCHIATRY & NEUROLOGY

## 2024-06-12 PROCEDURE — G8417 CALC BMI ABV UP PARAM F/U: HCPCS | Performed by: PSYCHIATRY & NEUROLOGY

## 2024-06-12 PROCEDURE — G8427 DOCREV CUR MEDS BY ELIG CLIN: HCPCS | Performed by: PSYCHIATRY & NEUROLOGY

## 2024-06-12 NOTE — PROGRESS NOTES
Subjective:      Patient ID: Maty Dawson is a 44 y.o. female who presents today for:  Chief Complaint   Patient presents with    Follow-up     Pt states she is having issues but she doesn't know if its from the ms or other issues. Pt is trting to get a scooter for her home and she walks with the braces and when she takes them off her legs feel like jello. Pt states she has been walking        HPI 44 right-handed female with history of multiple sclerosis.  Patient continues on Briumvi infusions.  Patient has had at least 3 infusions.  This is a B-cell line drug.  Prior to this she was on Copaxone and Avonex.  She has been stable since she started the medication.  She has considerable spasticity with some foot drops.  She has braces.  EDSS scores remain 4-5.  Patient is trying to get a scooter for her home but her legs feel like Jell-O    Secondarily she is having some issues with her hips given that she has braces when she walks on this.  She is being evaluated for the same and likely to receive knee injections as well    Past Medical History:   Diagnosis Date    Carpal tunnel syndrome     Multiple sclerosis (HCC)      Past Surgical History:   Procedure Laterality Date    APPENDECTOMY      BREAST SURGERY      lump removal bengin    CARPAL TUNNEL RELEASE Left 10/7/2022    LEFT CARPAL TUNNEL RELEASE ([facility]) performed by Vahe Dumont MD at [facility] OR    CARPAL TUNNEL RELEASE Right 2022    RIGHT CARPAL TUNNEL RELEASE performed by Vahe Dumont MD at [facility] OR     SECTION      x 3    TUBAL LIGATION       Social History     Socioeconomic History    Marital status:      Spouse name: Not on file    Number of children: Not on file    Years of education: Not on file    Highest education level: Not on file   Occupational History    Not on file   Tobacco Use    Smoking status: Every Day    Smokeless tobacco: Never   Vaping Use    Vaping Use: Never used   Substance and Sexual

## 2024-08-05 ENCOUNTER — OFFICE VISIT (OUTPATIENT)
Dept: FAMILY MEDICINE CLINIC | Age: 44
End: 2024-08-05
Payer: COMMERCIAL

## 2024-08-05 VITALS
DIASTOLIC BLOOD PRESSURE: 70 MMHG | RESPIRATION RATE: 17 BRPM | HEART RATE: 88 BPM | OXYGEN SATURATION: 98 % | SYSTOLIC BLOOD PRESSURE: 126 MMHG

## 2024-08-05 DIAGNOSIS — Z09 HOSPITAL DISCHARGE FOLLOW-UP: ICD-10-CM

## 2024-08-05 DIAGNOSIS — F41.9 ANXIETY AND DEPRESSION: Primary | ICD-10-CM

## 2024-08-05 DIAGNOSIS — F32.A ANXIETY AND DEPRESSION: Primary | ICD-10-CM

## 2024-08-05 DIAGNOSIS — G62.9 NEUROPATHY: ICD-10-CM

## 2024-08-05 DIAGNOSIS — R10.11 RIGHT UPPER QUADRANT ABDOMINAL PAIN: ICD-10-CM

## 2024-08-05 DIAGNOSIS — G35 MULTIPLE SCLEROSIS (HCC): ICD-10-CM

## 2024-08-05 DIAGNOSIS — G56.03 BILATERAL CARPAL TUNNEL SYNDROME: ICD-10-CM

## 2024-08-05 LAB
REASON FOR REJECTION: NORMAL
REJECTED TEST: 7479

## 2024-08-05 PROCEDURE — 4004F PT TOBACCO SCREEN RCVD TLK: CPT | Performed by: FAMILY MEDICINE

## 2024-08-05 PROCEDURE — G8427 DOCREV CUR MEDS BY ELIG CLIN: HCPCS | Performed by: FAMILY MEDICINE

## 2024-08-05 PROCEDURE — G8417 CALC BMI ABV UP PARAM F/U: HCPCS | Performed by: FAMILY MEDICINE

## 2024-08-05 PROCEDURE — 99214 OFFICE O/P EST MOD 30 MIN: CPT | Performed by: FAMILY MEDICINE

## 2024-08-05 ASSESSMENT — ENCOUNTER SYMPTOMS
ABDOMINAL PAIN: 1
COUGH: 0
RHINORRHEA: 0
CHEST TIGHTNESS: 0
EYES NEGATIVE: 1
RESPIRATORY NEGATIVE: 1

## 2024-08-05 NOTE — PROGRESS NOTES
12/12/2029   • Hepatitis C screen  Completed   • Hepatitis A vaccine  Aged Out   • Hib vaccine  Aged Out   • HPV vaccine  Aged Out   • Polio vaccine  Aged Out   • Meningococcal (ACWY) vaccine  Aged Out       Review of Systems     Review of Systems   Constitutional:  Negative for activity change, appetite change, fatigue and fever.   HENT:  Negative for congestion and rhinorrhea.    Eyes: Negative.    Respiratory: Negative.  Negative for cough and chest tightness.    Cardiovascular: Negative.    Gastrointestinal:  Positive for abdominal pain.   Endocrine: Negative.    Genitourinary: Negative.    Musculoskeletal: Negative.    Skin: Negative.    Neurological:  Negative for dizziness, light-headedness and numbness.   Hematological: Negative.    Psychiatric/Behavioral: Negative.         Physical Exam  Vitals:    08/05/24 1118   BP: 126/70   Pulse: 88   Resp: 17   SpO2: 98%       Physical Exam  Constitutional:       Appearance: She is well-developed.   HENT:      Right Ear: External ear normal.      Left Ear: External ear normal.   Eyes:      Pupils: Pupils are equal, round, and reactive to light.   Neck:      Thyroid: No thyromegaly.   Cardiovascular:      Rate and Rhythm: Normal rate and regular rhythm.      Heart sounds: Normal heart sounds. No murmur heard.     No friction rub. No gallop.   Pulmonary:      Effort: Pulmonary effort is normal. No respiratory distress.      Breath sounds: No wheezing or rales.   Chest:      Chest wall: No tenderness.   Abdominal:      General: Bowel sounds are normal. There is no distension.      Palpations: Abdomen is soft. There is no mass.      Tenderness: There is no abdominal tenderness. There is no guarding or rebound.   Musculoskeletal:         General: Normal range of motion.      Cervical back: Normal range of motion and neck supple.   Lymphadenopathy:      Cervical: No cervical adenopathy.   Skin:     General: Skin is warm and dry.   Neurological:      Mental Status: She is

## 2024-08-06 RX ORDER — ESCITALOPRAM OXALATE 20 MG/1
TABLET ORAL
Qty: 90 TABLET | Refills: 0 | Status: SHIPPED | OUTPATIENT
Start: 2024-08-06

## 2024-08-06 RX ORDER — GABAPENTIN 600 MG/1
600 TABLET ORAL 3 TIMES DAILY
Qty: 90 TABLET | Refills: 2 | Status: SHIPPED | OUTPATIENT
Start: 2024-08-06 | End: 2024-11-04

## 2024-08-14 ENCOUNTER — HOSPITAL ENCOUNTER (OUTPATIENT)
Dept: ULTRASOUND IMAGING | Age: 44
Discharge: HOME | End: 2024-08-16
Payer: COMMERCIAL

## 2024-08-14 DIAGNOSIS — R10.11 RIGHT UPPER QUADRANT ABDOMINAL PAIN: ICD-10-CM

## 2024-08-14 PROCEDURE — 76705 ECHO EXAM OF ABDOMEN: CPT

## 2024-09-10 ENCOUNTER — TELEMEDICINE (OUTPATIENT)
Dept: FAMILY MEDICINE CLINIC | Age: 44
End: 2024-09-10
Payer: COMMERCIAL

## 2024-09-10 DIAGNOSIS — Z00.00 MEDICARE ANNUAL WELLNESS VISIT, SUBSEQUENT: Primary | ICD-10-CM

## 2024-09-10 PROCEDURE — G0439 PPPS, SUBSEQ VISIT: HCPCS | Performed by: NURSE PRACTITIONER

## 2024-09-10 ASSESSMENT — LIFESTYLE VARIABLES
HOW MANY STANDARD DRINKS CONTAINING ALCOHOL DO YOU HAVE ON A TYPICAL DAY: 1 OR 2
HOW OFTEN DO YOU HAVE A DRINK CONTAINING ALCOHOL: MONTHLY OR LESS

## 2024-09-10 ASSESSMENT — PATIENT HEALTH QUESTIONNAIRE - PHQ9
4. FEELING TIRED OR HAVING LITTLE ENERGY: MORE THAN HALF THE DAYS
SUM OF ALL RESPONSES TO PHQ QUESTIONS 1-9: 13
2. FEELING DOWN, DEPRESSED OR HOPELESS: MORE THAN HALF THE DAYS
3. TROUBLE FALLING OR STAYING ASLEEP: MORE THAN HALF THE DAYS
SUM OF ALL RESPONSES TO PHQ9 QUESTIONS 1 & 2: 4
10. IF YOU CHECKED OFF ANY PROBLEMS, HOW DIFFICULT HAVE THESE PROBLEMS MADE IT FOR YOU TO DO YOUR WORK, TAKE CARE OF THINGS AT HOME, OR GET ALONG WITH OTHER PEOPLE: VERY DIFFICULT
7. TROUBLE CONCENTRATING ON THINGS, SUCH AS READING THE NEWSPAPER OR WATCHING TELEVISION: MORE THAN HALF THE DAYS
SUM OF ALL RESPONSES TO PHQ QUESTIONS 1-9: 13
1. LITTLE INTEREST OR PLEASURE IN DOING THINGS: MORE THAN HALF THE DAYS
8. MOVING OR SPEAKING SO SLOWLY THAT OTHER PEOPLE COULD HAVE NOTICED. OR THE OPPOSITE, BEING SO FIGETY OR RESTLESS THAT YOU HAVE BEEN MOVING AROUND A LOT MORE THAN USUAL: NOT AT ALL
5. POOR APPETITE OR OVEREATING: MORE THAN HALF THE DAYS
9. THOUGHTS THAT YOU WOULD BE BETTER OFF DEAD, OR OF HURTING YOURSELF: NOT AT ALL
6. FEELING BAD ABOUT YOURSELF - OR THAT YOU ARE A FAILURE OR HAVE LET YOURSELF OR YOUR FAMILY DOWN: SEVERAL DAYS

## 2024-09-10 ASSESSMENT — COLUMBIA-SUICIDE SEVERITY RATING SCALE - C-SSRS
2. HAVE YOU ACTUALLY HAD ANY THOUGHTS OF KILLING YOURSELF?: NO
6. HAVE YOU EVER DONE ANYTHING, STARTED TO DO ANYTHING, OR PREPARED TO DO ANYTHING TO END YOUR LIFE?: NO
1. WITHIN THE PAST MONTH, HAVE YOU WISHED YOU WERE DEAD OR WISHED YOU COULD GO TO SLEEP AND NOT WAKE UP?: NO

## 2024-10-02 ENCOUNTER — OFFICE VISIT (OUTPATIENT)
Dept: FAMILY MEDICINE CLINIC | Age: 44
End: 2024-10-02
Payer: COMMERCIAL

## 2024-10-02 VITALS
DIASTOLIC BLOOD PRESSURE: 80 MMHG | SYSTOLIC BLOOD PRESSURE: 126 MMHG | OXYGEN SATURATION: 98 % | HEIGHT: 60 IN | RESPIRATION RATE: 17 BRPM | WEIGHT: 198 LBS | BODY MASS INDEX: 38.87 KG/M2 | HEART RATE: 87 BPM

## 2024-10-02 DIAGNOSIS — K76.0 FATTY LIVER: ICD-10-CM

## 2024-10-02 DIAGNOSIS — R10.11 RIGHT UPPER QUADRANT ABDOMINAL PAIN: Primary | ICD-10-CM

## 2024-10-02 PROCEDURE — G8417 CALC BMI ABV UP PARAM F/U: HCPCS | Performed by: FAMILY MEDICINE

## 2024-10-02 PROCEDURE — 99214 OFFICE O/P EST MOD 30 MIN: CPT | Performed by: FAMILY MEDICINE

## 2024-10-02 PROCEDURE — 4004F PT TOBACCO SCREEN RCVD TLK: CPT | Performed by: FAMILY MEDICINE

## 2024-10-02 PROCEDURE — G8484 FLU IMMUNIZE NO ADMIN: HCPCS | Performed by: FAMILY MEDICINE

## 2024-10-02 PROCEDURE — G8427 DOCREV CUR MEDS BY ELIG CLIN: HCPCS | Performed by: FAMILY MEDICINE

## 2024-10-02 ASSESSMENT — ENCOUNTER SYMPTOMS
RHINORRHEA: 0
COUGH: 0
RESPIRATORY NEGATIVE: 1
CHEST TIGHTNESS: 0
EYES NEGATIVE: 1
ABDOMINAL PAIN: 1

## 2024-10-02 NOTE — PROGRESS NOTES
Patient is seen in follow up for   Chief Complaint   Patient presents with    Abdominal Pain     Had scan done was normal but still having pain     Abdominal Pain  Pertinent negatives include no fever.   here for follow up on abdominal pain. Ultra sound normal.    Past Medical History:   Diagnosis Date    Carpal tunnel syndrome     Multiple sclerosis (HCC)      Patient Active Problem List    Diagnosis Date Noted    Urinary urgency 2023    Bilateral carpal tunnel syndrome 10/21/2022    Left carpal tunnel syndrome 10/07/2022    Spasticity 2022    Bilateral foot-drop 10/12/2023    Bite, animal 2020    Left foot pain 2020    Deficient knowledge 2020    Dog bite of wrist 2020    Impaired mobility 2020    Myalgia 2020    Hypokalemia 10/02/2018    Nicotine dependence, unspecified, uncomplicated 10/02/2018    Overweight 10/02/2018    Ataxic gait 2018    Body mass index (bmi) 39.0-39.9, adult 2018    Muscle weakness (generalized) 2018    Neuromuscular dysfunction of bladder, unspecified 2018    Obesity, unspecified 2018    Paresthesia of skin 2018    Vitamin D deficiency 2018    Neuropathy 2016    Anxiety and depression 2016    MS (multiple sclerosis) ([facility]) 2015     Past Surgical History:   Procedure Laterality Date    APPENDECTOMY      BREAST SURGERY      lump removal bengin    CARPAL TUNNEL RELEASE Left 10/7/2022    LEFT CARPAL TUNNEL RELEASE ([facility] performed by Vahe Dumont MD at [facility] OR    CARPAL TUNNEL RELEASE Right 2022    RIGHT CARPAL TUNNEL RELEASE performed by Vahe Dumont MD at [facility] OR     SECTION      x 3    TUBAL LIGATION       Family History   Problem Relation Age of Onset    Diabetes Mother     Heart Disease Father      Social History     Socioeconomic History    Marital status:      Spouse name: None    Number of children: None    Years of

## 2024-10-04 RX ORDER — ETODOLAC 400 MG
400 TABLET ORAL 2 TIMES DAILY
Qty: 60 TABLET | Refills: 10 | Status: SHIPPED | OUTPATIENT
Start: 2024-10-04

## 2024-10-04 RX ORDER — ACETAMINOPHEN 160 MG
TABLET,DISINTEGRATING ORAL
Qty: 30 CAPSULE | Refills: 10 | Status: SHIPPED | OUTPATIENT
Start: 2024-10-04

## 2024-10-08 ENCOUNTER — PREP FOR PROCEDURE (OUTPATIENT)
Dept: GASTROENTEROLOGY | Age: 44
End: 2024-10-08

## 2024-10-08 ENCOUNTER — OFFICE VISIT (OUTPATIENT)
Dept: GASTROENTEROLOGY | Age: 44
End: 2024-10-08
Payer: COMMERCIAL

## 2024-10-08 VITALS
HEART RATE: 66 BPM | BODY MASS INDEX: 39.52 KG/M2 | OXYGEN SATURATION: 97 % | WEIGHT: 199 LBS | DIASTOLIC BLOOD PRESSURE: 78 MMHG | SYSTOLIC BLOOD PRESSURE: 124 MMHG

## 2024-10-08 DIAGNOSIS — R10.11 RIGHT UPPER QUADRANT ABDOMINAL PAIN: ICD-10-CM

## 2024-10-08 DIAGNOSIS — R10.11 RIGHT UPPER QUADRANT ABDOMINAL PAIN: Primary | ICD-10-CM

## 2024-10-08 PROCEDURE — G8417 CALC BMI ABV UP PARAM F/U: HCPCS | Performed by: INTERNAL MEDICINE

## 2024-10-08 PROCEDURE — G8427 DOCREV CUR MEDS BY ELIG CLIN: HCPCS | Performed by: INTERNAL MEDICINE

## 2024-10-08 PROCEDURE — 99204 OFFICE O/P NEW MOD 45 MIN: CPT | Performed by: INTERNAL MEDICINE

## 2024-10-08 PROCEDURE — 4004F PT TOBACCO SCREEN RCVD TLK: CPT | Performed by: INTERNAL MEDICINE

## 2024-10-08 PROCEDURE — G8484 FLU IMMUNIZE NO ADMIN: HCPCS | Performed by: INTERNAL MEDICINE

## 2024-10-08 RX ORDER — ESOMEPRAZOLE MAGNESIUM 40 MG/1
40 CAPSULE, DELAYED RELEASE ORAL DAILY
Qty: 30 CAPSULE | Refills: 3 | Status: SHIPPED | OUTPATIENT
Start: 2024-10-08

## 2024-10-08 ASSESSMENT — ENCOUNTER SYMPTOMS
VOICE CHANGE: 0
ABDOMINAL PAIN: 1
DIARRHEA: 0
EYE REDNESS: 0
RECTAL PAIN: 0
WHEEZING: 0
PHOTOPHOBIA: 0
CONSTIPATION: 0
EYE PAIN: 0
COLOR CHANGE: 0
ABDOMINAL DISTENTION: 0
TROUBLE SWALLOWING: 0
CHEST TIGHTNESS: 0
BLOOD IN STOOL: 0
VOMITING: 0
SHORTNESS OF BREATH: 0
NAUSEA: 0

## 2024-10-08 NOTE — PROGRESS NOTES
AM    HGB 14.9 07/03/2023 10:53 AM    HGB 14.9 01/14/2023 11:06 PM    HGB 14.0 06/22/2022 05:00 AM    HGB 14.0 06/21/2022 05:02 AM    HGB 14.3 06/20/2022 05:34 AM    HCT 44.2 07/03/2023 10:53 AM    HCT 45.2 01/14/2023 11:06 PM    HCT 42.9 06/22/2022 05:00 AM    HCT 42.5 06/21/2022 05:02 AM    HCT 43.0 06/20/2022 05:34 AM    MCV 97.7 07/03/2023 10:53 AM    MCV 96.4 01/14/2023 11:06 PM    MCV 96.2 06/22/2022 05:00 AM    MCV 96.6 06/21/2022 05:02 AM    MCV 95.5 06/20/2022 05:34 AM     07/03/2023 10:53 AM     01/14/2023 11:06 PM     06/22/2022 05:00 AM     06/21/2022 05:02 AM     06/20/2022 05:34 AM    .  Lab Results   Component Value Date/Time    ALT <5 07/03/2023 10:53 AM    ALT 9 01/14/2023 11:06 PM    ALT 11 06/18/2022 06:52 PM    AST 11 07/03/2023 10:53 AM    AST 13 01/14/2023 11:06 PM    AST 13 06/18/2022 06:52 PM    ALKPHOS 76 07/03/2023 10:53 AM    ALKPHOS 68 01/14/2023 11:06 PM    ALKPHOS 81 06/18/2022 06:52 PM    BILITOT <0.2 07/03/2023 10:53 AM    BILITOT <0.2 01/14/2023 11:06 PM    BILITOT 0.3 06/18/2022 06:52 PM       No results found.  No results found for: \"IRON\", \"TIBC\", \"FERRITIN\"  Lab Results   Component Value Date/Time    INR 0.9 08/24/2015 09:25 AM     No components found for: \"ACUTEHEPATITISSCREEN\"  No components found for: \"CELIACPANEL\"  No components found for: \"STOOLCULTURE\", \"C.DIFF\", \"STOOLOVAPARASITE\", \"STOOLLEUCOCYTE\"        Assessment:      Assessment and plan:    1.  Right upper quadrant pain and epigastric:  Not necessarily postprandial.  No associated nausea or vomiting  Ultrasound shows fatty liver otherwise no gallbladder disease  Proceed with upper endoscopy for further evaluation and management.  EGD to assess for any mucosal etiologies or referred pain  If negative findings, consideration for HIDA scan and additional testing  Therapeutic trial of PPI  2--Associated medical conditions include but not limited to history of class III obesity with BMI

## 2024-10-09 ENCOUNTER — HOSPITAL ENCOUNTER (OUTPATIENT)
Age: 44
Setting detail: OUTPATIENT SURGERY
Discharge: HOME | End: 2024-10-09
Attending: INTERNAL MEDICINE | Admitting: INTERNAL MEDICINE
Payer: COMMERCIAL

## 2024-10-09 ENCOUNTER — ANESTHESIA (OUTPATIENT)
Dept: ENDOSCOPY | Age: 44
End: 2024-10-09
Payer: COMMERCIAL

## 2024-10-09 ENCOUNTER — ANESTHESIA EVENT (OUTPATIENT)
Dept: ENDOSCOPY | Age: 44
End: 2024-10-09
Payer: COMMERCIAL

## 2024-10-09 VITALS
SYSTOLIC BLOOD PRESSURE: 96 MMHG | HEIGHT: 59 IN | RESPIRATION RATE: 16 BRPM | TEMPERATURE: 97.3 F | WEIGHT: 195 LBS | DIASTOLIC BLOOD PRESSURE: 57 MMHG | BODY MASS INDEX: 39.31 KG/M2 | OXYGEN SATURATION: 95 % | HEART RATE: 67 BPM

## 2024-10-09 DIAGNOSIS — R10.11 RIGHT UPPER QUADRANT ABDOMINAL PAIN: Primary | ICD-10-CM

## 2024-10-09 DIAGNOSIS — R10.11 RIGHT UPPER QUADRANT ABDOMINAL PAIN: ICD-10-CM

## 2024-10-09 PROCEDURE — 6360000002 HC RX W HCPCS: Performed by: NURSE ANESTHETIST, CERTIFIED REGISTERED

## 2024-10-09 PROCEDURE — 7100000011 HC PHASE II RECOVERY - ADDTL 15 MIN: Performed by: INTERNAL MEDICINE

## 2024-10-09 PROCEDURE — 88305 TISSUE EXAM BY PATHOLOGIST: CPT

## 2024-10-09 PROCEDURE — 2580000003 HC RX 258

## 2024-10-09 PROCEDURE — 3700000000 HC ANESTHESIA ATTENDED CARE: Performed by: INTERNAL MEDICINE

## 2024-10-09 PROCEDURE — 2709999900 HC NON-CHARGEABLE SUPPLY: Performed by: INTERNAL MEDICINE

## 2024-10-09 PROCEDURE — 7100000010 HC PHASE II RECOVERY - FIRST 15 MIN: Performed by: INTERNAL MEDICINE

## 2024-10-09 PROCEDURE — 2580000003 HC RX 258: Performed by: INTERNAL MEDICINE

## 2024-10-09 PROCEDURE — 2500000003 HC RX 250 WO HCPCS: Performed by: NURSE ANESTHETIST, CERTIFIED REGISTERED

## 2024-10-09 PROCEDURE — 88342 IMHCHEM/IMCYTCHM 1ST ANTB: CPT

## 2024-10-09 PROCEDURE — 3609017100 HC EGD: Performed by: INTERNAL MEDICINE

## 2024-10-09 RX ORDER — SODIUM CHLORIDE 9 MG/ML
INJECTION, SOLUTION INTRAVENOUS CONTINUOUS
Status: CANCELLED | OUTPATIENT
Start: 2024-10-09

## 2024-10-09 RX ORDER — SODIUM CHLORIDE 9 MG/ML
INJECTION, SOLUTION INTRAVENOUS CONTINUOUS
Status: DISCONTINUED | OUTPATIENT
Start: 2024-10-09 | End: 2024-10-09 | Stop reason: HOSPADM

## 2024-10-09 RX ORDER — LIDOCAINE HYDROCHLORIDE 20 MG/ML
INJECTION, SOLUTION EPIDURAL; INFILTRATION; INTRACAUDAL; PERINEURAL
Status: DISCONTINUED | OUTPATIENT
Start: 2024-10-09 | End: 2024-10-09 | Stop reason: SDUPTHER

## 2024-10-09 RX ORDER — SODIUM CHLORIDE 9 MG/ML
INJECTION, SOLUTION INTRAVENOUS PRN
Status: CANCELLED | OUTPATIENT
Start: 2024-10-09

## 2024-10-09 RX ORDER — SODIUM CHLORIDE 9 MG/ML
INJECTION, SOLUTION INTRAVENOUS PRN
Status: DISCONTINUED | OUTPATIENT
Start: 2024-10-09 | End: 2024-10-09 | Stop reason: HOSPADM

## 2024-10-09 RX ORDER — SODIUM CHLORIDE 0.9 % (FLUSH) 0.9 %
5-40 SYRINGE (ML) INJECTION PRN
Status: CANCELLED | OUTPATIENT
Start: 2024-10-09

## 2024-10-09 RX ORDER — SODIUM CHLORIDE 0.9 % (FLUSH) 0.9 %
5-40 SYRINGE (ML) INJECTION EVERY 12 HOURS SCHEDULED
Status: DISCONTINUED | OUTPATIENT
Start: 2024-10-09 | End: 2024-10-09 | Stop reason: HOSPADM

## 2024-10-09 RX ORDER — SODIUM CHLORIDE 0.9 % (FLUSH) 0.9 %
5-40 SYRINGE (ML) INJECTION PRN
Status: DISCONTINUED | OUTPATIENT
Start: 2024-10-09 | End: 2024-10-09 | Stop reason: HOSPADM

## 2024-10-09 RX ORDER — SODIUM CHLORIDE 9 MG/ML
INJECTION, SOLUTION INTRAVENOUS
Status: COMPLETED
Start: 2024-10-09 | End: 2024-10-09

## 2024-10-09 RX ORDER — PROPOFOL 10 MG/ML
INJECTION, EMULSION INTRAVENOUS
Status: DISCONTINUED | OUTPATIENT
Start: 2024-10-09 | End: 2024-10-09 | Stop reason: SDUPTHER

## 2024-10-09 RX ORDER — SODIUM CHLORIDE 0.9 % (FLUSH) 0.9 %
5-40 SYRINGE (ML) INJECTION EVERY 12 HOURS SCHEDULED
Status: CANCELLED | OUTPATIENT
Start: 2024-10-09

## 2024-10-09 RX ADMIN — PROPOFOL 50 MG: 10 INJECTION, EMULSION INTRAVENOUS at 10:16

## 2024-10-09 RX ADMIN — SODIUM CHLORIDE: 9 INJECTION, SOLUTION INTRAVENOUS at 09:42

## 2024-10-09 RX ADMIN — PROPOFOL 50 MG: 10 INJECTION, EMULSION INTRAVENOUS at 10:15

## 2024-10-09 RX ADMIN — LIDOCAINE HYDROCHLORIDE 80 MG: 20 INJECTION, SOLUTION EPIDURAL; INFILTRATION; INTRACAUDAL; PERINEURAL at 10:13

## 2024-10-09 RX ADMIN — PROPOFOL 50 MG: 10 INJECTION, EMULSION INTRAVENOUS at 10:13

## 2024-10-09 RX ADMIN — PROPOFOL 50 MG: 10 INJECTION, EMULSION INTRAVENOUS at 10:17

## 2024-10-09 ASSESSMENT — PAIN DESCRIPTION - DESCRIPTORS: DESCRIPTORS: ACHING

## 2024-10-09 ASSESSMENT — PAIN - FUNCTIONAL ASSESSMENT: PAIN_FUNCTIONAL_ASSESSMENT: 0-10

## 2024-10-09 NOTE — ANESTHESIA POSTPROCEDURE EVALUATION
Department of Anesthesiology  Postprocedure Note    Patient: Maty Dawson  MRN: [id number]  YOB: 1980  Date of evaluation: 10/9/2024    Procedure Summary       Date: 10/09/24 Room / Location: [facility] OR 02 / [facility]    Anesthesia Start: 1010 Anesthesia Stop: 1019    Procedure: ESOPHAGOGASTRODUODENOSCOPY Diagnosis:       Right upper quadrant abdominal pain      (Right upper quadrant abdominal pain [R10.11])    Surgeons: Luanne Bridges MD Responsible Provider: Rubia Garcia APRN - CRNA    Anesthesia Type: MAC ASA Status: 2            Anesthesia Type: No value filed.    John Phase I: John Score: 10    John Phase II:      Anesthesia Post Evaluation    Patient location during evaluation: bedside  Patient participation: complete - patient participated  Level of consciousness: responsive to light touch  Pain score: 0  Airway patency: patent  Nausea & Vomiting: no nausea and no vomiting  Cardiovascular status: blood pressure returned to baseline and hemodynamically stable  Respiratory status: acceptable, spontaneous ventilation, nasal cannula and nonlabored ventilation  Hydration status: euvolemic  Pain management: adequate        No notable events documented.

## 2024-10-09 NOTE — H&P
Patient Name: Maty Dawson  : 1980  MRN: [id number]  DATE: 10/09/24      ENDOSCOPY  History and Physical    Procedure:    [] Diagnostic Colonoscopy       [] Screening Colonoscopy  [x] EGD      [] ERCP      [] EUS       [] Other    [x] Previous office notes/History and Physical reviewed from the patients chart. Please see EMR for further details of HPI. I have examined the patient's status immediately prior to the procedure and:      Indications/HPI:    [x]Abdominal Pain   []Cancer- GI/Lung  []Fhx of colon CA/polyps  []History of Polyps   []Talley’s   []Melena  []Abnormal Imaging   []Dysphagia    []Persistent Pneumonia  []Anemia   []Food Impaction  []History of Polyps  []GI Bleed   []Pulmonary nodule/Mass  []Change in bowel habits  []Heartburn/Reflux  []Rectal Bleed (BRBPR)  []Chest Pain - Non Cardiac  []Heme (+) Stool  []Ulcers  []Constipation   []Hemoptysis   []Varices  []Diarrhea   []Hypoxemia  []Nausea/Vomiting   []Screening   []Crohns/Colitis  []Other:    Anesthesia:   [x] MAC [] Moderate Sedation   [] General   [] None     ROS: 12 pt Review of Symptoms was negative unless mentioned above    Medications:   Prior to Admission medications    Medication Sig Start Date End Date Taking? Authorizing Provider   esomeprazole (NEXIUM) 40 MG delayed release capsule Take 1 capsule by mouth daily 10/8/24   Luanne Bridges MD   vitamin D (VITAMIN D3) 50 MCG (2000 UT) CAPS capsule TAKE 1 CAPSULE BY MOUTH ONCE DAILY 10/4/24   Katie Hartley APRN - CNP   etodolac (LODINE) 400 MG tablet TAKE 1 TABLET BY MOUTH 2 TIMES DAILY 10/4/24   Katie Hartley APRN - CNP   escitalopram (LEXAPRO) 20 MG tablet TAKE 1 TABLET BY MOUTH DAILY 24   Darian Michelle MD   gabapentin (NEURONTIN) 600 MG tablet Take 1 tablet by mouth 3 times daily for 90 days. 24  Darian Michelle MD   Ublituximab-xiiy (BRIUMVI IV) Infuse intravenously    ProviderBay MD   baclofen (LIORESAL) 10 MG tablet TAKE 1 TABLET BY MOUTH 4

## 2024-10-09 NOTE — ANESTHESIA PRE PROCEDURE
Component Value Date/Time    HEPCAB NONREACTIVE 07/03/2023 10:53 AM       COVID-19 Screening (If Applicable):   Lab Results   Component Value Date/Time    COVID19 Not Detected 01/14/2023 11:15 PM           Anesthesia Evaluation  Patient summary reviewed and Nursing notes reviewed  Airway: Mallampati: II  TM distance: >3 FB   Neck ROM: full  Mouth opening: > = 3 FB   Dental: normal exam         Pulmonary:                              Cardiovascular:                      Neuro/Psych:               GI/Hepatic/Renal:             Endo/Other:                     Abdominal:             Vascular:          Other Findings:             Anesthesia Plan      MAC     ASA 2             Anesthetic plan and risks discussed with patient.    Use of blood products discussed with patient whom consented to blood products.                      Rubia Garcia, APRN - CRNA   10/9/2024

## 2024-10-16 ENCOUNTER — OFFICE VISIT (OUTPATIENT)
Dept: NEUROLOGY | Age: 44
End: 2024-10-16
Payer: COMMERCIAL

## 2024-10-16 VITALS
WEIGHT: 202 LBS | SYSTOLIC BLOOD PRESSURE: 120 MMHG | DIASTOLIC BLOOD PRESSURE: 70 MMHG | BODY MASS INDEX: 40.8 KG/M2 | HEART RATE: 89 BPM

## 2024-10-16 DIAGNOSIS — M21.372 BILATERAL FOOT-DROP: ICD-10-CM

## 2024-10-16 DIAGNOSIS — M62.81 MUSCLE WEAKNESS (GENERALIZED): ICD-10-CM

## 2024-10-16 DIAGNOSIS — R26.0 ATAXIC GAIT: ICD-10-CM

## 2024-10-16 DIAGNOSIS — T14.8XXA: ICD-10-CM

## 2024-10-16 DIAGNOSIS — G62.9 NEUROPATHY: ICD-10-CM

## 2024-10-16 DIAGNOSIS — M21.371 BILATERAL FOOT-DROP: ICD-10-CM

## 2024-10-16 DIAGNOSIS — G35 MS (MULTIPLE SCLEROSIS) (HCC): Primary | ICD-10-CM

## 2024-10-16 PROCEDURE — G8417 CALC BMI ABV UP PARAM F/U: HCPCS | Performed by: PSYCHIATRY & NEUROLOGY

## 2024-10-16 PROCEDURE — 99214 OFFICE O/P EST MOD 30 MIN: CPT | Performed by: PSYCHIATRY & NEUROLOGY

## 2024-10-16 PROCEDURE — 4004F PT TOBACCO SCREEN RCVD TLK: CPT | Performed by: PSYCHIATRY & NEUROLOGY

## 2024-10-16 PROCEDURE — G8484 FLU IMMUNIZE NO ADMIN: HCPCS | Performed by: PSYCHIATRY & NEUROLOGY

## 2024-10-16 PROCEDURE — G8427 DOCREV CUR MEDS BY ELIG CLIN: HCPCS | Performed by: PSYCHIATRY & NEUROLOGY

## 2024-10-16 RX ORDER — DIPHENHYDRAMINE HCL 25 MG
25 TABLET ORAL EVERY 6 HOURS PRN
Status: CANCELLED
Start: 2024-10-16

## 2024-10-16 RX ORDER — DIPHENHYDRAMINE HYDROCHLORIDE 50 MG/ML
25 INJECTION INTRAMUSCULAR; INTRAVENOUS ONCE
Status: CANCELLED | OUTPATIENT
Start: 2024-10-16 | End: 2024-10-16

## 2024-10-16 RX ORDER — DIPHENHYDRAMINE HCL 25 MG
25 TABLET ORAL ONCE
Status: CANCELLED
Start: 2024-10-16 | End: 2024-10-16

## 2024-10-16 RX ORDER — ACETAMINOPHEN 325 MG/1
650 TABLET ORAL ONCE
Status: CANCELLED | OUTPATIENT
Start: 2024-10-16 | End: 2024-10-16

## 2024-10-16 RX ORDER — CLONAZEPAM 0.5 MG/1
TABLET ORAL
Qty: 60 TABLET | Refills: 3 | Status: SHIPPED | OUTPATIENT
Start: 2024-10-16 | End: 2024-11-16

## 2024-10-16 NOTE — PROGRESS NOTES
Subjective:      Patient ID: Maty Dawson is a 44 y.o. female who presents today for:  Chief Complaint   Patient presents with    Follow-up     Pt states things is ok. Pt states her twitching and shakes are getting worse.        HPI 45 [place] female with history of multiple sclerosis.  Patient last seen 4 months ago and has lower extremity neuropathy and spasticity with pain.  Patient has urinary frequency as well.  We started her Bruimvi  Patient has been stable since then but her main issues appears to be lower extremity pain and spasticity.  Patient is having some shaking and twitching.    Patient having some more tremors in the upper extremity as well.  The lower extremity tremors actually are weakness and mostly consistent with orthostatic tremors.  Patient is not recent falls injuries or trauma.  She is continue to gain some weight    Past Medical History:   Diagnosis Date    Arthritis     Carpal tunnel syndrome     Multiple sclerosis (HCC)      Past Surgical History:   Procedure Laterality Date    APPENDECTOMY      BREAST SURGERY      lump removal bengin    CARPAL TUNNEL RELEASE Left 10/7/2022    LEFT CARPAL TUNNEL RELEASE ([facility] performed by Vahe Dumont MD at [facility] OR    CARPAL TUNNEL RELEASE Right 2022    RIGHT CARPAL TUNNEL RELEASE performed by Vahe Dumont MD at [facility] OR     SECTION      x 3    TUBAL LIGATION      UPPER GASTROINTESTINAL ENDOSCOPY N/A 10/9/2024    ESOPHAGOGASTRODUODENOSCOPY with biopsies performed by Luanne Bridges MD at [facility]     Social History     Socioeconomic History    Marital status:      Spouse name: Not on file    Number of children: Not on file    Years of education: Not on file    Highest education level: Not on file   Occupational History    Not on file   Tobacco Use    Smoking status: Every Day     Current packs/day: 1.00     Types: Cigarettes    Smokeless tobacco: Never   Vaping Use    Vaping status:

## 2024-10-23 ENCOUNTER — HOSPITAL ENCOUNTER (OUTPATIENT)
Dept: INFUSION THERAPY | Age: 44
Setting detail: INFUSION SERIES
Discharge: HOME | End: 2024-10-23
Payer: COMMERCIAL

## 2024-10-23 VITALS
RESPIRATION RATE: 18 BRPM | DIASTOLIC BLOOD PRESSURE: 79 MMHG | OXYGEN SATURATION: 96 % | TEMPERATURE: 97.6 F | HEART RATE: 80 BPM | SYSTOLIC BLOOD PRESSURE: 122 MMHG

## 2024-10-23 DIAGNOSIS — G35 MS (MULTIPLE SCLEROSIS) (HCC): Primary | ICD-10-CM

## 2024-10-23 PROCEDURE — 6370000000 HC RX 637 (ALT 250 FOR IP): Performed by: PSYCHIATRY & NEUROLOGY

## 2024-10-23 PROCEDURE — 2500000003 HC RX 250 WO HCPCS: Performed by: PSYCHIATRY & NEUROLOGY

## 2024-10-23 PROCEDURE — 6360000002 HC RX W HCPCS: Performed by: PSYCHIATRY & NEUROLOGY

## 2024-10-23 PROCEDURE — 96375 TX/PRO/DX INJ NEW DRUG ADDON: CPT

## 2024-10-23 PROCEDURE — 96413 CHEMO IV INFUSION 1 HR: CPT

## 2024-10-23 PROCEDURE — 2580000003 HC RX 258: Performed by: PSYCHIATRY & NEUROLOGY

## 2024-10-23 RX ORDER — DIPHENHYDRAMINE HCL 25 MG
25 TABLET ORAL ONCE
Status: COMPLETED | OUTPATIENT
Start: 2024-10-23 | End: 2024-10-23

## 2024-10-23 RX ORDER — ACETAMINOPHEN 325 MG/1
650 TABLET ORAL ONCE
Status: COMPLETED | OUTPATIENT
Start: 2024-10-23 | End: 2024-10-23

## 2024-10-23 RX ORDER — ACETAMINOPHEN 325 MG/1
650 TABLET ORAL ONCE
OUTPATIENT
Start: 2025-04-09 | End: 2025-04-09

## 2024-10-23 RX ORDER — DIPHENHYDRAMINE HCL 25 MG
25 TABLET ORAL ONCE
Start: 2025-04-09 | End: 2025-04-09

## 2024-10-23 RX ADMIN — DIPHENHYDRAMINE HCL 25 MG: 25 TABLET ORAL at 09:26

## 2024-10-23 RX ADMIN — METHYLPREDNISOLONE SODIUM SUCCINATE 125 MG: 125 INJECTION INTRAMUSCULAR; INTRAVENOUS at 09:28

## 2024-10-23 RX ADMIN — UBLITUXIMAB 450 MG: 150 INJECTION, SOLUTION, CONCENTRATE INTRAVENOUS at 10:01

## 2024-10-23 RX ADMIN — ACETAMINOPHEN 325MG 650 MG: 325 TABLET ORAL at 09:26

## 2024-10-23 NOTE — FLOWSHEET NOTE
Patient to the floor via wheelchair for her Briumvi infusion. Vital signs taken. No distress noted. Call light within reach.

## 2024-10-23 NOTE — FLOWSHEET NOTE
Infusion is complete. Tolerated well. Denies any side effects. Left the unit via wheelchair with her visitor. All equipment used in the care for this patient has been cleaned.

## 2024-10-29 ENCOUNTER — HOSPITAL ENCOUNTER (OUTPATIENT)
Dept: NUCLEAR MEDICINE | Age: 44
Discharge: HOME | End: 2024-10-31
Attending: INTERNAL MEDICINE
Payer: COMMERCIAL

## 2024-10-29 ENCOUNTER — HOSPITAL ENCOUNTER (OUTPATIENT)
Dept: CT IMAGING | Age: 44
Discharge: HOME | End: 2024-10-31
Attending: INTERNAL MEDICINE

## 2024-10-29 DIAGNOSIS — R10.11 RIGHT UPPER QUADRANT ABDOMINAL PAIN: ICD-10-CM

## 2024-10-29 PROCEDURE — A9537 TC99M MEBROFENIN: HCPCS | Performed by: INTERNAL MEDICINE

## 2024-10-29 PROCEDURE — 78227 HEPATOBIL SYST IMAGE W/DRUG: CPT

## 2024-10-29 PROCEDURE — 3430000000 HC RX DIAGNOSTIC RADIOPHARMACEUTICAL: Performed by: INTERNAL MEDICINE

## 2024-10-29 RX ADMIN — Medication 5.3 MILLICURIE: at 08:06

## 2024-11-01 DIAGNOSIS — G62.9 NEUROPATHY: ICD-10-CM

## 2024-11-01 DIAGNOSIS — G56.03 BILATERAL CARPAL TUNNEL SYNDROME: ICD-10-CM

## 2024-11-01 DIAGNOSIS — G35 MULTIPLE SCLEROSIS (HCC): ICD-10-CM

## 2024-11-01 DIAGNOSIS — Z09 HOSPITAL DISCHARGE FOLLOW-UP: ICD-10-CM

## 2024-11-02 NOTE — TELEPHONE ENCOUNTER
Future Appointments    Encounter Information   Provider Department Appt Notes   11/5/2024 Darian Michelle MD [facility] 3 mo f/u   11/12/2024 Jaiden Chaparro, DPIAN [facility] 6 month follow up   12/12/2024 Valentina Richardson APRN - CNP [facility] Gastroenterology f/u after egd/imaging   3/5/2025 Adilson Valderrama MD [facility] Neurology 4 MOS FUP     Past Visits    Date Provider Specialty Visit Type Primary Dx   10/16/2024 Adilson Valderrama MD Neurology Office Visit MS (multiple sclerosis) ([facility])   10/09/2024 Luanne Bridges MD Endoscopy Surgery    10/08/2024 Luanne Bridges MD Gastroenterology Office Visit Right upper quadrant abdominal pain   10/02/2024 Darian Michelle MD Family Medicine Office Visit Right upper quadrant abdominal pain   09/10/2024 Katie Hartley, APRN - CNP Family Medicine Telemedicine Medicare annual wellness visit, subsequent

## 2024-11-04 RX ORDER — GABAPENTIN 600 MG/1
600 TABLET ORAL 3 TIMES DAILY
Qty: 90 TABLET | Refills: 10 | Status: SHIPPED | OUTPATIENT
Start: 2024-11-04 | End: 2025-11-04

## 2024-11-04 RX ORDER — ESCITALOPRAM OXALATE 20 MG/1
TABLET ORAL
Qty: 30 TABLET | Refills: 10 | Status: SHIPPED | OUTPATIENT
Start: 2024-11-04 | End: 2024-11-05 | Stop reason: SDUPTHER

## 2024-11-05 ENCOUNTER — OFFICE VISIT (OUTPATIENT)
Dept: FAMILY MEDICINE CLINIC | Age: 44
End: 2024-11-05
Payer: COMMERCIAL

## 2024-11-05 VITALS
WEIGHT: 200 LBS | SYSTOLIC BLOOD PRESSURE: 128 MMHG | HEART RATE: 86 BPM | HEIGHT: 59 IN | DIASTOLIC BLOOD PRESSURE: 76 MMHG | OXYGEN SATURATION: 97 % | RESPIRATION RATE: 17 BRPM | BODY MASS INDEX: 40.32 KG/M2

## 2024-11-05 DIAGNOSIS — L57.0 KERATOMA: Primary | ICD-10-CM

## 2024-11-05 PROCEDURE — G8484 FLU IMMUNIZE NO ADMIN: HCPCS | Performed by: FAMILY MEDICINE

## 2024-11-05 PROCEDURE — G8427 DOCREV CUR MEDS BY ELIG CLIN: HCPCS | Performed by: FAMILY MEDICINE

## 2024-11-05 PROCEDURE — G8417 CALC BMI ABV UP PARAM F/U: HCPCS | Performed by: FAMILY MEDICINE

## 2024-11-05 PROCEDURE — 99214 OFFICE O/P EST MOD 30 MIN: CPT | Performed by: FAMILY MEDICINE

## 2024-11-05 PROCEDURE — 4004F PT TOBACCO SCREEN RCVD TLK: CPT | Performed by: FAMILY MEDICINE

## 2024-11-05 RX ORDER — ESCITALOPRAM OXALATE 20 MG/1
20 TABLET ORAL DAILY
Qty: 30 TABLET | Refills: 11 | Status: SHIPPED | OUTPATIENT
Start: 2024-11-05

## 2024-11-05 ASSESSMENT — ENCOUNTER SYMPTOMS
RHINORRHEA: 0
CHEST TIGHTNESS: 0
COUGH: 0
RESPIRATORY NEGATIVE: 1
EYES NEGATIVE: 1
GASTROINTESTINAL NEGATIVE: 1

## 2024-11-05 NOTE — PROGRESS NOTES
Patient is seen in follow up for   Chief Complaint   Patient presents with    3 Month Follow-Up     Uds and contract done 24    Mole     Left cheek     HPIhere with lesion for a month.    Past Medical History:   Diagnosis Date    Arthritis     Carpal tunnel syndrome     Multiple sclerosis (HCC)      Patient Active Problem List    Diagnosis Date Noted    Urinary urgency 2023    Bilateral carpal tunnel syndrome 10/21/2022    Left carpal tunnel syndrome 10/07/2022    Spasticity 2022    Right upper quadrant abdominal pain 10/08/2024    Bilateral foot-drop 10/12/2023    Left foot pain 2020    Deficient knowledge 2020    Dog bite of wrist 2020    Impaired mobility 2020    Myalgia 2020    Hypokalemia 10/02/2018    Nicotine dependence, unspecified, uncomplicated 10/02/2018    Overweight 10/02/2018    Ataxic gait 2018    Body mass index (bmi) 39.0-39.9, adult 2018    Muscle weakness (generalized) 2018    Neuromuscular dysfunction of bladder, unspecified 2018    Obesity, unspecified 2018    Paresthesia of skin 2018    Vitamin D deficiency 2018    Neuropathy 2016    Anxiety and depression 2016    MS (multiple sclerosis) ([facility]) 2015     Past Surgical History:   Procedure Laterality Date    APPENDECTOMY      BREAST SURGERY      lump removal bengin    CARPAL TUNNEL RELEASE Left 10/7/2022    LEFT CARPAL TUNNEL RELEASE ([facility]) performed by Vahe Dumont MD at [facility] OR    CARPAL TUNNEL RELEASE Right 2022    RIGHT CARPAL TUNNEL RELEASE performed by Vahe Dumont MD at [facility] OR     SECTION      x 3    TUBAL LIGATION      UPPER GASTROINTESTINAL ENDOSCOPY N/A 10/9/2024    ESOPHAGOGASTRODUODENOSCOPY with biopsies performed by Lu[facility] Bridges MD at [facility]     Family History   Problem Relation Age of Onset    Diabetes Mother     Heart Disease Father     Colon Cancer Neg Hx

## 2024-11-12 ENCOUNTER — OFFICE VISIT (OUTPATIENT)
Dept: PODIATRY | Age: 44
End: 2024-11-12
Payer: COMMERCIAL

## 2024-11-12 VITALS — HEIGHT: 59 IN | WEIGHT: 200 LBS | BODY MASS INDEX: 40.32 KG/M2 | TEMPERATURE: 97.5 F

## 2024-11-12 DIAGNOSIS — M21.372 ACQUIRED BILATERAL FOOT DROP: ICD-10-CM

## 2024-11-12 DIAGNOSIS — M79.671 PAIN IN BOTH FEET: Primary | ICD-10-CM

## 2024-11-12 DIAGNOSIS — M20.5X2 ACQUIRED HALLUX LIMITUS OF BOTH FEET: ICD-10-CM

## 2024-11-12 DIAGNOSIS — G35 MS (MULTIPLE SCLEROSIS) (HCC): ICD-10-CM

## 2024-11-12 DIAGNOSIS — M21.371 ACQUIRED BILATERAL FOOT DROP: ICD-10-CM

## 2024-11-12 DIAGNOSIS — G57.93 PERIPHERAL NEURITIS OF BOTH FEET: ICD-10-CM

## 2024-11-12 DIAGNOSIS — M79.672 PAIN IN BOTH FEET: Primary | ICD-10-CM

## 2024-11-12 DIAGNOSIS — M20.5X1 ACQUIRED HALLUX LIMITUS OF BOTH FEET: ICD-10-CM

## 2024-11-12 PROCEDURE — G8484 FLU IMMUNIZE NO ADMIN: HCPCS | Performed by: PODIATRIST

## 2024-11-12 PROCEDURE — G8427 DOCREV CUR MEDS BY ELIG CLIN: HCPCS | Performed by: PODIATRIST

## 2024-11-12 PROCEDURE — 99213 OFFICE O/P EST LOW 20 MIN: CPT | Performed by: PODIATRIST

## 2024-11-12 PROCEDURE — G8417 CALC BMI ABV UP PARAM F/U: HCPCS | Performed by: PODIATRIST

## 2024-11-12 PROCEDURE — 4004F PT TOBACCO SCREEN RCVD TLK: CPT | Performed by: PODIATRIST

## 2024-11-12 ASSESSMENT — ENCOUNTER SYMPTOMS
NAUSEA: 0
VOMITING: 0
BACK PAIN: 1
SHORTNESS OF BREATH: 0

## 2024-11-13 NOTE — PROGRESS NOTES
from proximal tibial to distal toes bilaterally.  Normal hair growth noted bilaterally.  No varicosities or telangiectasia noted bilaterally.  No changes consistent with ischemia noted bilaterally.   Pulmonary:      Effort: Pulmonary effort is normal.   Musculoskeletal:      Right lower leg: No edema.      Left lower leg: No edema.      Right foot: Decreased range of motion. Deformity and bunion present.      Left foot: Decreased range of motion. Deformity and bunion present.   Feet:      Right foot:      Protective Sensation: 10 sites tested.  0 sites sensed.      Skin integrity: Callus present.      Toenail Condition: Right toenails are ingrown.      Left foot:      Protective Sensation: 10 sites tested.  0 sites sensed.      Skin integrity: Callus present.      Toenail Condition: Left toenails are ingrown.      Comments: Rectus foot type noted bilaterally.  Global weakness with significant weakness with dorsiflexion noted to the bilateral foot.  Mild hallux abductovalgus deformity with decreased first MPJ dorsiflexion noted bilaterally.  Adductovarus rotation deformity of the fifth toe noted bilaterally.  Decreased ankle joint dorsiflexion noted bilaterally.  Lymphadenopathy:      Comments: Popliteal lymph nodes are soft and nontender.   Skin:     General: Skin is warm and dry.      Capillary Refill: Capillary refill takes less than 2 seconds.      Comments: No open lesions noted bilaterally.  Normal skin turgor noted bilaterally.  Normal skin texture noted bilaterally, focal hyperkeratotic lesion noted to the medial aspect of the hallux IPJ bilaterally.  Nail plates are incurvated without sign of acute bacterial infection bilaterally.      Neurological:      Mental Status: She is alert and oriented to person, place, and time.      Deep Tendon Reflexes: Babinski sign absent on the right side. Babinski sign absent on the left side.      Reflex Scores:       Patellar reflexes are 0 on the right side and 0 on the

## 2024-12-12 ENCOUNTER — OFFICE VISIT (OUTPATIENT)
Dept: GASTROENTEROLOGY | Age: 44
End: 2024-12-12
Payer: COMMERCIAL

## 2024-12-12 VITALS
OXYGEN SATURATION: 94 % | SYSTOLIC BLOOD PRESSURE: 122 MMHG | WEIGHT: 202 LBS | DIASTOLIC BLOOD PRESSURE: 78 MMHG | HEART RATE: 94 BPM | BODY MASS INDEX: 40.8 KG/M2

## 2024-12-12 DIAGNOSIS — K82.9 GALLBLADDER DISEASE: Primary | ICD-10-CM

## 2024-12-12 PROCEDURE — 99213 OFFICE O/P EST LOW 20 MIN: CPT | Performed by: NURSE PRACTITIONER

## 2024-12-12 PROCEDURE — 4004F PT TOBACCO SCREEN RCVD TLK: CPT | Performed by: NURSE PRACTITIONER

## 2024-12-12 PROCEDURE — G8427 DOCREV CUR MEDS BY ELIG CLIN: HCPCS | Performed by: NURSE PRACTITIONER

## 2024-12-12 PROCEDURE — G8417 CALC BMI ABV UP PARAM F/U: HCPCS | Performed by: NURSE PRACTITIONER

## 2024-12-12 PROCEDURE — G8484 FLU IMMUNIZE NO ADMIN: HCPCS | Performed by: NURSE PRACTITIONER

## 2024-12-12 ASSESSMENT — ENCOUNTER SYMPTOMS
TROUBLE SWALLOWING: 0
CONSTIPATION: 0
ABDOMINAL DISTENTION: 0
ANAL BLEEDING: 0
VOICE CHANGE: 0
WHEEZING: 0
EYE PAIN: 0
NAUSEA: 1
PHOTOPHOBIA: 0
VOMITING: 1
ABDOMINAL PAIN: 1
CHEST TIGHTNESS: 0
RECTAL PAIN: 0
BLOOD IN STOOL: 0
COLOR CHANGE: 0
EYE REDNESS: 0
SHORTNESS OF BREATH: 0
DIARRHEA: 0

## 2024-12-12 NOTE — PROGRESS NOTES
Subjective:      Patient ID: Maty Dawson is a 44 y.o. female who presents today for:  Chief Complaint   Patient presents with    Follow Up After Procedure       HPI  Patient came in as routine follow-up to abdominal pain associated with nausea and vomiting, had ultrasound with hepatic steatosis otherwise no gallbladder etiology, had HIDA scan with hyperkinetic gallbladder with ejection fraction greater than 95%, had EGD which was normal with pathology negative for H. pylori has been on a PPI and is clinically asymptomatic with no significant improvement in symptoms.  Otherwise no additional complaints or concerns no melena, no hematochezia.              OV 10/8/24  Abdominal Pain  Pertinent negatives include no constipation, diarrhea, fever, headaches, hematuria, nausea or vomiting.      This is a very pleasant 44-year-old with history of MS came in today for further evaluation management.  Patient mentioned that over the last 2 to 3 weeks she has been having abdominal pain and point toward the right upper quadrant.  The pain is radiating to the back.  Had ultrasound that shows fatty liver with no evidence of gallbladder disease.  LFTs were within normal range.  No associated nausea or vomiting.  The pain is not necessarily postprandial.  Given the abdominal pain, patient came in today for further evaluation.  Noted liver function test within normal range.  Patient does have history of MS on biologic agents ublituximab-xiiy.  Patient denies weight loss denies melena or hematochezia.  No prior endoscopic evaluation.  Patient came in today for further evaluation management  Past Medical History:   Diagnosis Date    Arthritis     Carpal tunnel syndrome     Multiple sclerosis (HCC)      Past Surgical History:   Procedure Laterality Date    APPENDECTOMY      BREAST SURGERY      lump removal henrik    CARPAL TUNNEL RELEASE Left 10/7/2022    LEFT CARPAL TUNNEL RELEASE ([facility]) performed by Vahe LOBATO

## 2025-01-02 ENCOUNTER — PREP FOR PROCEDURE (OUTPATIENT)
Dept: SURGERY | Age: 45
End: 2025-01-02

## 2025-01-02 ENCOUNTER — OFFICE VISIT (OUTPATIENT)
Dept: SURGERY | Age: 45
End: 2025-01-02
Payer: COMMERCIAL

## 2025-01-02 VITALS
WEIGHT: 200 LBS | SYSTOLIC BLOOD PRESSURE: 122 MMHG | TEMPERATURE: 98.1 F | BODY MASS INDEX: 39.27 KG/M2 | HEIGHT: 60 IN | DIASTOLIC BLOOD PRESSURE: 82 MMHG

## 2025-01-02 DIAGNOSIS — K82.8 DYSFUNCTIONAL GALLBLADDER: Primary | ICD-10-CM

## 2025-01-02 DIAGNOSIS — K82.8 DYSFUNCTIONAL GALLBLADDER: ICD-10-CM

## 2025-01-02 PROCEDURE — 4004F PT TOBACCO SCREEN RCVD TLK: CPT | Performed by: SURGERY

## 2025-01-02 PROCEDURE — G8417 CALC BMI ABV UP PARAM F/U: HCPCS | Performed by: SURGERY

## 2025-01-02 PROCEDURE — 99204 OFFICE O/P NEW MOD 45 MIN: CPT | Performed by: SURGERY

## 2025-01-02 PROCEDURE — G8427 DOCREV CUR MEDS BY ELIG CLIN: HCPCS | Performed by: SURGERY

## 2025-01-02 RX ORDER — SODIUM CHLORIDE 0.9 % (FLUSH) 0.9 %
5-40 SYRINGE (ML) INJECTION EVERY 12 HOURS SCHEDULED
Status: CANCELLED | OUTPATIENT
Start: 2025-01-14

## 2025-01-02 RX ORDER — SODIUM CHLORIDE 0.9 % (FLUSH) 0.9 %
5-40 SYRINGE (ML) INJECTION PRN
Status: CANCELLED | OUTPATIENT
Start: 2025-01-14

## 2025-01-02 RX ORDER — SODIUM CHLORIDE 9 MG/ML
INJECTION, SOLUTION INTRAVENOUS PRN
Status: CANCELLED | OUTPATIENT
Start: 2025-01-14

## 2025-01-02 NOTE — PROGRESS NOTES
ultrasound.        [facility]  FINDINGS:  There is normal radiotracer uptake within the hepatic parenchyma on initial imaging with uptake identified within the extrahepatic biliary tree on the 6 minute image and within the gallbladder on the 9 minute image.  Uptake is identified within small bowel on the 8 minute image.  Gallbladder ejection fraction measured 95%.  Normal value is >38% for CCK protocol and >33% for Ensure protocol.  Note,Ensure normal range is based on a limited study.     IMPRESSION:  Unremarkable nuclear cholangiogram with gallbladder stimulation.  The gallbladder ejection fraction is 95%.    ASSESSMENT AND PLAN:   Maty Dawson is a 44 y.o. female with a PMH of MS, arthritis, obesity (BMI 40.8), and anxiety/ depression who presents with a hyperkinetic gallbladder.    Review gallbladder anatomy and function using diagrams. Discussed cholecystectomy as well as its risks, benefits, and alternatives of surgical repair. She understands the risks of bleeding, infection, CBD injury, liver injury, bowel injury and possibility of conversion to an open procedure and other complications related to surgery and/or anesthesia. Pt expressed understanding. All questions answered. Pt would like to undergo elective surgery at this time. Recommendations were made to avoid fried/ fatty foods in the meantime.   PAT on admit, OR on 1/14      On this date 1/2/2025 I have spent 45 minutes reviewing previous notes, test results and face to face with the patient discussing the diagnosis and importance of compliance with the treatment plan as well as documenting on the day of the visit.    Carolin Franklin MD   General surgeon    Electronically signed by Carolin Franklin MD

## 2025-01-13 ENCOUNTER — ANESTHESIA EVENT (OUTPATIENT)
Dept: OPERATING ROOM | Age: 45
End: 2025-01-13
Payer: COMMERCIAL

## 2025-01-14 ENCOUNTER — ANESTHESIA (OUTPATIENT)
Dept: OPERATING ROOM | Age: 45
End: 2025-01-14
Payer: COMMERCIAL

## 2025-01-14 ENCOUNTER — HOSPITAL ENCOUNTER (OUTPATIENT)
Age: 45
Setting detail: OUTPATIENT SURGERY
Discharge: HOME | End: 2025-01-14
Attending: SURGERY | Admitting: SURGERY
Payer: COMMERCIAL

## 2025-01-14 VITALS
OXYGEN SATURATION: 94 % | HEART RATE: 103 BPM | TEMPERATURE: 98.5 F | DIASTOLIC BLOOD PRESSURE: 68 MMHG | SYSTOLIC BLOOD PRESSURE: 127 MMHG | BODY MASS INDEX: 39.27 KG/M2 | WEIGHT: 200 LBS | HEIGHT: 60 IN | RESPIRATION RATE: 16 BRPM

## 2025-01-14 DIAGNOSIS — K82.8 DYSFUNCTIONAL GALLBLADDER: ICD-10-CM

## 2025-01-14 DIAGNOSIS — G89.18 POST-OP PAIN: Primary | ICD-10-CM

## 2025-01-14 LAB
ANION GAP SERPL CALCULATED.3IONS-SCNC: 11 MEQ/L (ref 9–15)
BASOPHILS # BLD: 0.1 K/UL (ref 0–0.2)
BASOPHILS NFR BLD: 0.6 %
BUN SERPL-MCNC: 9 MG/DL (ref 6–20)
CALCIUM SERPL-MCNC: 9 MG/DL (ref 8.5–9.9)
CHLORIDE SERPL-SCNC: 103 MEQ/L (ref 95–107)
CO2 SERPL-SCNC: 24 MEQ/L (ref 20–31)
CREAT SERPL-MCNC: 0.77 MG/DL (ref 0.5–0.9)
EOSINOPHIL # BLD: 0.5 K/UL (ref 0–0.7)
EOSINOPHIL NFR BLD: 3.3 %
ERYTHROCYTE [DISTWIDTH] IN BLOOD BY AUTOMATED COUNT: 13.5 % (ref 11.5–14.5)
GLUCOSE SERPL-MCNC: 140 MG/DL (ref 70–99)
HCG, URINE, POC: NEGATIVE
HCT VFR BLD AUTO: 47.3 % (ref 37–47)
HGB BLD-MCNC: 16.5 G/DL (ref 12–16)
LYMPHOCYTES # BLD: 2.6 K/UL (ref 1–4.8)
LYMPHOCYTES NFR BLD: 19 %
Lab: NORMAL
MCH RBC QN AUTO: 32.9 PG (ref 27–31.3)
MCHC RBC AUTO-ENTMCNC: 34.9 % (ref 33–37)
MCV RBC AUTO: 94.2 FL (ref 79.4–94.8)
MONOCYTES # BLD: 0.7 K/UL (ref 0.2–0.8)
MONOCYTES NFR BLD: 4.8 %
NEGATIVE QC PASS/FAIL: NORMAL
NEUTROPHILS # BLD: 9.9 K/UL (ref 1.4–6.5)
NEUTS SEG NFR BLD: 71.8 %
PLATELET # BLD AUTO: 341 K/UL (ref 130–400)
POSITIVE QC PASS/FAIL: NORMAL
POTASSIUM SERPL-SCNC: 4.4 MEQ/L (ref 3.4–4.9)
RBC # BLD AUTO: 5.02 M/UL (ref 4.2–5.4)
SODIUM SERPL-SCNC: 138 MEQ/L (ref 135–144)
WBC # BLD AUTO: 13.8 K/UL (ref 4.8–10.8)

## 2025-01-14 PROCEDURE — 88304 TISSUE EXAM BY PATHOLOGIST: CPT

## 2025-01-14 PROCEDURE — 2720000010 HC SURG SUPPLY STERILE: Performed by: SURGERY

## 2025-01-14 PROCEDURE — 3600000014 HC SURGERY LEVEL 4 ADDTL 15MIN: Performed by: SURGERY

## 2025-01-14 PROCEDURE — 7100000011 HC PHASE II RECOVERY - ADDTL 15 MIN: Performed by: SURGERY

## 2025-01-14 PROCEDURE — 2500000003 HC RX 250 WO HCPCS: Performed by: SURGERY

## 2025-01-14 PROCEDURE — 3600000004 HC SURGERY LEVEL 4 BASE: Performed by: SURGERY

## 2025-01-14 PROCEDURE — 3700000000 HC ANESTHESIA ATTENDED CARE: Performed by: SURGERY

## 2025-01-14 PROCEDURE — 64488 TAP BLOCK BI INJECTION: CPT | Performed by: ANESTHESIOLOGY

## 2025-01-14 PROCEDURE — 6370000000 HC RX 637 (ALT 250 FOR IP)

## 2025-01-14 PROCEDURE — 7100000000 HC PACU RECOVERY - FIRST 15 MIN: Performed by: SURGERY

## 2025-01-14 PROCEDURE — 80048 BASIC METABOLIC PNL TOTAL CA: CPT

## 2025-01-14 PROCEDURE — 2709999900 HC NON-CHARGEABLE SUPPLY: Performed by: SURGERY

## 2025-01-14 PROCEDURE — 6360000002 HC RX W HCPCS: Performed by: ANESTHESIOLOGY

## 2025-01-14 PROCEDURE — 7100000010 HC PHASE II RECOVERY - FIRST 15 MIN: Performed by: SURGERY

## 2025-01-14 PROCEDURE — 6360000002 HC RX W HCPCS

## 2025-01-14 PROCEDURE — 6360000002 HC RX W HCPCS: Performed by: SURGERY

## 2025-01-14 PROCEDURE — 47562 LAPAROSCOPIC CHOLECYSTECTOMY: CPT | Performed by: SURGERY

## 2025-01-14 PROCEDURE — 3700000001 HC ADD 15 MINUTES (ANESTHESIA): Performed by: SURGERY

## 2025-01-14 PROCEDURE — 7100000001 HC PACU RECOVERY - ADDTL 15 MIN: Performed by: SURGERY

## 2025-01-14 PROCEDURE — 6370000000 HC RX 637 (ALT 250 FOR IP): Performed by: ANESTHESIOLOGY

## 2025-01-14 PROCEDURE — 85025 COMPLETE CBC W/AUTO DIFF WBC: CPT

## 2025-01-14 PROCEDURE — 2580000003 HC RX 258: Performed by: SURGERY

## 2025-01-14 PROCEDURE — 2500000003 HC RX 250 WO HCPCS

## 2025-01-14 RX ORDER — SODIUM CHLORIDE 0.9 % (FLUSH) 0.9 %
5-40 SYRINGE (ML) INJECTION PRN
Status: DISCONTINUED | OUTPATIENT
Start: 2025-01-14 | End: 2025-01-14 | Stop reason: HOSPADM

## 2025-01-14 RX ORDER — LABETALOL 20 MG/4 ML (5 MG/ML) INTRAVENOUS SYRINGE
Status: DISCONTINUED | OUTPATIENT
Start: 2025-01-14 | End: 2025-01-14 | Stop reason: SDUPTHER

## 2025-01-14 RX ORDER — LIDOCAINE HYDROCHLORIDE 10 MG/ML
INJECTION, SOLUTION EPIDURAL; INFILTRATION; INTRACAUDAL; PERINEURAL
Status: DISCONTINUED | OUTPATIENT
Start: 2025-01-14 | End: 2025-01-14 | Stop reason: SDUPTHER

## 2025-01-14 RX ORDER — ALBUTEROL SULFATE 90 UG/1
INHALANT RESPIRATORY (INHALATION)
Status: DISCONTINUED | OUTPATIENT
Start: 2025-01-14 | End: 2025-01-14 | Stop reason: SDUPTHER

## 2025-01-14 RX ORDER — FENTANYL CITRATE 50 UG/ML
INJECTION, SOLUTION INTRAMUSCULAR; INTRAVENOUS
Status: DISCONTINUED | OUTPATIENT
Start: 2025-01-14 | End: 2025-01-14 | Stop reason: SDUPTHER

## 2025-01-14 RX ORDER — FENTANYL CITRATE 0.05 MG/ML
50 INJECTION, SOLUTION INTRAMUSCULAR; INTRAVENOUS EVERY 10 MIN PRN
Status: DISCONTINUED | OUTPATIENT
Start: 2025-01-14 | End: 2025-01-14 | Stop reason: HOSPADM

## 2025-01-14 RX ORDER — SODIUM CHLORIDE 9 MG/ML
INJECTION, SOLUTION INTRAVENOUS PRN
Status: DISCONTINUED | OUTPATIENT
Start: 2025-01-14 | End: 2025-01-14 | Stop reason: HOSPADM

## 2025-01-14 RX ORDER — SODIUM CHLORIDE 0.9 % (FLUSH) 0.9 %
5-40 SYRINGE (ML) INJECTION EVERY 12 HOURS SCHEDULED
Status: DISCONTINUED | OUTPATIENT
Start: 2025-01-14 | End: 2025-01-14 | Stop reason: HOSPADM

## 2025-01-14 RX ORDER — ONDANSETRON 2 MG/ML
4 INJECTION INTRAMUSCULAR; INTRAVENOUS
Status: DISCONTINUED | OUTPATIENT
Start: 2025-01-14 | End: 2025-01-14 | Stop reason: HOSPADM

## 2025-01-14 RX ORDER — BUPIVACAINE HYDROCHLORIDE 2.5 MG/ML
INJECTION, SOLUTION EPIDURAL; INFILTRATION; INTRACAUDAL
Status: DISCONTINUED | OUTPATIENT
Start: 2025-01-14 | End: 2025-01-14 | Stop reason: SDUPTHER

## 2025-01-14 RX ORDER — METOCLOPRAMIDE HYDROCHLORIDE 5 MG/ML
10 INJECTION INTRAMUSCULAR; INTRAVENOUS
Status: DISCONTINUED | OUTPATIENT
Start: 2025-01-14 | End: 2025-01-14 | Stop reason: HOSPADM

## 2025-01-14 RX ORDER — MAGNESIUM HYDROXIDE 1200 MG/15ML
LIQUID ORAL CONTINUOUS PRN
Status: DISCONTINUED | OUTPATIENT
Start: 2025-01-14 | End: 2025-01-14 | Stop reason: HOSPADM

## 2025-01-14 RX ORDER — NALOXONE HYDROCHLORIDE 0.4 MG/ML
INJECTION, SOLUTION INTRAMUSCULAR; INTRAVENOUS; SUBCUTANEOUS PRN
Status: DISCONTINUED | OUTPATIENT
Start: 2025-01-14 | End: 2025-01-14 | Stop reason: HOSPADM

## 2025-01-14 RX ORDER — DIPHENHYDRAMINE HYDROCHLORIDE 50 MG/ML
12.5 INJECTION INTRAMUSCULAR; INTRAVENOUS
Status: DISCONTINUED | OUTPATIENT
Start: 2025-01-14 | End: 2025-01-14 | Stop reason: HOSPADM

## 2025-01-14 RX ORDER — DEXAMETHASONE SODIUM PHOSPHATE 10 MG/ML
INJECTION INTRAMUSCULAR; INTRAVENOUS
Status: DISCONTINUED | OUTPATIENT
Start: 2025-01-14 | End: 2025-01-14 | Stop reason: SDUPTHER

## 2025-01-14 RX ORDER — MEPERIDINE HYDROCHLORIDE 25 MG/ML
12.5 INJECTION INTRAMUSCULAR; INTRAVENOUS; SUBCUTANEOUS
Status: DISCONTINUED | OUTPATIENT
Start: 2025-01-14 | End: 2025-01-14 | Stop reason: HOSPADM

## 2025-01-14 RX ORDER — GLYCOPYRROLATE 0.2 MG/ML
INJECTION INTRAMUSCULAR; INTRAVENOUS
Status: DISCONTINUED | OUTPATIENT
Start: 2025-01-14 | End: 2025-01-14 | Stop reason: SDUPTHER

## 2025-01-14 RX ORDER — MIDAZOLAM HYDROCHLORIDE 1 MG/ML
INJECTION, SOLUTION INTRAMUSCULAR; INTRAVENOUS
Status: DISCONTINUED | OUTPATIENT
Start: 2025-01-14 | End: 2025-01-14 | Stop reason: SDUPTHER

## 2025-01-14 RX ORDER — ONDANSETRON 2 MG/ML
INJECTION INTRAMUSCULAR; INTRAVENOUS
Status: DISCONTINUED | OUTPATIENT
Start: 2025-01-14 | End: 2025-01-14 | Stop reason: SDUPTHER

## 2025-01-14 RX ORDER — OXYCODONE HYDROCHLORIDE 5 MG/1
5 TABLET ORAL EVERY 6 HOURS PRN
Qty: 5 TABLET | Refills: 0 | Status: SHIPPED | OUTPATIENT
Start: 2025-01-14 | End: 2025-01-17

## 2025-01-14 RX ORDER — ROCURONIUM BROMIDE 10 MG/ML
INJECTION, SOLUTION INTRAVENOUS
Status: DISCONTINUED | OUTPATIENT
Start: 2025-01-14 | End: 2025-01-14 | Stop reason: SDUPTHER

## 2025-01-14 RX ORDER — DOCUSATE SODIUM 100 MG/1
100 CAPSULE, LIQUID FILLED ORAL DAILY PRN
Qty: 7 CAPSULE | Refills: 0 | Status: SHIPPED | OUTPATIENT
Start: 2025-01-14 | End: 2025-01-21

## 2025-01-14 RX ORDER — PROPOFOL 10 MG/ML
INJECTION, EMULSION INTRAVENOUS
Status: DISCONTINUED | OUTPATIENT
Start: 2025-01-14 | End: 2025-01-14 | Stop reason: SDUPTHER

## 2025-01-14 RX ORDER — OXYCODONE HYDROCHLORIDE 5 MG/1
5 TABLET ORAL
Status: COMPLETED | OUTPATIENT
Start: 2025-01-14 | End: 2025-01-14

## 2025-01-14 RX ADMIN — FENTANYL CITRATE 50 MCG: 50 INJECTION, SOLUTION INTRAMUSCULAR; INTRAVENOUS at 12:04

## 2025-01-14 RX ADMIN — FENTANYL CITRATE 50 MCG: 50 INJECTION, SOLUTION INTRAMUSCULAR; INTRAVENOUS at 11:34

## 2025-01-14 RX ADMIN — FENTANYL CITRATE 50 MCG: 0.05 INJECTION, SOLUTION INTRAMUSCULAR; INTRAVENOUS at 12:46

## 2025-01-14 RX ADMIN — PROPOFOL 50 MG: 10 INJECTION, EMULSION INTRAVENOUS at 11:37

## 2025-01-14 RX ADMIN — LABETALOL 20 MG/4 ML (5 MG/ML) INTRAVENOUS SYRINGE 5 MG: at 11:46

## 2025-01-14 RX ADMIN — HYDROMORPHONE HYDROCHLORIDE 0.5 MG: 1 INJECTION, SOLUTION INTRAMUSCULAR; INTRAVENOUS; SUBCUTANEOUS at 13:07

## 2025-01-14 RX ADMIN — OXYCODONE 5 MG: 5 TABLET ORAL at 13:40

## 2025-01-14 RX ADMIN — ROCURONIUM BROMIDE 50 MG: 10 INJECTION, SOLUTION INTRAVENOUS at 11:05

## 2025-01-14 RX ADMIN — ALBUTEROL SULFATE 2 PUFF: 90 AEROSOL, METERED RESPIRATORY (INHALATION) at 12:21

## 2025-01-14 RX ADMIN — DEXAMETHASONE SODIUM PHOSPHATE 10 MG: 10 INJECTION INTRAMUSCULAR; INTRAVENOUS at 11:16

## 2025-01-14 RX ADMIN — SUGAMMADEX 200 MG: 100 INJECTION, SOLUTION INTRAVENOUS at 12:18

## 2025-01-14 RX ADMIN — GLYCOPYRROLATE 0.2 MG: 0.2 INJECTION INTRAMUSCULAR; INTRAVENOUS at 11:25

## 2025-01-14 RX ADMIN — MIDAZOLAM HYDROCHLORIDE 2 MG: 1 INJECTION, SOLUTION INTRAMUSCULAR; INTRAVENOUS at 09:40

## 2025-01-14 RX ADMIN — BUPIVACAINE HYDROCHLORIDE 30 ML: 2.5 INJECTION, SOLUTION EPIDURAL; INFILTRATION; INTRACAUDAL at 09:40

## 2025-01-14 RX ADMIN — BUPIVACAINE HYDROCHLORIDE 30 ML: 2.5 INJECTION, SOLUTION EPIDURAL; INFILTRATION; INTRACAUDAL at 09:43

## 2025-01-14 RX ADMIN — GLYCOPYRROLATE 0.2 MG: 0.2 INJECTION INTRAMUSCULAR; INTRAVENOUS at 11:28

## 2025-01-14 RX ADMIN — HYDROMORPHONE HYDROCHLORIDE 0.5 MG: 1 INJECTION, SOLUTION INTRAMUSCULAR; INTRAVENOUS; SUBCUTANEOUS at 12:57

## 2025-01-14 RX ADMIN — CEFAZOLIN 2000 MG: 2 INJECTION, POWDER, FOR SOLUTION INTRAMUSCULAR; INTRAVENOUS at 11:09

## 2025-01-14 RX ADMIN — FENTANYL CITRATE 50 MCG: 50 INJECTION, SOLUTION INTRAMUSCULAR; INTRAVENOUS at 11:05

## 2025-01-14 RX ADMIN — PROPOFOL 200 MG: 10 INJECTION, EMULSION INTRAVENOUS at 11:05

## 2025-01-14 RX ADMIN — SODIUM CHLORIDE: 9 INJECTION, SOLUTION INTRAVENOUS at 09:38

## 2025-01-14 RX ADMIN — ONDANSETRON 4 MG: 2 INJECTION, SOLUTION INTRAMUSCULAR; INTRAVENOUS at 12:04

## 2025-01-14 RX ADMIN — LIDOCAINE HYDROCHLORIDE 50 MG: 10 INJECTION, SOLUTION EPIDURAL; INFILTRATION; INTRACAUDAL; PERINEURAL at 11:05

## 2025-01-14 RX ADMIN — PROPOFOL 30 MG: 10 INJECTION, EMULSION INTRAVENOUS at 11:40

## 2025-01-14 ASSESSMENT — PAIN SCALES - GENERAL
PAINLEVEL_OUTOF10: 7
PAINLEVEL_OUTOF10: 8
PAINLEVEL_OUTOF10: 7
PAINLEVEL_OUTOF10: 6
PAINLEVEL_OUTOF10: 5
PAINLEVEL_OUTOF10: 6
PAINLEVEL_OUTOF10: 5

## 2025-01-14 ASSESSMENT — PAIN DESCRIPTION - ORIENTATION
ORIENTATION: RIGHT
ORIENTATION: RIGHT

## 2025-01-14 ASSESSMENT — PAIN - FUNCTIONAL ASSESSMENT
PAIN_FUNCTIONAL_ASSESSMENT: PREVENTS OR INTERFERES SOME ACTIVE ACTIVITIES AND ADLS
PAIN_FUNCTIONAL_ASSESSMENT: ACTIVITIES ARE NOT PREVENTED
PAIN_FUNCTIONAL_ASSESSMENT: 0-10
PAIN_FUNCTIONAL_ASSESSMENT: ACTIVITIES ARE NOT PREVENTED

## 2025-01-14 ASSESSMENT — PAIN DESCRIPTION - DESCRIPTORS
DESCRIPTORS: ACHING

## 2025-01-14 ASSESSMENT — PAIN DESCRIPTION - LOCATION
LOCATION: ABDOMEN

## 2025-01-14 NOTE — PROGRESS NOTES
1345 - Discharge instructions provided to patient and  - no further questions or concerns at this time.  picked up discharge medications.     1355 - Patient getting dressed with help from . Denied lightheadedness/dizziness.     1400 - waiting for ride     1412 - Patient discharged via wheelchair to car with friend and .

## 2025-01-14 NOTE — H&P
UPDATED HISTORY AND PHYSICAL EXAMINATION    The History and Physical (completed in the past 30 days) has been reviewed and the patient has been examined. The contents accurately reflect the patient's condition with the following additions or revisions since the H&P was completed.    Examination indicates no changes.  This H&P can be found in the Electronic Medical Record by myself on 1/2/25.    SIGNATURE: Carolin Franklin MD   DATE: 1/14/2025  TIME: 10:35 AM

## 2025-01-14 NOTE — ANESTHESIA PRE PROCEDURE
Department of Anesthesiology  Preprocedure Note       Name:  Maty Dawson   Age:  44 y.o.  :  1980                                          MRN:  [id number]         Date:  2025      Surgeon: Surgeon(s):  Carolin Franklin MD    Procedure: Procedure(s):  Laparoscopic possible open cholecystectomy- PAT on admit    Medications prior to admission:   Prior to Admission medications    Medication Sig Start Date End Date Taking? Authorizing Provider   escitalopram (LEXAPRO) 20 MG tablet Take 1 tablet by mouth daily 24   Darian Michelle MD   gabapentin (NEURONTIN) 600 MG tablet Take 1 tablet by mouth 3 times daily. 24  Darian Michelle MD   clonazePAM (KLONOPIN) 0.5 MG tablet 1 tablet twice a day 10/16/24 11/16/24  Adilson Valderrama MD   esomeprazole (NEXIUM) 40 MG delayed release capsule Take 1 capsule by mouth daily 10/8/24   Luanne Bridges MD   vitamin D (VITAMIN D3) 50 MCG (2000 UT) CAPS capsule TAKE 1 CAPSULE BY MOUTH ONCE DAILY 10/4/24   Katie Hartley APRN - CNP   etodolac (LODINE) 400 MG tablet TAKE 1 TABLET BY MOUTH 2 TIMES DAILY 10/4/24   Katie Hartley APRN - CNP   Ublituximab-xiiy (BRIUMVI IV) Infuse intravenously    ProviderBay MD   baclofen (LIORESAL) 10 MG tablet TAKE 1 TABLET BY MOUTH 4 TIMES A DAY 23   Darian Michelle MD   varenicline (CHANTIX) 1 MG tablet Take 1 tablet by mouth 2 times daily 23   Darian Michelle MD   Handicap Placard MISC by Does not apply route Duration of 5 years  Dx:MS 22   Darian Michelle MD   levonorgestrel (MIRENA) IUD 52 mg 1 each by IntraUTERine route    ProviderBay MD       Current medications:    Current Facility-Administered Medications   Medication Dose Route Frequency Provider Last Rate Last Admin   • ceFAZolin (ANCEF) 2,000 mg in sterile water 20 mL IV syringe  2,000 mg IntraVENous On Call to OR Carolin Franklin MD       • sodium chloride flush 0.9 % injection 5-40 mL  5-40 mL IntraVENous 2 times per day

## 2025-01-14 NOTE — PROGRESS NOTES
CLINICAL PHARMACY NOTE: MEDS TO BEDS    Total # of Prescriptions Filled: 2      The following medications were delivered to the patient:  Oxycodone 5mg tab   Docusate Sod. 100mg Cap    Additional Documentation:

## 2025-01-14 NOTE — OP NOTE
Operative Note        Patient: Maty Dawson  YOB: 1980  MRN: [id number]     Date of Procedure: 1/14/2025  Procedure Start Time: 11:26 am  Procedure End Time: 12:03 pm     Pre-Op Diagnosis: Hyperkinetic gallbladder  Post-Op Diagnosis: Same       Procedure: Laparoscopic cholecystectomy     Surgeon: Carolin Franklin MD   First Assistant: Janina Calzada     Anesthesia: General     Estimated Blood Loss (mL): Minimal     Specimens: Gallbladder    Description of Procedure:   With the patient in supine position, adequate general anesthesia obtained. She was given preoperative ancef and SCDs plugged in. Abdomen was prepped with ChloraPrep and draped in sterile fashion. A time-out was performed to verify patient and procedure.  A small incision was made at Alvarez's point then using optical entry, a 5mm port was placed to gain access into the abdomen and establish pneumoperitoneum to a final pressure of 15mm Hg. There was no evidence of injury from this. Then, A 12mm epigastric port, a 5mm right of the umbilicus port, and 2 right upper quadrant 5mm ports were inserted under direct visualization.  The gallbladder appeared elongated. There were no omental adhesions to it. The peritoneum overlying the cystic duct and artery were incised. They were skeletonized and the critical view of safety obtained. The cystic duct was doubly clipped with hemoclips and divided. Likewise, the cystic artery was doubly clipped and divided. The peritoneum overlying the gallbladder was incised. The gallbladder was dissected free from the liver bed completely. There was no bile spilled. Hemostasis in the liver bed was ensured. The gallbladder was placed into an EndoCatch bag and brought through the epigastric port site. The port was reinserted. All fluid aspirated and hemostasis complete.  The epigastric fascia was closed using an Omniclose device with two 0 vicryl stitches. The ports were then removed under direct vision as the  gas was evacuated. There was no bleeding from the port sites. The epigastric incision was closed with 3-0 vicryl deep dermal stitches. Skin incisions were closed with subcutaneous 4-0 monocryl. Glue was then applied. The patient tolerated the procedure well and was transferred to the PACU in stable condition. Sponge, tape, needle, and instrument counts were correct x2        Drains: None    Complications: None      Electronically signed by Carolin Franklin MD

## 2025-01-14 NOTE — ANESTHESIA POSTPROCEDURE EVALUATION
Department of Anesthesiology  Postprocedure Note    Patient: Maty Dawson  MRN: [id number]  YOB: 1980  Date of evaluation: 1/14/2025    Procedure Summary       Date: 01/14/25 Room / Location: [facility] 09 / [facility]    Anesthesia Start: 1101 Anesthesia Stop: 1230    Procedure: Laparoscopic cholecystectomy (Abdomen) Diagnosis:       Dysfunctional gallbladder      (Dysfunctional gallbladder [K82.8])    Surgeons: Carolin Franklin MD Responsible Provider: Giancarlo Alston MD    Anesthesia Type: general ASA Status: 3            Anesthesia Type: No value filed.    John Phase I: John Score: 5    John Phase II:      Anesthesia Post Evaluation    Patient location during evaluation: bedside  Patient participation: complete - patient participated  Level of consciousness: awake and awake and alert  Airway patency: patent  Nausea & Vomiting: no nausea and no vomiting  Cardiovascular status: blood pressure returned to baseline and hemodynamically stable  Respiratory status: acceptable  Hydration status: euvolemic  Pain management: adequate        No notable events documented.

## 2025-01-14 NOTE — BRIEF OP NOTE
Brief Postoperative Note      Patient: Maty Dawson  YOB: 1980  MRN: [id number]    Date of Procedure: 1/14/2025  Procedure Start Time: 11:26 am  Procedure End Time: 12:03 pm    Pre-Op Diagnosis: Hyperkinetic gallbladder  Post-Op Diagnosis: Same       Procedure: Laparoscopic cholecystectomy    Surgeon: Carolin Franklin MD   First Assistant: Janina Calzada    Anesthesia: General    Estimated Blood Loss (mL): Minimal    Complications: None    Specimens:   ID Type Source Tests Collected by Time Destination   A : Gallbladder Tissue Gallbladder SURGICAL PATHOLOGY Carolin Franklin MD 1/14/2025 0940      Implants: None      Drains: None    Findings: Critical view of safety obtained.   Infection Present At Time Of Surgery (PATOS) (choose all levels that have infection present): None      Electronically signed by Carolin Franklin MD on 1/14/2025 at 12:20 PM

## 2025-01-14 NOTE — ANESTHESIA PROCEDURE NOTES
Peripheral Block    Patient location during procedure: pre-op  Reason for block: post-op pain management and at surgeon's request  Start time: 1/14/2025 9:40 AM  Staffing  Performed: anesthesiologist   Anesthesiologist: Giancarlo Alston MD  Performed by: Giancarlo Alston MD  Authorized by: Giancarlo Alston MD    Preanesthetic Checklist  Completed: patient identified, IV checked, site marked, risks and benefits discussed, surgical/procedural consents, equipment checked, pre-op evaluation, timeout performed, anesthesia consent given, oxygen available and monitors applied/VS acknowledged  Peripheral Block   Patient position: supine  Prep: ChloraPrep  Provider prep: mask and sterile gloves (Sterile probe cover)  Patient monitoring: cardiac monitor, continuous pulse ox, frequent blood pressure checks and IV access  Block type: TAP  Laterality: bilateral  Injection technique: single-shot  Guidance: ultrasound guided  Local infiltration: bupivacaine  Infiltration strength: 0.25 %  Local infiltration: bupivacaine  Dose: 30 mLDose: 30 mL    Needle   Needle type: combined needle/nerve stimulator   Needle gauge: 22 G  Needle localization: anatomical landmarks and ultrasound guidance  Test dose: negative  Needle length: 5 cm  Assessment   Injection assessment: negative aspiration for heme, no paresthesia on injection and local visualized surrounding nerve on ultrasound  Paresthesia pain: immediately resolved  Slow fractionated injection: yes  Hemodynamics: stable  Outcomes: uncomplicated and patient tolerated procedure well    Additional Notes  Ultrasound guidance used to view needle for placement.    Ultrasound image stored,  printed and saved in patient chart.    Sterile probe cover used

## 2025-01-14 NOTE — DISCHARGE INSTRUCTIONS
The following is a brief overview of your hospitalization. Some of the information contained on this summary may be confidential.  This information should be kept in your records and should be shared with your regular doctor.    Admission Date: 1/14/2025  Discharge Date: 1/14/2025    Disposition:  Home    PRINCIPAL DIAGNOSIS (reason after study for this admission): Hyperkinetic gallbladder    Physicians:               Attending: Carolin Gardiner MD    Refer to After Visit Summary for Home Medications List  -After Visit Summary and medications reviewed with patient and family  -Please discard previous medication list and update your records with the new medication list, medication providers, or pharmacies    Procedures performed while hospitalized:   IV access  Venapuncture  Anesthesia/ Intubation (for surgery)    Operations performed while hospitalized:   Laparoscopic cholecystectomy    Treatment/wound care: Keep incision clean and dry- they are covered with surgical glue (do not peel off the glue, it will fall off on its own when ready, usually in 1-2 weeks).  Ok to shower upon discharge.  Do not scrub wound vigorously. Pat incision(s) dry after showering.  Do not submerge your incision: No baths, jacuzzi/ hot tubes, or swimming for 3 weeks. BE SURE TO LOOK AT YOUR INCISION DAILY (use a mirror if you need to or have some look at it and take a photo if need be.) IF THERE ARE ANY ISSUES WITH YOUR INCISION(S) SUCH AS DRAINAGE, BULGING, OR REDNESS, PLEASE CALL THE OFFICE AND LET DR GARDINER KNOW RIGHT AWAY. DO NOT MYCHART MESSAGE AS RESPONSES MAY BE DELAYED.    Pending results:  Pathology results (to be discussed at follow-up visit)    Pain Control:  Take tylenol or ibuprofen for pain relief- can take each every 6 hours. (Best to alternate the tylenol and ibuprofen every 3 hours: for example, if you take tylenol at 12pm, take ibuprofen at 3pm, then your next tylenol dose at 6pm). You may take prescription pain

## 2025-01-23 NOTE — PROGRESS NOTES
GENERAL SURGERY POST OPERATIVE VISIT    Pt Name: Maty Dawson  MRN: [id number]  Date: 1/24/2025       SUBJECTIVE:   Maty Dawson is a 44 y.o. female who is now 1.5 weeks s/p laparoscopic cholecystectomy for for a hyperkinetic gallbladder.  Upon presentation, pt reports doing well. She is not having any abdominal pain. Tolerating a diet with no diarrhea. No incisional drainage, fevers, chills, or night sweats.     OBJECTIVE:   CURRENT VITALS: /80   Temp 97.5 °F (36.4 °C)   Ht 1.511 m (4' 11.5\")   Wt 89.8 kg (198 lb)   LMP  (LMP Unknown)   BMI 39.32 kg/m²      GEN: Alert and oriented x3, no acute distress, cooperative   SKIN: Skin color, texture, turgor normal. No rashes or lesions  HEENT: Head is normocephalic, atraumatic. EOMI  NECK: Supple, symmetrical, trachea midline, skin normal  PULM: Chest symmetric, no increased work of breathing or accessory muscle use  CV: Heart regular rate   ABD: Soft, nontender, nondistended, no guarding, port sites c/d/I without surrounding erythema  EXTREMITIES: Warm, dry    PATHOLOGY:   FINAL DIAGNOSIS: GALLBLADDER - MILD CHOLECYSTITIS. NO CALCULI IDENTIFIED.     ASSESSMENT AND PLAN:   Maty Dawson is a 44 y.o. female now 1.5 weeks s/p laparoscopic cholecystectomy for for a hyperkinetic gallbladder. Recovering well as expected.    Pathology results reviewed with patient  Return to clinic on an as needed basis      Carolin Franklin MD   General surgeon    Electronically signed by Carolin Franklin MD

## 2025-01-24 ENCOUNTER — OFFICE VISIT (OUTPATIENT)
Dept: SURGERY | Age: 45
End: 2025-01-24

## 2025-01-24 VITALS
WEIGHT: 198 LBS | SYSTOLIC BLOOD PRESSURE: 124 MMHG | TEMPERATURE: 97.5 F | HEIGHT: 60 IN | DIASTOLIC BLOOD PRESSURE: 80 MMHG | BODY MASS INDEX: 38.87 KG/M2

## 2025-01-24 DIAGNOSIS — Z09 POSTOP CHECK: Primary | ICD-10-CM

## 2025-01-24 PROCEDURE — 99024 POSTOP FOLLOW-UP VISIT: CPT | Performed by: SURGERY

## 2025-02-10 RX ORDER — BACLOFEN 10 MG/1
TABLET ORAL
Qty: 360 TABLET | Refills: 3 | Status: SHIPPED | OUTPATIENT
Start: 2025-02-10

## 2025-02-18 ENCOUNTER — OFFICE VISIT (OUTPATIENT)
Age: 45
End: 2025-02-18
Payer: COMMERCIAL

## 2025-02-18 VITALS
SYSTOLIC BLOOD PRESSURE: 120 MMHG | RESPIRATION RATE: 17 BRPM | DIASTOLIC BLOOD PRESSURE: 78 MMHG | HEART RATE: 72 BPM | OXYGEN SATURATION: 95 %

## 2025-02-18 DIAGNOSIS — G35 MULTIPLE SCLEROSIS (HCC): Primary | ICD-10-CM

## 2025-02-18 DIAGNOSIS — F41.9 ANXIETY AND DEPRESSION: ICD-10-CM

## 2025-02-18 DIAGNOSIS — F32.A ANXIETY AND DEPRESSION: ICD-10-CM

## 2025-02-18 DIAGNOSIS — E87.6 HYPOKALEMIA: ICD-10-CM

## 2025-02-18 PROCEDURE — G8417 CALC BMI ABV UP PARAM F/U: HCPCS | Performed by: FAMILY MEDICINE

## 2025-02-18 PROCEDURE — G8427 DOCREV CUR MEDS BY ELIG CLIN: HCPCS | Performed by: FAMILY MEDICINE

## 2025-02-18 PROCEDURE — 99213 OFFICE O/P EST LOW 20 MIN: CPT | Performed by: FAMILY MEDICINE

## 2025-02-18 PROCEDURE — 4004F PT TOBACCO SCREEN RCVD TLK: CPT | Performed by: FAMILY MEDICINE

## 2025-02-18 RX ORDER — ESCITALOPRAM OXALATE 20 MG/1
20 TABLET ORAL DAILY
Qty: 30 TABLET | Refills: 11 | Status: SHIPPED | OUTPATIENT
Start: 2025-02-18

## 2025-02-18 SDOH — ECONOMIC STABILITY: FOOD INSECURITY: WITHIN THE PAST 12 MONTHS, YOU WORRIED THAT YOUR FOOD WOULD RUN OUT BEFORE YOU GOT MONEY TO BUY MORE.: NEVER TRUE

## 2025-02-18 SDOH — ECONOMIC STABILITY: FOOD INSECURITY: WITHIN THE PAST 12 MONTHS, THE FOOD YOU BOUGHT JUST DIDN'T LAST AND YOU DIDN'T HAVE MONEY TO GET MORE.: NEVER TRUE

## 2025-02-18 ASSESSMENT — PATIENT HEALTH QUESTIONNAIRE - PHQ9
7. TROUBLE CONCENTRATING ON THINGS, SUCH AS READING THE NEWSPAPER OR WATCHING TELEVISION: NOT AT ALL
2. FEELING DOWN, DEPRESSED OR HOPELESS: NOT AT ALL
4. FEELING TIRED OR HAVING LITTLE ENERGY: NOT AT ALL
SUM OF ALL RESPONSES TO PHQ QUESTIONS 1-9: 0
6. FEELING BAD ABOUT YOURSELF - OR THAT YOU ARE A FAILURE OR HAVE LET YOURSELF OR YOUR FAMILY DOWN: NOT AT ALL
5. POOR APPETITE OR OVEREATING: NOT AT ALL
3. TROUBLE FALLING OR STAYING ASLEEP: NOT AT ALL
8. MOVING OR SPEAKING SO SLOWLY THAT OTHER PEOPLE COULD HAVE NOTICED. OR THE OPPOSITE, BEING SO FIGETY OR RESTLESS THAT YOU HAVE BEEN MOVING AROUND A LOT MORE THAN USUAL: NOT AT ALL
SUM OF ALL RESPONSES TO PHQ QUESTIONS 1-9: 0
SUM OF ALL RESPONSES TO PHQ QUESTIONS 1-9: 0
1. LITTLE INTEREST OR PLEASURE IN DOING THINGS: NOT AT ALL
SUM OF ALL RESPONSES TO PHQ QUESTIONS 1-9: 0
9. THOUGHTS THAT YOU WOULD BE BETTER OFF DEAD, OR OF HURTING YOURSELF: NOT AT ALL
SUM OF ALL RESPONSES TO PHQ9 QUESTIONS 1 & 2: 0

## 2025-02-18 NOTE — PROGRESS NOTES
[facility]  [facility]  [address] [address]  Dept: [phone]  Dept Fax: [phone]  Loc: [phone]     Subjective  Maty Dawson, 44 y.o. female Established patient presents today with:  Chief Complaint   Patient presents with    3 Month Follow-Up       History of Present Illness  The patient presents for evaluation of anxiety, knuckle pain, and status post cholecystectomy.    She reports a delay in receiving her Lexapro prescription, which has been ongoing for approximately one month. She expresses concern about potential adverse effects on her mental health due to this delay. She also mentions an incident where she experienced heightened irritability, leading to a confrontation with her niece.    She describes experiencing pain in her knuckle, the onset of which was noted post-surgery. She speculates that the discomfort may be due to inadvertent stretching or bending of the knuckle while under the influence of anesthesia. Despite the pain, she retains the ability to form a fist.    She underwent a cholecystectomy and is currently on medication prescribed by her gastroenterologist. She seeks clarification on whether she can discontinue this medication now that her gallbladder has been removed.    MEDICATIONS  Lexapro      Past Medical History:   Diagnosis Date    Arthritis     Carpal tunnel syndrome     Multiple sclerosis (HCC)      Past Surgical History:   Procedure Laterality Date    APPENDECTOMY      laparoscopic at [facility]    BREAST SURGERY      lump removal bengin    CARPAL TUNNEL RELEASE Left 10/07/2022    LEFT CARPAL TUNNEL RELEASE ([facility]) performed by Vahe Dumont MD at [facility] OR    CARPAL TUNNEL RELEASE Right 2022    RIGHT CARPAL TUNNEL RELEASE performed by Vahe Dumont MD at [facility] OR     SECTION      x 3: '99, '03, '05 via

## 2025-03-05 ENCOUNTER — OFFICE VISIT (OUTPATIENT)
Dept: NEUROLOGY | Age: 45
End: 2025-03-05
Payer: COMMERCIAL

## 2025-03-05 VITALS
DIASTOLIC BLOOD PRESSURE: 82 MMHG | WEIGHT: 203 LBS | HEART RATE: 87 BPM | BODY MASS INDEX: 40.31 KG/M2 | SYSTOLIC BLOOD PRESSURE: 124 MMHG

## 2025-03-05 DIAGNOSIS — R25.2 SPASTICITY: ICD-10-CM

## 2025-03-05 DIAGNOSIS — G35 MS (MULTIPLE SCLEROSIS) (HCC): Primary | ICD-10-CM

## 2025-03-05 DIAGNOSIS — G62.9 NEUROPATHY: ICD-10-CM

## 2025-03-05 DIAGNOSIS — R39.15 URINARY URGENCY: ICD-10-CM

## 2025-03-05 DIAGNOSIS — G56.03 BILATERAL CARPAL TUNNEL SYNDROME: ICD-10-CM

## 2025-03-05 DIAGNOSIS — M21.372 BILATERAL FOOT-DROP: ICD-10-CM

## 2025-03-05 DIAGNOSIS — M21.371 BILATERAL FOOT-DROP: ICD-10-CM

## 2025-03-05 DIAGNOSIS — R26.0 ATAXIC GAIT: ICD-10-CM

## 2025-03-05 PROCEDURE — G8417 CALC BMI ABV UP PARAM F/U: HCPCS | Performed by: PSYCHIATRY & NEUROLOGY

## 2025-03-05 PROCEDURE — G8427 DOCREV CUR MEDS BY ELIG CLIN: HCPCS | Performed by: PSYCHIATRY & NEUROLOGY

## 2025-03-05 PROCEDURE — 4004F PT TOBACCO SCREEN RCVD TLK: CPT | Performed by: PSYCHIATRY & NEUROLOGY

## 2025-03-05 PROCEDURE — 99214 OFFICE O/P EST MOD 30 MIN: CPT | Performed by: PSYCHIATRY & NEUROLOGY

## 2025-03-05 NOTE — PROGRESS NOTES
Subjective:      Patient ID: Maty Dawson is a 44 y.o. female who presents today for:  Chief Complaint   Patient presents with    Follow-up     Pt states things have been a little rough but she is doing alright. Pt wants to discuss about her wheelchair she need information she is getting the wheelchair through her insurance        HPI 44 right-handed female with history of multiple sclerosis with neuropathy.  Patient has ataxic gait patient has muscle weakness with bilateral foot drops.  Patient reports being on Bruimvi doing well.  She is here as she is having more issues with walking and her legs still are weak and does not hold her weight at times.  She is therefore having difficulty with her activity of daily living on a daily basis at home.    Patient denies any recent clinical relapse.  She denies any recent falls but does buckled her legs.  Patient has neuropathic pain continues on gabapentin 600 mg 3 times a day    Past Medical History:   Diagnosis Date    Arthritis     Carpal tunnel syndrome     Multiple sclerosis (HCC)      Past Surgical History:   Procedure Laterality Date    APPENDECTOMY      laparoscopic at [facility]    BREAST SURGERY      lump removal bengin    CARPAL TUNNEL RELEASE Left 10/07/2022    LEFT CARPAL TUNNEL RELEASE ([facility] performed by Vahe Dumont MD at [facility] OR    CARPAL TUNNEL RELEASE Right 2022    RIGHT CARPAL TUNNEL RELEASE performed by Vahe Dumont MD at [facility] OR     SECTION      x 3: '99, '03, '05 via Pfannenstiel incision    CHOLECYSTECTOMY, LAPAROSCOPIC N/A 2025    Laparoscopic cholecystectomy performed by Carolin Franklin MD at [facility] OR    TUBAL LIGATION      UPPER GASTROINTESTINAL ENDOSCOPY N/A 10/09/2024    ESOPHAGOGASTRODUODENOSCOPY with biopsies performed by Luanne Bridges MD at [facility]     Social History     Socioeconomic History    Marital status:      Spouse name: Not on file    Number of

## 2025-03-07 RX ORDER — ESOMEPRAZOLE MAGNESIUM 40 MG/1
CAPSULE, DELAYED RELEASE ORAL DAILY
Qty: 90 CAPSULE | Refills: 1 | Status: SHIPPED | OUTPATIENT
Start: 2025-03-07

## 2025-03-14 RX ORDER — DIPHENHYDRAMINE HCL 25 MG
25 TABLET ORAL EVERY 6 HOURS PRN
Start: 2025-04-04

## 2025-03-14 RX ORDER — ACETAMINOPHEN 325 MG/1
650 TABLET ORAL ONCE
OUTPATIENT
Start: 2025-04-04 | End: 2025-04-04

## 2025-03-14 RX ORDER — ACETAMINOPHEN 325 MG/1
650 TABLET ORAL EVERY 4 HOURS PRN
OUTPATIENT
Start: 2025-04-04

## 2025-03-14 RX ORDER — DIPHENHYDRAMINE HYDROCHLORIDE 50 MG/ML
50 INJECTION, SOLUTION INTRAMUSCULAR; INTRAVENOUS EVERY 4 HOURS PRN
OUTPATIENT
Start: 2025-04-04

## 2025-04-15 DIAGNOSIS — G35 MS (MULTIPLE SCLEROSIS) (HCC): ICD-10-CM

## 2025-04-15 NOTE — TELEPHONE ENCOUNTER
Requesting medication refill. Please approve or deny this request.    Rx requested:  Requested Prescriptions     Pending Prescriptions Disp Refills    clonazePAM (KLONOPIN) 0.5 MG tablet [Pharmacy Med Name: CLONAZEPAM 0.5 MG TABLET] 60 tablet      Si TABLET TWICE A DAY         Last Office Visit:   3/5/2025      Next Visit Date:  Future Appointments   Date Time Provider Department Center   2025  8:00 AM [facility]   2025 10:30 AM Jaiden Chaparro DPM Podiatry [facility]   2025 10:30 AM Darian Michelle MD [facility]   2025  2:15 PM Adilson Valderrama MD [facility] -               Last refill 10/16/25. Please approve or deny.

## 2025-04-16 RX ORDER — CLONAZEPAM 0.5 MG/1
TABLET ORAL
Qty: 60 TABLET | Refills: 1 | Status: ON HOLD | OUTPATIENT
Start: 2025-04-16 | End: 2025-06-15

## 2025-04-24 ENCOUNTER — HOSPITAL ENCOUNTER (OUTPATIENT)
Dept: INFUSION THERAPY | Age: 45
Setting detail: INFUSION SERIES
Discharge: HOME | End: 2025-04-24
Payer: COMMERCIAL

## 2025-04-24 VITALS
SYSTOLIC BLOOD PRESSURE: 107 MMHG | OXYGEN SATURATION: 92 % | HEART RATE: 70 BPM | TEMPERATURE: 98.1 F | DIASTOLIC BLOOD PRESSURE: 72 MMHG | RESPIRATION RATE: 18 BRPM

## 2025-04-24 DIAGNOSIS — G35 MS (MULTIPLE SCLEROSIS) (HCC): Primary | ICD-10-CM

## 2025-04-24 PROCEDURE — 2580000003 HC RX 258: Performed by: PSYCHIATRY & NEUROLOGY

## 2025-04-24 PROCEDURE — 96374 THER/PROPH/DIAG INJ IV PUSH: CPT

## 2025-04-24 PROCEDURE — 6360000002 HC RX W HCPCS: Performed by: PSYCHIATRY & NEUROLOGY

## 2025-04-24 PROCEDURE — 6370000000 HC RX 637 (ALT 250 FOR IP): Performed by: PSYCHIATRY & NEUROLOGY

## 2025-04-24 PROCEDURE — 96413 CHEMO IV INFUSION 1 HR: CPT

## 2025-04-24 RX ORDER — ACETAMINOPHEN 325 MG/1
650 TABLET ORAL ONCE
Status: COMPLETED | OUTPATIENT
Start: 2025-04-24 | End: 2025-04-24

## 2025-04-24 RX ORDER — ACETAMINOPHEN 325 MG/1
650 TABLET ORAL ONCE
OUTPATIENT
Start: 2025-10-09 | End: 2025-10-09

## 2025-04-24 RX ORDER — DIPHENHYDRAMINE HYDROCHLORIDE 50 MG/ML
50 INJECTION, SOLUTION INTRAMUSCULAR; INTRAVENOUS EVERY 4 HOURS PRN
OUTPATIENT
Start: 2025-10-09

## 2025-04-24 RX ORDER — DIPHENHYDRAMINE HCL 25 MG
25 TABLET ORAL EVERY 6 HOURS PRN
Start: 2025-10-09

## 2025-04-24 RX ORDER — DIPHENHYDRAMINE HCL 25 MG
25 TABLET ORAL EVERY 6 HOURS PRN
Status: DISCONTINUED | OUTPATIENT
Start: 2025-04-24 | End: 2025-04-25 | Stop reason: HOSPADM

## 2025-04-24 RX ORDER — ACETAMINOPHEN 325 MG/1
650 TABLET ORAL EVERY 4 HOURS PRN
OUTPATIENT
Start: 2025-10-09

## 2025-04-24 RX ADMIN — UBLITUXIMAB 450 MG: 150 INJECTION, SOLUTION, CONCENTRATE INTRAVENOUS at 09:33

## 2025-04-24 RX ADMIN — ACETAMINOPHEN 650 MG: 325 TABLET ORAL at 09:15

## 2025-04-24 RX ADMIN — DIPHENHYDRAMINE HCL 25 MG: 25 TABLET ORAL at 08:48

## 2025-04-24 RX ADMIN — METHYLPREDNISOLONE SODIUM SUCCINATE 125 MG: 125 INJECTION, POWDER, FOR SOLUTION INTRAMUSCULAR; INTRAVENOUS at 09:15

## 2025-04-24 NOTE — PROGRESS NOTES
Patient arrived to the unit via wheelchair. Vital signs taken and stable. Patient oriented to room and call light. Call light within reach.    0827 - IV placed without complications. Brisk blood return noted. Pt tolerated well. Call light within reach.     0848 - Premedications given, pt tolerated well. Pt educated on signs and symptoms of medication reactions. Patient verbalized understanding, no further questions. 15 minute observation started.    0933 - Briumvi Infusion started @ 100mL/HR for 30 minutes. Call light within reach.     1003 - Pt tolerating infusion, increased to 400 mL/HR per MAR orders. Call light within reach.     1038 - Infusion completed. Patient without signs/symptoms of reaction. IV removed without complication. Pt tolerated well. Catheter appears intact, dressing applied. Pt left unit via ambulatory. All equipment used in the care for this patient has been cleaned.

## 2025-05-13 ENCOUNTER — OFFICE VISIT (OUTPATIENT)
Age: 45
End: 2025-05-13
Payer: COMMERCIAL

## 2025-05-13 VITALS — WEIGHT: 195 LBS | BODY MASS INDEX: 38.28 KG/M2 | TEMPERATURE: 97.5 F | HEIGHT: 60 IN

## 2025-05-13 DIAGNOSIS — M21.372 ACQUIRED BILATERAL FOOT DROP: ICD-10-CM

## 2025-05-13 DIAGNOSIS — G57.93 PERIPHERAL NEURITIS OF BOTH FEET: ICD-10-CM

## 2025-05-13 DIAGNOSIS — M20.5X1 ACQUIRED HALLUX LIMITUS OF BOTH FEET: ICD-10-CM

## 2025-05-13 DIAGNOSIS — M21.371 ACQUIRED BILATERAL FOOT DROP: ICD-10-CM

## 2025-05-13 DIAGNOSIS — M20.5X2 ACQUIRED HALLUX LIMITUS OF BOTH FEET: ICD-10-CM

## 2025-05-13 DIAGNOSIS — M79.671 PAIN IN BOTH FEET: Primary | ICD-10-CM

## 2025-05-13 DIAGNOSIS — F41.9 ANXIETY AND DEPRESSION: ICD-10-CM

## 2025-05-13 DIAGNOSIS — G35 MS (MULTIPLE SCLEROSIS) (HCC): ICD-10-CM

## 2025-05-13 DIAGNOSIS — M79.672 PAIN IN BOTH FEET: Primary | ICD-10-CM

## 2025-05-13 DIAGNOSIS — F32.A ANXIETY AND DEPRESSION: ICD-10-CM

## 2025-05-13 LAB
ALBUMIN SERPL-MCNC: 4 G/DL (ref 3.5–4.6)
ALP SERPL-CCNC: 96 U/L (ref 40–130)
ALT SERPL-CCNC: 17 U/L (ref 0–33)
AST SERPL-CCNC: 18 U/L (ref 0–35)
BASOPHILS # BLD: 0.1 K/UL (ref 0–0.2)
BASOPHILS NFR BLD: 0.5 %
BILIRUB DIRECT SERPL-MCNC: <0.1 MG/DL (ref 0–0.4)
BILIRUB INDIRECT SERPL-MCNC: NORMAL MG/DL (ref 0–0.6)
BILIRUB SERPL-MCNC: <0.2 MG/DL (ref 0.2–0.7)
EOSINOPHIL # BLD: 0.4 K/UL (ref 0–0.7)
EOSINOPHIL NFR BLD: 2.6 %
ERYTHROCYTE [DISTWIDTH] IN BLOOD BY AUTOMATED COUNT: 13.9 % (ref 11.5–14.5)
HCT VFR BLD AUTO: 47.1 % (ref 37–47)
HGB BLD-MCNC: 16 G/DL (ref 12–16)
LYMPHOCYTES # BLD: 3.1 K/UL (ref 1–4.8)
LYMPHOCYTES NFR BLD: 21.1 %
MCH RBC QN AUTO: 32 PG (ref 27–31.3)
MCHC RBC AUTO-ENTMCNC: 34 % (ref 33–37)
MCV RBC AUTO: 94.2 FL (ref 79.4–94.8)
MONOCYTES # BLD: 1 K/UL (ref 0.2–0.8)
MONOCYTES NFR BLD: 6.5 %
NEUTROPHILS # BLD: 10.1 K/UL (ref 1.4–6.5)
NEUTS SEG NFR BLD: 68.9 %
PLATELET # BLD AUTO: 383 K/UL (ref 130–400)
PROT SERPL-MCNC: 6.6 G/DL (ref 6.3–8)
RBC # BLD AUTO: 5 M/UL (ref 4.2–5.4)
WBC # BLD AUTO: 14.7 K/UL (ref 4.8–10.8)

## 2025-05-13 PROCEDURE — 4004F PT TOBACCO SCREEN RCVD TLK: CPT | Performed by: PODIATRIST

## 2025-05-13 PROCEDURE — G8427 DOCREV CUR MEDS BY ELIG CLIN: HCPCS | Performed by: PODIATRIST

## 2025-05-13 PROCEDURE — 99213 OFFICE O/P EST LOW 20 MIN: CPT | Performed by: PODIATRIST

## 2025-05-13 PROCEDURE — G8417 CALC BMI ABV UP PARAM F/U: HCPCS | Performed by: PODIATRIST

## 2025-05-13 ASSESSMENT — ENCOUNTER SYMPTOMS
VOMITING: 0
SHORTNESS OF BREATH: 1
BACK PAIN: 1
NAUSEA: 0

## 2025-05-13 NOTE — TELEPHONE ENCOUNTER
Patient's Last Office Visit  2/18/2025     Patient's Next Visit  Future Appointments   Date Time Provider Department Center   5/19/2025 10:30 AM Darian Michelle MD [facility]   7/9/2025  2:15 PM Adilson Valderrama MD [facility] Neurology -   5/13/2026 10:30 AM Jaiden Chaparro DPM Podiatry [facility]

## 2025-05-13 NOTE — PROGRESS NOTES
[facility] PHYSICIANS [place] [facility], [facility] PODIATRY  [address] [place] [address]  Dept: [phone]  Loc: [phone]       Maty Dawson  (1980)    5/13/25    Subjective     Maty Dawson is 45 y.o. female who complains today of:    Chief Complaint   Patient presents with    Foot Pain     Both feet       Foot Pain   Associated symptoms include numbness. Pertinent negatives include no fever.     HPI: Patient presents for follow-up for bilateral lower extremity weakness and pain secondary to muscular sclerosis.  Patient reports compliance with use of her bilateral AFO device.  Patient states she has minimal to no pain to the feet when wearing the devices.  Patient that she does have pain in the feet when not wearing them and walking around her home such as when getting out of bed in the melanite to go to the bathroom.  Patient states she currently wears a pair of older slippers that do not have arch support.    Review of Systems   Constitutional:  Negative for chills and fever.   HENT:  Negative for hearing loss.    Respiratory:  Positive for shortness of breath.    Cardiovascular:  Negative for chest pain.   Gastrointestinal:  Negative for nausea and vomiting.   Genitourinary:  Negative for difficulty urinating.   Musculoskeletal:  Positive for back pain and gait problem.   Skin:  Negative for wound.   Neurological:  Positive for numbness.   Hematological:  Does not bruise/bleed easily.   Psychiatric/Behavioral:  Negative for sleep disturbance.        Allergies:  Morphine    Current Outpatient Medications on File Prior to Visit   Medication Sig Dispense Refill    clonazePAM (KLONOPIN) 0.5 MG tablet 1 tablet twice a day 60 tablet 1    esomeprazole (NEXIUM) 40 MG delayed release capsule TAKE 1 CAPSULE BY MOUTH EVERY DAY 90 capsule 1    escitalopram (LEXAPRO) 20 MG tablet Take 1 tablet by mouth daily 30 tablet 11    baclofen (LIORESAL) 10 MG

## 2025-05-15 RX ORDER — LORAZEPAM 0.5 MG/1
0.5 TABLET ORAL EVERY 8 HOURS PRN
Qty: 60 TABLET | Refills: 0 | Status: SHIPPED | OUTPATIENT
Start: 2025-05-15 | End: 2025-06-14

## 2025-05-19 ENCOUNTER — RESULTS FOLLOW-UP (OUTPATIENT)
Age: 45
End: 2025-05-19

## 2025-05-19 ENCOUNTER — OFFICE VISIT (OUTPATIENT)
Age: 45
End: 2025-05-19
Payer: COMMERCIAL

## 2025-05-19 VITALS
BODY MASS INDEX: 39.5 KG/M2 | WEIGHT: 201.2 LBS | SYSTOLIC BLOOD PRESSURE: 130 MMHG | TEMPERATURE: 97.9 F | OXYGEN SATURATION: 95 % | DIASTOLIC BLOOD PRESSURE: 72 MMHG | HEART RATE: 86 BPM | HEIGHT: 60 IN

## 2025-05-19 VITALS
HEIGHT: 60 IN | WEIGHT: 201 LBS | SYSTOLIC BLOOD PRESSURE: 130 MMHG | BODY MASS INDEX: 39.46 KG/M2 | DIASTOLIC BLOOD PRESSURE: 72 MMHG

## 2025-05-19 DIAGNOSIS — F32.A ANXIETY AND DEPRESSION: ICD-10-CM

## 2025-05-19 DIAGNOSIS — Z12.31 ENCOUNTER FOR SCREENING MAMMOGRAM FOR BREAST CANCER: ICD-10-CM

## 2025-05-19 DIAGNOSIS — K76.0 FATTY LIVER: Primary | ICD-10-CM

## 2025-05-19 DIAGNOSIS — G35 MULTIPLE SCLEROSIS (HCC): ICD-10-CM

## 2025-05-19 DIAGNOSIS — Z00.00 MEDICARE ANNUAL WELLNESS VISIT, SUBSEQUENT: Primary | ICD-10-CM

## 2025-05-19 DIAGNOSIS — K76.0 FATTY LIVER: ICD-10-CM

## 2025-05-19 DIAGNOSIS — R73.09 ELEVATED GLUCOSE: ICD-10-CM

## 2025-05-19 DIAGNOSIS — F41.9 ANXIETY AND DEPRESSION: ICD-10-CM

## 2025-05-19 LAB
CHOLEST SERPL-MCNC: 224 MG/DL (ref 0–199)
HBA1C MFR BLD: 5.6 %
HDLC SERPL-MCNC: 27 MG/DL (ref 40–59)
LDL CHOLESTEROL: 130 MG/DL (ref 0–129)
TRIGLYCERIDE, FASTING: 337 MG/DL (ref 0–150)

## 2025-05-19 PROCEDURE — 4004F PT TOBACCO SCREEN RCVD TLK: CPT | Performed by: FAMILY MEDICINE

## 2025-05-19 PROCEDURE — 83036 HEMOGLOBIN GLYCOSYLATED A1C: CPT | Performed by: FAMILY MEDICINE

## 2025-05-19 PROCEDURE — G8427 DOCREV CUR MEDS BY ELIG CLIN: HCPCS | Performed by: FAMILY MEDICINE

## 2025-05-19 PROCEDURE — 99214 OFFICE O/P EST MOD 30 MIN: CPT | Performed by: FAMILY MEDICINE

## 2025-05-19 PROCEDURE — G8417 CALC BMI ABV UP PARAM F/U: HCPCS | Performed by: FAMILY MEDICINE

## 2025-05-19 PROCEDURE — G0439 PPPS, SUBSEQ VISIT: HCPCS | Performed by: NURSE PRACTITIONER

## 2025-05-19 ASSESSMENT — PATIENT HEALTH QUESTIONNAIRE - PHQ9
5. POOR APPETITE OR OVEREATING: SEVERAL DAYS
SUM OF ALL RESPONSES TO PHQ QUESTIONS 1-9: 5
2. FEELING DOWN, DEPRESSED OR HOPELESS: SEVERAL DAYS
7. TROUBLE CONCENTRATING ON THINGS, SUCH AS READING THE NEWSPAPER OR WATCHING TELEVISION: NOT AT ALL
1. LITTLE INTEREST OR PLEASURE IN DOING THINGS: SEVERAL DAYS
SUM OF ALL RESPONSES TO PHQ QUESTIONS 1-9: 5
8. MOVING OR SPEAKING SO SLOWLY THAT OTHER PEOPLE COULD HAVE NOTICED. OR THE OPPOSITE, BEING SO FIGETY OR RESTLESS THAT YOU HAVE BEEN MOVING AROUND A LOT MORE THAN USUAL: NOT AT ALL
10. IF YOU CHECKED OFF ANY PROBLEMS, HOW DIFFICULT HAVE THESE PROBLEMS MADE IT FOR YOU TO DO YOUR WORK, TAKE CARE OF THINGS AT HOME, OR GET ALONG WITH OTHER PEOPLE: SOMEWHAT DIFFICULT
4. FEELING TIRED OR HAVING LITTLE ENERGY: SEVERAL DAYS
9. THOUGHTS THAT YOU WOULD BE BETTER OFF DEAD, OR OF HURTING YOURSELF: NOT AT ALL
3. TROUBLE FALLING OR STAYING ASLEEP: SEVERAL DAYS
SUM OF ALL RESPONSES TO PHQ QUESTIONS 1-9: 5
6. FEELING BAD ABOUT YOURSELF - OR THAT YOU ARE A FAILURE OR HAVE LET YOURSELF OR YOUR FAMILY DOWN: NOT AT ALL
SUM OF ALL RESPONSES TO PHQ QUESTIONS 1-9: 5

## 2025-05-19 ASSESSMENT — LIFESTYLE VARIABLES
HOW OFTEN DO YOU HAVE A DRINK CONTAINING ALCOHOL: MONTHLY OR LESS
HOW MANY STANDARD DRINKS CONTAINING ALCOHOL DO YOU HAVE ON A TYPICAL DAY: 1 OR 2

## 2025-05-19 NOTE — PROGRESS NOTES
and spelling as well as words and phrases that may seem inappropriate. If there are questions or concerns please feel free to contact me to clarify.    The patient (or guardian, if applicable) and other individuals in attendance with the patient were advised that Artificial Intelligence will be utilized during this visit to record, process the conversation to generate a clinical note, and support improvement of the AI technology. The patient (or guardian, if applicable) and other individuals in attendance at the appointment consented to the use of AI, including the recording.      Darian Michelle MD

## 2025-05-20 NOTE — PROGRESS NOTES
Screen:  Do you have difficulty driving, watching TV, or doing any of your daily activities because of your eyesight?: No  Have you had an eye exam within the past year?: (!) No  Interventions:   Patient encouraged to make appointment with their eye specialist      Advanced Directives:  Do you have a Living Will?: (!) No    Intervention:  has NO advanced directive - not interested in additional information        Tobacco Use:    Tobacco Use      Smoking status: Every Day        Packs/day: 1.00        Types: Cigarettes      Smokeless tobacco: Never     Interventions:  See AVS for additional education material                      Objective   Vitals:    05/19/25 1105   BP: 130/72   Weight: 91.2 kg (201 lb)   Height: 1.511 m (4' 11.5\")      Body mass index is 39.92 kg/m².                  Allergies   Allergen Reactions    Morphine Itching     Prior to Visit Medications    Medication Sig Taking? Authorizing Provider   LORazepam (ATIVAN) 0.5 MG tablet Take 1 tablet by mouth every 8 hours as needed for Anxiety for up to 30 days. Yes Darian Michelle MD   clonazePAM (KLONOPIN) 0.5 MG tablet 1 tablet twice a day Yes Adilson Valderrama MD   esomeprazole (NEXIUM) 40 MG delayed release capsule TAKE 1 CAPSULE BY MOUTH EVERY DAY Yes Valentina Richardson APRN - CNP   escitalopram (LEXAPRO) 20 MG tablet Take 1 tablet by mouth daily Yes Darian Michelle MD   baclofen (LIORESAL) 10 MG tablet TAKE 1 TABLET BY MOUTH FOUR TIMES A DAY Yes Katie Hartley APRN - CNP   gabapentin (NEURONTIN) 600 MG tablet Take 1 tablet by mouth 3 times daily. Yes Darian Michelle MD   vitamin D (VITAMIN D3) 50 MCG (2000 UT) CAPS capsule TAKE 1 CAPSULE BY MOUTH ONCE DAILY Yes Katie Hartley APRN - CNP   etodolac (LODINE) 400 MG tablet TAKE 1 TABLET BY MOUTH 2 TIMES DAILY Yes Katie Hartley APRN - CNP   Ublituximab-xiiy (BRIUMVI IV) Infuse intravenously Yes Bay Bernal MD   varenicline (CHANTIX) 1 MG tablet Take 1 tablet by mouth 2 times daily Yes

## 2025-05-26 NOTE — TELEPHONE ENCOUNTER
FAXED ORDER TO [facility] TO DISPENSE THE FOOT ORTHOTIC
Orientation to room/Bed in low position, brakes on/Side rails x 2 or 4 up, assess large gaps, such that a patient could get extremity or other body part entrapped, use additional safety procedures/Call light is within reach, educate patient/family on its functionality

## 2025-06-12 ENCOUNTER — HOSPITAL ENCOUNTER (INPATIENT)
Age: 45
LOS: 3 days | Discharge: NURSING, REHAB OR LONG-TERM CARE | DRG: 059 | End: 2025-06-15
Attending: EMERGENCY MEDICINE | Admitting: INTERNAL MEDICINE
Payer: COMMERCIAL

## 2025-06-12 ENCOUNTER — APPOINTMENT (OUTPATIENT)
Dept: CT IMAGING | Age: 45
DRG: 059 | End: 2025-06-12
Payer: COMMERCIAL

## 2025-06-12 DIAGNOSIS — R11.0 NAUSEA: Primary | ICD-10-CM

## 2025-06-12 DIAGNOSIS — N30.01 ACUTE CYSTITIS WITH HEMATURIA: ICD-10-CM

## 2025-06-12 DIAGNOSIS — G35 MULTIPLE SCLEROSIS (HCC): ICD-10-CM

## 2025-06-12 LAB
ALBUMIN SERPL-MCNC: 4.2 G/DL (ref 3.5–4.6)
ALP SERPL-CCNC: 117 U/L (ref 40–130)
ALT SERPL-CCNC: 14 U/L (ref 0–33)
ANION GAP SERPL CALCULATED.3IONS-SCNC: 16 MEQ/L (ref 9–15)
ANISOCYTOSIS BLD QL SMEAR: ABNORMAL
AST SERPL-CCNC: 18 U/L (ref 0–35)
BACTERIA URNS QL MICRO: ABNORMAL /HPF
BASOPHILS # BLD: 0 K/UL (ref 0–0.2)
BASOPHILS NFR BLD: 0.3 %
BILIRUB SERPL-MCNC: 0.6 MG/DL (ref 0.2–0.7)
BILIRUB UR QL STRIP: NEGATIVE
BUN SERPL-MCNC: 11 MG/DL (ref 6–20)
CALCIUM SERPL-MCNC: 9.4 MG/DL (ref 8.5–9.9)
CHLORIDE SERPL-SCNC: 99 MEQ/L (ref 95–107)
CLARITY UR: ABNORMAL
CO2 SERPL-SCNC: 24 MEQ/L (ref 20–31)
COLOR UR: ABNORMAL
CREAT SERPL-MCNC: 0.89 MG/DL (ref 0.5–0.9)
EKG ATRIAL RATE: 89 BPM
EKG DIAGNOSIS: NORMAL
EKG P AXIS: 70 DEGREES
EKG P-R INTERVAL: 148 MS
EKG Q-T INTERVAL: 368 MS
EKG QRS DURATION: 74 MS
EKG QTC CALCULATION (BAZETT): 447 MS
EKG R AXIS: -52 DEGREES
EKG T AXIS: -8 DEGREES
EKG VENTRICULAR RATE: 89 BPM
EOSINOPHIL # BLD: 0.2 K/UL (ref 0–0.7)
EOSINOPHIL NFR BLD: 1 %
EPI CELLS #/AREA URNS AUTO: ABNORMAL /HPF (ref 0–5)
ERYTHROCYTE [DISTWIDTH] IN BLOOD BY AUTOMATED COUNT: 13 % (ref 11.5–14.5)
GLOBULIN SER CALC-MCNC: 3.6 G/DL (ref 2.3–3.5)
GLUCOSE SERPL-MCNC: 144 MG/DL (ref 70–99)
GLUCOSE UR STRIP-MCNC: NEGATIVE MG/DL
HCT VFR BLD AUTO: 47.5 % (ref 37–47)
HGB BLD-MCNC: 16.2 G/DL (ref 12–16)
HGB UR QL STRIP: ABNORMAL
HYALINE CASTS #/AREA URNS AUTO: ABNORMAL /HPF (ref 0–5)
KETONES UR STRIP-MCNC: 15 MG/DL
LACTATE BLDV-SCNC: 0.7 MMOL/L (ref 0.5–2.2)
LEUKOCYTE ESTERASE UR QL STRIP: ABNORMAL
LIPASE SERPL-CCNC: 16 U/L (ref 12–95)
LYMPHOCYTES # BLD: 2.7 K/UL (ref 1–4.8)
LYMPHOCYTES NFR BLD: 11 %
MAGNESIUM SERPL-MCNC: 2.2 MG/DL (ref 1.7–2.4)
MCH RBC QN AUTO: 32.7 PG (ref 27–31.3)
MCHC RBC AUTO-ENTMCNC: 34.1 % (ref 33–37)
MCV RBC AUTO: 95.8 FL (ref 79.4–94.8)
MONOCYTES # BLD: 1.4 K/UL (ref 0.2–0.8)
MONOCYTES NFR BLD: 5.7 %
NEUTROPHILS # BLD: 18.5 K/UL (ref 1.4–6.5)
NEUTS SEG NFR BLD: 82 %
NITRITE UR QL STRIP: POSITIVE
PH UR STRIP: 6 [PH] (ref 5–9)
PLATELET # BLD AUTO: 392 K/UL (ref 130–400)
PLATELET BLD QL SMEAR: ADEQUATE
POIKILOCYTOSIS BLD QL SMEAR: ABNORMAL
POTASSIUM SERPL-SCNC: 3.9 MEQ/L (ref 3.4–4.9)
PROT SERPL-MCNC: 7.8 G/DL (ref 6.3–8)
PROT UR STRIP-MCNC: 30 MG/DL
RBC # BLD AUTO: 4.96 M/UL (ref 4.2–5.4)
RBC #/AREA URNS HPF: ABNORMAL /HPF (ref 0–2)
SLIDE REVIEW: ABNORMAL
SODIUM SERPL-SCNC: 139 MEQ/L (ref 135–144)
SP GR UR STRIP: 1.03 (ref 1–1.03)
T4 FREE SERPL-MCNC: 1.34 NG/DL (ref 0.84–1.68)
TROPONIN, HIGH SENSITIVITY: <6 NG/L (ref 0–19)
TROPONIN, HIGH SENSITIVITY: <6 NG/L (ref 0–19)
TSH SERPL-MCNC: 1.52 UIU/ML (ref 0.44–3.86)
URINE REFLEX TO CULTURE: YES
UROBILINOGEN UR STRIP-ACNC: 2 E.U./DL
VARIANT LYMPHS NFR BLD: 1 %
WBC # BLD AUTO: 22.5 K/UL (ref 4.8–10.8)
WBC #/AREA URNS AUTO: >100 /HPF (ref 0–5)

## 2025-06-12 PROCEDURE — APPSS45 APP SPLIT SHARED TIME 31-45 MINUTES: Performed by: NURSE PRACTITIONER

## 2025-06-12 PROCEDURE — 2580000003 HC RX 258: Performed by: INTERNAL MEDICINE

## 2025-06-12 PROCEDURE — 84443 ASSAY THYROID STIM HORMONE: CPT

## 2025-06-12 PROCEDURE — 85025 COMPLETE CBC W/AUTO DIFF WBC: CPT

## 2025-06-12 PROCEDURE — 80053 COMPREHEN METABOLIC PANEL: CPT

## 2025-06-12 PROCEDURE — 2580000003 HC RX 258: Performed by: EMERGENCY MEDICINE

## 2025-06-12 PROCEDURE — 84439 ASSAY OF FREE THYROXINE: CPT

## 2025-06-12 PROCEDURE — 99222 1ST HOSP IP/OBS MODERATE 55: CPT | Performed by: PSYCHIATRY & NEUROLOGY

## 2025-06-12 PROCEDURE — 87088 URINE BACTERIA CULTURE: CPT

## 2025-06-12 PROCEDURE — 83735 ASSAY OF MAGNESIUM: CPT

## 2025-06-12 PROCEDURE — 96365 THER/PROPH/DIAG IV INF INIT: CPT

## 2025-06-12 PROCEDURE — 81001 URINALYSIS AUTO W/SCOPE: CPT

## 2025-06-12 PROCEDURE — 93005 ELECTROCARDIOGRAM TRACING: CPT | Performed by: EMERGENCY MEDICINE

## 2025-06-12 PROCEDURE — 6360000002 HC RX W HCPCS: Performed by: INTERNAL MEDICINE

## 2025-06-12 PROCEDURE — 36415 COLL VENOUS BLD VENIPUNCTURE: CPT

## 2025-06-12 PROCEDURE — 87040 BLOOD CULTURE FOR BACTERIA: CPT

## 2025-06-12 PROCEDURE — 6360000004 HC RX CONTRAST MEDICATION: Performed by: EMERGENCY MEDICINE

## 2025-06-12 PROCEDURE — 2500000003 HC RX 250 WO HCPCS: Performed by: EMERGENCY MEDICINE

## 2025-06-12 PROCEDURE — 6360000002 HC RX W HCPCS: Performed by: EMERGENCY MEDICINE

## 2025-06-12 PROCEDURE — 74177 CT ABD & PELVIS W/CONTRAST: CPT

## 2025-06-12 PROCEDURE — 87186 SC STD MICRODIL/AGAR DIL: CPT

## 2025-06-12 PROCEDURE — 6370000000 HC RX 637 (ALT 250 FOR IP): Performed by: INTERNAL MEDICINE

## 2025-06-12 PROCEDURE — 83690 ASSAY OF LIPASE: CPT

## 2025-06-12 PROCEDURE — 87086 URINE CULTURE/COLONY COUNT: CPT

## 2025-06-12 PROCEDURE — 93010 ELECTROCARDIOGRAM REPORT: CPT | Performed by: INTERNAL MEDICINE

## 2025-06-12 PROCEDURE — 83605 ASSAY OF LACTIC ACID: CPT

## 2025-06-12 PROCEDURE — 1210000000 HC MED SURG R&B

## 2025-06-12 PROCEDURE — 2500000003 HC RX 250 WO HCPCS: Performed by: INTERNAL MEDICINE

## 2025-06-12 PROCEDURE — 84484 ASSAY OF TROPONIN QUANT: CPT

## 2025-06-12 PROCEDURE — 99285 EMERGENCY DEPT VISIT HI MDM: CPT

## 2025-06-12 RX ORDER — POTASSIUM CHLORIDE 1500 MG/1
40 TABLET, EXTENDED RELEASE ORAL PRN
Status: DISCONTINUED | OUTPATIENT
Start: 2025-06-12 | End: 2025-06-15 | Stop reason: HOSPADM

## 2025-06-12 RX ORDER — SODIUM CHLORIDE 0.9 % (FLUSH) 0.9 %
5-40 SYRINGE (ML) INJECTION EVERY 12 HOURS SCHEDULED
Status: DISCONTINUED | OUTPATIENT
Start: 2025-06-12 | End: 2025-06-15 | Stop reason: HOSPADM

## 2025-06-12 RX ORDER — SODIUM CHLORIDE 0.9 % (FLUSH) 0.9 %
5-40 SYRINGE (ML) INJECTION PRN
Status: DISCONTINUED | OUTPATIENT
Start: 2025-06-12 | End: 2025-06-15 | Stop reason: HOSPADM

## 2025-06-12 RX ORDER — ACETAMINOPHEN 650 MG/1
650 SUPPOSITORY RECTAL EVERY 6 HOURS PRN
Status: DISCONTINUED | OUTPATIENT
Start: 2025-06-12 | End: 2025-06-15 | Stop reason: HOSPADM

## 2025-06-12 RX ORDER — GABAPENTIN 300 MG/1
600 CAPSULE ORAL 3 TIMES DAILY
Status: DISCONTINUED | OUTPATIENT
Start: 2025-06-12 | End: 2025-06-15 | Stop reason: HOSPADM

## 2025-06-12 RX ORDER — LORAZEPAM 0.5 MG/1
0.5 TABLET ORAL EVERY 8 HOURS PRN
Status: DISCONTINUED | OUTPATIENT
Start: 2025-06-12 | End: 2025-06-15 | Stop reason: HOSPADM

## 2025-06-12 RX ORDER — ONDANSETRON 2 MG/ML
4 INJECTION INTRAMUSCULAR; INTRAVENOUS EVERY 6 HOURS PRN
Status: DISCONTINUED | OUTPATIENT
Start: 2025-06-12 | End: 2025-06-15 | Stop reason: HOSPADM

## 2025-06-12 RX ORDER — SODIUM CHLORIDE 9 MG/ML
INJECTION, SOLUTION INTRAVENOUS PRN
Status: DISCONTINUED | OUTPATIENT
Start: 2025-06-12 | End: 2025-06-15 | Stop reason: HOSPADM

## 2025-06-12 RX ORDER — POTASSIUM CHLORIDE 7.45 MG/ML
10 INJECTION INTRAVENOUS PRN
Status: DISCONTINUED | OUTPATIENT
Start: 2025-06-12 | End: 2025-06-15 | Stop reason: HOSPADM

## 2025-06-12 RX ORDER — POLYETHYLENE GLYCOL 3350 17 G/17G
17 POWDER, FOR SOLUTION ORAL DAILY PRN
Status: DISCONTINUED | OUTPATIENT
Start: 2025-06-12 | End: 2025-06-15 | Stop reason: HOSPADM

## 2025-06-12 RX ORDER — ACETAMINOPHEN 325 MG/1
650 TABLET ORAL EVERY 6 HOURS PRN
Status: DISCONTINUED | OUTPATIENT
Start: 2025-06-12 | End: 2025-06-15 | Stop reason: HOSPADM

## 2025-06-12 RX ORDER — IOPAMIDOL 755 MG/ML
75 INJECTION, SOLUTION INTRAVASCULAR
Status: COMPLETED | OUTPATIENT
Start: 2025-06-12 | End: 2025-06-12

## 2025-06-12 RX ORDER — ENOXAPARIN SODIUM 100 MG/ML
40 INJECTION SUBCUTANEOUS DAILY
Status: DISCONTINUED | OUTPATIENT
Start: 2025-06-12 | End: 2025-06-15 | Stop reason: HOSPADM

## 2025-06-12 RX ORDER — SODIUM CHLORIDE 9 MG/ML
INJECTION, SOLUTION INTRAVENOUS CONTINUOUS
Status: DISCONTINUED | OUTPATIENT
Start: 2025-06-12 | End: 2025-06-13

## 2025-06-12 RX ORDER — MAGNESIUM SULFATE IN WATER 40 MG/ML
2000 INJECTION, SOLUTION INTRAVENOUS PRN
Status: DISCONTINUED | OUTPATIENT
Start: 2025-06-12 | End: 2025-06-15 | Stop reason: HOSPADM

## 2025-06-12 RX ORDER — CLONAZEPAM 0.5 MG/1
0.5 TABLET ORAL EVERY 12 HOURS
Status: DISCONTINUED | OUTPATIENT
Start: 2025-06-12 | End: 2025-06-15 | Stop reason: HOSPADM

## 2025-06-12 RX ORDER — ONDANSETRON 4 MG/1
4 TABLET, ORALLY DISINTEGRATING ORAL EVERY 8 HOURS PRN
Status: DISCONTINUED | OUTPATIENT
Start: 2025-06-12 | End: 2025-06-15 | Stop reason: HOSPADM

## 2025-06-12 RX ORDER — ESCITALOPRAM OXALATE 20 MG/1
20 TABLET ORAL DAILY
Status: DISCONTINUED | OUTPATIENT
Start: 2025-06-13 | End: 2025-06-15 | Stop reason: HOSPADM

## 2025-06-12 RX ORDER — BACLOFEN 10 MG/1
10 TABLET ORAL 4 TIMES DAILY
Status: DISCONTINUED | OUTPATIENT
Start: 2025-06-12 | End: 2025-06-15 | Stop reason: HOSPADM

## 2025-06-12 RX ORDER — 0.9 % SODIUM CHLORIDE 0.9 %
1000 INTRAVENOUS SOLUTION INTRAVENOUS ONCE
Status: COMPLETED | OUTPATIENT
Start: 2025-06-12 | End: 2025-06-12

## 2025-06-12 RX ORDER — PANTOPRAZOLE SODIUM 40 MG/10ML
40 INJECTION, POWDER, LYOPHILIZED, FOR SOLUTION INTRAVENOUS DAILY
Status: DISCONTINUED | OUTPATIENT
Start: 2025-06-12 | End: 2025-06-15 | Stop reason: HOSPADM

## 2025-06-12 RX ORDER — VITAMIN B COMPLEX
2000 TABLET ORAL DAILY
Status: DISCONTINUED | OUTPATIENT
Start: 2025-06-13 | End: 2025-06-15 | Stop reason: HOSPADM

## 2025-06-12 RX ADMIN — SODIUM CHLORIDE, PRESERVATIVE FREE 10 ML: 5 INJECTION INTRAVENOUS at 20:27

## 2025-06-12 RX ADMIN — SODIUM CHLORIDE: 0.9 INJECTION, SOLUTION INTRAVENOUS at 16:38

## 2025-06-12 RX ADMIN — IOPAMIDOL 75 ML: 755 INJECTION, SOLUTION INTRAVENOUS at 12:39

## 2025-06-12 RX ADMIN — SODIUM CHLORIDE 1000 ML: 0.9 INJECTION, SOLUTION INTRAVENOUS at 11:48

## 2025-06-12 RX ADMIN — PANTOPRAZOLE SODIUM 40 MG: 40 INJECTION, POWDER, FOR SOLUTION INTRAVENOUS at 16:41

## 2025-06-12 RX ADMIN — CLONAZEPAM 0.5 MG: 0.5 TABLET ORAL at 20:26

## 2025-06-12 RX ADMIN — WATER 1000 MG: 1 INJECTION INTRAMUSCULAR; INTRAVENOUS; SUBCUTANEOUS at 13:20

## 2025-06-12 RX ADMIN — BACLOFEN 10 MG: 10 TABLET ORAL at 20:26

## 2025-06-12 RX ADMIN — ENOXAPARIN SODIUM 40 MG: 100 INJECTION SUBCUTANEOUS at 16:38

## 2025-06-12 RX ADMIN — GABAPENTIN 600 MG: 300 CAPSULE ORAL at 20:26

## 2025-06-12 RX ADMIN — METHYLPREDNISOLONE SODIUM SUCCINATE 1000 MG: 1 INJECTION INTRAMUSCULAR; INTRAVENOUS at 12:11

## 2025-06-12 ASSESSMENT — PAIN DESCRIPTION - LOCATION
LOCATION: ARM;HAND;FOOT;LEG
LOCATION: ABDOMEN

## 2025-06-12 ASSESSMENT — PAIN DESCRIPTION - ORIENTATION
ORIENTATION: LOWER
ORIENTATION: RIGHT;LEFT

## 2025-06-12 ASSESSMENT — PAIN SCALES - GENERAL
PAINLEVEL_OUTOF10: 0
PAINLEVEL_OUTOF10: 6
PAINLEVEL_OUTOF10: 3

## 2025-06-12 ASSESSMENT — PAIN - FUNCTIONAL ASSESSMENT: PAIN_FUNCTIONAL_ASSESSMENT: 0-10

## 2025-06-12 ASSESSMENT — PAIN DESCRIPTION - DESCRIPTORS: DESCRIPTORS: SHARP;NUMBNESS;TINGLING

## 2025-06-12 NOTE — H&P
Patient 45-year-old woman with past medical history of MS sees Dr. Valderrama as outpatient comes in here for 2 days history of nausea vomiting weakness unable to ambulate.  Found to have UTI in the ER received IV Rocephin.  Also ER physician spoke with Dr. Valderrama patient's symptoms are consistent with MS exacerbation exacerbated by UTI.  Recommend to give IV steroids.    Past Medical History:   Diagnosis Date    Arthritis     Carpal tunnel syndrome     Multiple sclerosis (HCC)      Past Surgical History:   Procedure Laterality Date    APPENDECTOMY  1998    laparoscopic at [facility]    BREAST SURGERY      lump removal bengin    CARPAL TUNNEL RELEASE Left 10/07/2022    LEFT CARPAL TUNNEL RELEASE ([facility]) performed by Vahe Dumont MD at [facility] OR    CARPAL TUNNEL RELEASE Right 2022    RIGHT CARPAL TUNNEL RELEASE performed by Vahe Dumont MD at [facility] OR     SECTION      x 3: '99, '03, '05 via Pfannenstiel incision    CHOLECYSTECTOMY, LAPAROSCOPIC N/A 2025    Laparoscopic cholecystectomy performed by Carolin Franklin MD at [facility] [place]    TUBAL LIGATION      UPPER GASTROINTESTINAL ENDOSCOPY N/A 10/09/2024    ESOPHAGOGASTRODUODENOSCOPY with biopsies performed by Luanne Bridges MD at [facility]     Social History     Socioeconomic History    Marital status:      Spouse name: Not on file    Number of children: Not on file    Years of education: Not on file    Highest education level: Not on file   Occupational History    Not on file   Tobacco Use    Smoking status: Every Day     Current packs/day: 1.00     Types: Cigarettes    Smokeless tobacco: Never   Vaping Use    Vaping status: Never Used   Substance and Sexual Activity    Alcohol use: Yes     Comment: socially    Drug use: Yes     Types: Marijuana (Weed)    Sexual activity: Not Currently   Other Topics Concern    Not on file   Social History Narrative    Not on file     Social Drivers of Health     Financial

## 2025-06-12 NOTE — ACP (ADVANCE CARE PLANNING)
Advance Care Planning     Advance Care Planning Activator (Inpatient)  Conversation Note      Date of ACP Conversation: 6/12/2025     Conversation Conducted with: Patient with Decision Making Capacity    ACP Activator: Jailyn Khan, RN        Health Care Decision Maker:     Current Designated Health Care Decision Maker:     Primary Decision Maker: Solange Headley - Parent - [phone]    Secondary Decision Maker: Bette Pittman - Niece/Nephew - [phone]    Supplemental (Other) Decision Maker: Jaylen Blanca - Other - [phone]

## 2025-06-12 NOTE — ED TRIAGE NOTES
Pt came [facility] er via [facility] reports lower abd pain some nausea x 3 days given zofran 4mg and ns 500ml bolus enroute per squad. Pt also reports generalized weakness worse in the past 3 days thinks it is her ms flare up starting. Pt is a pt of Dr Ying

## 2025-06-12 NOTE — CARE COORDINATION
Case Management Assessment  Initial Evaluation    Date/Time of Evaluation: 6/12/2025 2:42 PM  Assessment Completed by: Jailyn Khan RN    If patient is discharged prior to next notation, then this note serves as note for discharge by case management.    Patient Name: Maty Dawson                   YOB: 1980  Diagnosis: Multiple sclerosis (HCC) [G35]  Nausea [R11.0]  Acute cystitis with hematuria [N30.01]                   Date / Time: 6/12/2025 11:34 AM    Patient Admission Status: Inpatient   Readmission Risk (Low < 19, Mod (19-27), High > 27): No data recorded  Current PCP: Darian Michelle MD  PCP verified by ? Yes    Chart Reviewed: Yes      History Provided by: Patient  Patient Orientation: Alert and Oriented, Person, Place, Situation, Self    Patient Cognition: Alert    Hospitalization in the last 30 days (Readmission):  No    If yes, Readmission Assessment in  Navigator will be completed.    Advance Directives:      Code Status: Full Code   Patient's Primary Decision Maker is: Legal Next of Kin (Solange leach)      Discharge Planning:    Patient lives with: Spouse/Significant Other Type of Home: House  Primary Care Giver: Self  Patient Support Systems include: Spouse/Significant Other   Current Financial resources: Medicaid, Medicare  Current community resources: Other (Comment) ([employer]: [phone].)  Current services prior to admission: Home Care            Current DME: Cane, Shower Chair, Walker, Wheelchair            Type of Home Care services:  OT, PT    ADLS  Prior functional level: Assistance with the following:, Bathing, Dressing, Feeding, Cooking, Housework, Shopping, Mobility, Other (see comment) (as below.)  Current functional level: Other (see comment), Assistance with the following:, Bathing, Dressing, Cooking, Housework, Shopping, Mobility (her niece Bette is her caregiver. she states that what care the patient needs varies on her condition that day. pt is

## 2025-06-12 NOTE — ED PROVIDER NOTES
[facility] 2W ORTHO TELE  eMERGENCY dEPARTMENT eNCOUnter      Pt Name: Maty Dawson  MRN: [id number]  Birthdate 1980  Date of evaluation: 2025  Provider: Gracie Hollingsworth MD    CHIEF COMPLAINT       Chief Complaint   Patient presents with    Abdominal Pain    Nausea         HISTORY OF PRESENT ILLNESS   (Location/Symptom, Timing/Onset,Context/Setting, Quality, Duration, Modifying Factors, Severity)  Note limiting factors.   Maty Dawson is a 45 y.o. female who presents to the emergency department with complaint of nausea vomiting and left lower quadrant abdominal pain.  The abdominal pain has been going on for several days getting worse.  Worse with palpation better with rest dull and achy in nature.  Patient states she is getting lightheaded and has nausea and weakness and this is identical to previous episodes of MS flares.  That has been progressively getting worse over the last 3 to 4 days.    HPI    NursingNotes were reviewed.    REVIEW OF SYSTEMS    (2-9 systems for level 4, 10 or more for level 5)     Review of Systems    Except as noted above the remainder of the review of systems was reviewed and negative.       PAST MEDICAL HISTORY     Past Medical History:   Diagnosis Date    Arthritis     Carpal tunnel syndrome     Multiple sclerosis (HCC)          SURGICALHISTORY       Past Surgical History:   Procedure Laterality Date    APPENDECTOMY      laparoscopic at [facility]    BREAST SURGERY      lump removal bengin    CARPAL TUNNEL RELEASE Left 10/07/2022    LEFT CARPAL TUNNEL RELEASE ([facility]) performed by Vahe Dumont MD at [facility] OR    CARPAL TUNNEL RELEASE Right 2022    RIGHT CARPAL TUNNEL RELEASE performed by Vahe Dumont MD at [facility] OR     SECTION      x 3: '99, '03, '05 via Pfannenstiel incision    CHOLECYSTECTOMY, LAPAROSCOPIC N/A 2025    Laparoscopic cholecystectomy performed by Carolin Franklin MD at [facility] OR    TUBAL LIGATION      UPPER

## 2025-06-13 LAB
ANION GAP SERPL CALCULATED.3IONS-SCNC: 9 MEQ/L (ref 9–15)
BASOPHILS # BLD: 0 K/UL (ref 0–0.2)
BASOPHILS NFR BLD: 0.1 %
BUN SERPL-MCNC: 13 MG/DL (ref 6–20)
CALCIUM SERPL-MCNC: 8.5 MG/DL (ref 8.5–9.9)
CHLORIDE SERPL-SCNC: 111 MEQ/L (ref 95–107)
CO2 SERPL-SCNC: 19 MEQ/L (ref 20–31)
CREAT SERPL-MCNC: 0.71 MG/DL (ref 0.5–0.9)
EOSINOPHIL # BLD: 0 K/UL (ref 0–0.7)
EOSINOPHIL NFR BLD: 0 %
ERYTHROCYTE [DISTWIDTH] IN BLOOD BY AUTOMATED COUNT: 13.3 % (ref 11.5–14.5)
FOLATE: 5.4 NG/ML (ref 4.8–24.2)
GLUCOSE SERPL-MCNC: 218 MG/DL (ref 70–99)
HCT VFR BLD AUTO: 40.6 % (ref 37–47)
HGB BLD-MCNC: 13.7 G/DL (ref 12–16)
LYMPHOCYTES # BLD: 2 K/UL (ref 1–4.8)
LYMPHOCYTES NFR BLD: 12.3 %
MCH RBC QN AUTO: 32 PG (ref 27–31.3)
MCHC RBC AUTO-ENTMCNC: 33.7 % (ref 33–37)
MCV RBC AUTO: 94.9 FL (ref 79.4–94.8)
MONOCYTES # BLD: 0.5 K/UL (ref 0.2–0.8)
MONOCYTES NFR BLD: 2.7 %
NEUTROPHILS # BLD: 13.9 K/UL (ref 1.4–6.5)
NEUTS SEG NFR BLD: 84.2 %
PERFORMED ON: NORMAL
PLATELET # BLD AUTO: 338 K/UL (ref 130–400)
POC CREATININE: 1 MG/DL (ref 0.6–1.2)
POC SAMPLE TYPE: NORMAL
POTASSIUM SERPL-SCNC: 4.2 MEQ/L (ref 3.4–4.9)
RBC # BLD AUTO: 4.28 M/UL (ref 4.2–5.4)
SODIUM SERPL-SCNC: 139 MEQ/L (ref 135–144)
VITAMIN B-12: 344 PG/ML (ref 232–1245)
VITAMIN D 25-HYDROXY: 33.4 NG/ML (ref 30–100)
WBC # BLD AUTO: 16.5 K/UL (ref 4.8–10.8)

## 2025-06-13 PROCEDURE — 2580000003 HC RX 258: Performed by: INTERNAL MEDICINE

## 2025-06-13 PROCEDURE — 36415 COLL VENOUS BLD VENIPUNCTURE: CPT

## 2025-06-13 PROCEDURE — 82306 VITAMIN D 25 HYDROXY: CPT

## 2025-06-13 PROCEDURE — 85025 COMPLETE CBC W/AUTO DIFF WBC: CPT

## 2025-06-13 PROCEDURE — 97166 OT EVAL MOD COMPLEX 45 MIN: CPT

## 2025-06-13 PROCEDURE — APPSS15 APP SPLIT SHARED TIME 0-15 MINUTES: Performed by: NURSE PRACTITIONER

## 2025-06-13 PROCEDURE — 1210000000 HC MED SURG R&B

## 2025-06-13 PROCEDURE — 97162 PT EVAL MOD COMPLEX 30 MIN: CPT

## 2025-06-13 PROCEDURE — 82607 VITAMIN B-12: CPT

## 2025-06-13 PROCEDURE — 82746 ASSAY OF FOLIC ACID SERUM: CPT

## 2025-06-13 PROCEDURE — 2500000003 HC RX 250 WO HCPCS: Performed by: INTERNAL MEDICINE

## 2025-06-13 PROCEDURE — 6360000002 HC RX W HCPCS: Performed by: INTERNAL MEDICINE

## 2025-06-13 PROCEDURE — 6370000000 HC RX 637 (ALT 250 FOR IP): Performed by: NURSE PRACTITIONER

## 2025-06-13 PROCEDURE — 99232 SBSQ HOSP IP/OBS MODERATE 35: CPT | Performed by: PSYCHIATRY & NEUROLOGY

## 2025-06-13 PROCEDURE — 6370000000 HC RX 637 (ALT 250 FOR IP): Performed by: INTERNAL MEDICINE

## 2025-06-13 PROCEDURE — 80048 BASIC METABOLIC PNL TOTAL CA: CPT

## 2025-06-13 RX ADMIN — ENOXAPARIN SODIUM 40 MG: 100 INJECTION SUBCUTANEOUS at 12:27

## 2025-06-13 RX ADMIN — GABAPENTIN 600 MG: 300 CAPSULE ORAL at 20:39

## 2025-06-13 RX ADMIN — SODIUM CHLORIDE: 0.9 INJECTION, SOLUTION INTRAVENOUS at 03:01

## 2025-06-13 RX ADMIN — WATER 1000 MG: 1 INJECTION INTRAMUSCULAR; INTRAVENOUS; SUBCUTANEOUS at 12:27

## 2025-06-13 RX ADMIN — LORAZEPAM 0.5 MG: 0.5 TABLET ORAL at 20:38

## 2025-06-13 RX ADMIN — ACETAMINOPHEN 650 MG: 325 TABLET ORAL at 20:38

## 2025-06-13 RX ADMIN — BACLOFEN 10 MG: 10 TABLET ORAL at 20:38

## 2025-06-13 RX ADMIN — Medication 2000 UNITS: at 12:28

## 2025-06-13 RX ADMIN — BACLOFEN 10 MG: 10 TABLET ORAL at 12:27

## 2025-06-13 RX ADMIN — SODIUM CHLORIDE, PRESERVATIVE FREE 10 ML: 5 INJECTION INTRAVENOUS at 20:39

## 2025-06-13 RX ADMIN — CLONAZEPAM 0.5 MG: 0.5 TABLET ORAL at 12:28

## 2025-06-13 RX ADMIN — BACLOFEN 10 MG: 10 TABLET ORAL at 17:12

## 2025-06-13 RX ADMIN — GABAPENTIN 600 MG: 300 CAPSULE ORAL at 12:28

## 2025-06-13 RX ADMIN — CLONAZEPAM 0.5 MG: 0.5 TABLET ORAL at 20:38

## 2025-06-13 RX ADMIN — METHYLPREDNISOLONE SODIUM SUCCINATE 1000 MG: 1 INJECTION INTRAMUSCULAR; INTRAVENOUS at 15:52

## 2025-06-13 NOTE — PROGRESS NOTES
Physical Therapy Med Surg Initial Assessment  Facility/Department: [address] ORTHO TELE  Room: [facility] [id number]       NAME: Maty Dawson  : 1980 (45 y.o.)  MRN: [id number]  CODE STATUS: Full Code    Date of Service: 2025    Patient Diagnosis(es): Multiple sclerosis (HCC) [G35]  Nausea [R11.0]  Acute cystitis with hematuria [N30.01]   Chief Complaint   Patient presents with    Abdominal Pain    Nausea     Patient Active Problem List    Diagnosis Date Noted    Urinary urgency 2023    Bilateral carpal tunnel syndrome 10/21/2022    Left carpal tunnel syndrome 10/07/2022    Spasticity 2022    Multiple sclerosis (HCC) 2025    Nausea 2025    Dysfunctional gallbladder 2025    Right upper quadrant abdominal pain 10/08/2024    Bilateral foot-drop 10/12/2023    Left foot pain 2020    Deficient knowledge 2020    Dog bite of wrist 2020    Impaired mobility 2020    Myalgia 2020    Hypokalemia 10/02/2018    Nicotine dependence, unspecified, uncomplicated 10/02/2018    Overweight 10/02/2018    Ataxic gait 2018    Body mass index (bmi) 39.0-39.9, adult 2018    Muscle weakness (generalized) 2018    Neuromuscular dysfunction of bladder, unspecified 2018    Obesity, unspecified 2018    Paresthesia of skin 2018    Vitamin D deficiency 2018    Neuropathy 2016    Anxiety and depression 2016    MS (multiple sclerosis) (HCC) 2015        Past Medical History:   Diagnosis Date    Arthritis     Carpal tunnel syndrome     Multiple sclerosis (HCC)      Past Surgical History:   Procedure Laterality Date    APPENDECTOMY  1998    laparoscopic at [facility]    BREAST SURGERY      lump removal bengin    CARPAL TUNNEL RELEASE Left 10/07/2022    LEFT CARPAL TUNNEL RELEASE ([facility]    CARPAL TUNNEL RELEASE Right 2022    RIGHT CARPAL TUNNEL RELEASE performed by

## 2025-06-13 NOTE — CARE COORDINATION
IRF precert initiated via [employer] portal. Reference# [id number]. Will follow for response.  Electronically signed by Marimar Del Rio on 6/13/2025 at 3:49 PM

## 2025-06-13 NOTE — PROGRESS NOTES
[facility] Neurology Daily Progress Note  Name: Maty Dawson  Age: 45 y.o.  Gender: female  CodeStatus: Full Code  Allergies: Morphine    Chief Complaint:Abdominal Pain and Nausea    Primary Care Provider: Darian Michelle MD  InpatientTreatment Team: Treatment Team:   Guy Bianchi MD Patel, Dhruv R, MD Visnyai, Lisa, RN  Susan, Edy SOSA, RN  Roxana, Valentina Tolbert LSW Scullin, Heather, DO  Admission Date: 6/12/2025      HPI   Pt seen and examined for neuro follow up.  Patient is alert and oriented x 3, no acute distress, cooperative.  Sitting up in the bed.  Denies headache.  No vision changes.  No bowel or bladder changes.  Was able to walk with therapy today.  Patient feeling much better today.  Vitals:    06/13/25 0723   BP: 114/65   Pulse: 65   Resp: 18   Temp: 97.9 °F (36.6 °C)   SpO2: 97%        Review of Systems   Constitutional:  Negative for appetite change, chills, fatigue and fever.   HENT:  Negative for hearing loss and trouble swallowing.    Eyes:  Negative for visual disturbance.   Respiratory:  Negative for cough, chest tightness, shortness of breath and wheezing.    Cardiovascular:  Negative for chest pain and palpitations.   Gastrointestinal:  Negative for nausea and vomiting.   Genitourinary:  Negative for difficulty urinating.   Musculoskeletal:  Positive for gait problem. Negative for back pain, neck pain and neck stiffness.   Skin:  Negative for color change and rash.   Neurological:  Positive for weakness. Negative for dizziness, tremors, seizures, syncope, facial asymmetry, speech difficulty, light-headedness, numbness and headaches.   Psychiatric/Behavioral:  Negative for agitation, confusion and hallucinations. The patient is not nervous/anxious.          Physical Exam  Vitals and nursing note reviewed.   Constitutional:       General: She is not in acute distress.     Appearance: She is not diaphoretic.   HENT:      Head: Normocephalic and atraumatic.   Eyes:

## 2025-06-13 NOTE — CARE COORDINATION
Met with pt and yakelinance at bedside. DC plan discussed at length. FOC offered, pt interested in [facility] if indicated by therapy. PT/OT evals pending. Rehab consult placed by Dr. Bianchi. Pt will need precert if appropriate.

## 2025-06-13 NOTE — PROGRESS NOTES
Progress Note  Date:2025       [facility]  Patient Name:Maty Dawson     YOB: 1980     Age:45 y.o.        Subjective    Subjective:  Symptoms:  She reports weakness.  No shortness of breath, malaise, cough, chest pain, headache, chest pressure, anorexia, diarrhea or anxiety.    Diet:  No nausea or vomiting.       Review of Systems   Respiratory:  Negative for cough and shortness of breath.    Cardiovascular:  Negative for chest pain.   Gastrointestinal:  Negative for anorexia, diarrhea, nausea and vomiting.   Neurological:  Positive for weakness.     Objective         Vitals Last 24 Hours:  TEMPERATURE:  Temp  Av.2 °F (36.8 °C)  Min: 97.3 °F (36.3 °C)  Max: 98.9 °F (37.2 °C)  RESPIRATIONS RANGE: Resp  Av  Min: 18  Max: 18  PULSE OXIMETRY RANGE: SpO2  Av.1 %  Min: 93 %  Max: 97 %  PULSE RANGE: Pulse  Av.6  Min: 61  Max: 91  BLOOD PRESSURE RANGE: Systolic (24hrs), Av , Min:90 , Max:114   ; Diastolic (24hrs), Av, Min:58, Max:83    I/O (24Hr):  No intake or output data in the 24 hours ending 25 1033  Objective:  General Appearance:  Comfortable, well-appearing and in no acute distress.    Vital signs: (most recent): Blood pressure 114/65, pulse 65, temperature 97.9 °F (36.6 °C), resp. rate 18, height 1.499 m (4' 11\"), weight 86.1 kg (189 lb 14.4 oz), SpO2 97%, not currently breastfeeding.    HEENT: Normal HEENT exam.    Lungs:  There are decreased breath sounds.    Heart: S1 normal and S2 normal.    Abdomen: Abdomen is soft.  Bowel sounds are normal.     Extremities: Normal range of motion.    Pulses: Distal pulses are intact.    Neurological: Patient is alert.    Pupils:  Pupils are equal, round, and reactive to light.    Skin:  Warm.      Labs/Imaging/Diagnostics    Labs:  CBC:  Recent Labs     25  1208 25  0523   WBC 22.5* 16.5*   RBC 4.96 4.28   HGB 16.2* 13.7   HCT 47.5* 40.6   MCV 95.8* 94.9*   RDW 13.0 13.3    338

## 2025-06-13 NOTE — PLAN OF CARE
Problem: Discharge Planning  Goal: Discharge to home or other facility with appropriate resources  6/12/2025 2032 by Jailyn Tabor RN  Outcome: Progressing  6/12/2025 1712 by Nelda Basilio RN  Outcome: Progressing     Problem: Pain  Goal: Verbalizes/displays adequate comfort level or baseline comfort level  6/12/2025 2032 by Jailyn Tabor RN  Outcome: Progressing  6/12/2025 1712 by Nelda Basilio RN  Outcome: Progressing     Problem: Safety - Adult  Goal: Free from fall injury  6/12/2025 2032 by Jailyn Tabor RN  Outcome: Progressing  6/12/2025 1712 by Nelda Basilio RN  Outcome: Progressing     Problem: ABCDS Injury Assessment  Goal: Absence of physical injury  6/12/2025 2032 by Jailyn Tabor RN  Outcome: Progressing  6/12/2025 1712 by Nelda Basilio RN  Outcome: Progressing     Problem: Skin/Tissue Integrity  Goal: Skin integrity remains intact  Description: 1.  Monitor for areas of redness and/or skin breakdown2.  Assess vascular access sites hourly3.  Every 4-6 hours minimum:  Change oxygen saturation probe site4.  Every 4-6 hours:  If on nasal continuous positive airway pressure, respiratory therapy assess nares and determine need for appliance change or resting period  6/12/2025 2032 by Jailyn Tabor RN  Outcome: Progressing  6/12/2025 1712 by Nelda Basilio RN  Outcome: Progressing

## 2025-06-13 NOTE — CONSULTS
Physical Medicine & Rehabilitation  Consult Note      Admitting Physician: Guy Bianchi MD    Primary Care Provider: Darian Michelle MD     Reason for Consult:  Asses rehab needs, promote physical and mental function, analyze level of care to determine rehab needs, improve ability to actively participate in the rehabilitation process, and decrease likelihood of re-admit to the hospital after discharge.      History of Present Illness:    Maty Dawson is a 45 y.o. female admitted to [facility] on 6/12/2025.            HPI  45-year-old female with a history of multiple sclerosis neuropathy bilateral foot drop presented with a history of abdominal pain and discomfort with loss of function and mobility she was having difficulty with gait ataxia ambulating through her house  Workup is demonstrated urinary tract infection with leukocytosis of 22,000  She is being followed by neurology  I reviewed recent nursing notes discussed care with acute care providers, \" see notes  \".   Events from the previous 24 hours reviewed and discussed .      Their inpatient work up has included:    Imaging:  Imaging and other studies reviewed and discussed with patient and staff    CT ABDOMEN PELVIS W IV CONTRAST Additional Contrast? None  Result Date: 6/12/2025  EXAMINATION: CT OF THE ABDOMEN AND PELVIS WITH CONTRAST 6/12/2025 12:37 pm TECHNIQUE: CT of the abdomen and pelvis was performed with the administration of intravenous contrast. Multiplanar reformatted images are provided for review. Automated exposure control, iterative reconstruction, and/or weight based adjustment of the mA/kV was utilized to reduce the radiation dose to as low as reasonably achievable. COMPARISON: HIDA scan 10/29/2024. HISTORY: ORDERING SYSTEM PROVIDED HISTORY: pain TECHNOLOGIST PROVIDED HISTORY: Additional Contrast?->None Reason for exam:->pain Decision Support Exception - unselect if not a suspected or confirmed emergency medical 
OCCLUSION.    -  RADWHERE  Read By- CARLOS LEIGH M.D.  Released By- CARLOS LEIGH M.D.  Released Date Time- 08/29/15 1015  This document has been electronically signed.  ------------------------------------------------------------------------------                            CT of the Head: Results for orders placed during the hospital encounter of 06/18/22    CT HEAD WO CONTRAST    Narrative  Patient: ALEXANDRIA PRICE  Time Out: 20:16  Exam(s): CT HEAD Without Contrast    EXAM:  CT Head Without Intravenous Contrast    CLINICAL HISTORY:  Reason for exam: Left upper extremity weakness hx of MS. Patient  states she is having left elbow pain, denies injury but states she has  been diagnosed with MS. States she is having weakness in left hand.    TECHNIQUE:  Axial computed tomography images of the head/brain without intravenous  contrast.  All CT scan at this facility use dose modulation, iterative  reconstruction, and/or weight based dosing when appropriate to reduce  radiation dose to as low as reasonably achievable.    COMPARISON:  No relevant prior studies available.    FINDINGS:  Brain:  No acute infarct or hemorrhage.  Ventricles:  Unremarkable.  No ventriculomegaly.  Bones/joints:  Unremarkable.  No acute calvarial fracture.  Soft tissues:  Unremarkable.  Sinuses:  Mild mucosal thickening the paranasal sinuses.  Mastoid air cells:  Unremarkable as visualized.      Electronically signed by Sudhakar Quiñones MD on 06-18-22 at 2016    Impression  No acute findings in the head/brain.  No results found for this or any previous visit.  No results found for this or any previous visit.      Carotid duplex: No results found for this or any previous visit.  No results found for this or any previous visit.  No results found for this or any previous visit.      Echo No results found for this or any previous visit.            Assessment/Plan:    Patient is a 45-year-old  female with past medical history of

## 2025-06-13 NOTE — CARE COORDINATION
[facility] Pre-Admission Screening Document      Patient Name: Maty Dawson       MRN: [id number]    : 1980    Age: 45 y.o.  Gender: female   Payor: Payor: KENNY JOY / Plan: KENNY JOY DUAL / Product Type: *No Product type* /   MSSP: No    Admitted from: [facility] Floor: 2W  Attending Care Provider: Guy Bianchi MD  Inpatient Rehab Referring Care Provider: Dr. Bianchi  Primary Care Provider: Darian Michelle MD  Inpatient Treatment Team including Consults: Treatment Team:   Guy Bianchi MD Patel, MD Susan Drew Angel M, RN  Roxana, Valentina Tolbert, Shakila Flor DO    Reason for Hospitalization:   1. Nausea    2. Acute cystitis with hematuria    3. Multiple sclerosis (HCC)      Chief Complaint   Patient presents with    Abdominal Pain    Nausea     Isolation:No active isolations    Hospital Course:  Admit Date: 2025 11:34 AM  Inpatient Rehab Referral Date: 2025  Narrative of hospital course/history of present illness: 45 y.o. female patient with Hx of MS who presented to ER  with ABD pain, nausea and generalized weakness. UTI + and started on IV Rocephin. Patient admitted with MS exacerbation r/t UTI. Neurology consulting.    Internal Medicine:  1) MS exacerbation  2) gastritis  3) UTI  Assessment & Plan  : Continue IV antibiotics.  Follow-up urine culture.  Continue IV steroids.  Continue with PT OT.  Rehab referral.  Nausea vomiting resolved.  Continue IV Protonix.  Possible discharge this weekend    Neurology:  Patient with MS exacerbation in the setting of UTI.  Will give 3 total doses of IV methylprednisolone 1 g.  Check vitamin D level, B12 and folate     I have personally performed a face to face diagnostic evaluation on this patient, reviewed all data and investigations, and am the sole provider of all clinical decisions on the neurological status of this patient.

## 2025-06-13 NOTE — CARE COORDINATION
Inpatient Rehab referral received. Met with patient and fiance to discuss rehab referral. Patient admitted with weakness r/t MS beto. Advised of needing PT/OT evals to help determine functional needs. Reviewed and explained [facility] Acute Inpatient Rehab program and requirements, including 3 hours of intense therapy daily, anticipated length of stay, weekly team meetings and goal of discharge to home. Advised of needing insurance approval for rehab admission and that again, remains dependent upon PT/OT evals. Patient reports to having done outpatient PT/OT in the past. Follows with Dr. Valderrama-- informed neurology able to follow while on rehab too. Patient from home with john. Has an aide who assists. Utilizes [id number] for transportation, however, reports sometimes it can be difficult using their service for various reasons. Patient reports to having some DME at home but that she also wall/ furniture walks at time and is currently in the process of getting electric w/c. All questions answered and patient/ fiance verbalized understanding. Freedom of choice provided and patient requests admit to [facility].  Electronically signed by Marimar Del Rio on 6/13/2025 at 11:11 AM

## 2025-06-13 NOTE — PROGRESS NOTES
[facility] OCCUPATIONAL THERAPY EVALUATION - ACUTE     NAME: Maty Dawson  : 1980 (45 y.o.)  MRN: [id number]  CODE STATUS: Full Code  Room: W286/W286-01    Date of Service: 2025    Patient Diagnosis(es): Multiple sclerosis (HCC) [G35]  Nausea [R11.0]  Acute cystitis with hematuria [N30.01]   Patient Active Problem List    Diagnosis Date Noted    Urinary urgency 2023    Bilateral carpal tunnel syndrome 10/21/2022    Left carpal tunnel syndrome 10/07/2022    Spasticity 2022    Multiple sclerosis (HCC) 2025    Nausea 2025    Dysfunctional gallbladder 2025    Right upper quadrant abdominal pain 10/08/2024    Bilateral foot-drop 10/12/2023    Left foot pain 2020    Deficient knowledge 2020    Dog bite of wrist 2020    Impaired mobility 2020    Myalgia 2020    Hypokalemia 10/02/2018    Nicotine dependence, unspecified, uncomplicated 10/02/2018    Overweight 10/02/2018    Ataxic gait 2018    Body mass index (bmi) 39.0-39.9, adult 2018    Muscle weakness (generalized) 2018    Neuromuscular dysfunction of bladder, unspecified 2018    Obesity, unspecified 2018    Paresthesia of skin 2018    Vitamin D deficiency 2018    Neuropathy 2016    Anxiety and depression 2016    MS (multiple sclerosis) (HCC) 2015      Nausea  Acute cystitis with hematuria  Multiple sclerosis (HCC)    Past Medical History:   Diagnosis Date    Arthritis     Carpal tunnel syndrome     Multiple sclerosis (HCC)      Past Surgical History:   Procedure Laterality Date    APPENDECTOMY  1998    laparoscopic at [facility]    BREAST SURGERY      lump removal bengin    CARPAL TUNNEL RELEASE Left 10/07/2022    LEFT CARPAL TUNNEL RELEASE ([facility]) performed by Vahe Dumont MD at [facility] OR    CARPAL TUNNEL RELEASE Right 2022    RIGHT CARPAL TUNNEL RELEASE performed by Vahe Dumont MD at [facility] OR

## 2025-06-14 LAB
ANION GAP SERPL CALCULATED.3IONS-SCNC: 9 MEQ/L (ref 9–15)
BACTERIA UR CULT: ABNORMAL
BACTERIA UR CULT: ABNORMAL
BASOPHILS # BLD: 0 K/UL (ref 0–0.2)
BASOPHILS NFR BLD: 0.1 %
BUN SERPL-MCNC: 16 MG/DL (ref 6–20)
CALCIUM SERPL-MCNC: 9.1 MG/DL (ref 8.5–9.9)
CHLORIDE SERPL-SCNC: 110 MEQ/L (ref 95–107)
CO2 SERPL-SCNC: 23 MEQ/L (ref 20–31)
CREAT SERPL-MCNC: 0.78 MG/DL (ref 0.5–0.9)
EOSINOPHIL # BLD: 0 K/UL (ref 0–0.7)
EOSINOPHIL NFR BLD: 0 %
ERYTHROCYTE [DISTWIDTH] IN BLOOD BY AUTOMATED COUNT: 13.1 % (ref 11.5–14.5)
GLUCOSE SERPL-MCNC: 206 MG/DL (ref 70–99)
HCT VFR BLD AUTO: 42.6 % (ref 37–47)
HGB BLD-MCNC: 13.9 G/DL (ref 12–16)
LYMPHOCYTES # BLD: 2.1 K/UL (ref 1–4.8)
LYMPHOCYTES NFR BLD: 11.2 %
MCH RBC QN AUTO: 31.4 PG (ref 27–31.3)
MCHC RBC AUTO-ENTMCNC: 32.6 % (ref 33–37)
MCV RBC AUTO: 96.4 FL (ref 79.4–94.8)
MONOCYTES # BLD: 0.3 K/UL (ref 0.2–0.8)
MONOCYTES NFR BLD: 1.5 %
NEUTROPHILS # BLD: 16.1 K/UL (ref 1.4–6.5)
NEUTS SEG NFR BLD: 86.6 %
ORGANISM: ABNORMAL
PLATELET # BLD AUTO: 377 K/UL (ref 130–400)
POTASSIUM SERPL-SCNC: 4.7 MEQ/L (ref 3.4–4.9)
RBC # BLD AUTO: 4.42 M/UL (ref 4.2–5.4)
SODIUM SERPL-SCNC: 142 MEQ/L (ref 135–144)
WBC # BLD AUTO: 18.6 K/UL (ref 4.8–10.8)

## 2025-06-14 PROCEDURE — 6370000000 HC RX 637 (ALT 250 FOR IP): Performed by: NURSE PRACTITIONER

## 2025-06-14 PROCEDURE — 97116 GAIT TRAINING THERAPY: CPT

## 2025-06-14 PROCEDURE — 36415 COLL VENOUS BLD VENIPUNCTURE: CPT

## 2025-06-14 PROCEDURE — 85025 COMPLETE CBC W/AUTO DIFF WBC: CPT

## 2025-06-14 PROCEDURE — 80048 BASIC METABOLIC PNL TOTAL CA: CPT

## 2025-06-14 PROCEDURE — 2580000003 HC RX 258: Performed by: INTERNAL MEDICINE

## 2025-06-14 PROCEDURE — 1210000000 HC MED SURG R&B

## 2025-06-14 PROCEDURE — 2500000003 HC RX 250 WO HCPCS

## 2025-06-14 PROCEDURE — 99232 SBSQ HOSP IP/OBS MODERATE 35: CPT | Performed by: PSYCHIATRY & NEUROLOGY

## 2025-06-14 PROCEDURE — 6370000000 HC RX 637 (ALT 250 FOR IP): Performed by: INTERNAL MEDICINE

## 2025-06-14 PROCEDURE — 6360000002 HC RX W HCPCS: Performed by: INTERNAL MEDICINE

## 2025-06-14 PROCEDURE — 2500000003 HC RX 250 WO HCPCS: Performed by: INTERNAL MEDICINE

## 2025-06-14 RX ORDER — WATER 10 ML/10ML
INJECTION INTRAMUSCULAR; INTRAVENOUS; SUBCUTANEOUS
Status: COMPLETED
Start: 2025-06-14 | End: 2025-06-14

## 2025-06-14 RX ADMIN — SODIUM CHLORIDE, PRESERVATIVE FREE 10 ML: 5 INJECTION INTRAVENOUS at 21:17

## 2025-06-14 RX ADMIN — Medication 2000 UNITS: at 09:33

## 2025-06-14 RX ADMIN — WATER 10 ML: 1 INJECTION INTRAMUSCULAR; INTRAVENOUS; SUBCUTANEOUS at 09:34

## 2025-06-14 RX ADMIN — SODIUM CHLORIDE, PRESERVATIVE FREE 10 ML: 5 INJECTION INTRAVENOUS at 09:34

## 2025-06-14 RX ADMIN — CLONAZEPAM 0.5 MG: 0.5 TABLET ORAL at 21:16

## 2025-06-14 RX ADMIN — ENOXAPARIN SODIUM 40 MG: 100 INJECTION SUBCUTANEOUS at 09:33

## 2025-06-14 RX ADMIN — METHYLPREDNISOLONE SODIUM SUCCINATE 1000 MG: 1 INJECTION INTRAMUSCULAR; INTRAVENOUS at 15:58

## 2025-06-14 RX ADMIN — CLONAZEPAM 0.5 MG: 0.5 TABLET ORAL at 09:33

## 2025-06-14 RX ADMIN — BACLOFEN 10 MG: 10 TABLET ORAL at 13:28

## 2025-06-14 RX ADMIN — GABAPENTIN 600 MG: 300 CAPSULE ORAL at 21:16

## 2025-06-14 RX ADMIN — ESCITALOPRAM OXALATE 20 MG: 20 TABLET ORAL at 09:33

## 2025-06-14 RX ADMIN — ACETAMINOPHEN 650 MG: 325 TABLET ORAL at 21:16

## 2025-06-14 RX ADMIN — PANTOPRAZOLE SODIUM 40 MG: 40 INJECTION, POWDER, FOR SOLUTION INTRAVENOUS at 09:34

## 2025-06-14 RX ADMIN — BACLOFEN 10 MG: 10 TABLET ORAL at 21:16

## 2025-06-14 RX ADMIN — GABAPENTIN 600 MG: 300 CAPSULE ORAL at 09:33

## 2025-06-14 RX ADMIN — WATER 1000 MG: 1 INJECTION INTRAMUSCULAR; INTRAVENOUS; SUBCUTANEOUS at 13:28

## 2025-06-14 RX ADMIN — BACLOFEN 10 MG: 10 TABLET ORAL at 17:39

## 2025-06-14 RX ADMIN — BACLOFEN 10 MG: 10 TABLET ORAL at 09:33

## 2025-06-14 RX ADMIN — LORAZEPAM 0.5 MG: 0.5 TABLET ORAL at 21:16

## 2025-06-14 RX ADMIN — GABAPENTIN 600 MG: 300 CAPSULE ORAL at 14:29

## 2025-06-14 NOTE — PLAN OF CARE
Problem: Discharge Planning  Goal: Discharge to home or other facility with appropriate resources  Outcome: Progressing     Problem: Pain  Goal: Verbalizes/displays adequate comfort level or baseline comfort level  Outcome: Progressing     Problem: Safety - Adult  Goal: Free from fall injury  Outcome: Progressing     Problem: ABCDS Injury Assessment  Goal: Absence of physical injury  Outcome: Progressing     Problem: Skin/Tissue Integrity  Goal: Skin integrity remains intact  Description: 1.  Monitor for areas of redness and/or skin breakdown2.  Assess vascular access sites hourly3.  Every 4-6 hours minimum:  Change oxygen saturation probe site4.  Every 4-6 hours:  If on nasal continuous positive airway pressure, respiratory therapy assess nares and determine need for appliance change or resting period  Outcome: Progressing     Problem: Occupational Therapy - Adult  Goal: By Discharge: Performs self-care activities at highest level of function for planned discharge setting.  See evaluation for individualized goals.  6/13/2025 1347 by Naty Hong, OTR/L  Outcome: Progressing     Problem: Physical Therapy - Adult  Goal: By Discharge: Performs mobility at highest level of function for planned discharge setting.  See evaluation for individualized goals.  6/13/2025 1416 by Jeri Nguyen, PT  Outcome: Progressing

## 2025-06-14 NOTE — PROGRESS NOTES
[facility] Neurology Daily Progress Note  Name: Maty Dawson  Age: 45 y.o.  Gender: female  CodeStatus: Full Code  Allergies: Morphine    Chief Complaint:Abdominal Pain and Nausea    Primary Care Provider: Darian Michelle MD  InpatientTreatment Team: Treatment Team:   Guy Bianchi MD Patel, MD Ga Drew Heather, DO Downs, Yuli Null, Carlos Enrique OROSCO, Osiris Brizuela, RCP  Fabienne Ziegler RN Vance, Kelly, RN  Admission Date: 6/12/2025      HPI   Pt seen and examined for neuro follow up.  Patient is alert and oriented x 3, no acute distress, cooperative.  Sitting up in the bed.  Denies headache.  No vision changes.  No bowel or bladder changes.  Was able to walk with therapy today.  Patient feeling much better today.    6/14  Much improved in the lower extremity still continues to have some weakness with spasticity.  Patient will receive third dose of steroids today  Vitals:    06/14/25 0726   BP: 131/85   Pulse: 57   Resp: 18   Temp: 97.9 °F (36.6 °C)   SpO2: 95%        Review of Systems   Constitutional:  Negative for appetite change, chills, fatigue and fever.   HENT:  Negative for hearing loss and trouble swallowing.    Eyes:  Negative for visual disturbance.   Respiratory:  Negative for cough, chest tightness, shortness of breath and wheezing.    Cardiovascular:  Negative for chest pain and palpitations.   Gastrointestinal:  Negative for nausea and vomiting.   Genitourinary:  Negative for difficulty urinating.   Musculoskeletal:  Positive for gait problem. Negative for back pain, neck pain and neck stiffness.   Skin:  Negative for color change and rash.   Neurological:  Positive for weakness. Negative for dizziness, tremors, seizures, syncope, facial asymmetry, speech difficulty, light-headedness, numbness and headaches.   Psychiatric/Behavioral:  Negative for agitation, confusion and hallucinations. The patient is not nervous/anxious.          Physical Exam  Vitals and nursing note reviewed.

## 2025-06-14 NOTE — PROGRESS NOTES
Progress Note  Date:2025       [facility]  Patient Name:Maty Dawson     YOB: 1980     Age:45 y.o.        Subjective    Subjective:  Symptoms:  She reports weakness.  No shortness of breath, malaise, cough, chest pain, headache, chest pressure, anorexia, diarrhea or anxiety.    Diet:  No nausea or vomiting.       Review of Systems   Respiratory:  Negative for cough and shortness of breath.    Cardiovascular:  Negative for chest pain.   Gastrointestinal:  Negative for anorexia, diarrhea, nausea and vomiting.   Neurological:  Positive for weakness.     Objective         Vitals Last 24 Hours:  TEMPERATURE:  Temp  Av.8 °F (36.6 °C)  Min: 97.5 °F (36.4 °C)  Max: 98.1 °F (36.7 °C)  RESPIRATIONS RANGE: Resp  Av.3  Min: 16  Max: 18  PULSE OXIMETRY RANGE: SpO2  Av.3 %  Min: 95 %  Max: 99 %  PULSE RANGE: Pulse  Av.3  Min: 57  Max: 72  BLOOD PRESSURE RANGE: Systolic (24hrs), Av , Min:109 , Max:131   ; Diastolic (24hrs), Av, Min:76, Max:85    I/O (24Hr):  No intake or output data in the 24 hours ending 25 1109  Objective:  General Appearance:  Comfortable, well-appearing and in no acute distress.    Vital signs: (most recent): Blood pressure 131/85, pulse 57, temperature 97.9 °F (36.6 °C), temperature source Oral, resp. rate 18, height 1.499 m (4' 11\"), weight 86.1 kg (189 lb 14.4 oz), SpO2 95%, not currently breastfeeding.    HEENT: Normal HEENT exam.    Lungs:  There are decreased breath sounds.    Heart: S1 normal and S2 normal.    Abdomen: Abdomen is soft.  Bowel sounds are normal.     Extremities: Normal range of motion.    Pulses: Distal pulses are intact.    Neurological: Patient is alert.    Pupils:  Pupils are equal, round, and reactive to light.    Skin:  Warm.      Labs/Imaging/Diagnostics    Labs:  CBC:  Recent Labs     25  1208 25  0523 25  0515   WBC 22.5* 16.5* 18.6*   RBC 4.96 4.28 4.42   HGB 16.2* 13.7 13.9   HCT 47.5* 40.6 42.6

## 2025-06-14 NOTE — PROGRESS NOTES
Subjective:  The patient complains of moderate acute on chronic progressive fatigue and weakness  partially relieved by rest, PT, OT and meds   and exacerbated by exertion and recent  .      I am concerned about patient’s medical complexities including:  Principal Problem:    Multiple sclerosis (HCC)  Active Problems:    Nausea  Resolved Problems:    * No resolved hospital problems. *      .    Controlled Substance Monitoring:    Acute and Chronic Pain Monitoring:   RX Monitoring Periodic Controlled Substance Monitoring Chronic Pain > 50 MEDD   4/16/2025   2:32 PM No signs of potential drug abuse or diversion identified. Re-evaluated the status of the patient's underlying condition causing pain.           Reviewed recent nursing note and discussed current status and planned care with acute care providers, \"see notes  \".    ROS x10:  The patient also complains of severely impaired mobility and activities of daily living.  Otherwise no new problems with vision, hearing, nose, mouth, throat, dermal, cardiovascular, GI, , pulmonary, musculoskeletal, psychiatric or neurological.        Vital signs:  BP (!) 120/56   Pulse 54   Temp 97.9 °F (36.6 °C)   Resp 18   Ht 1.499 m (4' 11\")   Wt 86.1 kg (189 lb 14.4 oz)   SpO2 98%   BMI 38.36 kg/m²   I/O:   PO/Intake:    fair PO intake, monitoring for dysphagia    Bowel/Bladder:   continent,    General:  Patient is well developed, adequately nourished, and    well kempt.     HEENT:    PERRLA, hearing intact to loud voice, external inspection of ear and nose benign.  Inspection of lips, tongue and gums benign  Musculoskeletal: No significant change in strength or tone.  All joints stable.      Inspection and palpation of digits and nails show no clubbing, cyanosis or inflammatory conditions.   Neuro/Psychiatric: Affect: flat-  Alert and oriented to self and situation with   cues.  No significant change in deep tendon reflexes or sensation  Lungs:  Diminished, CTA-B

## 2025-06-14 NOTE — CARE COORDINATION
Checked status of Prior Authorization for Inpatient Rehab Facility (IRF), Ref# [id number]. Still \"PENDING DECISION\". Will continue to monitor for response. Electronically signed by Carlos Enrique Null RN on 6/14/25 at 8:50 AM EDT    [employer] approved patient for IRF. Okay to admit today, 6/15/25. Patient will admit into Room 247. Called 2W , Li. Provided room number and she will let the nurse know.     Also notified CM patient has been approved. Electronically signed by Carlos Enrique Null RN on 6/15/25 at 11:02 AM EDT

## 2025-06-14 NOTE — FLOWSHEET NOTE
Loss of IV access. This RN had 2 unsuccessful attempts. ER called to see if someone could come up.    1520 Access gained by Medic in ED.     1530 attempted to give Solumedrol but addease reconstitution clogged. Call placed to pharmacy who will send me a new premixed bag.

## 2025-06-14 NOTE — PLAN OF CARE
Problem: Discharge Planning  Goal: Discharge to home or other facility with appropriate resources  6/14/2025 1337 by Fabienne Ziegler RN  Outcome: Progressing     Problem: Pain  Goal: Verbalizes/displays adequate comfort level or baseline comfort level  6/14/2025 1337 by Fabienne Ziegler RN  Outcome: Progressing     Problem: Safety - Adult  Goal: Free from fall injury  6/14/2025 1337 by Fabienne Ziegler RN  Outcome: Progressing     Problem: ABCDS Injury Assessment  Goal: Absence of physical injury  6/14/2025 1337 by Fabienne Ziegler RN  Outcome: Progressing     Problem: Skin/Tissue Integrity  Goal: Skin integrity remains intact  Description: 1.  Monitor for areas of redness and/or skin breakdown2.  Assess vascular access sites hourly3.  Every 4-6 hours minimum:  Change oxygen saturation probe site4.  Every 4-6 hours:  If on nasal continuous positive airway pressure, respiratory therapy assess nares and determine need for appliance change or resting period  6/14/2025 1337 by Fabienne Ziegler RN  Outcome: Progressing

## 2025-06-14 NOTE — PROGRESS NOTES
Physical Therapy Med Surg Daily Treatment Note  Facility/Department: [address] ORTHO TELE  Room: [place] [address]       NAME: Maty Dawson  : 1980 (45 y.o.)  MRN: [id number]  CODE STATUS: Full Code    Date of Service: 2025    Patient Diagnosis(es): Multiple sclerosis (HCC) [G35]  Nausea [R11.0]  Acute cystitis with hematuria [N30.01]   Chief Complaint   Patient presents with    Abdominal Pain    Nausea     Patient Active Problem List    Diagnosis Date Noted    Urinary urgency 2023    Bilateral carpal tunnel syndrome 10/21/2022    Left carpal tunnel syndrome 10/07/2022    Spasticity 2022    Multiple sclerosis (HCC) 2025    Nausea 2025    Dysfunctional gallbladder 2025    Right upper quadrant abdominal pain 10/08/2024    Bilateral foot-drop 10/12/2023    Left foot pain 2020    Deficient knowledge 2020    Dog bite of wrist 2020    Impaired mobility 2020    Myalgia 2020    Hypokalemia 10/02/2018    Nicotine dependence, unspecified, uncomplicated 10/02/2018    Overweight 10/02/2018    Ataxic gait 2018    Body mass index (bmi) 39.0-39.9, adult 2018    Muscle weakness (generalized) 2018    Neuromuscular dysfunction of bladder, unspecified 2018    Obesity, unspecified 2018    Paresthesia of skin 2018    Vitamin D deficiency 2018    Neuropathy 2016    Anxiety and depression 2016    MS (multiple sclerosis) (HCC) 2015        Past Medical History:   Diagnosis Date    Arthritis     Carpal tunnel syndrome     Multiple sclerosis (HCC)      Past Surgical History:   Procedure Laterality Date    APPENDECTOMY  1998    laparoscopic at [facility]    BREAST SURGERY      lump removal bengin    CARPAL TUNNEL RELEASE Left 10/07/2022    LEFT CARPAL TUNNEL RELEASE ([facility]) performed by Vahe Dumont MD at [facility] OR    CARPAL TUNNEL RELEASE Right 2022    RIGHT CARPAL TUNNEL RELEASE performed by

## 2025-06-15 ENCOUNTER — HOSPITAL ENCOUNTER (INPATIENT)
Age: 45
LOS: 4 days | Discharge: HOME | DRG: 059 | End: 2025-06-19
Attending: PHYSICAL MEDICINE & REHABILITATION | Admitting: PHYSICAL MEDICINE & REHABILITATION
Payer: COMMERCIAL

## 2025-06-15 VITALS
SYSTOLIC BLOOD PRESSURE: 124 MMHG | WEIGHT: 189.9 LBS | HEART RATE: 56 BPM | RESPIRATION RATE: 16 BRPM | DIASTOLIC BLOOD PRESSURE: 69 MMHG | TEMPERATURE: 98.2 F | HEIGHT: 59 IN | BODY MASS INDEX: 38.28 KG/M2 | OXYGEN SATURATION: 93 %

## 2025-06-15 DIAGNOSIS — G35 MULTIPLE SCLEROSIS (HCC): ICD-10-CM

## 2025-06-15 DIAGNOSIS — Z78.9 IMPAIRED MOBILITY AND ACTIVITIES OF DAILY LIVING: Primary | ICD-10-CM

## 2025-06-15 DIAGNOSIS — G62.9 NEUROPATHY: ICD-10-CM

## 2025-06-15 DIAGNOSIS — R20.2 PARESTHESIA OF SKIN: ICD-10-CM

## 2025-06-15 DIAGNOSIS — Z74.09 IMPAIRED MOBILITY: ICD-10-CM

## 2025-06-15 DIAGNOSIS — R10.11 RIGHT UPPER QUADRANT ABDOMINAL PAIN: ICD-10-CM

## 2025-06-15 DIAGNOSIS — Z74.09 IMPAIRED MOBILITY AND ACTIVITIES OF DAILY LIVING: Primary | ICD-10-CM

## 2025-06-15 DIAGNOSIS — R26.0 ATAXIC GAIT: ICD-10-CM

## 2025-06-15 LAB
ANION GAP SERPL CALCULATED.3IONS-SCNC: 14 MEQ/L (ref 9–15)
BASOPHILS # BLD: 0 K/UL (ref 0–0.2)
BASOPHILS NFR BLD: 0.2 %
BUN SERPL-MCNC: 17 MG/DL (ref 6–20)
CALCIUM SERPL-MCNC: 8.7 MG/DL (ref 8.5–9.9)
CHLORIDE SERPL-SCNC: 103 MEQ/L (ref 95–107)
CO2 SERPL-SCNC: 22 MEQ/L (ref 20–31)
CREAT SERPL-MCNC: 0.88 MG/DL (ref 0.5–0.9)
EOSINOPHIL # BLD: 0 K/UL (ref 0–0.7)
EOSINOPHIL NFR BLD: 0 %
ERYTHROCYTE [DISTWIDTH] IN BLOOD BY AUTOMATED COUNT: 13.2 % (ref 11.5–14.5)
GLUCOSE BLD-MCNC: 299 MG/DL (ref 70–99)
GLUCOSE SERPL-MCNC: 355 MG/DL (ref 70–99)
HCT VFR BLD AUTO: 41.7 % (ref 37–47)
HGB BLD-MCNC: 13.9 G/DL (ref 12–16)
LYMPHOCYTES # BLD: 1.6 K/UL (ref 1–4.8)
LYMPHOCYTES NFR BLD: 10.4 %
MCH RBC QN AUTO: 32 PG (ref 27–31.3)
MCHC RBC AUTO-ENTMCNC: 33.3 % (ref 33–37)
MCV RBC AUTO: 96.1 FL (ref 79.4–94.8)
MONOCYTES # BLD: 0.3 K/UL (ref 0.2–0.8)
MONOCYTES NFR BLD: 1.7 %
NEUTROPHILS # BLD: 13.4 K/UL (ref 1.4–6.5)
NEUTS SEG NFR BLD: 86 %
PERFORMED ON: ABNORMAL
PLATELET # BLD AUTO: 395 K/UL (ref 130–400)
POTASSIUM SERPL-SCNC: 4.4 MEQ/L (ref 3.4–4.9)
RBC # BLD AUTO: 4.34 M/UL (ref 4.2–5.4)
SODIUM SERPL-SCNC: 139 MEQ/L (ref 135–144)
WBC # BLD AUTO: 15.6 K/UL (ref 4.8–10.8)

## 2025-06-15 PROCEDURE — 6370000000 HC RX 637 (ALT 250 FOR IP): Performed by: INTERNAL MEDICINE

## 2025-06-15 PROCEDURE — 80048 BASIC METABOLIC PNL TOTAL CA: CPT

## 2025-06-15 PROCEDURE — 6360000002 HC RX W HCPCS: Performed by: INTERNAL MEDICINE

## 2025-06-15 PROCEDURE — 2500000003 HC RX 250 WO HCPCS

## 2025-06-15 PROCEDURE — 36415 COLL VENOUS BLD VENIPUNCTURE: CPT

## 2025-06-15 PROCEDURE — 6370000000 HC RX 637 (ALT 250 FOR IP): Performed by: NURSE PRACTITIONER

## 2025-06-15 PROCEDURE — 99232 SBSQ HOSP IP/OBS MODERATE 35: CPT | Performed by: PSYCHIATRY & NEUROLOGY

## 2025-06-15 PROCEDURE — 1180000000 HC REHAB R&B

## 2025-06-15 PROCEDURE — 2500000003 HC RX 250 WO HCPCS: Performed by: INTERNAL MEDICINE

## 2025-06-15 PROCEDURE — 85025 COMPLETE CBC W/AUTO DIFF WBC: CPT

## 2025-06-15 PROCEDURE — 2580000003 HC RX 258: Performed by: INTERNAL MEDICINE

## 2025-06-15 RX ORDER — MAGNESIUM SULFATE IN WATER 40 MG/ML
2000 INJECTION, SOLUTION INTRAVENOUS PRN
Status: DISCONTINUED | OUTPATIENT
Start: 2025-06-15 | End: 2025-06-16

## 2025-06-15 RX ORDER — CLONAZEPAM 0.5 MG/1
0.5 TABLET ORAL EVERY 12 HOURS
Status: CANCELLED | OUTPATIENT
Start: 2025-06-15

## 2025-06-15 RX ORDER — ACETAMINOPHEN 650 MG/1
650 SUPPOSITORY RECTAL EVERY 6 HOURS PRN
Status: CANCELLED | OUTPATIENT
Start: 2025-06-15

## 2025-06-15 RX ORDER — GABAPENTIN 300 MG/1
600 CAPSULE ORAL 3 TIMES DAILY
Status: DISCONTINUED | OUTPATIENT
Start: 2025-06-15 | End: 2025-06-19 | Stop reason: HOSPADM

## 2025-06-15 RX ORDER — ENOXAPARIN SODIUM 100 MG/ML
40 INJECTION SUBCUTANEOUS DAILY
Status: CANCELLED | OUTPATIENT
Start: 2025-06-16

## 2025-06-15 RX ORDER — ESCITALOPRAM OXALATE 20 MG/1
20 TABLET ORAL DAILY
Status: CANCELLED | OUTPATIENT
Start: 2025-06-16

## 2025-06-15 RX ORDER — SODIUM CHLORIDE 0.9 % (FLUSH) 0.9 %
5-40 SYRINGE (ML) INJECTION EVERY 12 HOURS SCHEDULED
Status: DISCONTINUED | OUTPATIENT
Start: 2025-06-15 | End: 2025-06-16

## 2025-06-15 RX ORDER — PANTOPRAZOLE SODIUM 40 MG/10ML
40 INJECTION, POWDER, LYOPHILIZED, FOR SOLUTION INTRAVENOUS DAILY
Status: DISCONTINUED | OUTPATIENT
Start: 2025-06-16 | End: 2025-06-16

## 2025-06-15 RX ORDER — POTASSIUM CHLORIDE 1500 MG/1
40 TABLET, EXTENDED RELEASE ORAL PRN
Status: CANCELLED | OUTPATIENT
Start: 2025-06-15

## 2025-06-15 RX ORDER — ACETAMINOPHEN 650 MG/1
650 SUPPOSITORY RECTAL EVERY 6 HOURS PRN
Status: DISCONTINUED | OUTPATIENT
Start: 2025-06-15 | End: 2025-06-16

## 2025-06-15 RX ORDER — VITAMIN B COMPLEX
2000 TABLET ORAL DAILY
Status: DISCONTINUED | OUTPATIENT
Start: 2025-06-16 | End: 2025-06-19 | Stop reason: HOSPADM

## 2025-06-15 RX ORDER — VITAMIN B COMPLEX
2000 TABLET ORAL DAILY
Status: CANCELLED | OUTPATIENT
Start: 2025-06-16

## 2025-06-15 RX ORDER — LORAZEPAM 0.5 MG/1
0.5 TABLET ORAL EVERY 8 HOURS PRN
Status: DISCONTINUED | OUTPATIENT
Start: 2025-06-15 | End: 2025-06-19 | Stop reason: HOSPADM

## 2025-06-15 RX ORDER — MAGNESIUM SULFATE IN WATER 40 MG/ML
2000 INJECTION, SOLUTION INTRAVENOUS PRN
Status: CANCELLED | OUTPATIENT
Start: 2025-06-15

## 2025-06-15 RX ORDER — POLYETHYLENE GLYCOL 3350 17 G/17G
17 POWDER, FOR SOLUTION ORAL DAILY PRN
Status: DISCONTINUED | OUTPATIENT
Start: 2025-06-15 | End: 2025-06-19 | Stop reason: HOSPADM

## 2025-06-15 RX ORDER — POTASSIUM CHLORIDE 7.45 MG/ML
10 INJECTION INTRAVENOUS PRN
Status: DISCONTINUED | OUTPATIENT
Start: 2025-06-15 | End: 2025-06-16

## 2025-06-15 RX ORDER — CLONAZEPAM 0.5 MG/1
0.5 TABLET ORAL EVERY 12 HOURS
Status: DISCONTINUED | OUTPATIENT
Start: 2025-06-15 | End: 2025-06-19 | Stop reason: HOSPADM

## 2025-06-15 RX ORDER — ONDANSETRON 2 MG/ML
4 INJECTION INTRAMUSCULAR; INTRAVENOUS EVERY 6 HOURS PRN
Status: CANCELLED | OUTPATIENT
Start: 2025-06-15

## 2025-06-15 RX ORDER — PANTOPRAZOLE SODIUM 40 MG/10ML
40 INJECTION, POWDER, LYOPHILIZED, FOR SOLUTION INTRAVENOUS DAILY
Status: CANCELLED | OUTPATIENT
Start: 2025-06-16

## 2025-06-15 RX ORDER — ACETAMINOPHEN 325 MG/1
650 TABLET ORAL EVERY 6 HOURS PRN
Status: CANCELLED | OUTPATIENT
Start: 2025-06-15

## 2025-06-15 RX ORDER — POLYETHYLENE GLYCOL 3350 17 G/17G
17 POWDER, FOR SOLUTION ORAL DAILY PRN
Status: CANCELLED | OUTPATIENT
Start: 2025-06-15

## 2025-06-15 RX ORDER — ENOXAPARIN SODIUM 100 MG/ML
40 INJECTION SUBCUTANEOUS DAILY
Status: DISCONTINUED | OUTPATIENT
Start: 2025-06-16 | End: 2025-06-19 | Stop reason: HOSPADM

## 2025-06-15 RX ORDER — ESCITALOPRAM OXALATE 20 MG/1
20 TABLET ORAL DAILY
Status: DISCONTINUED | OUTPATIENT
Start: 2025-06-16 | End: 2025-06-19 | Stop reason: HOSPADM

## 2025-06-15 RX ORDER — LORAZEPAM 2 MG/ML
1 INJECTION INTRAMUSCULAR
Status: DISCONTINUED | OUTPATIENT
Start: 2025-06-15 | End: 2025-06-15 | Stop reason: HOSPADM

## 2025-06-15 RX ORDER — ACETAMINOPHEN 325 MG/1
650 TABLET ORAL EVERY 4 HOURS PRN
Status: DISCONTINUED | OUTPATIENT
Start: 2025-06-15 | End: 2025-06-16

## 2025-06-15 RX ORDER — WATER 10 ML/10ML
INJECTION INTRAMUSCULAR; INTRAVENOUS; SUBCUTANEOUS
Status: COMPLETED
Start: 2025-06-15 | End: 2025-06-15

## 2025-06-15 RX ORDER — SODIUM CHLORIDE 0.9 % (FLUSH) 0.9 %
5-40 SYRINGE (ML) INJECTION PRN
Status: CANCELLED | OUTPATIENT
Start: 2025-06-15

## 2025-06-15 RX ORDER — ONDANSETRON 4 MG/1
4 TABLET, ORALLY DISINTEGRATING ORAL EVERY 8 HOURS PRN
Status: CANCELLED | OUTPATIENT
Start: 2025-06-15

## 2025-06-15 RX ORDER — GABAPENTIN 300 MG/1
600 CAPSULE ORAL 3 TIMES DAILY
Status: CANCELLED | OUTPATIENT
Start: 2025-06-15

## 2025-06-15 RX ORDER — SODIUM CHLORIDE 9 MG/ML
INJECTION, SOLUTION INTRAVENOUS PRN
Status: CANCELLED | OUTPATIENT
Start: 2025-06-15

## 2025-06-15 RX ORDER — BACLOFEN 10 MG/1
10 TABLET ORAL 4 TIMES DAILY
Status: DISCONTINUED | OUTPATIENT
Start: 2025-06-15 | End: 2025-06-19 | Stop reason: HOSPADM

## 2025-06-15 RX ORDER — ONDANSETRON 2 MG/ML
4 INJECTION INTRAMUSCULAR; INTRAVENOUS EVERY 6 HOURS PRN
Status: DISCONTINUED | OUTPATIENT
Start: 2025-06-15 | End: 2025-06-19 | Stop reason: HOSPADM

## 2025-06-15 RX ORDER — SODIUM CHLORIDE 9 MG/ML
INJECTION, SOLUTION INTRAVENOUS PRN
Status: DISCONTINUED | OUTPATIENT
Start: 2025-06-15 | End: 2025-06-19 | Stop reason: HOSPADM

## 2025-06-15 RX ORDER — BISACODYL 10 MG
10 SUPPOSITORY, RECTAL RECTAL DAILY PRN
Status: DISCONTINUED | OUTPATIENT
Start: 2025-06-15 | End: 2025-06-19 | Stop reason: HOSPADM

## 2025-06-15 RX ORDER — LORAZEPAM 0.5 MG/1
0.5 TABLET ORAL EVERY 8 HOURS PRN
Status: CANCELLED | OUTPATIENT
Start: 2025-06-15

## 2025-06-15 RX ORDER — SODIUM PHOSPHATE, DIBASIC AND SODIUM PHOSPHATE, MONOBASIC 7; 19 G/230ML; G/230ML
1 ENEMA RECTAL DAILY PRN
Status: DISCONTINUED | OUTPATIENT
Start: 2025-06-15 | End: 2025-06-19 | Stop reason: HOSPADM

## 2025-06-15 RX ORDER — SODIUM CHLORIDE 0.9 % (FLUSH) 0.9 %
5-40 SYRINGE (ML) INJECTION EVERY 12 HOURS SCHEDULED
Status: CANCELLED | OUTPATIENT
Start: 2025-06-15

## 2025-06-15 RX ORDER — POTASSIUM CHLORIDE 7.45 MG/ML
10 INJECTION INTRAVENOUS PRN
Status: CANCELLED | OUTPATIENT
Start: 2025-06-15

## 2025-06-15 RX ORDER — ONDANSETRON 4 MG/1
4 TABLET, ORALLY DISINTEGRATING ORAL EVERY 8 HOURS PRN
Status: DISCONTINUED | OUTPATIENT
Start: 2025-06-15 | End: 2025-06-19 | Stop reason: HOSPADM

## 2025-06-15 RX ORDER — POTASSIUM CHLORIDE 1500 MG/1
40 TABLET, EXTENDED RELEASE ORAL PRN
Status: DISCONTINUED | OUTPATIENT
Start: 2025-06-15 | End: 2025-06-16

## 2025-06-15 RX ORDER — SODIUM CHLORIDE 0.9 % (FLUSH) 0.9 %
5-40 SYRINGE (ML) INJECTION PRN
Status: DISCONTINUED | OUTPATIENT
Start: 2025-06-15 | End: 2025-06-19 | Stop reason: HOSPADM

## 2025-06-15 RX ORDER — BACLOFEN 10 MG/1
10 TABLET ORAL 4 TIMES DAILY
Status: CANCELLED | OUTPATIENT
Start: 2025-06-15

## 2025-06-15 RX ORDER — ACETAMINOPHEN 325 MG/1
650 TABLET ORAL EVERY 6 HOURS PRN
Status: DISCONTINUED | OUTPATIENT
Start: 2025-06-15 | End: 2025-06-19 | Stop reason: HOSPADM

## 2025-06-15 RX ADMIN — BACLOFEN 10 MG: 10 TABLET ORAL at 09:27

## 2025-06-15 RX ADMIN — BACLOFEN 10 MG: 10 TABLET ORAL at 12:59

## 2025-06-15 RX ADMIN — WATER: 1 INJECTION INTRAMUSCULAR; INTRAVENOUS; SUBCUTANEOUS at 09:37

## 2025-06-15 RX ADMIN — METHYLPREDNISOLONE SODIUM SUCCINATE 1000 MG: 1 INJECTION INTRAMUSCULAR; INTRAVENOUS at 12:59

## 2025-06-15 RX ADMIN — Medication 10 ML: at 22:25

## 2025-06-15 RX ADMIN — CEPHALEXIN 500 MG: 250 CAPSULE ORAL at 22:24

## 2025-06-15 RX ADMIN — ENOXAPARIN SODIUM 40 MG: 100 INJECTION SUBCUTANEOUS at 09:27

## 2025-06-15 RX ADMIN — PANTOPRAZOLE SODIUM 40 MG: 40 INJECTION, POWDER, FOR SOLUTION INTRAVENOUS at 09:29

## 2025-06-15 RX ADMIN — CLONAZEPAM 0.5 MG: 0.5 TABLET ORAL at 09:28

## 2025-06-15 RX ADMIN — GABAPENTIN 600 MG: 300 CAPSULE ORAL at 12:59

## 2025-06-15 RX ADMIN — Medication 2000 UNITS: at 09:28

## 2025-06-15 RX ADMIN — CLONAZEPAM 0.5 MG: 0.5 TABLET ORAL at 22:24

## 2025-06-15 RX ADMIN — ACETAMINOPHEN 650 MG: 325 TABLET ORAL at 09:44

## 2025-06-15 RX ADMIN — GABAPENTIN 600 MG: 300 CAPSULE ORAL at 22:24

## 2025-06-15 RX ADMIN — GABAPENTIN 600 MG: 300 CAPSULE ORAL at 09:27

## 2025-06-15 RX ADMIN — SODIUM CHLORIDE, PRESERVATIVE FREE 10 ML: 5 INJECTION INTRAVENOUS at 09:37

## 2025-06-15 RX ADMIN — CEPHALEXIN 500 MG: 250 CAPSULE ORAL at 11:03

## 2025-06-15 RX ADMIN — ESCITALOPRAM OXALATE 20 MG: 20 TABLET ORAL at 09:27

## 2025-06-15 RX ADMIN — BACLOFEN 10 MG: 10 TABLET ORAL at 22:23

## 2025-06-15 ASSESSMENT — PAIN SCALES - GENERAL
PAINLEVEL_OUTOF10: 4
PAINLEVEL_OUTOF10: 4
PAINLEVEL_OUTOF10: 6
PAINLEVEL_OUTOF10: 6
PAINLEVEL_OUTOF10: 8
PAINLEVEL_OUTOF10: 8

## 2025-06-15 ASSESSMENT — PAIN DESCRIPTION - FREQUENCY: FREQUENCY: INTERMITTENT

## 2025-06-15 ASSESSMENT — PAIN DESCRIPTION - DESCRIPTORS
DESCRIPTORS: ACHING
DESCRIPTORS: ACHING

## 2025-06-15 ASSESSMENT — PAIN DESCRIPTION - LOCATION
LOCATION: NECK
LOCATION: HEAD
LOCATION: OTHER (COMMENT)

## 2025-06-15 ASSESSMENT — PAIN DESCRIPTION - ORIENTATION
ORIENTATION: ANTERIOR
ORIENTATION: LOWER

## 2025-06-15 ASSESSMENT — PAIN DESCRIPTION - PAIN TYPE: TYPE: NEUROPATHIC PAIN

## 2025-06-15 ASSESSMENT — PAIN - FUNCTIONAL ASSESSMENT: PAIN_FUNCTIONAL_ASSESSMENT: ACTIVITIES ARE NOT PREVENTED

## 2025-06-15 NOTE — DISCHARGE SUMMARY
range: 0.0 - 0.7 K/uL     Status: Final  Basophils Absolute                            Date: 06/12/2025  Value: 0.0         Ref range: 0.0 - 0.2 K/uL     Status: Final  Atypical Lymphocytes Relative                 Date: 06/12/2025  Value: 1           Ref range: %                  Status: Final  Anisocytosis                                  Date: 06/12/2025  Value: 1+            Status: Final  Poikilocytes                                  Date: 06/12/2025  Value: 2+            Status: Final  Sodium                                        Date: 06/12/2025  Value: 139         Ref range: 135 - 144 mEq/L    Status: Final  Potassium                                     Date: 06/12/2025  Value: 3.9         Ref range: 3.4 - 4.9 mEq/L    Status: Final  Chloride                                      Date: 06/12/2025  Value: 99          Ref range: 95 - 107 mEq/L     Status: Final  CO2                                           Date: 06/12/2025  Value: 24          Ref range: 20 - 31 mEq/L      Status: Final  Anion Gap                                     Date: 06/12/2025  Value: 16 (H)      Ref range: 9 - 15 mEq/L       Status: Final  Glucose                                       Date: 06/12/2025  Value: 144 (H)     Ref range: 70 - 99 mg/dL      Status: Final  BUN                                           Date: 06/12/2025  Value: 11          Ref range: 6 - 20 mg/dL       Status: Final  Creatinine                                    Date: 06/12/2025  Value: 0.89        Ref range: 0.50 - 0.90 mg/dL  Status: Final  Est, Glom Filt Rate                           Date: 06/12/2025  Value: 81.3        Ref range: >60                Status: Final                Comment: Pediatric calculator link  https://www.kidney.org/professionals/kdoqi/gfr_calculatorped  Effective Oct 3, 2022  These results are not intended for use in patients  <18 years of age.  eGFR results are calculated without  a race factor using the 2021 CKD-EPI equation.

## 2025-06-15 NOTE — H&P
HISTORY & PHYSICAL       DATE OF ADMISSION:  6/15/2025    DATE OF SERVICE:  6/15/25    Subjective:    Maty Dawson, 45 y.o. female presents today with:     CHIEF COMPLAINT:   Weakness      HPI  Reason for Hospitalization:   1. Nausea    2. Acute cystitis with hematuria    3. Multiple sclerosis (HCC)           Chief Complaint   Patient presents with    Abdominal Pain    Nausea      Isolation:No active isolations     Hospital Course:  Admit Date: 6/12/2025 11:34 AM  Inpatient Rehab Referral Date: 6/13/2025  Narrative of hospital course/history of present illness: 45 y.o. female patient with Hx of MS who presented to ER 6/12 with ABD pain, nausea and generalized weakness. UTI + and started on IV Rocephin. Patient admitted with MS exacerbation r/t UTI. Neurology consulting.     Internal Medicine:  1) MS exacerbation  2) gastritis  3) UTI  Assessment & Plan  6/13: Continue IV antibiotics.  Follow-up urine culture.  Continue IV steroids.  Continue with PT OT.  Rehab referral.  Nausea vomiting resolved.  Continue IV Protonix.  Possible discharge this weekend     Neurology:  Patient with MS exacerbation in the setting of UTI.  Will give 3 total doses of IV methylprednisolone 1 g.  Check vitamin D level, B12 and folate     I have personally performed a face to face diagnostic evaluation on this patient, reviewed all data and investigations, and am the sole provider of all clinical decisions on the neurological status of this patient.  MS exacerbation renal tract infection.  Patient be treated with prednisone and treat her intact infections.  Patient require imitation send exacerbation.     6/13/2025:  MS exacerbation in the setting of UTI  Will complete 3 doses of IV methylprednisolone 1 g daily  Shown some improvement in leg weakness today  Tobacco use, initiate nicotine patch  Rehab pre-CERT pending     I have personally performed a face to face diagnostic evaluation on this patient, reviewed all data and investigations,

## 2025-06-15 NOTE — PLAN OF CARE
Problem: Discharge Planning  Goal: Discharge to home or other facility with appropriate resources  6/15/2025 1054 by Karena Chawla RN  Outcome: Progressing  6/15/2025 0309 by Vaughn Bardales RN  Outcome: Progressing     Problem: Pain  Goal: Verbalizes/displays adequate comfort level or baseline comfort level  6/15/2025 1054 by Karena Chawla RN  Outcome: Progressing  6/15/2025 0309 by Vaughn Bardales RN  Outcome: Progressing     Problem: Safety - Adult  Goal: Free from fall injury  6/15/2025 1054 by Karena Chawla RN  Outcome: Progressing  6/15/2025 0309 by Vaughn Bardales RN  Outcome: Progressing     Problem: ABCDS Injury Assessment  Goal: Absence of physical injury  6/15/2025 0309 by Vaughn Bardales RN  Outcome: Progressing

## 2025-06-15 NOTE — PLAN OF CARE
Problem: Pain  Goal: Verbalizes/displays adequate comfort level or baseline comfort level  Outcome: Progressing  Flowsheets (Taken 6/15/2025 1530)  Verbalizes/displays adequate comfort level or baseline comfort level:   Encourage patient to monitor pain and request assistance   Assess pain using appropriate pain scale   Administer analgesics based on type and severity of pain and evaluate response   Implement non-pharmacological measures as appropriate and evaluate response   Consider cultural and social influences on pain and pain management   Notify Licensed Independent Practitioner if interventions unsuccessful or patient reports new pain

## 2025-06-15 NOTE — PROGRESS NOTES
[facility] Neurology Daily Progress Note  Name: Maty Dawson  Age: 45 y.o.  Gender: female  CodeStatus: Full Code  Allergies: Morphine    Chief Complaint:Abdominal Pain and Nausea    Primary Care Provider: Darian Michelle MD  InpatientTreatment Team: Treatment Team:   Guy Bianchi MD Patel, MD Ga Drew Heather, DO Downs, Yuli Null, Carlos Enrique OROSCO, Osiris Brizuela, Karena Zelaya RN Vance, Kelly, RN Caraballo, Mariyah  Admission Date: 6/12/2025      HPI   Pt seen and examined for neuro follow up.  Patient is alert and oriented x 3, no acute distress, cooperative.  Sitting up in the bed.  Denies headache.  No vision changes.  No bowel or bladder changes.  Was able to walk with therapy today.  Patient feeling much better today.    6/14  Much improved in the lower extremity still continues to have some weakness with spasticity.  Patient will receive third dose of steroids today    6/15  Patient feels much better ambulatory less spastic.  Patient is not complain of severe neck issues which she has had in the past.  Vitals:    06/15/25 0726   BP: (!) 145/77   Pulse: 65   Resp: 18   Temp: 98.1 °F (36.7 °C)   SpO2: 97%        Review of Systems   Constitutional:  Negative for appetite change, chills, fatigue and fever.   HENT:  Negative for hearing loss and trouble swallowing.    Eyes:  Negative for visual disturbance.   Respiratory:  Negative for cough, chest tightness, shortness of breath and wheezing.    Cardiovascular:  Negative for chest pain and palpitations.   Gastrointestinal:  Negative for nausea and vomiting.   Genitourinary:  Negative for difficulty urinating.   Musculoskeletal:  Positive for gait problem. Negative for back pain, neck pain and neck stiffness.   Skin:  Negative for color change and rash.   Neurological:  Positive for weakness. Negative for dizziness, tremors, seizures, syncope, facial asymmetry, speech difficulty, light-headedness, numbness and headaches.

## 2025-06-15 NOTE — PLAN OF CARE
Problem: Discharge Planning  Goal: Discharge to home or other facility with appropriate resources  6/15/2025 0309 by Vaughn Bardales RN  Outcome: Progressing  6/14/2025 1337 by Fabienne Ziegler RN  Outcome: Progressing     Problem: Pain  Goal: Verbalizes/displays adequate comfort level or baseline comfort level  6/15/2025 0309 by Vaughn Bardales RN  Outcome: Progressing  6/14/2025 1337 by Fabienne Ziegler RN  Outcome: Progressing     Problem: Safety - Adult  Goal: Free from fall injury  6/15/2025 0309 by Vaughn Bardales RN  Outcome: Progressing  6/14/2025 1337 by Fabienne Ziegler RN  Outcome: Progressing     Problem: ABCDS Injury Assessment  Goal: Absence of physical injury  6/15/2025 0309 by Vaughn Bardales RN  Outcome: Progressing  6/14/2025 1337 by Fabienne Ziegler RN  Outcome: Progressing     Problem: Skin/Tissue Integrity  Goal: Skin integrity remains intact  Description: 1.  Monitor for areas of redness and/or skin breakdown2.  Assess vascular access sites hourly3.  Every 4-6 hours minimum:  Change oxygen saturation probe site4.  Every 4-6 hours:  If on nasal continuous positive airway pressure, respiratory therapy assess nares and determine need for appliance change or resting period  6/15/2025 0309 by Vaughn Bardales RN  Outcome: Progressing  6/14/2025 1337 by Fabienne Ziegler RN  Outcome: Progressing

## 2025-06-15 NOTE — PLAN OF CARE
Problem: Discharge Planning  Goal: Discharge to home or other facility with appropriate resources  Recent Flowsheet Documentation  Taken 6/15/2025 1552 by Sondra Vera RN  Discharge to home or other facility with appropriate resources:   Identify barriers to discharge with patient and caregiver   Identify discharge learning needs (meds, wound care, etc)   Refer to discharge planning if patient needs post-hospital services based on physician order or complex needs related to functional status, cognitive ability or social support system   Arrange for needed discharge resources and transportation as appropriate

## 2025-06-15 NOTE — PROGRESS NOTES
DVT / VTE PROPHYLAXIS EVALUATION    Recent Labs     06/13/25  0523 06/14/25  0515 06/15/25  0505   BUN 13 16 17   CREATININE 0.71 0.78 0.88    377 395   HGB 13.7 13.9 13.9   HCT 40.6 42.6 41.7     ADMITTING DX OR CHIEF COMPLAINT? MS  WARFARIN? DOAC'S? no  ANY APPARENT BLEEDING? no  SCHEDULED SURGERY? no     If yes to following, excluded from auto adjustment in Table 1 of policy - please contact provider with recommendations as appropriate.  Include condition/exception in scratch notes. Yes No   Trauma Service or Ortho Surgery []  []    Pregnancy []  []        Current order:  Enoxaparin 40 mg SUBQ once daily       ,    Estimated Creatinine Clearance: 81 mL/min (based on SCr of 0.88 mg/dL).    Plan:  No intervention recommended, continue current VTE prophylaxis as ordered     Patient Weight (kg)      50.9 and below .9 101-150.9 151-174.9 175 or greater   Estimated   CrCl  (ml/min) 30 or greater []   30 mg   SUBQ daily   [x]   40 mg   SUBQ daily (or 30 mg BID for orthopedic cases) []  30 mg SUBQ   BID*  []  40 mg   SUBQ   BID []  60mg SUBQ BID    15-29.9 []  UFH 5000   units SUBQ BID []  30 mg   SUBQ daily [] 30 mg SUBQ   daily []  40 mg SUBQ   daily [] 60 mg SUBQ   Daily*    Less than 15 or dialysis []  UFH 5000   units SUBQ BID [] UFH 5000 units SUBQ TID []  UFH 7500   units   SUBQ TID*   *Do not exceed enoxaparin 40mg daily or UFH 5000 units SUBQ TID in patients with epidurals,   lumbar drains, or external ventricular drains    Chelly Hearn, BEAU PharmD

## 2025-06-16 PROBLEM — R53.83 OTHER FATIGUE: Status: ACTIVE | Noted: 2025-06-16

## 2025-06-16 PROBLEM — E66.3 OVERWEIGHT: Status: RESOLVED | Noted: 2018-10-02 | Resolved: 2025-06-16

## 2025-06-16 PROBLEM — E11.65 HYPERGLYCEMIA DUE TO TYPE 2 DIABETES MELLITUS (HCC): Status: ACTIVE | Noted: 2025-06-16

## 2025-06-16 LAB
GLUCOSE BLD-MCNC: 107 MG/DL (ref 70–99)
GLUCOSE BLD-MCNC: 164 MG/DL (ref 70–99)
GLUCOSE BLD-MCNC: 372 MG/DL (ref 70–99)
GLUCOSE BLD-MCNC: 385 MG/DL (ref 70–99)
PERFORMED ON: ABNORMAL

## 2025-06-16 PROCEDURE — 99233 SBSQ HOSP IP/OBS HIGH 50: CPT | Performed by: PHYSICAL MEDICINE & REHABILITATION

## 2025-06-16 PROCEDURE — 97162 PT EVAL MOD COMPLEX 30 MIN: CPT

## 2025-06-16 PROCEDURE — 97112 NEUROMUSCULAR REEDUCATION: CPT

## 2025-06-16 PROCEDURE — 6370000000 HC RX 637 (ALT 250 FOR IP): Performed by: NURSE PRACTITIONER

## 2025-06-16 PROCEDURE — 97116 GAIT TRAINING THERAPY: CPT

## 2025-06-16 PROCEDURE — 6370000000 HC RX 637 (ALT 250 FOR IP): Performed by: INTERNAL MEDICINE

## 2025-06-16 PROCEDURE — 97535 SELF CARE MNGMENT TRAINING: CPT

## 2025-06-16 PROCEDURE — 2580000003 HC RX 258: Performed by: INTERNAL MEDICINE

## 2025-06-16 PROCEDURE — 99222 1ST HOSP IP/OBS MODERATE 55: CPT | Performed by: NURSE PRACTITIONER

## 2025-06-16 PROCEDURE — 97166 OT EVAL MOD COMPLEX 45 MIN: CPT

## 2025-06-16 PROCEDURE — 97110 THERAPEUTIC EXERCISES: CPT

## 2025-06-16 PROCEDURE — 6370000000 HC RX 637 (ALT 250 FOR IP): Performed by: PHYSICAL MEDICINE & REHABILITATION

## 2025-06-16 PROCEDURE — 1180000000 HC REHAB R&B

## 2025-06-16 PROCEDURE — 6360000002 HC RX W HCPCS: Performed by: INTERNAL MEDICINE

## 2025-06-16 RX ORDER — HYDROCODONE BITARTRATE AND ACETAMINOPHEN 5; 325 MG/1; MG/1
1 TABLET ORAL EVERY 4 HOURS PRN
Status: DISCONTINUED | OUTPATIENT
Start: 2025-06-16 | End: 2025-06-19 | Stop reason: HOSPADM

## 2025-06-16 RX ORDER — AMANTADINE HYDROCHLORIDE 100 MG/1
100 CAPSULE, GELATIN COATED ORAL 2 TIMES DAILY
Status: DISCONTINUED | OUTPATIENT
Start: 2025-06-16 | End: 2025-06-19 | Stop reason: HOSPADM

## 2025-06-16 RX ORDER — PANTOPRAZOLE SODIUM 40 MG/1
40 TABLET, DELAYED RELEASE ORAL
Status: DISCONTINUED | OUTPATIENT
Start: 2025-06-17 | End: 2025-06-19 | Stop reason: HOSPADM

## 2025-06-16 RX ORDER — INSULIN LISPRO 100 [IU]/ML
0-8 INJECTION, SOLUTION INTRAVENOUS; SUBCUTANEOUS
Status: DISCONTINUED | OUTPATIENT
Start: 2025-06-16 | End: 2025-06-19 | Stop reason: HOSPADM

## 2025-06-16 RX ORDER — LORAZEPAM 2 MG/ML
1 INJECTION INTRAMUSCULAR ONCE
Status: COMPLETED | OUTPATIENT
Start: 2025-06-16 | End: 2025-06-17

## 2025-06-16 RX ORDER — DEXTROSE MONOHYDRATE 100 MG/ML
INJECTION, SOLUTION INTRAVENOUS CONTINUOUS PRN
Status: DISCONTINUED | OUTPATIENT
Start: 2025-06-16 | End: 2025-06-19 | Stop reason: HOSPADM

## 2025-06-16 RX ORDER — GLUCAGON 1 MG/ML
1 KIT INJECTION PRN
Status: DISCONTINUED | OUTPATIENT
Start: 2025-06-16 | End: 2025-06-19 | Stop reason: HOSPADM

## 2025-06-16 RX ADMIN — BACLOFEN 10 MG: 10 TABLET ORAL at 17:21

## 2025-06-16 RX ADMIN — GABAPENTIN 600 MG: 300 CAPSULE ORAL at 20:49

## 2025-06-16 RX ADMIN — AMANTADINE 100 MG: 100 CAPSULE ORAL at 20:49

## 2025-06-16 RX ADMIN — GABAPENTIN 600 MG: 300 CAPSULE ORAL at 08:41

## 2025-06-16 RX ADMIN — BACLOFEN 10 MG: 10 TABLET ORAL at 20:49

## 2025-06-16 RX ADMIN — Medication 2000 UNITS: at 08:41

## 2025-06-16 RX ADMIN — GABAPENTIN 600 MG: 300 CAPSULE ORAL at 13:53

## 2025-06-16 RX ADMIN — ACETAMINOPHEN 650 MG: 325 TABLET ORAL at 08:54

## 2025-06-16 RX ADMIN — CEPHALEXIN 500 MG: 250 CAPSULE ORAL at 06:48

## 2025-06-16 RX ADMIN — ESCITALOPRAM OXALATE 20 MG: 20 TABLET ORAL at 08:41

## 2025-06-16 RX ADMIN — PANTOPRAZOLE SODIUM 40 MG: 40 INJECTION, POWDER, FOR SOLUTION INTRAVENOUS at 08:40

## 2025-06-16 RX ADMIN — CEPHALEXIN 500 MG: 250 CAPSULE ORAL at 20:49

## 2025-06-16 RX ADMIN — CLONAZEPAM 0.5 MG: 0.5 TABLET ORAL at 08:41

## 2025-06-16 RX ADMIN — ENOXAPARIN SODIUM 40 MG: 100 INJECTION SUBCUTANEOUS at 08:41

## 2025-06-16 RX ADMIN — BACLOFEN 10 MG: 10 TABLET ORAL at 08:41

## 2025-06-16 RX ADMIN — BACLOFEN 10 MG: 10 TABLET ORAL at 13:15

## 2025-06-16 RX ADMIN — METHYLPREDNISOLONE SODIUM SUCCINATE 1000 MG: 1 INJECTION INTRAMUSCULAR; INTRAVENOUS at 15:40

## 2025-06-16 RX ADMIN — INSULIN LISPRO 8 UNITS: 100 INJECTION, SOLUTION INTRAVENOUS; SUBCUTANEOUS at 20:54

## 2025-06-16 RX ADMIN — CLONAZEPAM 0.5 MG: 0.5 TABLET ORAL at 20:49

## 2025-06-16 RX ADMIN — CEPHALEXIN 500 MG: 250 CAPSULE ORAL at 13:53

## 2025-06-16 ASSESSMENT — ENCOUNTER SYMPTOMS
COLOR CHANGE: 0
COUGH: 0
SHORTNESS OF BREATH: 0
NAUSEA: 0
BACK PAIN: 0
TROUBLE SWALLOWING: 0
WHEEZING: 0
VOMITING: 0
CHEST TIGHTNESS: 0

## 2025-06-16 ASSESSMENT — PAIN DESCRIPTION - DESCRIPTORS: DESCRIPTORS: ACHING;BURNING

## 2025-06-16 ASSESSMENT — PAIN - FUNCTIONAL ASSESSMENT: PAIN_FUNCTIONAL_ASSESSMENT: PREVENTS OR INTERFERES SOME ACTIVE ACTIVITIES AND ADLS

## 2025-06-16 ASSESSMENT — PAIN SCALES - GENERAL
PAINLEVEL_OUTOF10: 4
PAINLEVEL_OUTOF10: 8
PAINLEVEL_OUTOF10: 0

## 2025-06-16 ASSESSMENT — PAIN DESCRIPTION - LOCATION: LOCATION: LEG;FOOT

## 2025-06-16 ASSESSMENT — PAIN DESCRIPTION - ORIENTATION: ORIENTATION: RIGHT;LEFT

## 2025-06-16 NOTE — PROGRESS NOTES
OCCUPATIONAL THERAPY  INPATIENT REHAB TREATMENT NOTE  [facility]      NAME: Maty Dawson  : 1980 (45 y.o.)  MRN: [id number]  CODE STATUS: Full Code  Room: [place] [address]    Date of Service: 2025    Referring Physician: Shakila Koroma DO  Rehab Diagnosis: Impaired mobility and ADL's due to MS exacerbation in setting of UTI    Hospital course:   Comments: 45 y.o. female patient with Hx of MS who presented to ER  with ABD pain, nausea and generalized weakness. UTI + and started on IV Rocephin. Patient admitted with MS exacerbation r/t UTI. Neurology consulting.      Restrictions  Restrictions/Precautions  Restrictions/Precautions: Fall Risk  Activity Level: Up as Tolerated     Patient's date of birth confirmed: Yes    SAFETY:  Safety Devices  Safety Devices in place: Yes  Type of devices: All fall risk precautions in place    SUBJECTIVE: \"it's always more fun when the boss isn't around. Everyone is happier.\"    Pain at start of treatment: Yes: 5/10    Pain at end of treatment: Yes: 5/10    Location: B hands/feet/forearms  Description: ache  Nursing notified: Declined  Intervention: None    COGNITION:  Orientation  Overall Orientation Status: Within Functional Limits  Cognition  Overall Cognitive Status: WFL      Patient's cognition will improve or maintain baseline to safely perform ADLs:    OBJECTIVE:    Instrumental ADL's  Instrumental ADLs: Yes  Health Management  Health Management Level of Assistance: Modified independent  Health Management:   Medication Management Simulation Activity:  Patient completed simulation activity utilizing 7 provided medication bottles with written instruction to place a total of 74 beads into the provided weekly medication organizer.   Patient with 0 difficulty opening medication bottles.   Patient with 0 difficulty opening organizer boxes.   Patient placed a total of 74 beads into organizer with 0 verbal cues and a total of 74 being correct.   Patient with 0

## 2025-06-16 NOTE — PROGRESS NOTES
45 y.o. female patient with Hx of MS who presented to ER 6/12 with ABD pain, nausea and generalized weakness. UTI + and started on IV Rocephin. Patient admitted with MS exacerbation r/t UTI. Neurology consulting.     Subjective:   The patient complains of severe acute on chronic progressive fatigue and generalized weakness partially relieved by rest, medications, PT,  OT, steroids SLP and rest and exacerbated by recent MS exacerbation.      I am concerned about patient’s medical complexities and barriers to advancing in rehab goals including quitting smoking.        I discussed current functional, rehabilitation, medical status with other rehabilitation providers including nursing and case management.  According to recent nursing note, \" Loss of IV access. This RN had 2 unsuccessful attempts. ER called to see if someone could come up.  1520 Access gained by Medic in ED.    1530 attempted to give Solumedrol but addease reconstitution clogged. Call placed to pharmacy who will send me a new premixed bag. \".    ROS x10:  The patient also complains of severely impaired mobility and activities of daily living.  Otherwise no new problems with vision, hearing, nose, mouth, throat, dermal, cardiovascular, GI, , pulmonary, musculoskeletal, psychiatric or neurological. See Rehab H&P on Rehab chart dated .       Vital signs:  /77   Pulse 54   Temp 98.1 °F (36.7 °C) (Oral)   Resp 18   Ht 1.499 m (4' 11\")   Wt 86.1 kg (189 lb 14.4 oz)   SpO2 95%   BMI 38.36 kg/m²   I/O:   PO/Intake:  fair PO intake, no problems observed or reported.    Bowel/Bladder:  continent, constipation and urinary urgency.  General:  Patient is well developed, adequately nourished, non-obese and     well kempt.     HEENT:    PERRLA, hearing intact to loud voice, external inspection of ear     and nose benign.  Inspection of lips, tongue and gums    Musculoskeletal: No significant change in strength or tone.  All joints stable.      Inspection

## 2025-06-16 NOTE — CONSULTS
Consult      Patient:  Maty Dawson  YOB: 1980    MRN: [id number]     Acct: [id number]    Primary Care Physician: Darian Michelle MD    HISTORY OF PRESENT ILLNESS:   History obtained from chart review and the patient.    The patient is a 45 y.o. female whom I have been requested to see by Dr. Koroma for evaluation of medical management. Patient was admitted to [facility] due to MS exacerbation, received IV steroids, and IV abx for UTI causing MS exacerbation. Was  switched to p.o. Keflex based on sensitivity. Patient finished steroids here eval by PT OT and neurology and cleared for discharge to acute rehab.     Past Medical History:        Diagnosis Date    Arthritis     Carpal tunnel syndrome     Multiple sclerosis (HCC)     Osteoarthritis        Past Surgical History:        Procedure Laterality Date    APPENDECTOMY      laparoscopic at [facility]    BREAST SURGERY      lump removal bengin    CARPAL TUNNEL RELEASE Left 10/07/2022    LEFT CARPAL TUNNEL RELEASE ([facility]) performed by Vahe Dumont MD at [facility] OR    CARPAL TUNNEL RELEASE Right 2022    RIGHT CARPAL TUNNEL RELEASE performed by Vahe Dumont MD at [facility] OR     SECTION      x 3: '99, '03, '05 via Pfannenstiel incision    CHOLECYSTECTOMY, LAPAROSCOPIC N/A 2025    Laparoscopic cholecystectomy performed by Carolin Franklin MD at [facility] [place]    TUBAL LIGATION      UPPER GASTROINTESTINAL ENDOSCOPY N/A 10/09/2024    ESOPHAGOGASTRODUODENOSCOPY with biopsies performed by Luanne Bridges MD at [facility]       Medications:    No current facility-administered medications on file prior to encounter.     Current Outpatient Medications on File Prior to Encounter   Medication Sig Dispense Refill    clonazePAM (KLONOPIN) 0.5 MG tablet 1 tablet twice a day 60 tablet 1    esomeprazole (NEXIUM) 40 MG delayed release capsule TAKE 1 CAPSULE BY MOUTH EVERY DAY 90 capsule 1    escitalopram (LEXAPRO) 20 MG 
low as reasonably achievable.    COMPARISON:  No relevant prior studies available.    FINDINGS:  Brain:  No acute infarct or hemorrhage.  Ventricles:  Unremarkable.  No ventriculomegaly.  Bones/joints:  Unremarkable.  No acute calvarial fracture.  Soft tissues:  Unremarkable.  Sinuses:  Mild mucosal thickening the paranasal sinuses.  Mastoid air cells:  Unremarkable as visualized.      Electronically signed by Sudhakar Quiñones MD on 06-18-22 at 2016    Impression  No acute findings in the head/brain.  No results found for this or any previous visit.  No results found for this or any previous visit.      Carotid duplex: No results found for this or any previous visit.  No results found for this or any previous visit.  No results found for this or any previous visit.      Echo No results found for this or any previous visit.            Assessment/Plan:    MS exacerbation in the setting of UTI  IV steroids to be completed today.  Will transition to oral steroids starting tomorrow.  Obtain MRI of the cervical spine with and without contrast  Patient showing some improvement in lower extremity weakness and gait  Fatigue, will start amantadine 100 mg twice daily  Continue PT/OT    I have personally performed a face to face diagnostic evaluation on this patient, reviewed all data and investigations, and am the sole provider of all clinical decisions on the neurological status of this patient.  MS exacerbation responsive to IV steroids.  Patient complains of Sequent neck issues and therefore recommended MRI of cervical spine to see if there are any lesions.  She also now complains of considerable fatigue we will add amantadine and then consider other stimulants if needed.  Patient will transfer to rehab 60% time spent on evaluating  pt  Adilson Valderrama MD, JOHNY  Diplomate, American Board of Psychiatry & Neurology  Board Certified in Vascular Neurology  Board Certified in Neuromuscular Medicine  Certified in

## 2025-06-16 NOTE — PROGRESS NOTES
INDIVIDUALIZED OVERALL REHAB PLAN OF CARE  ADDENDUM TO REHAB PROGRESS NOTE-for audit purposes must also refer to this day's clinical note and combine the information      Date: 2025  Patient Name: Maty Dawson   Room: R247/R247-01    MRN: [id number]    : 1980  (45 y.o.)  Gender: female       Today 2025 during weekly team meeting, I reviewed the patient Maty Dawson in detail with the therapists and nurses involved in patient's care gathering complex physiatric data regarding current medical issues, progress in therapies, factors limiting progress, social issues, psychological issues, ongoing therapeutic plans and discharge planning.  I was present in the PM& R department in [facility].  A  video monitor live feed between the interdisciplinary team participants in the team conference was used to supplement connectivity and facilitate record review and allow immediate access to the EMR to allow real time review of the records and immediately address pertinent issues.   Our program views this an essential process to maintaining the integral rehab physician leadership role in team and the interdisciplinary discussion of our patient.    Legend:  I= independent Im =Modified independent  S=Supervised SB=stand by DELGADO=set up CG=contact hussain Min= minimal Mod=Moderate Max=maximal Max of 2 =maximal assist of 2 people      CURRENT FUNCTIONAL STATUS:    45 y.o. female patient with Hx of MS who presented to ER  with ABD pain, nausea and generalized weakness. UTI + and started on IV Rocephin. Patient admitted with MS exacerbation r/t UTI. Neurology consulting.     NURSING ISSUES:    Therapy working with pt. Pt states pain is 8 was medicated with tylenol      Nursing will continue to focus on bowel and bladder continence transitioning toward independence by time of discharge.  Monitoring post void residuals monitoring for severe constipation and bowel obstruction.      Barriers

## 2025-06-16 NOTE — PROGRESS NOTES
Physical Therapy Rehab Treatment Note  Facility/Department: [facility]  [facility]       NAME: Maty Dawson  : 1980 (45 y.o.)  MRN: [id number]  CODE STATUS: Full Code    Date of Service: 2025       Restrictions:  Restrictions/Precautions: Fall Risk       SUBJECTIVE:        Pain   \"My normal MS pains, I don't consider them anymore\"      OBJECTIVE:   Orientation  Overall Orientation Status: Within Functional Limits              Transfers  Surface: From chair with arms  Sit to Stand  Assistance Level: Stand by assist  Stand to Sit  Assistance Level: Stand by assist                   Neuromuscular Education  Neuromuscular Comments: standing balance. fwd/bwd stepping. pt tends to be retropulsive with standing. 5 x sit to stands. x 2 sets. not timed-focus on technique. standing with weighted ball. overhead flexion and rotation. vc to focus.                             ASSESSMENT/PROGRESS TOWARDS GOALS: pt needs to concentrate and have her total focus on task at hand or she can't perform. Rest breaks given appropriately.        Goals:  Long Term Goals  Long Term Goal 1: indep sit to stand, car and floor transfers  Long Term Goal 2: indep gait with no devices with wall/furniture support 20-30 feet consistently  Long Term Goal 3: indep gait with rollator and AFO's 50 feet consistently  Long Term Goal 4: modified 5x STS completed in 15 sec or less  Patient Goals   Patient Goals : to get back to my normal    PLAN OF CARE/Safety: ongoing          Therapy Time:   Individual   Time In  0900   Time out 0930   Minutes 30      Minutes:30  Transfer/Bed mobility training:10  Gait trainin  Neuro re education:20  Therapeutic ex:0      Olga Martínez PTA, 25 at 9:28 AM

## 2025-06-16 NOTE — PROGRESS NOTES
Assessment completed. Therapy working with pt. Pt states pain is 8 was medicated with tylenol. Electronically signed by Brooke Minor RN on 6/16/2025 at 11:06 AM

## 2025-06-16 NOTE — PROGRESS NOTES
Facility/Department: [facility]  Rehabilitation Initial Assessment: Physical Therapy  Room: R2/R247-01    NAME: Maty Dawson  : 1980  MRN: [id number]    Date of Service: 2025    Rehab Diagnosis(es): : Impaired mobility and ADL's due to MS exac in setting of UTI  Patient Active Problem List    Diagnosis Date Noted    Urinary urgency 2023    Bilateral carpal tunnel syndrome 10/21/2022    Left carpal tunnel syndrome 10/07/2022    Spasticity 2022    Hyperglycemia due to type 2 diabetes mellitus (HCC) 2025    Impaired mobility ADLs dt MS exac 06/15/2025    Multiple sclerosis (HCC) 2025    Nausea 2025    Dysfunctional gallbladder 2025    Right upper quadrant abdominal pain 10/08/2024    Bilateral foot-drop 10/12/2023    Left foot pain 2020    Deficient knowledge 2020    Dog bite of wrist 2020    Impaired mobility 2020    Myalgia 2020    Hypokalemia 10/02/2018    Nicotine dependence, unspecified, uncomplicated 10/02/2018    Ataxic gait 2018    Body mass index (bmi) 39.0-39.9, adult 2018    Muscle weakness (generalized) 2018    Neuromuscular dysfunction of bladder, unspecified 2018    Obesity, unspecified 2018    Paresthesia of skin 2018    Vitamin D deficiency 2018    Neuropathy 2016    Anxiety and depression 2016    MS (multiple sclerosis) ([facility]) 2015       Past Medical History:   Diagnosis Date    Arthritis     Carpal tunnel syndrome     Multiple sclerosis (HCC)     Osteoarthritis      Past Surgical History:   Procedure Laterality Date    APPENDECTOMY  1998    laparoscopic at [facility]    BREAST SURGERY      lump removal bengin    CARPAL TUNNEL RELEASE Left 10/07/2022    LEFT CARPAL TUNNEL RELEASE ([facility]) performed by Vahe Dumont MD at [facility] OR    CARPAL TUNNEL RELEASE Right 2022    RIGHT CARPAL TUNNEL RELEASE performed by Vahe Dumont MD at [facility] OR

## 2025-06-16 NOTE — PROGRESS NOTES
Physical Therapy Rehab Treatment Note  Facility/Department: [facility]  [facility]       NAME: Maty Dawson  : 1980 (45 y.o.)  MRN: [id number]  CODE STATUS: Full Code    Date of Service: 2025       Restrictions:  Restrictions/Precautions: Fall Risk       SUBJECTIVE:   Subjective: \"I am tired\"    Pain  Pain: 5/10 pain pre/post session all over      OBJECTIVE:     Transfers  Surface: Wheelchair  Sit to Stand  Assistance Level: Stand by assist  Skilled Clinical Factors: good safety and hand placement  Stand to Sit  Assistance Level: Stand by assist  Skilled Clinical Factors: good safety and controlled descent    Ambulation  Surface: Level surface  Device: Rollator (no AD)  Distance: 100', 15' x 2  Activity: Within Unit  Additional Factors: Verbal cues;Hand placement cues  Assistance Level: Stand by assist;Minimal assistance  Gait Deviations: Slow candy;Path deviations  Skilled Clinical Factors: good ability to increased gait distance, braces to BLE applied with no LOB/instability. good stability with use of rollator. short distances no AD with Alf for stability and balance throughout.    PT Exercises  Resistive Exercises: seated RTB HS curls/hip abd x10 BLE  Dynamic Standing Balance Exercises: standing marches/heel raises/hip abduction x10 BLE     Activity Tolerance  Activity Tolerance: Patient tolerated treatment well    ASSESSMENT/PROGRESS TOWARDS GOALS:   Assessment  Assessment: Pt pleasant and motivated this date, requires redirection to task at hand but maintains overall SBA for mobility. Increased fatigue this PM as opposed to AM, but pt reports this is normal regarding her MS diagnosis.  Activity Tolerance: Patient tolerated treatment well    Goals:  Long Term Goals  Long Term Goal 1: indep sit to stand, car and floor transfers  Long Term Goal 2: indep gait with no devices with wall/furniture support 20-30 feet consistently  Long Term Goal 3: indep gait with rollator and AFO's 50 feet

## 2025-06-16 NOTE — PROGRESS NOTES
Physical Therapy  Facility/Department: [facility] MED SURG W286/W286-01  Physical Therapy Discharge      NAME: Maty Dawson    : 1980 (45 y.o.)  MRN: [id number]    Account: [id number]  Gender: female      Patient has been discharged from acute care hospital. DC patient from current PT program.      Electronically signed by Shakila Joseph PT on 25 at 3:14 PM EDT

## 2025-06-16 NOTE — PROGRESS NOTES
Facility/Department: [facility]  Rehabilitation Initial Assessment: Occupational Therapy  Room: R2/R247-01    NAME: Maty Dawson  : 1980  MRN: [id number]    Date of Service: 2025    Rehab Diagnosis(es): Impaired mobility and ADL's due to MS exacerbation in setting of UTI  Patient Active Problem List    Diagnosis Date Noted    Urinary urgency 2023    Bilateral carpal tunnel syndrome 10/21/2022    Left carpal tunnel syndrome 10/07/2022    Spasticity 2022    Hyperglycemia due to type 2 diabetes mellitus (HCC) 2025    Impaired mobility ADLs dt MS exac 06/15/2025    Multiple sclerosis (HCC) 2025    Nausea 2025    Dysfunctional gallbladder 2025    Right upper quadrant abdominal pain 10/08/2024    Bilateral foot-drop 10/12/2023    Left foot pain 2020    Deficient knowledge 2020    Dog bite of wrist 2020    Impaired mobility 2020    Myalgia 2020    Hypokalemia 10/02/2018    Nicotine dependence, unspecified, uncomplicated 10/02/2018    Ataxic gait 2018    Body mass index (bmi) 39.0-39.9, adult 2018    Muscle weakness (generalized) 2018    Neuromuscular dysfunction of bladder, unspecified 2018    Obesity, unspecified 2018    Paresthesia of skin 2018    Vitamin D deficiency 2018    Neuropathy 2016    Anxiety and depression 2016    MS (multiple sclerosis) ([facility]) 2015     No data found    Past Medical History:   Diagnosis Date    Arthritis     Carpal tunnel syndrome     Multiple sclerosis (HCC)     Osteoarthritis      Past Surgical History:   Procedure Laterality Date    APPENDECTOMY  1998    laparoscopic at [facility]    BREAST SURGERY      lump removal bengin    CARPAL TUNNEL RELEASE Left 10/07/2022    LEFT CARPAL TUNNEL RELEASE ([facility]) performed by Vahe Dumont MD at [facility] OR    CARPAL TUNNEL RELEASE Right 2022    RIGHT CARPAL TUNNEL RELEASE performed by Vahe LOBATO

## 2025-06-16 NOTE — PLAN OF CARE
Problem: Discharge Planning  Goal: Discharge to home or other facility with appropriate resources  6/16/2025 1058 by Brooke Minor RN  Outcome: Progressing  6/16/2025 0420 by Chelly Vang RN  Outcome: Progressing  Flowsheets  Taken 6/15/2025 2026 by Chelly Vang RN  Discharge to home or other facility with appropriate resources: Identify barriers to discharge with patient and caregiver  Taken 6/15/2025 1552 by Sondra Vera RN  Discharge to home or other facility with appropriate resources:   Identify barriers to discharge with patient and caregiver   Identify discharge learning needs (meds, wound care, etc)   Refer to discharge planning if patient needs post-hospital services based on physician order or complex needs related to functional status, cognitive ability or social support system   Arrange for needed discharge resources and transportation as appropriate     Problem: Pain  Goal: Verbalizes/displays adequate comfort level or baseline comfort level  6/16/2025 1058 by Brooke Minor RN  Outcome: Progressing  6/16/2025 0420 by Chelly Vang RN  Outcome: Progressing  Flowsheets (Taken 6/15/2025 2026)  Verbalizes/displays adequate comfort level or baseline comfort level:   Encourage patient to monitor pain and request assistance   Assess pain using appropriate pain scale   Administer analgesics based on type and severity of pain and evaluate response     Problem: Safety - Adult  Goal: Free from fall injury  6/16/2025 1058 by Brooke Minor RN  Outcome: Progressing  6/16/2025 0420 by Chelly Vang RN  Outcome: Progressing     Problem: ABCDS Injury Assessment  Goal: Absence of physical injury  6/16/2025 1058 by Brooke Minor RN  Outcome: Progressing  6/16/2025 0420 by Chelly Vang RN  Outcome: Progressing  Flowsheets (Taken 6/15/2025 1543 by Sondra Vera RN)  Absence of Physical Injury: Implement safety measures based on patient assessment     Problem:

## 2025-06-16 NOTE — PLAN OF CARE
Problem: Discharge Planning  Goal: Discharge to home or other facility with appropriate resources  Outcome: Progressing  Flowsheets  Taken 6/15/2025 2026 by Chelly Vang RN  Discharge to home or other facility with appropriate resources: Identify barriers to discharge with patient and caregiver  Taken 6/15/2025 1552 by Sondra Vera RN  Discharge to home or other facility with appropriate resources:   Identify barriers to discharge with patient and caregiver   Identify discharge learning needs (meds, wound care, etc)   Refer to discharge planning if patient needs post-hospital services based on physician order or complex needs related to functional status, cognitive ability or social support system   Arrange for needed discharge resources and transportation as appropriate     Problem: Pain  Goal: Verbalizes/displays adequate comfort level or baseline comfort level  6/16/2025 0420 by Chelly Vang RN  Outcome: Progressing  Flowsheets (Taken 6/15/2025 2026)  Verbalizes/displays adequate comfort level or baseline comfort level:   Encourage patient to monitor pain and request assistance   Assess pain using appropriate pain scale   Administer analgesics based on type and severity of pain and evaluate response  6/15/2025 1553 by Sondra Vera RN  Outcome: Progressing  Flowsheets (Taken 6/15/2025 1530)  Verbalizes/displays adequate comfort level or baseline comfort level:   Encourage patient to monitor pain and request assistance   Assess pain using appropriate pain scale   Administer analgesics based on type and severity of pain and evaluate response   Implement non-pharmacological measures as appropriate and evaluate response   Consider cultural and social influences on pain and pain management   Notify Licensed Independent Practitioner if interventions unsuccessful or patient reports new pain     Problem: Safety - Adult  Goal: Free from fall injury  6/16/2025 0420 by Chelly Vang

## 2025-06-17 ENCOUNTER — APPOINTMENT (OUTPATIENT)
Dept: MRI IMAGING | Age: 45
DRG: 059 | End: 2025-06-17
Attending: PHYSICAL MEDICINE & REHABILITATION
Payer: COMMERCIAL

## 2025-06-17 LAB
BACTERIA BLD CULT ORG #2: NORMAL
BACTERIA BLD CULT: NORMAL
GLUCOSE BLD-MCNC: 110 MG/DL (ref 70–99)
GLUCOSE BLD-MCNC: 143 MG/DL (ref 70–99)
GLUCOSE BLD-MCNC: 172 MG/DL (ref 70–99)
GLUCOSE BLD-MCNC: 212 MG/DL (ref 70–99)
GLUCOSE BLD-MCNC: 364 MG/DL (ref 70–99)
PERFORMED ON: ABNORMAL

## 2025-06-17 PROCEDURE — 1180000000 HC REHAB R&B

## 2025-06-17 PROCEDURE — A9579 GAD-BASE MR CONTRAST NOS,1ML: HCPCS | Performed by: PSYCHIATRY & NEUROLOGY

## 2025-06-17 PROCEDURE — 97116 GAIT TRAINING THERAPY: CPT

## 2025-06-17 PROCEDURE — 6360000002 HC RX W HCPCS: Performed by: NURSE PRACTITIONER

## 2025-06-17 PROCEDURE — 97112 NEUROMUSCULAR REEDUCATION: CPT

## 2025-06-17 PROCEDURE — 6370000000 HC RX 637 (ALT 250 FOR IP): Performed by: INTERNAL MEDICINE

## 2025-06-17 PROCEDURE — 97535 SELF CARE MNGMENT TRAINING: CPT

## 2025-06-17 PROCEDURE — 72156 MRI NECK SPINE W/O & W/DYE: CPT

## 2025-06-17 PROCEDURE — 99232 SBSQ HOSP IP/OBS MODERATE 35: CPT | Performed by: PHYSICAL MEDICINE & REHABILITATION

## 2025-06-17 PROCEDURE — 6360000002 HC RX W HCPCS: Performed by: INTERNAL MEDICINE

## 2025-06-17 PROCEDURE — 6360000004 HC RX CONTRAST MEDICATION: Performed by: PSYCHIATRY & NEUROLOGY

## 2025-06-17 PROCEDURE — 97140 MANUAL THERAPY 1/> REGIONS: CPT

## 2025-06-17 PROCEDURE — 6370000000 HC RX 637 (ALT 250 FOR IP): Performed by: PHYSICAL MEDICINE & REHABILITATION

## 2025-06-17 PROCEDURE — 6370000000 HC RX 637 (ALT 250 FOR IP): Performed by: NURSE PRACTITIONER

## 2025-06-17 RX ORDER — GADOTERIDOL 279.3 MG/ML
20 INJECTION INTRAVENOUS
Status: COMPLETED | OUTPATIENT
Start: 2025-06-17 | End: 2025-06-17

## 2025-06-17 RX ORDER — SENNOSIDES 8.6 MG/1
1 TABLET ORAL NIGHTLY
Status: DISCONTINUED | OUTPATIENT
Start: 2025-06-17 | End: 2025-06-19 | Stop reason: HOSPADM

## 2025-06-17 RX ADMIN — GABAPENTIN 600 MG: 300 CAPSULE ORAL at 13:12

## 2025-06-17 RX ADMIN — AMANTADINE 100 MG: 100 CAPSULE ORAL at 07:44

## 2025-06-17 RX ADMIN — CEPHALEXIN 500 MG: 250 CAPSULE ORAL at 06:16

## 2025-06-17 RX ADMIN — AMANTADINE 100 MG: 100 CAPSULE ORAL at 20:20

## 2025-06-17 RX ADMIN — CEPHALEXIN 500 MG: 250 CAPSULE ORAL at 20:20

## 2025-06-17 RX ADMIN — Medication 1 MG: at 18:02

## 2025-06-17 RX ADMIN — GABAPENTIN 600 MG: 300 CAPSULE ORAL at 20:21

## 2025-06-17 RX ADMIN — INSULIN HUMAN 10 UNITS: 100 INJECTION, SOLUTION PARENTERAL at 00:00

## 2025-06-17 RX ADMIN — CLONAZEPAM 0.5 MG: 0.5 TABLET ORAL at 07:44

## 2025-06-17 RX ADMIN — INSULIN LISPRO 2 UNITS: 100 INJECTION, SOLUTION INTRAVENOUS; SUBCUTANEOUS at 06:16

## 2025-06-17 RX ADMIN — PANTOPRAZOLE SODIUM 40 MG: 40 TABLET, DELAYED RELEASE ORAL at 06:16

## 2025-06-17 RX ADMIN — CLONAZEPAM 0.5 MG: 0.5 TABLET ORAL at 20:20

## 2025-06-17 RX ADMIN — BACLOFEN 10 MG: 10 TABLET ORAL at 13:12

## 2025-06-17 RX ADMIN — ENOXAPARIN SODIUM 40 MG: 100 INJECTION SUBCUTANEOUS at 07:45

## 2025-06-17 RX ADMIN — Medication 2000 UNITS: at 07:44

## 2025-06-17 RX ADMIN — GADOTERIDOL 20 ML: 279.3 INJECTION, SOLUTION INTRAVENOUS at 18:47

## 2025-06-17 RX ADMIN — ESCITALOPRAM OXALATE 20 MG: 20 TABLET ORAL at 07:44

## 2025-06-17 RX ADMIN — BACLOFEN 10 MG: 10 TABLET ORAL at 17:27

## 2025-06-17 RX ADMIN — SENNOSIDES 8.6 MG: 8.6 TABLET, FILM COATED ORAL at 20:20

## 2025-06-17 RX ADMIN — BACLOFEN 10 MG: 10 TABLET ORAL at 07:45

## 2025-06-17 RX ADMIN — BACLOFEN 10 MG: 10 TABLET ORAL at 20:21

## 2025-06-17 RX ADMIN — PREDNISONE 30 MG: 20 TABLET ORAL at 07:45

## 2025-06-17 RX ADMIN — CEPHALEXIN 500 MG: 250 CAPSULE ORAL at 13:12

## 2025-06-17 RX ADMIN — GABAPENTIN 600 MG: 300 CAPSULE ORAL at 07:44

## 2025-06-17 ASSESSMENT — PAIN DESCRIPTION - LOCATION
LOCATION: GENERALIZED
LOCATION: NECK
LOCATION: ARM;LEG
LOCATION: NECK

## 2025-06-17 ASSESSMENT — PAIN SCALES - GENERAL
PAINLEVEL_OUTOF10: 5
PAINLEVEL_OUTOF10: 6
PAINLEVEL_OUTOF10: 6
PAINLEVEL_OUTOF10: 7

## 2025-06-17 ASSESSMENT — PAIN DESCRIPTION - ORIENTATION
ORIENTATION: POSTERIOR
ORIENTATION: RIGHT;LEFT
ORIENTATION: POSTERIOR

## 2025-06-17 ASSESSMENT — PAIN DESCRIPTION - DESCRIPTORS
DESCRIPTORS: THROBBING;SHOOTING
DESCRIPTORS: ACHING
DESCRIPTORS: STABBING;TINGLING

## 2025-06-17 NOTE — PROGRESS NOTES
OCCUPATIONAL THERAPY  INPATIENT REHAB TREATMENT NOTE  [facility]      NAME: Maty Dawson  : 1980 (45 y.o.)  MRN: [id number]  CODE STATUS: Full Code  Room: [place] [address]    Date of Service: 2025    Referring Physician: Shakila Koroma DO  Rehab Diagnosis: Impaired mobility and ADL's due to MS exacerbation in setting of UTI    Hospital course:   Comments: 45 y.o. female patient with Hx of MS who presented to ER  with ABD pain, nausea and generalized weakness. UTI + and started on IV Rocephin. Patient admitted with MS exacerbation r/t UTI. Neurology consulting.      Restrictions  Restrictions/Precautions  Restrictions/Precautions: Fall Risk  Activity Level: Up as Tolerated     Patient's date of birth confirmed: Yes    SAFETY:  Safety Devices  Safety Devices in place: Yes  Type of devices: All fall risk precautions in place    SUBJECTIVE: \"I have four kids.\"    Pain at start of treatment: Yes: 5/10    Pain at end of treatment: Yes: 10    Location: B UE's/LE's  Description: constant  Nursing notified: Declined  Intervention: None    COGNITION:  Orientation  Overall Orientation Status: Within Functional Limits  Cognition  Overall Cognitive Status: WFL      Pt's current cognitive status is:  Comprehension: Independent  Expression: Independent  Social Interaction: Independent  Problem Solving: Mod I  Memory: Mod I    OBJECTIVE:    Grooming/Oral Hygiene  Assistance Level: Modified independent  Skilled Clinical Factors: seated in armchair at bathroom seat  Upper Extremity Bathing  Assistance Level: Modified independent  Skilled Clinical Factors: seated in armchair at bathroom seat  Lower Extremity Bathing  Assistance Level: Modified independent  Skilled Clinical Factors: seated in armchair and in standing at bathroom seat  Upper Extremity Dressing  Assistance Level: Modified independent  Skilled Clinical Factors: seated EOB  Lower Extremity Dressing  Assistance Level: Modified independent  Skilled

## 2025-06-17 NOTE — PROGRESS NOTES
Patient down for MRI, prn Ativan given prior as ordered. Electronically signed by Kina Skinner RN on 6/17/25 at 6:51 PM EDT     IV removed per patient request. Senna-Stacy ordered and given for LBM  6/14. Denies abdominal discomfort. Resting in bed at this time with call light in reach. Electronically signed by Kina Skinner RN on 6/17/25 at 8:34 PM EDT

## 2025-06-17 NOTE — PLAN OF CARE
Problem: Discharge Planning  Goal: Discharge to home or other facility with appropriate resources  6/17/2025 1013 by Kina Skinner RN  Outcome: Progressing  6/17/2025 0154 by Cara Reyes RN  Outcome: Progressing     Problem: Pain  Goal: Verbalizes/displays adequate comfort level or baseline comfort level  6/17/2025 1013 by Kina Skinner RN  Outcome: Progressing  6/17/2025 0154 by Cara Reyes RN  Outcome: Progressing     Problem: Safety - Adult  Goal: Free from fall injury  6/17/2025 1013 by Kina Skinner RN  Outcome: Progressing  6/17/2025 0154 by Cara Reyes RN  Outcome: Progressing     Problem: ABCDS Injury Assessment  Goal: Absence of physical injury  6/17/2025 1013 by Kina Skinner RN  Outcome: Progressing  6/17/2025 0154 by Cara Reyes RN  Outcome: Progressing     Problem: Chronic Conditions and Co-morbidities  Goal: Patient's chronic conditions and co-morbidity symptoms are monitored and maintained or improved  6/17/2025 1013 by Kina Skinner RN  Outcome: Progressing  6/17/2025 0154 by Cara Reyes RN  Outcome: Progressing

## 2025-06-17 NOTE — PROGRESS NOTES
45 y.o. female patient with Hx of MS who presented to ER 6/12 with ABD pain, nausea and generalized weakness. UTI + and started on IV Rocephin. Patient admitted with MS exacerbation r/t UTI. Neurology consulting.     Subjective:   The patient complains of severe acute on chronic progressive fatigue and generalized weakness partially relieved by rest, medications, PT,  OT, steroids SLP and rest and exacerbated by recent MS exacerbation.      I am concerned about patient’s medical complexities and barriers to advancing in rehab goals including quitting smoking.        I discussed current functional, rehabilitation, medical status with other rehabilitation providers including nursing and case management.  According to recent nursing note,  no recent substantive nursing notes.  Care discussed directly with nursing and care team.      She is concerned about her elevated blood sugars.  We discussed they should start coming down now that she is off the steroids.  She has severe truncal obesity and is at risk for fatty liver and type 2 diabetes with or without the steroids.  She is referred back to her family doctor for possible weight loss or diabetic versus prediabetic treatment.  For now hospitalist is managing.    ROS x10:  The patient also complains of severely impaired mobility and activities of daily living.  Otherwise no new problems with vision, hearing, nose, mouth, throat, dermal, cardiovascular, GI, , pulmonary, musculoskeletal, psychiatric or neurological. See Rehab H&P on Rehab chart dated .       Vital signs:  /70   Pulse 53   Temp 97.9 °F (36.6 °C) (Oral)   Resp 18   Ht 1.499 m (4' 11\")   Wt 86.1 kg (189 lb 14.4 oz)   SpO2 97%   BMI 38.36 kg/m²   I/O:   PO/Intake:  fair PO intake, no problems observed or reported.    Bowel/Bladder:  continent, constipation and urinary urgency.  General:  Patient is well developed, adequately nourished, non-obese and     well kempt.     HEENT:    GIANCARLO

## 2025-06-17 NOTE — PROGRESS NOTES
Physical Therapy Rehab Treatment Note  Facility/Department: [facility]  [facility]       NAME: Maty Dawson  : 1980 (45 y.o.)  MRN: [id number]  CODE STATUS: Full Code    Date of Service: 2025     Restrictions:  Restrictions/Precautions: Fall Risk       SUBJECTIVE:   Subjective: \"You can call me whatever you want\"    Pain  Pain: 4-5/10 generalized pain pre/post tx      OBJECTIVE:            Transfers  Surface: Wheelchair  Device:  (rollator)  Sit to Stand  Assistance Level: Independent  Stand to Sit  Assistance Level: Independent    Ambulation  Surface: Level surface  Device: Rollator  Distance: 300'  Activity: Within Unit  Activity Comments: without AKOs donned this session  Additional Factors: Verbal cues  Assistance Level: Supervision  Gait Deviations: Slow candy;Path deviations  Skilled Clinical Factors: motivated to increase distance, fatigued following and needed extended seated rest break       5 Times Sit to Stand  5 TIMES SIT TO STAND: 39.5 seconds (with use of UEs)                 Activity Tolerance  Activity Tolerance: Patient tolerated treatment well           ASSESSMENT/PROGRESS TOWARDS GOALS:   Assessment  Assessment: Patient motivated to progress gait with rollator this afternoon, she completed increased distance without louis AFOs donned and good stability noted. She did require an extended seated rest break following gait but had good recovery afterwards. Continue to progress as able.  Activity Tolerance: Patient tolerated treatment well    Goals:  Long Term Goals  Long Term Goal 1: indep sit to stand, car and floor transfers  Long Term Goal 2: indep gait with no devices with wall/furniture support 20-30 feet consistently  Long Term Goal 3: indep gait with rollator and AFO's 50 feet consistently  Long Term Goal 4: modified 5x STS completed in 15 sec or less  Patient Goals   Patient Goals : to get back to my normal    PLAN OF CARE/Safety:   Safety Devices  Type of Devices: All fall

## 2025-06-17 NOTE — PROGRESS NOTES
OCCUPATIONAL THERAPY  INPATIENT REHAB TREATMENT NOTE  [facility]      NAME: Maty Dawson  : 1980 (45 y.o.)  MRN: [id number]  CODE STATUS: Full Code  [facility]    Date of Service: 2025    Referring Physician: Shakila Koroma DO  Rehab Diagnosis: Impaired mobility and ADL's due to MS exacerbation in setting of UTI    Hospital course:   Comments: 45 y.o. female patient with Hx of MS who presented to ER  with ABD pain, nausea and generalized weakness. UTI + and started on IV Rocephin. Patient admitted with MS exacerbation r/t UTI. Neurology consulting.      Restrictions  Restrictions/Precautions  Restrictions/Precautions: Fall Risk  Activity Level: Up as Tolerated     Patient's date of birth confirmed: Yes    SAFETY:  Safety Devices  Safety Devices in place: Yes  Type of devices: All fall risk precautions in place    SUBJECTIVE: \"They said I don't need my legs (braces) this time.\"    Pain at start of treatment: Yes: 6/10    Pain at end of treatment: Yes: 6/10    Location: B UE's/LE's  Description: constant  Nursing notified: Yes  Nurse: Kina  Intervention: RN provided pain medication    COGNITION:  Orientation  Overall Orientation Status: Within Functional Limits  Cognition  Overall Cognitive Status: WFL      Patient's cognition will improve or maintain baseline to safely perform ADLs:    OBJECTIVE:    Instrumental ADL's  Instrumental ADLs: Yes  Money Management  Money Management Level of Assistance: Supervision  Money Management:   Money Management Simulation Activity:  Patient able to correctly withdraw 5/5 dollar amounts ($1.89, $7.94, $16.78, $52.48, $63.74) from cash drawer on first attempt.   Patient able to correctly make change 4/5 trials ($47.62 from $100, $67.23 from $100, $86.17 from $100, $148.54 from $200) on first attempt.   Patient able to correctly make change 0/1 trials ($6.19 from $50) on second attempt.   Patient able to correctly make change 1/1 trials ($6.19

## 2025-06-17 NOTE — PROGRESS NOTES
Physical Therapy Rehab Treatment Note  [facility]ility/Department[facility]  [facility]       NAME: Maty Dawson  : 1980 (45 y.o.)  MRN: [id number]  CODE STATUS: Full Code    Date of Service: 2025  Chart Reviewed: Yes  Family/Caregiver Present: No    Restrictions:  Restrictions/Precautions: Fall Risk       SUBJECTIVE:   Subjective: \"I feel like i'm getting better but I get so very tired\"    Pain   Denies pain       OBJECTIVE:   Orientation  Overall Orientation Status: Within Functional Limits  Cognition  Overall Cognitive Status: Exceptions         Bed mobility  Rolling to Left: Independent  Rolling to Right: Independent  Supine to Sit: Independent  Sit to Supine: Independent  Scooting: Independent         Ambulation  Surface: Level tile  Device: Rollator  Other Apparatus:  (bilateral afo's.)  Assistance: Supervision;Stand by assistance  Quality of Gait: inconsistent steps, increased wb through upper extremities, exaggerated heel strike  Gait Deviations: Decreased arm swing;Decreased head and trunk rotation;Increased JONATHAN  Distance: 50 feet x 2  More Ambulation?: No    Stairs/Curb  Stairs?: No              PT Exercises  PROM Exercises: stretching to bilateral lower extremities.  Exercise Equipment: swiss ball. lateral flexion, knee flexion        Neuro:  Standing weight shifts with rotation with one upper extremity support. Sidestepping, fwd/bwd gait drills.                 ASSESSMENT/PROGRESS TOWARDS GOALS: pt chose to ambulate with rollator and afo's this am. Will practice without afo's and no device in pm. Frequent rest breaks throughout tx.        Goals:  Long Term Goals  Long Term Goal 1: indep sit to stand, car and floor transfers  Long Term Goal 2: indep gait with no devices with wall/furniture support 20-30 feet consistently  Long Term Goal 3: indep gait with rollator and AFO's 50 feet consistently  Long Term Goal 4: modified 5x STS completed in 15 sec or less  Patient Goals   Patient Goals : to

## 2025-06-18 PROBLEM — F13.20 BENZODIAZEPINE DEPENDENCE (HCC): Status: ACTIVE | Noted: 2025-06-18

## 2025-06-18 LAB
GLUCOSE BLD-MCNC: 106 MG/DL (ref 70–99)
GLUCOSE BLD-MCNC: 138 MG/DL (ref 70–99)
GLUCOSE BLD-MCNC: 158 MG/DL (ref 70–99)
GLUCOSE BLD-MCNC: 203 MG/DL (ref 70–99)
PERFORMED ON: ABNORMAL

## 2025-06-18 PROCEDURE — 1180000000 HC REHAB R&B

## 2025-06-18 PROCEDURE — 6370000000 HC RX 637 (ALT 250 FOR IP): Performed by: INTERNAL MEDICINE

## 2025-06-18 PROCEDURE — 6360000002 HC RX W HCPCS: Performed by: INTERNAL MEDICINE

## 2025-06-18 PROCEDURE — 99232 SBSQ HOSP IP/OBS MODERATE 35: CPT | Performed by: PHYSICAL MEDICINE & REHABILITATION

## 2025-06-18 PROCEDURE — 6370000000 HC RX 637 (ALT 250 FOR IP): Performed by: PHYSICAL MEDICINE & REHABILITATION

## 2025-06-18 PROCEDURE — 99232 SBSQ HOSP IP/OBS MODERATE 35: CPT | Performed by: NURSE PRACTITIONER

## 2025-06-18 PROCEDURE — 97535 SELF CARE MNGMENT TRAINING: CPT

## 2025-06-18 PROCEDURE — 6370000000 HC RX 637 (ALT 250 FOR IP): Performed by: NURSE PRACTITIONER

## 2025-06-18 RX ORDER — CEPHALEXIN 500 MG/1
500 CAPSULE ORAL EVERY 8 HOURS SCHEDULED
Qty: 6 CAPSULE | Refills: 0 | Status: SHIPPED | OUTPATIENT
Start: 2025-06-18 | End: 2025-06-20

## 2025-06-18 RX ORDER — AMANTADINE HYDROCHLORIDE 100 MG/1
100 CAPSULE, GELATIN COATED ORAL 2 TIMES DAILY
Qty: 60 CAPSULE | Refills: 3 | Status: SHIPPED | OUTPATIENT
Start: 2025-06-18

## 2025-06-18 RX ADMIN — BACLOFEN 10 MG: 10 TABLET ORAL at 21:32

## 2025-06-18 RX ADMIN — PANTOPRAZOLE SODIUM 40 MG: 40 TABLET, DELAYED RELEASE ORAL at 06:58

## 2025-06-18 RX ADMIN — GABAPENTIN 600 MG: 300 CAPSULE ORAL at 15:10

## 2025-06-18 RX ADMIN — PREDNISONE 30 MG: 20 TABLET ORAL at 08:45

## 2025-06-18 RX ADMIN — AMANTADINE 100 MG: 100 CAPSULE ORAL at 21:32

## 2025-06-18 RX ADMIN — GABAPENTIN 600 MG: 300 CAPSULE ORAL at 21:31

## 2025-06-18 RX ADMIN — BACLOFEN 10 MG: 10 TABLET ORAL at 17:24

## 2025-06-18 RX ADMIN — ENOXAPARIN SODIUM 40 MG: 100 INJECTION SUBCUTANEOUS at 08:44

## 2025-06-18 RX ADMIN — AMANTADINE 100 MG: 100 CAPSULE ORAL at 08:45

## 2025-06-18 RX ADMIN — INSULIN LISPRO 2 UNITS: 100 INJECTION, SOLUTION INTRAVENOUS; SUBCUTANEOUS at 21:32

## 2025-06-18 RX ADMIN — BACLOFEN 10 MG: 10 TABLET ORAL at 12:34

## 2025-06-18 RX ADMIN — CEPHALEXIN 500 MG: 250 CAPSULE ORAL at 15:10

## 2025-06-18 RX ADMIN — BACLOFEN 10 MG: 10 TABLET ORAL at 08:44

## 2025-06-18 RX ADMIN — HYDROCODONE BITARTRATE AND ACETAMINOPHEN 1 TABLET: 5; 325 TABLET ORAL at 08:55

## 2025-06-18 RX ADMIN — CEPHALEXIN 500 MG: 250 CAPSULE ORAL at 06:58

## 2025-06-18 RX ADMIN — GABAPENTIN 600 MG: 300 CAPSULE ORAL at 08:44

## 2025-06-18 RX ADMIN — CEPHALEXIN 500 MG: 250 CAPSULE ORAL at 21:31

## 2025-06-18 RX ADMIN — CLONAZEPAM 0.5 MG: 0.5 TABLET ORAL at 21:32

## 2025-06-18 RX ADMIN — SENNOSIDES 8.6 MG: 8.6 TABLET, FILM COATED ORAL at 21:32

## 2025-06-18 RX ADMIN — Medication 2000 UNITS: at 08:44

## 2025-06-18 RX ADMIN — CLONAZEPAM 0.5 MG: 0.5 TABLET ORAL at 08:44

## 2025-06-18 RX ADMIN — ESCITALOPRAM OXALATE 20 MG: 20 TABLET ORAL at 08:45

## 2025-06-18 ASSESSMENT — ENCOUNTER SYMPTOMS
COLOR CHANGE: 0
NAUSEA: 0
SHORTNESS OF BREATH: 0
COUGH: 0
VOMITING: 0
CHEST TIGHTNESS: 0
TROUBLE SWALLOWING: 0
WHEEZING: 0

## 2025-06-18 ASSESSMENT — PAIN - FUNCTIONAL ASSESSMENT: PAIN_FUNCTIONAL_ASSESSMENT: PREVENTS OR INTERFERES SOME ACTIVE ACTIVITIES AND ADLS

## 2025-06-18 ASSESSMENT — PAIN SCALES - GENERAL
PAINLEVEL_OUTOF10: 7
PAINLEVEL_OUTOF10: 0

## 2025-06-18 ASSESSMENT — PAIN DESCRIPTION - PAIN TYPE: TYPE: CHRONIC PAIN

## 2025-06-18 ASSESSMENT — PAIN DESCRIPTION - LOCATION: LOCATION: BACK

## 2025-06-18 ASSESSMENT — PAIN DESCRIPTION - DESCRIPTORS: DESCRIPTORS: ACHING

## 2025-06-18 NOTE — PROGRESS NOTES
Assessment completed . Pt was medicated for pain. Level was a 7. Currently 6. Pt ate well for breakfast. Pt showered with therapy. Electronically signed by Brooke Minor RN on 6/18/2025 at 11:42 AM

## 2025-06-18 NOTE — PROGRESS NOTES
OCCUPATIONAL THERAPY  INPATIENT REHAB TREATMENT NOTE  [facility]      NAME: Maty Dawson  : 1980 (45 y.o.)  MRN: [id number]  CODE STATUS: Full Code  Room: [place] [address]    Date of Service: 2025    Referring Physician: Shakila Koroma DO  Rehab Diagnosis: Impaired mobility and ADL's due to MS exacerbation in setting of UTI    Hospital course:   Comments: 45 y.o. female patient with Hx of MS who presented to ER  with ABD pain, nausea and generalized weakness. UTI + and started on IV Rocephin. Patient admitted with MS exacerbation r/t UTI. Neurology consulting.      Restrictions  Restrictions/Precautions  Restrictions/Precautions: Fall Risk  Activity Level: Up as Tolerated     Patient's date of birth confirmed: Yes    SAFETY:  Safety Devices  Safety Devices in place: Yes  Type of devices: All fall risk precautions in place    SUBJECTIVE: \"I think brought my toothpaste from home.\"    Pain at start of treatment: Yes: 5/10    Pain at end of treatment: Yes: 10    Location: B UE's/LE's  Description: constant  Nursing notified: Declined  Intervention: RN provided pain medication - Sudha RN entered at the beginning of treatment session with all medications     COGNITION:  Orientation  Overall Orientation Status: Within Functional Limits  Cognition  Overall Cognitive Status: WFL      Pt's current cognitive status is:  Comprehension: Independent  Expression: Independent  Social Interaction: Independent  Problem Solving: Independent  Memory: Independent    OBJECTIVE:    Feeding  Assistance Level: Modified independent  Skilled Clinical Factors: patient positive hand -> mouth with medications administered by Sudha FORMAN and water  Grooming/Oral Hygiene  Assistance Level: Modified independent  Skilled Clinical Factors: seated in armchair at bathroom seat  Upper Extremity Bathing  Assistance Level: Modified independent  Skilled Clinical Factors: seated on shower chair  Lower Extremity Bathing  Assistance

## 2025-06-18 NOTE — PROGRESS NOTES
45 y.o. female patient with Hx of MS who presented to ER 6/12 with ABD pain, nausea and generalized weakness. UTI + and started on IV Rocephin. Patient admitted with MS exacerbation r/t UTI. Neurology consulting.     Subjective:   The patient complains of severe acute on chronic progressive fatigue and generalized weakness partially relieved by rest, medications, PT,  OT, steroids SLP and rest and exacerbated by recent MS exacerbation.      I am concerned about patient’s medical complexities and barriers to advancing in rehab goals including quitting smoking.        I discussed current functional, rehabilitation, medical status with other rehabilitation providers including nursing and case management.  According to recent nursing note,  \"assume care of patient   Patient resting denies pain call light in reach , 0300 patient up to bathroom at this time tolerated well\".    I am concerned about her chronic benzodiazepine dependence.    She is pending independent in the room.  She is preparing for discharge tomorrow.  Fortunately the C-spine MRI did not show any change in her plaques.    Therapy informs me that they found an empty beer can and a tall boy beer can in patient's room.  Patient states that it is her boyfriend's.  He is no longer staying with her.  She also has cigarettes in her room though she states that she has not been smoking.  I counseled her to quit smoking.    ROS x10:  The patient also complains of severely impaired mobility and activities of daily living.  Otherwise no new problems with vision, hearing, nose, mouth, throat, dermal, cardiovascular, GI, , pulmonary, musculoskeletal, psychiatric or neurological. See Rehab H&P on Rehab chart dated .       Vital signs:  /77   Pulse 63   Temp 97.7 °F (36.5 °C) (Oral)   Resp 17   Ht 1.499 m (4' 11\")   Wt 86.1 kg (189 lb 14.4 oz)   SpO2 95%   BMI 38.36 kg/m²   I/O:   PO/Intake:  fair PO intake, no problems observed or

## 2025-06-18 NOTE — PROGRESS NOTES
Physical Therapy Rehab Treatment Note  Facility/Department: [facility]  [facility]       NAME: Maty Dawson  : 1980 (45 y.o.)  MRN: [id number]  CODE STATUS: Full Code    Date of Service: 2025  Chart Reviewed: Yes  Family/Caregiver Present: No    Restrictions:  Restrictions/Precautions: Fall Risk       SUBJECTIVE:   Subjective: \"I am looking to going home tomorrow\"    Pain   Denies  \"I've always got the usual MS pains in my arms and legs, etc\"   It's tolerable.       OBJECTIVE:             Bed mobility  Bridging: Independent  Rolling to Left: Independent  Rolling to Right: Independent  Supine to Sit: Independent  Sit to Supine: Independent  Scooting: Independent    Transfers  Sit to Stand: Modified independent  Stand to Sit: Modified independent  Bed to Chair: Modified independent  Stand Pivot Transfers: Modified independent  Car Transfer: Modified independent  Comment: floor transfer with independence.    TUG:15    5 x sit to stands: 15 seconds.                             Patient Education  Education Given To: Patient  Education Provided: Role of Therapy;Plan of Care;Home Exercise Program  Education Provided Comments: demonstrated to patient how to self stretch legs with gait belt. issued red tband for resistance therex to lower extremities.  Education Method: Demonstration;Teach Back  Barriers to Learning: None  Education Outcome: Demonstrated understanding        ASSESSMENT/PROGRESS TOWARDS GOALS: pt has met all of her goals and is to dc home tomorrow. Pt feels she is getting back to her \"normal\" and feels stronger.        Goals:  Long Term Goals  Long Term Goal 1: indep sit to stand, car and floor transfers-met  Long Term Goal 2: indep gait with no devices with wall/furniture support 20-30 feet consistently  Long Term Goal 3: indep gait with rollator and AFO's 50 feet consistently-met  Long Term Goal 4: modified 5x STS completed in 15 sec or less  Patient Goals   Patient Goals : to get back to

## 2025-06-18 NOTE — PROGRESS NOTES
[facility]   MUSIC THERAPY  Declined Visit       Date:  6/18/2025        Patient Name: Maty Dawson       MRN: [id number]        YOB: 1980 (45 y.o.)       Gender: female  Diagnosis: Impaired mobility and ADL's due to MS exacerbation in setting of UTI  Referring Practitioner: Shakila Koroma DO    RESTRICTIONS/PRECAUTIONS:  Restrictions/Precautions: Fall Risk     Hearing: Within functional limits    Subjective/Observation:   Patient declined participating in individual music therapy session at 2:55pm stating she was okay for now, that she likes to listen to her preferred music on her phone.      Assessment/Plan:      [] Patient would like to have music therapy, just not today. [facility]      [] Patient would like to have music therapy another time, however [facility]        *If patient is still on unit, or readmitted at another time, [facility] will attempt to see patient.      [x] Patient does not want music therapy. [facility] unless pt specifically requests.       JENELLE Cortez, [facility]    6/18/2025  Electronically signed by Reyna Baker on 6/18/2025 at 3:15 PM

## 2025-06-18 NOTE — PROGRESS NOTES
[facility] Neurology Daily Progress Note  Name: aMty Dawson  Age: 45 y.o.  Gender: female  CodeStatus: Full Code  Allergies: Morphine    Chief Complaint:No chief complaint on file.    Primary Care Provider: Darian Michelle MD  InpatientTreatment Team: Treatment Team:   Shakila Koroma DO Patel, Dhruv R, MD Kaplan, Mark, MD Arrington, Cindy Gupta, Aniyah Lund RN McKinney, Cynthia, RN Shultz, LINNETTE Cardenas James  Admission Date: 6/15/2025      HPI   Pt seen and examined on rehab for neurology follow-up.  Alert and oriented x 3, no acute distress, cooperative.  Overall patient is doing well.  Denies vision changes.  Occasional urinary incontinence.  Lower extremity weakness has improved.  Fatigue better today.    Vitals:    06/18/25 0810   BP: 124/77   Pulse: 63   Resp: 17   Temp: 97.7 °F (36.5 °C)   SpO2: 95%        Review of Systems   Constitutional:  Positive for fatigue. Negative for appetite change, chills and fever.   HENT:  Negative for hearing loss and trouble swallowing.    Eyes:  Negative for visual disturbance.   Respiratory:  Negative for cough, chest tightness, shortness of breath and wheezing.    Cardiovascular:  Negative for chest pain, palpitations and leg swelling.   Gastrointestinal:  Negative for nausea and vomiting.   Genitourinary:  Negative for difficulty urinating.   Musculoskeletal:  Positive for gait problem.   Skin:  Negative for color change and rash.   Neurological:  Positive for weakness. Negative for dizziness, tremors, seizures, syncope, facial asymmetry, speech difficulty, light-headedness, numbness and headaches.   Psychiatric/Behavioral:  Negative for agitation, confusion and hallucinations. The patient is not nervous/anxious.          Physical Exam  Vitals and nursing note reviewed.   Constitutional:       General: She is not in acute distress.     Appearance: She is not diaphoretic.   HENT:      Head: Normocephalic and atraumatic.   Eyes:      General: No  Normal rate, regular rhythm.  Heart sounds S1, S2.  No murmurs, rubs or gallops.

## 2025-06-18 NOTE — PROGRESS NOTES
0000 assume care of patient   Patient resting denies pain call light in reach , 0300 patient up to bathroom at this time tolerated well

## 2025-06-18 NOTE — PROGRESS NOTES
OCCUPATIONAL THERAPY  INPATIENT REHAB TREATMENT NOTE  [facility]      NAME: Maty Dawson  : 1980 (45 y.o.)  MRN: [id number]  CODE STATUS: Full Code  Room: [place] [address]    Date of Service: 2025    Referring Physician: Shakila Koroma DO  Rehab Diagnosis: Impaired mobility and ADL's due to MS exacerbation in setting of UTI    Hospital course:   Comments: 45 y.o. female patient with Hx of MS who presented to ER  with ABD pain, nausea and generalized weakness. UTI + and started on IV Rocephin. Patient admitted with MS exacerbation r/t UTI. Neurology consulting.      Restrictions  Restrictions/Precautions  Restrictions/Precautions: Fall Risk  Activity Level: Up as Tolerated     Patient's date of birth confirmed: Yes    SAFETY:  Safety Devices  Safety Devices in place: Yes  Type of devices: All fall risk precautions in place    SUBJECTIVE: \"I don't like cooking but I love to bake.\"    Pain at start of treatment: Yes: 5/10    Pain at end of treatment: Yes: 5/10    Location: B UE's/LE's/knees  Description: \"my constant\"  Nursing notified: Declined  Intervention: None    COGNITION:  Orientation  Overall Orientation Status: Within Functional Limits  Cognition  Overall Cognitive Status: WFL      Patient's cognition will improve or maintain baseline to safely perform ADLs:    OBJECTIVE:    Functional Mobility  Activity: To therapy gym  Assistance Level: Modified independent  Skilled Clinical Factors: Patient ambulated while pushing w/c to therapy suite.  Sit to Stand  Assistance Level: Modified independent  Stand to Sit  Assistance Level: Modified independent    Tub/Shower Transfers  Type: Tub  Transfer From: Standing without device  Transfer To: Shower chair with back  Assistance Level: Modified independent  Skilled Clinical Factors: 0 AD - grab bars - Patient transferred to shower chair on first trial. - Patient transferred to bottom of the tub on second trial.    Instrumental ADL's  Instrumental

## 2025-06-18 NOTE — PROGRESS NOTES
Physical Therapy Rehab Treatment Note  Facility/Department: [facility]  [facility]       NAME: Maty Dawson  : 1980 (45 y.o.)  MRN: [id number]  CODE STATUS: Full Code    Date of Service: 2025  Chart Reviewed: Yes  Family/Caregiver Present: No    Restrictions:  Restrictions/Precautions: Fall Risk       SUBJECTIVE:   Subjective: \"I am looking to going home tomorrow\"    Pain   denies      OBJECTIVE:             Bed mobility  Bridging: Independent  Rolling to Left: Independent  Rolling to Right: Independent  Supine to Sit: Independent  Sit to Supine: Independent  Scooting: Independent    Transfers  Sit to Stand: Modified independent  Stand to Sit: Modified independent  Bed to Chair: Modified independent  Stand Pivot Transfers: Modified independent  Car Transfer: Modified independent  Comment: floor transfer with independence.    Ambulation  Surface: Level tile  Device: No Device  Assistance: Supervision  Quality of Gait: steppage gait, wbos, absent arm swing.  Gait Deviations: Decreased arm swing;Decreased head and trunk rotation;Increased JONATHAN  Distance: 25 feet x 2 with seated rest break  More Ambulation?: No       5 x sit to stands:  25 second  TU  Will test again in pm session.                                   ASSESSMENT/PROGRESS TOWARDS GOALS: pt progressing and meeting her goals at this time. Tested pt for independence in the room this am.        Goals:  Long Term Goals  Long Term Goal 1: indep sit to stand, car and floor transfers-met  Long Term Goal 2: indep gait with no devices with wall/furniture support 20-30 feet consistently  Long Term Goal 3: indep gait with rollator and AFO's 50 feet consistently-met  Long Term Goal 4: modified 5x STS completed in 15 sec or less  Patient Goals   Patient Goals : to get back to my normal    PLAN OF CARE/Safety: ongoing          Therapy Time:   Individual   Time In 1100   Time Out 1200   Minutes 60      Minutes:60  Transfer/Bed mobility

## 2025-06-18 NOTE — PLAN OF CARE
Problem: Discharge Planning  Goal: Discharge to home or other facility with appropriate resources  Outcome: Progressing     Problem: Pain  Goal: Verbalizes/displays adequate comfort level or baseline comfort level  Outcome: Progressing     Problem: Safety - Adult  Goal: Free from fall injury  Outcome: Progressing     Problem: ABCDS Injury Assessment  Goal: Absence of physical injury  Outcome: Progressing     Problem: Chronic Conditions and Co-morbidities  Goal: Patient's chronic conditions and co-morbidity symptoms are monitored and maintained or improved  Outcome: Progressing     Problem: Skin/Tissue Integrity  Goal: Skin integrity remains intact  Description: 1.  Monitor for areas of redness and/or skin breakdown  2.  Assess vascular access sites hourly  3.  Every 4-6 hours minimum:  Change oxygen saturation probe site  4.  Every 4-6 hours:  If on nasal continuous positive airway pressure, respiratory therapy assess nares and determine need for appliance change or resting period  Outcome: Progressing

## 2025-06-18 NOTE — PROGRESS NOTES
Comprehensive Nutrition Assessment    Type and Reason for Visit:  Initial, Consult    Nutrition Recommendations/Plan:   Continue Current Diet (Regular)     Malnutrition Assessment:  Malnutrition Status:  No malnutrition (06/18/25 1354)    Nutrition Assessment:    Pt is stable from a nutritional standpoint, with verbalized good appetite/intake currently and pta. Pt reported decreased intake x ~1week pta with GI complaints (now resolved). Pt declined Carb Control 3 to promote weight control when proposed, stating she will try to 'watch her diet' on her own. To continue to follow.    Nutrition Related Findings:    PMH-MS, OA; admitted d/t MS exacerbation. Labs/meds reviewed. Gluc-[id number] x last 24hrs. Meds include- Predenisone (d/c'd today), SS insulin, PPI, senokot. Trace BLE edema noted. Wound Type: None       Current Nutrition Intake & Therapies:    Average Meal Intake: %     ADULT DIET; Regular    Anthropometric Measures:  Height: 149.9 cm (4' 11\")  Ideal Body Weight (IBW): 95 lbs (43 kg)    Admission Body Weight: 86.1 kg (189 lb 13.1 oz) (bedside scale)  Current BMI (kg/m2):  38.36  Usual Body Weight: 90.7 kg (200 lb) ((1/2025 actual); 190-202lb (2024))                          BMI Categories: Obese Class 2 (BMI 35.0 -39.9)    Estimated Daily Nutrient Needs:  Energy Requirements Based On: Kcal/kg  Weight Used for Energy Requirements: Admission  Energy (kcal/day): 8918-6287 (kg x 15-17)     Nutrition Diagnosis:   Altered nutrition-related lab values related to  (steroid tx) as evidenced by lab values    Nutrition Interventions:   Food and/or Nutrient Delivery: Continue Current Diet  Nutrition Education/Counseling: No recommendation at this time  Coordination of Nutrition Care: Continue to monitor while inpatient       Goals:  Goals: PO intake 75% or greater (Gluc < 150)  Type of Goal: New goal       Nutrition Monitoring and Evaluation:      Food/Nutrient Intake Outcomes: Food and Nutrient Intake  Physical

## 2025-06-19 VITALS
TEMPERATURE: 98.2 F | HEIGHT: 59 IN | WEIGHT: 189.9 LBS | RESPIRATION RATE: 17 BRPM | HEART RATE: 68 BPM | BODY MASS INDEX: 38.28 KG/M2 | OXYGEN SATURATION: 97 % | DIASTOLIC BLOOD PRESSURE: 83 MMHG | SYSTOLIC BLOOD PRESSURE: 115 MMHG

## 2025-06-19 LAB
ANION GAP SERPL CALCULATED.3IONS-SCNC: 10 MEQ/L (ref 9–15)
ANISOCYTOSIS BLD QL SMEAR: ABNORMAL
BASOPHILS # BLD: 0 K/UL (ref 0–0.2)
BASOPHILS NFR BLD: 0.5 %
BUN SERPL-MCNC: 19 MG/DL (ref 6–20)
CALCIUM SERPL-MCNC: 9.5 MG/DL (ref 8.5–9.9)
CHLORIDE SERPL-SCNC: 102 MEQ/L (ref 95–107)
CO2 SERPL-SCNC: 31 MEQ/L (ref 20–31)
CREAT SERPL-MCNC: 0.77 MG/DL (ref 0.5–0.9)
EOSINOPHIL # BLD: 0.4 K/UL (ref 0–0.7)
EOSINOPHIL NFR BLD: 2 %
ERYTHROCYTE [DISTWIDTH] IN BLOOD BY AUTOMATED COUNT: 13 % (ref 11.5–14.5)
GLUCOSE BLD-MCNC: 114 MG/DL (ref 70–99)
GLUCOSE BLD-MCNC: 86 MG/DL (ref 70–99)
GLUCOSE SERPL-MCNC: 104 MG/DL (ref 70–99)
HCT VFR BLD AUTO: 46.2 % (ref 37–47)
HGB BLD-MCNC: 15.6 G/DL (ref 12–16)
LYMPHOCYTES # BLD: 6.8 K/UL (ref 1–4.8)
LYMPHOCYTES NFR BLD: 31 %
MCH RBC QN AUTO: 32 PG (ref 27–31.3)
MCHC RBC AUTO-ENTMCNC: 33.8 % (ref 33–37)
MCV RBC AUTO: 94.7 FL (ref 79.4–94.8)
MONOCYTES # BLD: 1.1 K/UL (ref 0.2–0.8)
MONOCYTES NFR BLD: 4.8 %
MYELOCYTES NFR BLD MANUAL: 4 %
NEUTROPHILS # BLD: 13.8 K/UL (ref 1.4–6.5)
NEUTS SEG NFR BLD: 59 %
PERFORMED ON: ABNORMAL
PERFORMED ON: NORMAL
PLATELET # BLD AUTO: 376 K/UL (ref 130–400)
PLATELET BLD QL SMEAR: ADEQUATE
POIKILOCYTOSIS BLD QL SMEAR: ABNORMAL
POTASSIUM SERPL-SCNC: 4.6 MEQ/L (ref 3.4–4.9)
RBC # BLD AUTO: 4.88 M/UL (ref 4.2–5.4)
SLIDE REVIEW: ABNORMAL
SMUDGE CELLS BLD QL SMEAR: 7.6
SODIUM SERPL-SCNC: 143 MEQ/L (ref 135–144)
WBC # BLD AUTO: 21.9 K/UL (ref 4.8–10.8)

## 2025-06-19 PROCEDURE — 6370000000 HC RX 637 (ALT 250 FOR IP): Performed by: NURSE PRACTITIONER

## 2025-06-19 PROCEDURE — 85025 COMPLETE CBC W/AUTO DIFF WBC: CPT

## 2025-06-19 PROCEDURE — 6370000000 HC RX 637 (ALT 250 FOR IP): Performed by: INTERNAL MEDICINE

## 2025-06-19 PROCEDURE — 97530 THERAPEUTIC ACTIVITIES: CPT

## 2025-06-19 PROCEDURE — 97116 GAIT TRAINING THERAPY: CPT

## 2025-06-19 PROCEDURE — 97112 NEUROMUSCULAR REEDUCATION: CPT

## 2025-06-19 PROCEDURE — 80048 BASIC METABOLIC PNL TOTAL CA: CPT

## 2025-06-19 PROCEDURE — 6360000002 HC RX W HCPCS: Performed by: INTERNAL MEDICINE

## 2025-06-19 PROCEDURE — 99239 HOSP IP/OBS DSCHRG MGMT >30: CPT | Performed by: PHYSICAL MEDICINE & REHABILITATION

## 2025-06-19 PROCEDURE — 36415 COLL VENOUS BLD VENIPUNCTURE: CPT

## 2025-06-19 RX ORDER — HYDROCODONE BITARTRATE AND ACETAMINOPHEN 5; 325 MG/1; MG/1
1 TABLET ORAL EVERY 6 HOURS PRN
Qty: 15 TABLET | Refills: 0 | Status: SHIPPED | OUTPATIENT
Start: 2025-06-19 | End: 2025-06-26

## 2025-06-19 RX ADMIN — PANTOPRAZOLE SODIUM 40 MG: 40 TABLET, DELAYED RELEASE ORAL at 06:12

## 2025-06-19 RX ADMIN — BACLOFEN 10 MG: 10 TABLET ORAL at 14:05

## 2025-06-19 RX ADMIN — GABAPENTIN 600 MG: 300 CAPSULE ORAL at 10:37

## 2025-06-19 RX ADMIN — BACLOFEN 10 MG: 10 TABLET ORAL at 10:37

## 2025-06-19 RX ADMIN — ESCITALOPRAM OXALATE 20 MG: 20 TABLET ORAL at 10:38

## 2025-06-19 RX ADMIN — CEPHALEXIN 500 MG: 250 CAPSULE ORAL at 14:10

## 2025-06-19 RX ADMIN — Medication 2000 UNITS: at 10:36

## 2025-06-19 RX ADMIN — ENOXAPARIN SODIUM 40 MG: 100 INJECTION SUBCUTANEOUS at 14:04

## 2025-06-19 RX ADMIN — GABAPENTIN 600 MG: 300 CAPSULE ORAL at 14:06

## 2025-06-19 RX ADMIN — CLONAZEPAM 0.5 MG: 0.5 TABLET ORAL at 10:38

## 2025-06-19 RX ADMIN — CEPHALEXIN 500 MG: 250 CAPSULE ORAL at 06:12

## 2025-06-19 RX ADMIN — AMANTADINE 100 MG: 100 CAPSULE ORAL at 10:36

## 2025-06-19 ASSESSMENT — PAIN DESCRIPTION - LOCATION
LOCATION: NECK
LOCATION: NECK

## 2025-06-19 ASSESSMENT — PAIN SCALES - GENERAL
PAINLEVEL_OUTOF10: 7
PAINLEVEL_OUTOF10: 5

## 2025-06-19 ASSESSMENT — PAIN DESCRIPTION - DESCRIPTORS
DESCRIPTORS: ACHING
DESCRIPTORS: ACHING

## 2025-06-19 NOTE — PROGRESS NOTES
Physical Therapy Rehab Treatment Note  Facility/Department: [facility]  [facility]       NAME: Maty Dawson  : 1980 (45 y.o.)  MRN: [id number]  CODE STATUS: Full Code    Date of Service: 2025       Restrictions:  Restrictions/Precautions: Fall Risk       SUBJECTIVE: Agreeable to treatment. States she's ready to go home.      Pain   6/10 pre and post, ble's and post neck, ok for tx, declines RN or Med intervention    OBJECTIVE:      Transfers  Sit to Stand: Modified independent  Stand to Sit: Modified independent  Car Transfer: Modified independent  Comment: without AD, safe and efficient    Ambulation  Surface: Level tile  Device: No Device  Assistance: Supervision  Quality of Gait: wbos, medium guard, no lob  Gait Deviations: Decreased arm swing;Decreased head and trunk rotation;Increased JONATHAN  Distance: 50 feet with several turns  Comments: intermittent furniture walking to steady safe       Neuromuscular Education  Neuromuscular Comments: balance reactions, retro lob to ellicit hip ankle stepping strategies, close sba, completed with safe timing until ble's fatigued after 5-6 repititions    PT Exercises  PROM Exercises: BLE's hs/calf/piriformis stretch x20 seconds        Activity Tolerance  Activity Tolerance: Patient tolerated treatment well       ASSESSMENT/PROGRESS TOWARDS GOALS:   Assessment  Assessment: Patient completed all mobility with mod I/Ind level with sba/s exeption of blocked practice of limits of stability tasks. Reviewed HEP seated stretch and upper trunk rotational movement.  Activity Tolerance: Patient tolerated treatment well  Discharge Recommendations: Continue to assess pending progress    Goals:  Long Term Goals  Long Term Goal 1: indep sit to stand, car and floor transfers-met  Long Term Goal 2: indep gait with no devices with wall/furniture support 20-30 feet consistently  Long Term Goal 3: indep gait with rollator and AFO's 50 feet consistently-met  Long Term Goal 4:

## 2025-06-19 NOTE — PROGRESS NOTES
Physical Therapy  Facility/Department: [facility] REH[facility]  Rehabilitation Discharge note    NAME: Maty Dawson  : 1980  MRN: [id number]    Date of discharge: 25        Past Medical History:   Diagnosis Date    Arthritis     Carpal tunnel syndrome     Multiple sclerosis (HCC)     Osteoarthritis      Past Surgical History:   Procedure Laterality Date    APPENDECTOMY  1998    laparoscopic at [facility]    BREAST SURGERY      lump removal bengin    CARPAL TUNNEL RELEASE Left 10/07/2022    LEFT CARPAL TUNNEL RELEASE ([facility]) performed by Vahe Dumont MD at [facility] OR    CARPAL TUNNEL RELEASE Right 2022    RIGHT CARPAL TUNNEL RELEASE performed by Vahe Dumont MD at [facility] OR     SECTION      x 3: '99, '03, '05 via Pfannenstiel incision    CHOLECYSTECTOMY, LAPAROSCOPIC N/A 2025    Laparoscopic cholecystectomy performed by Carolin Franklin MD at [facility] OR    TUBAL LIGATION      UPPER GASTROINTESTINAL ENDOSCOPY N/A 10/09/2024    ESOPHAGOGASTRODUODENOSCOPY with biopsies performed by Luanne Bridges MD at [facility]       Restrictions  Restrictions/Precautions  Restrictions/Precautions: Fall Risk  Activity Level: Up as Tolerated  Position Activity Restriction  Other Position/Activity Restrictions: Independent in room.    Objective      Bed mobility  Bridging: Independent  Rolling to Left: Independent  Rolling to Right: Independent  Supine to Sit: Independent  Sit to Supine: Independent  Scooting: Independent     Transfers  Sit to Stand: Modified independent  Stand to Sit: Modified independent  Bed to Chair: Modified independent  Stand Pivot Transfers: Modified independent  Car Transfer: Modified independent  Comment: floor transfer with independence.     Ambulation  Surface: Level tile  Device: No Device  Assistance: Supervision  Quality of Gait: steppage gait, wbos, absent arm swing.  Gait Deviations: Decreased arm swing;Decreased head and trunk rotation;Increased

## 2025-06-19 NOTE — PROGRESS NOTES
OCCUPATIONAL THERAPY  INPATIENT REHAB TREATMENT NOTE  [facility]      NAME: Maty Dawson  : 1980 (45 y.o.)  MRN: [id number]  CODE STATUS: Full Code  [facility]    Date of Service: 2025    Referring Physician: Shakila Koroma DO  Rehab Diagnosis: Impaired mobility and ADL's due to MS exacerbation in setting of UTI    Hospital course:   Comments: 45 y.o. female patient with Hx of MS who presented to ER  with ABD pain, nausea and generalized weakness. UTI + and started on IV Rocephin. Patient admitted with MS exacerbation r/t UTI. Neurology consulting.      Restrictions  Restrictions/Precautions  Restrictions/Precautions: Independent in room  Activity Level: Up as Tolerated     Patient's date of birth confirmed: Yes    SAFETY:  Safety Devices  Safety Devices in place: Yes  Type of devices: All fall risk precautions in place    SUBJECTIVE: \"I have 4 boys because I actually had a daughter but she's transitioning. So now we call him Darrick.\"    Pain at start of treatment: Yes: 6/10    Pain at end of treatment: Yes: 8/10    Location: B UE's/LE's  Description: constant  Nursing notified: Declined  Intervention: None    COGNITION:  Orientation  Overall Orientation Status: Within Functional Limits  Cognition  Overall Cognitive Status: WFL  Cognition Comment: Comprehension: IND   Expression: IND   Social Interaction: IND   Problem Solving: IND   Memory: IND      Pt's current cognitive status is:  Comprehension: Independent  Expression: Independent  Social Interaction: Independent  Problem Solving: Independent  Memory: Independent    OBJECTIVE:    UE Strengthening HEP:   Patient engaged in B UE ROM/strengthening to increase I with ADL's and transfers.   Patient issued written HEP focusing on all UE joints including scapular protraction/retraction, shoulder flexion/extension/rotation/horizontal abduction, elbow flexion/extension, supination/pronation, and wrist/digit flexion/extension..  Patient

## 2025-06-19 NOTE — CARE COORDINATION
Case Management Initial Assessment        NAME:  Maty Dawson  ROOM: R247/R247-01  :  1980  DATE: 2025        Social Functional:  Social/Functional History  Lives With: Significant other (fiance cares for mother as well (but pt always has someone with her); four cats (yakelinance cares for))  Type of Home: House  Home Layout: One level  Home Access: Level entry  Bathroom Shower/Tub: Tub/Shower unit;Curtain  Bathroom Toilet: Standard  Bathroom Equipment: Grab bars in shower;Tub transfer bench  Bathroom Accessibility: Walker accessible  Home Equipment: Cane;Rollator;Wheelchair - Manual;Alert button  Receives Help From: Family ([employer]: [phone]  (niece's agency))  Prior Level of Assist for ADLs: Needs assistance (stand by assist bathing, ambulation.--four days a week (7hrs/day) pt was going to niece's home but niece may have to go to pt's home now)  Bath: Stand by assistance (partner waits outside while pt bathes)  Dressing: Modified independent (pt requires increased time; but if pt is exhausted from bathing fiance will help get dressed)  Grooming: Independent  Feeding: Independent  Toileting: Independent (incontinence of urine: wears Depends, continent stool.)  Prior Level of Assist for Homemaking: Needs assistance (housework/cooking/ transportation: niece Bette helps. PAR helps also with transportation.)  Meal Prep: Total  Laundry: Total  Vacuuming: Total  Cleaning: Total  Gardening: Total  Yard Work: Total  Driving: Total  Shopping: Total  Homemaking Responsibilities: No  Prior Level of Assist for Transfers: Independent (pt requires assist out of tub from partner if bathing; can transfer out of shower from shower chair)  Active : No  Patient's  Info: Provide a Ride/niece.  Mode of Transportation: Car;Van  Education: some college--2 months from graduating with medical 
LSW met with pt and confirmed she feels ready to discharge home today. Orders made for [facility]. No new DME needed. Patient satisfaction survey completed. Electronically signed by BRETT Akers, AUGUSTO on 6/19/2025 at 4:43 PM\  
LSW met with pt and fiance and discussed the discharge date and goals. Pt and fiance are in agreement with plan and did not have any concerns. OP was discussed and given freedom of choice, pt would like [facility] at discharge. Orders will be placed. No new DME needed at this time. Electronically signed by BRETT Akers, AUGUSTO on 6/17/2025 at 4:43 PM    
[facility]  INPATIENT REHABILITATION  INDEPENDENT IN ROOM NOTE  Room: R247/R247-01  Admit Date: 6/15/2025       Date: 2025  Patient Name: Maty Dawson        MRN: [id number]    : 1980  (45 y.o.)  Gender: female      Diagnosis: : Impaired mobility and ADL's due to MS exac in setting of UTI         Discipline pre admission summary:    Nursing:  Patient orientation to the room/suite:  [x] Yes    []   No  (Safety checks to consider: Oxygen, Fire Alarm, Stove safety, Call alarm, Bed alarm,   Bed power, TV, Phone)        1. Pt physical ability to be independent in room/suite:  [x] Yes    []   No   Comment:    2. Pt demonstrates cognitive ability to be independent in room/suite:  [x] Yes    []   No   Comment:    3. Pt demonstrates emotional ability to be independent in room/suite:  [x] Yes    []   No   Comment:    4. Special Considerations/Equipment if needed: [x] NA   Comment:    Recommend independent in room/suite:  [x] Yes    []   No            Signature: Electronically signed by Brooke Minor RN on 2025 at 11:43 AM       Occupational Therapy:  1. Pt physical ability to be independent in room/suite:  [x] Yes    []   No   Comment:     2. Pt demonstrates cognitive ability to be independent in room/suite:  [x] Yes    []   No   Comment:    3. Pt demonstrates emotional ability to be independent in room/suite:  [x] Yes    []   No   Comment:    4. Special Considerations/Equipment if needed: [] NA   Comment:    Recommend independent in room/suite:  [x] Yes    []   No       Signature: Electronically signed by LINNETTE Her on 25 at 9:24 AM EDT      Physical Therapy:  1. Pt physical ability to be independent in room/suite:  [x] Yes    []   No   Comment:     2. Pt demonstrates cognitive ability to be independent in room/suite:  [x] Yes    []   No   Comment:    3. Pt demonstrates emotional ability to be independent in room/suite:  [x] Yes    []   No   Comment:    4. Special 
(06/16/25 0853)  Quality of Gait: decreased speed and candy, over-pronates,  no LOB however unsteadiness requires 1 UE support on furniture or walls (06/16/25 0853)  Gait Deviations: Decreased arm swing;Decreased head and trunk rotation;Increased JONATHAN (06/16/25 0853)  Distance: 20 feet - multiple trials (06/16/25 0853)  Comments: good judgement in choice of furniture and wall proximity during these distance (06/16/25 0853)  More Ambulation?: Yes (06/16/25 0853)  Ambulation 2  Surface - 2: level tile (06/16/25 0853)  Device 2: Rollator (06/16/25 0853)  Other Apparatus 2: AFO;Left;Right (06/16/25 0853)  Assistance 2: Stand by assistance (to ensure safety) (06/16/25 0853)  Quality of Gait 2: increased lat sway, improved quality and balance (06/16/25 0853)  Distance: 50 feet (06/16/25 0853)  Ambulation  Surface: Level surface (06/16/25 1509)  Device: Rollator (no AD) (06/16/25 1509)  Distance: 100', 15' x 2 (06/16/25 1509)  Activity: Within Unit (06/16/25 1509)  Additional Factors: Verbal cues;Hand placement cues (06/16/25 1509)  Assistance Level: Stand by assist;Minimal assistance (06/16/25 1509)  Gait Deviations: Slow candy;Path deviations (06/16/25 1509)  Skilled Clinical Factors: good ability to increased gait distance, braces to BLE applied with no LOB/instability. good stability with use of rollator. short distances no AD with Alf for stability and balance throughout. (06/16/25 1509)  Stairs:  Stairs/Curb  Stairs?: No (06/16/25 0853)  W/C mobility:  Wheelchair Activities  Propulsion: Yes (06/16/25 0853)  Propulsion 1  Method: RUE;LUE (06/16/25 0853)  Level of Assistance: Independent (06/16/25 0853)  Description/ Details: limited by UE fatigue (06/16/25 0853)  Distance: 50 feet (06/16/25 0853)  LTG:  Long Term Goal 1: indep sit to stand, car and floor transfers  Long Term Goal 2: indep gait with no devices with wall/furniture support 20-30 feet consistently  Long Term Goal 3: indep gait with rollator and AFO's

## 2025-06-19 NOTE — PROGRESS NOTES
Patient complained of pain to neck this morning and now. Scheduled baclofen and gabapentin given.  Electronically signed by Nano Gill LPN on 6/19/2025 at 2:33 PM    Discharge papers explained to patient. Patient verbalized understanding. Transport ordered via bedwatch.  Electronically signed by Nano Gill LPN on 6/19/2025 at 2:34 PM    Patient picked up by transport.  Electronically signed by Nano Gill LPN on 6/19/2025 at 2:40 PM

## 2025-06-19 NOTE — DISCHARGE INSTR - DIET

## 2025-06-19 NOTE — PROGRESS NOTES
[facility] OCCUPATIONAL THERAPY DISCHARGE SUMMARY- REHAB     Date: 2025  Patient Name: Maty Dawson        MRN: [id number]  Account: [id number]   : 1980  (45 y.o.)  Room: [place] [address]    Diagnosis:  Impaired mobility and ADL's due to MS exacerbation in setting of UTI    Past Medical History:   Diagnosis Date    Arthritis     Carpal tunnel syndrome     Multiple sclerosis (HCC)     Osteoarthritis      Past Surgical History:   Procedure Laterality Date    APPENDECTOMY  1998    laparoscopic at [facility]    BREAST SURGERY      lump removal bengin    CARPAL TUNNEL RELEASE Left 10/07/2022    LEFT CARPAL TUNNEL RELEASE ([facility]) performed by Vahe Dumont MD at [facility] OR    CARPAL TUNNEL RELEASE Right 2022    RIGHT CARPAL TUNNEL RELEASE performed by Vahe Dumont MD at [facility] OR     SECTION      x 3: '99, '03, '05 via Pfannenstiel incision    CHOLECYSTECTOMY, LAPAROSCOPIC N/A 2025    Laparoscopic cholecystectomy performed by Carolin Franklin MD at [facility] OR    TUBAL LIGATION      UPPER GASTROINTESTINAL ENDOSCOPY N/A 10/09/2024    ESOPHAGOGASTRODUODENOSCOPY with biopsies performed by Luanne Bridges MD at [facility]       Precautions:   Restrictions/Precautions: Fall Risk  Other Position/Activity Restrictions: Independent in room.     Social/Functional History:  Social/Functional History  Lives With: Significant other ([facility] cares for mother as well (but pt always has someone with her); four cats ([facility] cares for))  Type of Home: House  Home Layout: One level  Home Access: Level entry  Bathroom Shower/Tub: Tub/Shower unit, Curtain  Bathroom Toilet: Standard  Bathroom Equipment: Grab bars in shower, Tub transfer bench  Bathroom Accessibility: Walker accessible  Home Equipment: Cane, Rollator, Wheelchair - Manual, Alert button  Has the patient had two or more falls in the past year or any fall with injury in the past year?: Unknown  Receives Help From:

## 2025-06-19 NOTE — DISCHARGE SUMMARY
45 y.o. female patient with Hx of MS who presented to ER 6/12 with ABD pain, nausea and generalized weakness. UTI + and started on IV Rocephin. Patient admitted with MS exacerbation r/t UTI. Neurology consulting.     Subjective:   The patient complains of severe acute on chronic progressive fatigue and generalized weakness partially relieved by rest, medications, PT,  OT, steroids SLP and rest and exacerbated by recent MS exacerbation.      I am concerned about patient’s medical complexities and barriers to advancing in rehab goals including quitting smoking.        I discussed current functional, rehabilitation, medical status with other rehabilitation providers including nursing and case management.  According to recent nursing note,  \"assume care of patient   Patient resting denies pain call light in reach , 0300 patient up to bathroom at this time tolerated well\".    I am concerned about her chronic benzodiazepine dependence.    She is pending independent in the room.  She is preparing for discharge tomorrow.  Fortunately the C-spine MRI did not show any change in her plaques.    Therapy informs me that they found an empty beer can and a tall boy beer can in patient's room.  Patient states that it is her boyfriend's.  He is no longer staying with her.  She also has cigarettes in her room though she states that she has not been smoking.  I counseled her to quit smoking.    DISCHARGE SUMMARY    Hospital Course: The patient was admitted to the Rehabilitation Unit to address ADL and mobility deficits-as detailed above and below.  The patient was enrolled in acute PT, OT program.  Weekly team meetings were held to assess functional progress toward their goals-and modify the therapy program.  The patient's medical, emotional, psychosocial and functional issues were addressed.  The patient progressed in the rehab program and is now ready for discharge home.  Refer to functional assessments summary report for detailed

## 2025-06-19 NOTE — DISCHARGE INSTR - COC
Continuity of Care Form    Patient Name: Maty Dawson   :  1980  MRN:  [id number]    Admit date:  6/15/2025  Discharge date:  25    Code Status Order: Full Code   Advance Directives:     Admitting Physician:  Shakila Koroma DO  PCP: Darian Michelle MD    Discharging Nurse: Electronically signed by Nano Gill LPN on 2025 at 12:07 PM    Discharging Hospital Unit/Room#: R247/R247-01  Discharging Unit Phone Number: [phone]    Emergency Contact:   Extended Emergency Contact Information  Primary Emergency Contact: Solange Headley   [facility]  Home Phone: [phone]  Mobile Phone: [phone]  Relation: Parent  Secondary Emergency Contact: Jaylen Blanca  Home Phone: [phone]  Relation: Other    Past Surgical History:  Past Surgical History:   Procedure Laterality Date    APPENDECTOMY      laparoscopic at [facility]    BREAST SURGERY      lump removal bengin    CARPAL TUNNEL RELEASE Left 10/07/2022    LEFT CARPAL TUNNEL RELEASE ([facility]) performed by Vahe Dumont MD at [facility] OR    CARPAL TUNNEL RELEASE Right 2022    RIGHT CARPAL TUNNEL RELEASE performed by Vahe Dumont MD at [facility] OR     SECTION      x 3: '99, '03, '05 via Pfannenstiel incision    CHOLECYSTECTOMY, LAPAROSCOPIC N/A 2025    Laparoscopic cholecystectomy performed by Carolin Franklin MD at [facility] OR    TUBAL LIGATION      UPPER GASTROINTESTINAL ENDOSCOPY N/A 10/09/2024    ESOPHAGOGASTRODUODENOSCOPY with biopsies performed by Luanne Bridges MD at [facility] CENTER       Immunization History:   Immunization History   Administered Date(s) Administered    Influenza Vaccine, unspecified formulation 2018    Influenza, FLUARIX, FLULAVAL, FLUZONE (age 6 mo+) and AFLURIA, (age 3 y+), Quadv PF, 0.5mL 2018    TDaP, ADACEL (age 10y-64y), BOOSTRIX (age 10y+), IM, 0.5mL 2011, 2019, 2019       Active Problems:  Patient Active Problem List

## 2025-06-20 ENCOUNTER — TELEPHONE (OUTPATIENT)
Age: 45
End: 2025-06-20

## 2025-06-20 NOTE — TELEPHONE ENCOUNTER
Care Transitions Initial Follow Up Call    Outreach made within 2 business days of discharge: Yes    Patient: Maty Dawson Patient : 1980   MRN: [id number]  Reason for Admission: Impaired mobility ADLs dt MS exac   Discharge Date: 25       Spoke with: WENDY X1    Discharge department/facility: [facility]        Scheduled appointment with PCP within 7-14 days    Follow Up  Future Appointments   Date Time Provider Department Center   2025 11:00 AM Darian Michelle MD [facility] DEP   2025  2:15 PM Adilson Valderrama MD [facility] Neurology -   2025 10:15 AM Darian Michelle MD [facility] DEP   2026 10:30 AM Jaiden Chaparro DPM Podiatry [facility], [place]

## 2025-06-26 ENCOUNTER — OFFICE VISIT (OUTPATIENT)
Age: 45
End: 2025-06-26
Payer: COMMERCIAL

## 2025-06-26 VITALS
HEIGHT: 59 IN | DIASTOLIC BLOOD PRESSURE: 66 MMHG | HEART RATE: 90 BPM | OXYGEN SATURATION: 96 % | TEMPERATURE: 97.6 F | SYSTOLIC BLOOD PRESSURE: 130 MMHG | WEIGHT: 192.6 LBS | BODY MASS INDEX: 38.83 KG/M2

## 2025-06-26 DIAGNOSIS — G35 MS (MULTIPLE SCLEROSIS) (HCC): Primary | ICD-10-CM

## 2025-06-26 DIAGNOSIS — Z12.31 ENCOUNTER FOR SCREENING MAMMOGRAM FOR BREAST CANCER: ICD-10-CM

## 2025-06-26 PROCEDURE — G8427 DOCREV CUR MEDS BY ELIG CLIN: HCPCS | Performed by: FAMILY MEDICINE

## 2025-06-26 PROCEDURE — G8417 CALC BMI ABV UP PARAM F/U: HCPCS | Performed by: FAMILY MEDICINE

## 2025-06-26 PROCEDURE — 99214 OFFICE O/P EST MOD 30 MIN: CPT | Performed by: FAMILY MEDICINE

## 2025-06-26 PROCEDURE — 1111F DSCHRG MED/CURRENT MED MERGE: CPT | Performed by: FAMILY MEDICINE

## 2025-06-26 PROCEDURE — 4004F PT TOBACCO SCREEN RCVD TLK: CPT | Performed by: FAMILY MEDICINE

## 2025-06-26 RX ORDER — CLONAZEPAM 0.5 MG/1
TABLET ORAL
Qty: 60 TABLET | Refills: 2 | Status: SHIPPED | OUTPATIENT
Start: 2025-06-26 | End: 2025-08-25

## 2025-06-26 NOTE — PROGRESS NOTES
[facility]  [facility]  [address]  [place] [address]  Dept: [phone]  Dept Fax: [phone]  Loc: [phone]     Subjective  Maty Dawson, 45 y.o. female Established patient presents today with:  Chief Complaint   Patient presents with    Discuss Medications     Lorazepam, Clonazepam        History of Present Illness        Past Medical History:   Diagnosis Date    Arthritis     Carpal tunnel syndrome     Multiple sclerosis (HCC)     Osteoarthritis      Past Surgical History:   Procedure Laterality Date    APPENDECTOMY      laparoscopic at [facility]    BREAST SURGERY      lump removal bengin    CARPAL TUNNEL RELEASE Left 10/07/2022    LEFT CARPAL TUNNEL RELEASE ([facility]) performed by Vahe Dumont MD at [facility] OR    CARPAL TUNNEL RELEASE Right 2022    RIGHT CARPAL TUNNEL RELEASE performed by Vahe Dumont MD at [facility] OR     SECTION      x 3: '99, '03, '05 via Pfannenstiel incision    CHOLECYSTECTOMY, LAPAROSCOPIC N/A 2025    Laparoscopic cholecystectomy performed by Carolin Franklin MD at [facility] OR    TUBAL LIGATION      UPPER GASTROINTESTINAL ENDOSCOPY N/A 10/09/2024    ESOPHAGOGASTRODUODENOSCOPY with biopsies performed by Luanne Bridges MD at [facility]     Immunization History   Administered Date(s) Administered    Influenza Vaccine, unspecified formulation 2018    Influenza, FLUARIX, FLULAVAL, FLUZONE (age 6 mo+) and AFLURIA, (age 3 y+), Quadv PF, 0.5mL 2018    TDaP, ADACEL (age 10y-64y), BOOSTRIX (age 10y+), IM, 0.5mL 2011, 2019, 2019     Current Outpatient Medications   Medication Sig Dispense Refill    clonazePAM (KLONOPIN) 0.5 MG tablet 1 tablet twice a day 60 tablet 2    HYDROcodone-acetaminophen (NORCO) 5-325 MG per tablet Take 1 tablet by mouth every 6 hours as needed for Pain for up to 7 days. Max

## 2025-07-09 ENCOUNTER — OFFICE VISIT (OUTPATIENT)
Age: 45
End: 2025-07-09
Payer: COMMERCIAL

## 2025-07-09 VITALS
BODY MASS INDEX: 39.18 KG/M2 | WEIGHT: 194 LBS | SYSTOLIC BLOOD PRESSURE: 118 MMHG | HEART RATE: 84 BPM | DIASTOLIC BLOOD PRESSURE: 64 MMHG

## 2025-07-09 DIAGNOSIS — R25.2 SPASTICITY: ICD-10-CM

## 2025-07-09 DIAGNOSIS — G35 MS (MULTIPLE SCLEROSIS) (HCC): Primary | ICD-10-CM

## 2025-07-09 DIAGNOSIS — G44.86 CERVICOGENIC HEADACHE: ICD-10-CM

## 2025-07-09 DIAGNOSIS — M21.371 BILATERAL FOOT-DROP: ICD-10-CM

## 2025-07-09 DIAGNOSIS — R26.0 ATAXIC GAIT: ICD-10-CM

## 2025-07-09 DIAGNOSIS — M21.372 BILATERAL FOOT-DROP: ICD-10-CM

## 2025-07-09 DIAGNOSIS — G62.9 NEUROPATHY: ICD-10-CM

## 2025-07-09 DIAGNOSIS — R53.82 CHRONIC FATIGUE: ICD-10-CM

## 2025-07-09 DIAGNOSIS — R39.15 URINARY URGENCY: ICD-10-CM

## 2025-07-09 PROCEDURE — 4004F PT TOBACCO SCREEN RCVD TLK: CPT | Performed by: PSYCHIATRY & NEUROLOGY

## 2025-07-09 PROCEDURE — G8417 CALC BMI ABV UP PARAM F/U: HCPCS | Performed by: PSYCHIATRY & NEUROLOGY

## 2025-07-09 PROCEDURE — 1111F DSCHRG MED/CURRENT MED MERGE: CPT | Performed by: PSYCHIATRY & NEUROLOGY

## 2025-07-09 PROCEDURE — 99214 OFFICE O/P EST MOD 30 MIN: CPT | Performed by: PSYCHIATRY & NEUROLOGY

## 2025-07-09 PROCEDURE — G8427 DOCREV CUR MEDS BY ELIG CLIN: HCPCS | Performed by: PSYCHIATRY & NEUROLOGY

## 2025-07-09 NOTE — PROGRESS NOTES
Subjective:      Patient ID: Maty Dawson is a 45 y.o. female who presents today for:  Chief Complaint   Patient presents with    Follow-up     Pt states things are ok. She was in the hospital a few weeks ago. 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  Patient was admitted to hospital significant lower extremity weakness with good response to methylprednisone.  Patient also has tremors and we have her on Klonopin  Patient doing otherwise well after the hospitalization no other abnormal findings noted.  We would further obtain cervical spine MRI due to her reflexes and this does not show any cord compression.  She has stable demyelinating disease in the cervical spine but no new lesion.   Patient is on Ocrevus.  Patient she is admitted Intractable traction and further worsening of patient's bilateral foot drops with the braces as well.  We have her on gabapentin.  Next patient also questionable fatigue and we had her on amantadine at home which she has not continued and has some response.    Past Medical History:   Diagnosis Date    Arthritis     Carpal tunnel syndrome     Multiple sclerosis (HCC)     Osteoarthritis      Past Surgical History:   Procedure Laterality Date    APPENDECTOMY      laparoscopic at [facility]    BREAST SURGERY      lump removal bengin    CARPAL TUNNEL RELEASE Left 10/07/2022    LEFT CARPAL TUNNEL RELEASE ([facility] performed by Vahe Dumont MD at [facility] OR    CARPAL TUNNEL RELEASE Right 2022    RIGHT CARPAL TUNNEL RELEASE performed by Vahe Dumont MD at [facility] OR     SECTION      x 3: '99, '03, '05 via Pfannenstiel incision    CHOLECYSTECTOMY, LAPAROSCOPIC N/A 2025    Laparoscopic cholecystectomy performed by Carolin Franklin MD at [facility] OR    TUBAL LIGATION      UPPER GASTROINTESTINAL ENDOSCOPY N/A 10/09/2024

## 2025-09-02 RX ORDER — ETODOLAC 400 MG/1
400 TABLET, FILM COATED ORAL 2 TIMES DAILY
Qty: 60 TABLET | Refills: 11 | Status: SHIPPED | OUTPATIENT
Start: 2025-09-02

## 2025-09-02 RX ORDER — ACETAMINOPHEN 160 MG
2000 TABLET,DISINTEGRATING ORAL DAILY
Qty: 30 CAPSULE | Refills: 11 | Status: SHIPPED | OUTPATIENT
Start: 2025-09-02

## 2025-09-05 RX ORDER — ESOMEPRAZOLE MAGNESIUM 40 MG/1
CAPSULE, DELAYED RELEASE ORAL DAILY
Qty: 90 CAPSULE | Refills: 1 | Status: SHIPPED | OUTPATIENT
Start: 2025-09-05

## (undated) DEVICE — FORCEPS BX L240CM JAW DIA2.8MM L CAP W/ NDL MIC MESH TOOTH

## (undated) DEVICE — TISSUE RETRIEVAL SYSTEM: Brand: INZII RETRIEVAL SYSTEM

## (undated) DEVICE — SUTURE VICRYL + SZ 3-0 L27IN ABSRB UD L26MM SH 1/2 CIR VCP416H

## (undated) DEVICE — GLOVE SURG SZ 6 L12IN FNGR THK79MIL GRN LTX FREE

## (undated) DEVICE — GENERAL LAPAROSCOPY: Brand: MEDLINE INDUSTRIES, INC.

## (undated) DEVICE — LAPAROSCOPIC TROCAR SLEEVE/SINGLE USE: Brand: KII® OPTICAL ACCESS SYSTEM

## (undated) DEVICE — SUTURE MONOCRYL SZ 4-0 L27IN ABSRB UD L19MM PS-2 1/2 CIR PRIM Y426H

## (undated) DEVICE — CHLORAPREP 26ML ORANGE

## (undated) DEVICE — DRAPE,UTILITY,TAPE,15X26,STERILE: Brand: MEDLINE

## (undated) DEVICE — CONMED SCOPE SAVER BITE BLOCK, 20X27 MM: Brand: SCOPE SAVER

## (undated) DEVICE — BLADE,CARBON-STEEL,15,STRL,DISPOSABLE,TB: Brand: MEDLINE

## (undated) DEVICE — 1010 S-DRAPE TOWEL DRAPE 10/BX: Brand: STERI-DRAPE™

## (undated) DEVICE — DRAPE SURG EXT FEN REINF ST W O FLD PCH STD

## (undated) DEVICE — GLOVE SURG SZ 55 THK91MIL ORANGE  LTX FREE SYN POLYISOPRENE

## (undated) DEVICE — LIQUIBAND RAPID ADHESIVE 36/CS 0.8ML: Brand: MEDLINE

## (undated) DEVICE — TROCAR: Brand: KII® SLEEVE

## (undated) DEVICE — SINGLE PORT MANIFOLD: Brand: NEPTUNE 2

## (undated) DEVICE — BANDAGE COMPR W2INXL5YD HI E BGE W/ CLP SURE-WRAP

## (undated) DEVICE — APPLIER CLP M/L SHFT DIA5MM 15 LIG LIGAMAX 5

## (undated) DEVICE — DRAPE,HAND,STERILE: Brand: MEDLINE

## (undated) DEVICE — GLOVE ORANGE PI 8   MSG9080

## (undated) DEVICE — TUBING, SUCTION, 1/4" X 10', STRAIGHT: Brand: MEDLINE

## (undated) DEVICE — MINOR: Brand: MEDLINE INDUSTRIES, INC.

## (undated) DEVICE — SUTURE ETHLN 5-0 L18IN NONABSORBABLE BLK PS-3 L16MM 3/8 CIR 1668H

## (undated) DEVICE — DRESSING GZ W1XL8IN COT XRFRM N ADH OVERWRAP CURAD

## (undated) DEVICE — GAUZE,SPONGE,FLUFF,6"X6.75",STRL,10/TRAY: Brand: MEDLINE

## (undated) DEVICE — LABEL MED MINI W/ MARKER

## (undated) DEVICE — TROCARS: Brand: KII® BALLOON BLUNT TIP SYSTEM

## (undated) DEVICE — ELECTRODE LAP L36CM PTFE WIRE J HK CLEANCOAT

## (undated) DEVICE — PADDING CAST W2INXL4YD COT LO LINTING WYTEX

## (undated) DEVICE — TUBING IRRIGATION 140/160/180/190 SER GI ENDOSCP SMARTCAP

## (undated) DEVICE — NEPTUNE E-SEP SMOKE EVACUATION PENCIL, COATED, 70MM BLADE, PUSH BUTTON SWITCH: Brand: NEPTUNE E-SEP

## (undated) DEVICE — AGENT HEMSTAT W2XL4IN OXIDIZED REGENERATED CELOS ABSRB

## (undated) DEVICE — GOWN,AURORA,NONREINFORCED,LARGE: Brand: MEDLINE

## (undated) DEVICE — ENDO CARRY-ON PROCEDURE KIT: Brand: ENDO CARRY-ON PROCEDURE KIT

## (undated) DEVICE — DEVICE TRCR 12X9X3IN WHT CLSR DISP OMNICLOSE

## (undated) DEVICE — BRUSH ENDO CLN L90.5IN SHTH DIA1.7MM BRIST DIA5-7MM 2-6MM

## (undated) DEVICE — SUTURE VICRYL + SZ 0 L27IN ABSRB VLT L26MM UR-6 5/8 CIR VCP603H

## (undated) DEVICE — TUBE SET 96 MM 64 MM H2O PERISTALTIC STD AUX CHANNEL